# Patient Record
Sex: MALE | Race: ASIAN | NOT HISPANIC OR LATINO | Employment: OTHER | URBAN - METROPOLITAN AREA
[De-identification: names, ages, dates, MRNs, and addresses within clinical notes are randomized per-mention and may not be internally consistent; named-entity substitution may affect disease eponyms.]

---

## 2023-02-22 ENCOUNTER — OFFICE VISIT (OUTPATIENT)
Dept: FAMILY MEDICINE | Facility: CLINIC | Age: 73
End: 2023-02-22
Payer: MEDICAID

## 2023-02-22 VITALS
DIASTOLIC BLOOD PRESSURE: 73 MMHG | HEIGHT: 62 IN | WEIGHT: 137 LBS | HEART RATE: 69 BPM | SYSTOLIC BLOOD PRESSURE: 116 MMHG | TEMPERATURE: 99 F | OXYGEN SATURATION: 98 % | RESPIRATION RATE: 20 BRPM | BODY MASS INDEX: 25.21 KG/M2

## 2023-02-22 DIAGNOSIS — E11.9 TYPE 2 DIABETES MELLITUS WITHOUT COMPLICATION, WITHOUT LONG-TERM CURRENT USE OF INSULIN: Primary | ICD-10-CM

## 2023-02-22 DIAGNOSIS — R26.81 GAIT INSTABILITY: ICD-10-CM

## 2023-02-22 DIAGNOSIS — R26.2 DIFFICULTY WALKING: ICD-10-CM

## 2023-02-22 DIAGNOSIS — K59.00 CONSTIPATION, UNSPECIFIED CONSTIPATION TYPE: ICD-10-CM

## 2023-02-22 DIAGNOSIS — Z12.11 COLON CANCER SCREENING: ICD-10-CM

## 2023-02-22 DIAGNOSIS — I10 HYPERTENSION, UNSPECIFIED TYPE: ICD-10-CM

## 2023-02-22 DIAGNOSIS — I10 PRIMARY HYPERTENSION: ICD-10-CM

## 2023-02-22 DIAGNOSIS — Z00.00 WELLNESS EXAMINATION: ICD-10-CM

## 2023-02-22 DIAGNOSIS — R29.818 OTHER SYMPTOMS AND SIGNS INVOLVING THE NERVOUS SYSTEM: ICD-10-CM

## 2023-02-22 DIAGNOSIS — K59.09 OTHER CONSTIPATION: ICD-10-CM

## 2023-02-22 LAB
ALBUMIN SERPL-MCNC: 3.9 G/DL (ref 3.4–4.8)
ALBUMIN/GLOB SERPL: 1.1 RATIO (ref 1.1–2)
ALP SERPL-CCNC: 52 UNIT/L (ref 40–150)
ALT SERPL-CCNC: 28 UNIT/L (ref 0–55)
APPEARANCE UR: CLEAR
AST SERPL-CCNC: 22 UNIT/L (ref 5–34)
BACTERIA #/AREA URNS AUTO: ABNORMAL /HPF
BASOPHILS # BLD AUTO: 0.05 X10(3)/MCL (ref 0–0.2)
BASOPHILS NFR BLD AUTO: 0.7 %
BILIRUB UR QL STRIP.AUTO: NEGATIVE MG/DL
BILIRUBIN DIRECT+TOT PNL SERPL-MCNC: 0.8 MG/DL
BUN SERPL-MCNC: 16.8 MG/DL (ref 8.4–25.7)
CALCIUM SERPL-MCNC: 10.2 MG/DL (ref 8.8–10)
CHLORIDE SERPL-SCNC: 102 MMOL/L (ref 98–107)
CHOLEST SERPL-MCNC: 224 MG/DL
CHOLEST/HDLC SERPL: 4 {RATIO} (ref 0–5)
CO2 SERPL-SCNC: 28 MMOL/L (ref 23–31)
COLOR UR AUTO: YELLOW
CREAT SERPL-MCNC: 0.84 MG/DL (ref 0.73–1.18)
EOSINOPHIL # BLD AUTO: 0.5 X10(3)/MCL (ref 0–0.9)
EOSINOPHIL NFR BLD AUTO: 6.9 %
ERYTHROCYTE [DISTWIDTH] IN BLOOD BY AUTOMATED COUNT: 13.9 % (ref 11.5–17)
EST. AVERAGE GLUCOSE BLD GHB EST-MCNC: 269 MG/DL
GFR SERPLBLD CREATININE-BSD FMLA CKD-EPI: >60 MLS/MIN/1.73/M2
GLOBULIN SER-MCNC: 3.6 GM/DL (ref 2.4–3.5)
GLUCOSE SERPL-MCNC: 189 MG/DL (ref 82–115)
GLUCOSE UR QL STRIP.AUTO: NORMAL MG/DL
HAV IGM SERPL QL IA: NONREACTIVE
HBA1C MFR BLD: 11 %
HBV CORE IGM SERPL QL IA: NONREACTIVE
HBV SURFACE AG SERPL QL IA: NONREACTIVE
HCT VFR BLD AUTO: 40.1 % (ref 42–52)
HCV AB SERPL QL IA: NONREACTIVE
HDLC SERPL-MCNC: 59 MG/DL (ref 35–60)
HGB BLD-MCNC: 12.5 G/DL (ref 14–18)
HYALINE CASTS #/AREA URNS LPF: ABNORMAL /LPF
IMM GRANULOCYTES # BLD AUTO: 0.07 X10(3)/MCL (ref 0–0.04)
IMM GRANULOCYTES NFR BLD AUTO: 1 %
KETONES UR QL STRIP.AUTO: NEGATIVE MG/DL
LDLC SERPL CALC-MCNC: 138 MG/DL (ref 50–140)
LEUKOCYTE ESTERASE UR QL STRIP.AUTO: NEGATIVE UNIT/L
LYMPHOCYTES # BLD AUTO: 1.44 X10(3)/MCL (ref 0.6–4.6)
LYMPHOCYTES NFR BLD AUTO: 19.8 %
MCH RBC QN AUTO: 29.4 PG
MCHC RBC AUTO-ENTMCNC: 31.2 G/DL (ref 33–36)
MCV RBC AUTO: 94.4 FL (ref 80–94)
MONOCYTES # BLD AUTO: 0.55 X10(3)/MCL (ref 0.1–1.3)
MONOCYTES NFR BLD AUTO: 7.5 %
MUCOUS THREADS URNS QL MICRO: ABNORMAL /LPF
NEUTROPHILS # BLD AUTO: 4.68 X10(3)/MCL (ref 2.1–9.2)
NEUTROPHILS NFR BLD AUTO: 64.1 %
NITRITE UR QL STRIP.AUTO: NEGATIVE
NRBC BLD AUTO-RTO: 0 %
PH UR STRIP.AUTO: 5.5 [PH]
PLATELET # BLD AUTO: 345 X10(3)/MCL (ref 130–400)
PMV BLD AUTO: 10.3 FL (ref 7.4–10.4)
POTASSIUM SERPL-SCNC: 5 MMOL/L (ref 3.5–5.1)
PROT SERPL-MCNC: 7.5 GM/DL (ref 5.8–7.6)
PROT UR QL STRIP.AUTO: NEGATIVE MG/DL
RBC # BLD AUTO: 4.25 X10(6)/MCL (ref 4.7–6.1)
RBC #/AREA URNS AUTO: ABNORMAL /HPF
RBC UR QL AUTO: NEGATIVE UNIT/L
SODIUM SERPL-SCNC: 139 MMOL/L (ref 136–145)
SP GR UR STRIP.AUTO: 1.02
SQUAMOUS #/AREA URNS LPF: ABNORMAL /HPF
T4 FREE SERPL-MCNC: 1.04 NG/DL (ref 0.7–1.48)
TRIGL SERPL-MCNC: 137 MG/DL (ref 34–140)
TSH SERPL-ACNC: 1.26 UIU/ML (ref 0.35–4.94)
UROBILINOGEN UR STRIP-ACNC: NORMAL MG/DL
VLDLC SERPL CALC-MCNC: 27 MG/DL
WBC # SPEC AUTO: 7.3 X10(3)/MCL (ref 4.5–11.5)
WBC #/AREA URNS AUTO: ABNORMAL /HPF

## 2023-02-22 PROCEDURE — 84439 ASSAY OF FREE THYROXINE: CPT | Performed by: STUDENT IN AN ORGANIZED HEALTH CARE EDUCATION/TRAINING PROGRAM

## 2023-02-22 PROCEDURE — 80061 LIPID PANEL: CPT | Performed by: STUDENT IN AN ORGANIZED HEALTH CARE EDUCATION/TRAINING PROGRAM

## 2023-02-22 PROCEDURE — 3074F SYST BP LT 130 MM HG: CPT | Mod: CPTII,,, | Performed by: STUDENT IN AN ORGANIZED HEALTH CARE EDUCATION/TRAINING PROGRAM

## 2023-02-22 PROCEDURE — 80053 COMPREHEN METABOLIC PANEL: CPT | Performed by: STUDENT IN AN ORGANIZED HEALTH CARE EDUCATION/TRAINING PROGRAM

## 2023-02-22 PROCEDURE — 99204 PR OFFICE/OUTPT VISIT, NEW, LEVL IV, 45-59 MIN: ICD-10-PCS | Mod: S$PBB,,, | Performed by: STUDENT IN AN ORGANIZED HEALTH CARE EDUCATION/TRAINING PROGRAM

## 2023-02-22 PROCEDURE — 36415 COLL VENOUS BLD VENIPUNCTURE: CPT | Performed by: STUDENT IN AN ORGANIZED HEALTH CARE EDUCATION/TRAINING PROGRAM

## 2023-02-22 PROCEDURE — 3008F BODY MASS INDEX DOCD: CPT | Mod: CPTII,,, | Performed by: STUDENT IN AN ORGANIZED HEALTH CARE EDUCATION/TRAINING PROGRAM

## 2023-02-22 PROCEDURE — 1126F PR PAIN SEVERITY QUANTIFIED, NO PAIN PRESENT: ICD-10-PCS | Mod: CPTII,,, | Performed by: STUDENT IN AN ORGANIZED HEALTH CARE EDUCATION/TRAINING PROGRAM

## 2023-02-22 PROCEDURE — 83036 HEMOGLOBIN GLYCOSYLATED A1C: CPT | Performed by: STUDENT IN AN ORGANIZED HEALTH CARE EDUCATION/TRAINING PROGRAM

## 2023-02-22 PROCEDURE — 85025 COMPLETE CBC W/AUTO DIFF WBC: CPT | Performed by: STUDENT IN AN ORGANIZED HEALTH CARE EDUCATION/TRAINING PROGRAM

## 2023-02-22 PROCEDURE — 3074F PR MOST RECENT SYSTOLIC BLOOD PRESSURE < 130 MM HG: ICD-10-PCS | Mod: CPTII,,, | Performed by: STUDENT IN AN ORGANIZED HEALTH CARE EDUCATION/TRAINING PROGRAM

## 2023-02-22 PROCEDURE — 1126F AMNT PAIN NOTED NONE PRSNT: CPT | Mod: CPTII,,, | Performed by: STUDENT IN AN ORGANIZED HEALTH CARE EDUCATION/TRAINING PROGRAM

## 2023-02-22 PROCEDURE — 80074 ACUTE HEPATITIS PANEL: CPT | Performed by: STUDENT IN AN ORGANIZED HEALTH CARE EDUCATION/TRAINING PROGRAM

## 2023-02-22 PROCEDURE — 99204 OFFICE O/P NEW MOD 45 MIN: CPT | Mod: PBBFAC,PN | Performed by: STUDENT IN AN ORGANIZED HEALTH CARE EDUCATION/TRAINING PROGRAM

## 2023-02-22 PROCEDURE — 84443 ASSAY THYROID STIM HORMONE: CPT | Performed by: STUDENT IN AN ORGANIZED HEALTH CARE EDUCATION/TRAINING PROGRAM

## 2023-02-22 PROCEDURE — 3008F PR BODY MASS INDEX (BMI) DOCUMENTED: ICD-10-PCS | Mod: CPTII,,, | Performed by: STUDENT IN AN ORGANIZED HEALTH CARE EDUCATION/TRAINING PROGRAM

## 2023-02-22 PROCEDURE — 3078F PR MOST RECENT DIASTOLIC BLOOD PRESSURE < 80 MM HG: ICD-10-PCS | Mod: CPTII,,, | Performed by: STUDENT IN AN ORGANIZED HEALTH CARE EDUCATION/TRAINING PROGRAM

## 2023-02-22 PROCEDURE — 1101F PR PT FALLS ASSESS DOC 0-1 FALLS W/OUT INJ PAST YR: ICD-10-PCS | Mod: CPTII,,, | Performed by: STUDENT IN AN ORGANIZED HEALTH CARE EDUCATION/TRAINING PROGRAM

## 2023-02-22 PROCEDURE — 3078F DIAST BP <80 MM HG: CPT | Mod: CPTII,,, | Performed by: STUDENT IN AN ORGANIZED HEALTH CARE EDUCATION/TRAINING PROGRAM

## 2023-02-22 PROCEDURE — 81001 URINALYSIS AUTO W/SCOPE: CPT | Performed by: STUDENT IN AN ORGANIZED HEALTH CARE EDUCATION/TRAINING PROGRAM

## 2023-02-22 PROCEDURE — 3288F PR FALLS RISK ASSESSMENT DOCUMENTED: ICD-10-PCS | Mod: CPTII,,, | Performed by: STUDENT IN AN ORGANIZED HEALTH CARE EDUCATION/TRAINING PROGRAM

## 2023-02-22 PROCEDURE — 1101F PT FALLS ASSESS-DOCD LE1/YR: CPT | Mod: CPTII,,, | Performed by: STUDENT IN AN ORGANIZED HEALTH CARE EDUCATION/TRAINING PROGRAM

## 2023-02-22 PROCEDURE — 99204 OFFICE O/P NEW MOD 45 MIN: CPT | Mod: S$PBB,,, | Performed by: STUDENT IN AN ORGANIZED HEALTH CARE EDUCATION/TRAINING PROGRAM

## 2023-02-22 PROCEDURE — 3288F FALL RISK ASSESSMENT DOCD: CPT | Mod: CPTII,,, | Performed by: STUDENT IN AN ORGANIZED HEALTH CARE EDUCATION/TRAINING PROGRAM

## 2023-02-22 RX ORDER — METFORMIN HYDROCHLORIDE 500 MG/1
500 TABLET ORAL 2 TIMES DAILY
COMMUNITY
Start: 2023-01-20 | End: 2023-06-06 | Stop reason: SDUPTHER

## 2023-02-22 RX ORDER — POLYETHYLENE GLYCOL 3350 17 G/17G
17 POWDER, FOR SOLUTION ORAL DAILY
Qty: 289 G | Refills: 11 | Status: SHIPPED | OUTPATIENT
Start: 2023-02-22 | End: 2023-03-27

## 2023-02-22 NOTE — PROGRESS NOTES
Patient Name: William Mccauley   : 1950  MRN: [id number]     Subjective:   Patient ID: William Mccauley is a 72 y.o. male.    Chief Complaint:   Chief Complaint   Patient presents with    Establish Care        HPI: HPI  72-year-old Sinhala speaking male presents to clinic with  Son to establish care did not have prior PCP  Reports receiving his care at urgent cares or ED  Sinhala  used for duration of interview and physical      Diabetes  Reports that he is taking metformin 500 mg  2 times a day    Hypertension  Reports being on a blood pressure medication but chart records do not indicate that any blood pressure medicine was filled  Within the past year patient and caregiver asked to call the clinic with the name of the medication as patient reports that he last took medication last night      Difficulty walking  Son and patient both report an abrupt difficulty walking approximately 7 months ago  No inciting trauma  Son reports noting that his father's facial features do not seem to be symmetric all of the time and noted that there was an apparent   droop to 1 side of his mouth denies any issues with  Confusion or memory  State he had CT of back at outside ED. Negative per patient.       Constipation  States that he often has difficulty with constipation and sometimes will bleed a small amount onto the toilet paper after having a very difficult bowel movement      Med hx- as above  Surgical hx- denies  Family hx- mother  from cirrhosis of the liver.  States that she did not drink  Social hx- non smoker, 1 can of beer per week          ROS:  Review of Systems   Constitutional:  Negative for chills and fever.   HENT:  Negative for sore throat.    Respiratory:  Negative for shortness of breath and wheezing.    Cardiovascular:  Negative for chest pain, palpitations and leg swelling.   Gastrointestinal:  Negative for constipation, diarrhea and heartburn.   Genitourinary:  Negative for frequency and urgency.  "  Musculoskeletal:  Positive for joint pain. Negative for back pain and myalgias.   Skin:  Negative for itching and rash.   Neurological:  Negative for dizziness, focal weakness and headaches.   Psychiatric/Behavioral:  The patient is not nervous/anxious.     History:     Past Medical History:   Diagnosis Date    Diabetes mellitus, type 2       Past Surgical History:   Procedure Laterality Date    KIDNEY STONE SURGERY Left      History reviewed. No pertinent family history.   Social History     Tobacco Use    Smoking status: Never     Passive exposure: Never    Smokeless tobacco: Never   Substance and Sexual Activity    Alcohol use: Yes     Alcohol/week: 1.0 standard drink     Types: 1 Cans of beer per week     Comment: every once in awhile    Drug use: Never    Sexual activity: Yes     Partners: Female        Allergies: Review of patient's allergies indicates:  No Known Allergies  Objective:     Vitals:    02/22/23 1216   BP: 116/73   Pulse: 69   Resp: 20   Temp: 98.5 °F (36.9 °C)   SpO2: 98%   Weight: 62.1 kg (137 lb)   Height: 5' 2" (1.575 m)   PainSc: 0-No pain     Body mass index is 25.06 kg/m².     Physical Examination:   Physical Exam  Constitutional:       General: He is not in acute distress.  Eyes:      General: No scleral icterus.     Extraocular Movements: Extraocular movements intact.      Conjunctiva/sclera: Conjunctivae normal.      Pupils: Pupils are equal, round, and reactive to light.   Cardiovascular:      Rate and Rhythm: Normal rate and regular rhythm.      Heart sounds: No murmur heard.  Pulmonary:      Effort: Pulmonary effort is normal. No respiratory distress.      Breath sounds: No stridor. No wheezing or rhonchi.   Abdominal:      General: Bowel sounds are normal. There is no distension.      Palpations: Abdomen is soft.      Tenderness: There is no abdominal tenderness. There is no guarding.   Musculoskeletal:         General: No swelling. Normal range of motion.   Skin:     General: Skin " is warm and dry.      Coloration: Skin is not jaundiced.      Findings: No rash.   Neurological:      Mental Status: He is alert and oriented to person, place, and time.      Cranial Nerves: Cranial nerve deficit present.      Sensory: Sensory deficit present.      Gait: Gait abnormal.      Comments: Slight assymetry to smile on right  Left eye subjective lid droop per patient but cranial nerve III and VII intact in that region.   No dysdiadochokinesia negative Romberg  Shuffling gait   Psychiatric:         Thought Content: Thought content normal.       Assessment:     1. Type 2 diabetes mellitus without complication, without long-term current use of insulin    2. Hypertension, unspecified type    3. Wellness examination    4. Other symptoms and signs involving the nervous system    5. Difficulty walking    6. Colon cancer screening    7. Constipation, unspecified constipation type    8. Gait instability    9. Primary hypertension    10. Other constipation        Plan:     Problem List Items Addressed This Visit          Cardiac/Vascular    Primary hypertension    Overview     Family does not know name of BP med.  Chart records do not indicate BP med given  Advised family to call with name of BP med             Endocrine    Type 2 diabetes mellitus without complication - Primary    Overview     Filled metformin for patient.     A1c         Relevant Medications    metFORMIN (GLUCOPHAGE) 500 MG tablet    Other Relevant Orders    Comprehensive Metabolic Panel    Hemoglobin A1C       GI    Other constipation    Overview     Miralax 17 g daily             Other    Gait instability    Overview     CT head ordered as cranial nerve deficit noted  PT ordered  ED precautions. Patient has rollator but did not bring to appointment.           Other Visit Diagnoses       Hypertension, unspecified type        Relevant Orders    CBC Auto Differential    Comprehensive Metabolic Panel    Lipid Panel    T4, Free    TSH    Urinalysis     Hemoglobin A1C    Wellness examination        Relevant Orders    Hepatitis Panel, Acute    Other symptoms and signs involving the nervous system        Relevant Orders    CT Head Without Contrast    Ambulatory referral/consult to Physical/Occupational Therapy    Difficulty walking        Relevant Orders    CT Head Without Contrast    Ambulatory referral/consult to Physical/Occupational Therapy    Colon cancer screening        Relevant Orders    Cologuard Screening (Multitarget Stool DNA)    Constipation, unspecified constipation type        Relevant Medications    polyethylene glycol (GLYCOLAX) 17 gram/dose powder           Problem List Items Addressed This Visit          Cardiac/Vascular    Primary hypertension    Overview     Family does not know name of BP med.  Chart records do not indicate BP med given  Advised family to call with name of BP med             Endocrine    Type 2 diabetes mellitus without complication - Primary    Overview     Filled metformin for patient.     A1c         Relevant Medications    metFORMIN (GLUCOPHAGE) 500 MG tablet    Other Relevant Orders    Comprehensive Metabolic Panel    Hemoglobin A1C       GI    Other constipation    Overview     Miralax 17 g daily             Other    Gait instability    Overview     CT head ordered as cranial nerve deficit noted  PT ordered  ED precautions. Patient has rollator but did not bring to appointment.           Other Visit Diagnoses       Hypertension, unspecified type        Relevant Orders    CBC Auto Differential    Comprehensive Metabolic Panel    Lipid Panel    T4, Free    TSH    Urinalysis    Hemoglobin A1C    Wellness examination        Relevant Orders    Hepatitis Panel, Acute    Other symptoms and signs involving the nervous system        Relevant Orders    CT Head Without Contrast    Ambulatory referral/consult to Physical/Occupational Therapy    Difficulty walking        Relevant Orders    CT Head Without Contrast    Ambulatory  referral/consult to Physical/Occupational Therapy    Colon cancer screening        Relevant Orders    Cologuard Screening (Multitarget Stool DNA)    Constipation, unspecified constipation type        Relevant Medications    polyethylene glycol (GLYCOLAX) 17 gram/dose powder           Follow up in about 2 weeks (around 3/8/2023) for lab results.

## 2023-02-24 LAB — PATH REV: NORMAL

## 2023-03-07 ENCOUNTER — HOSPITAL ENCOUNTER (OUTPATIENT)
Dept: RADIOLOGY | Facility: HOSPITAL | Age: 73
Discharge: HOME | End: 2023-03-07
Attending: STUDENT IN AN ORGANIZED HEALTH CARE EDUCATION/TRAINING PROGRAM
Payer: MEDICAID

## 2023-03-07 DIAGNOSIS — R29.818 OTHER SYMPTOMS AND SIGNS INVOLVING THE NERVOUS SYSTEM: ICD-10-CM

## 2023-03-07 DIAGNOSIS — R26.2 DIFFICULTY WALKING: ICD-10-CM

## 2023-03-07 PROCEDURE — 70450 CT HEAD/BRAIN W/O DYE: CPT | Mod: TC

## 2023-03-10 DIAGNOSIS — I63.89 OTHER CEREBRAL INFARCTION: Primary | ICD-10-CM

## 2023-03-20 LAB — NONINV COLON CA DNA+OCC BLD SCRN STL QL: POSITIVE

## 2023-03-22 ENCOUNTER — HOSPITAL ENCOUNTER (OUTPATIENT)
Dept: RADIOLOGY | Facility: HOSPITAL | Age: 73
Discharge: HOME | End: 2023-03-22
Attending: STUDENT IN AN ORGANIZED HEALTH CARE EDUCATION/TRAINING PROGRAM
Payer: MEDICAID

## 2023-03-22 DIAGNOSIS — I63.89 OTHER CEREBRAL INFARCTION: ICD-10-CM

## 2023-03-22 PROCEDURE — 70551 MRI BRAIN STEM W/O DYE: CPT | Mod: TC

## 2023-03-27 ENCOUNTER — OFFICE VISIT (OUTPATIENT)
Dept: FAMILY MEDICINE | Facility: CLINIC | Age: 73
End: 2023-03-27
Payer: MEDICAID

## 2023-03-27 ENCOUNTER — HOSPITAL ENCOUNTER (OUTPATIENT)
Dept: RADIOLOGY | Facility: HOSPITAL | Age: 73
Discharge: HOME | End: 2023-03-27
Attending: STUDENT IN AN ORGANIZED HEALTH CARE EDUCATION/TRAINING PROGRAM
Payer: MEDICAID

## 2023-03-27 VITALS
HEIGHT: 62 IN | OXYGEN SATURATION: 97 % | DIASTOLIC BLOOD PRESSURE: 77 MMHG | HEART RATE: 71 BPM | TEMPERATURE: 98 F | BODY MASS INDEX: 22.82 KG/M2 | SYSTOLIC BLOOD PRESSURE: 133 MMHG | WEIGHT: 124 LBS | RESPIRATION RATE: 20 BRPM

## 2023-03-27 DIAGNOSIS — B37.9 YEAST INFECTION: ICD-10-CM

## 2023-03-27 DIAGNOSIS — K92.1 BLOOD IN STOOL: ICD-10-CM

## 2023-03-27 DIAGNOSIS — R60.9 EDEMA, UNSPECIFIED TYPE: ICD-10-CM

## 2023-03-27 DIAGNOSIS — R71.8 ELEVATED MCV: ICD-10-CM

## 2023-03-27 DIAGNOSIS — Z86.73 HISTORY OF CVA (CEREBROVASCULAR ACCIDENT): ICD-10-CM

## 2023-03-27 DIAGNOSIS — Z86.73 HISTORY OF STROKE: ICD-10-CM

## 2023-03-27 DIAGNOSIS — R06.02 SOB (SHORTNESS OF BREATH): ICD-10-CM

## 2023-03-27 DIAGNOSIS — E78.49 OTHER HYPERLIPIDEMIA: ICD-10-CM

## 2023-03-27 DIAGNOSIS — D64.9 ANEMIA, UNSPECIFIED TYPE: Primary | ICD-10-CM

## 2023-03-27 DIAGNOSIS — E11.65 UNCONTROLLED TYPE 2 DIABETES MELLITUS WITH HYPERGLYCEMIA: ICD-10-CM

## 2023-03-27 DIAGNOSIS — M79.89 LEG SWELLING: ICD-10-CM

## 2023-03-27 DIAGNOSIS — K62.5 BLOOD PER RECTUM: ICD-10-CM

## 2023-03-27 DIAGNOSIS — K59.00 CONSTIPATION, UNSPECIFIED CONSTIPATION TYPE: ICD-10-CM

## 2023-03-27 DIAGNOSIS — Z12.5 PROSTATE CANCER SCREENING: ICD-10-CM

## 2023-03-27 DIAGNOSIS — E78.5 HYPERLIPIDEMIA, UNSPECIFIED HYPERLIPIDEMIA TYPE: ICD-10-CM

## 2023-03-27 LAB
DEPRECATED CALCIDIOL+CALCIFEROL SERPL-MC: 28.1 NG/ML (ref 30–80)
FOLATE SERPL-MCNC: 18.7 NG/ML (ref 7–31.4)
IRON SATN MFR SERPL: 11 % (ref 20–50)
IRON SERPL-MCNC: 35 UG/DL (ref 65–175)
PSA SERPL-MCNC: 0.33 NG/ML
TIBC SERPL-MCNC: 293 UG/DL (ref 69–240)
TIBC SERPL-MCNC: 328 UG/DL (ref 250–450)
TRANSFERRIN SERPL-MCNC: 293 MG/DL (ref 163–344)
VIT B12 SERPL-MCNC: 732 PG/ML (ref 213–816)

## 2023-03-27 PROCEDURE — 83550 IRON BINDING TEST: CPT | Performed by: STUDENT IN AN ORGANIZED HEALTH CARE EDUCATION/TRAINING PROGRAM

## 2023-03-27 PROCEDURE — 3288F FALL RISK ASSESSMENT DOCD: CPT | Mod: CPTII,,, | Performed by: STUDENT IN AN ORGANIZED HEALTH CARE EDUCATION/TRAINING PROGRAM

## 2023-03-27 PROCEDURE — 3078F PR MOST RECENT DIASTOLIC BLOOD PRESSURE < 80 MM HG: ICD-10-PCS | Mod: CPTII,,, | Performed by: STUDENT IN AN ORGANIZED HEALTH CARE EDUCATION/TRAINING PROGRAM

## 2023-03-27 PROCEDURE — 3008F BODY MASS INDEX DOCD: CPT | Mod: CPTII,,, | Performed by: STUDENT IN AN ORGANIZED HEALTH CARE EDUCATION/TRAINING PROGRAM

## 2023-03-27 PROCEDURE — 82306 VITAMIN D 25 HYDROXY: CPT | Performed by: STUDENT IN AN ORGANIZED HEALTH CARE EDUCATION/TRAINING PROGRAM

## 2023-03-27 PROCEDURE — 1101F PT FALLS ASSESS-DOCD LE1/YR: CPT | Mod: CPTII,,, | Performed by: STUDENT IN AN ORGANIZED HEALTH CARE EDUCATION/TRAINING PROGRAM

## 2023-03-27 PROCEDURE — 3078F DIAST BP <80 MM HG: CPT | Mod: CPTII,,, | Performed by: STUDENT IN AN ORGANIZED HEALTH CARE EDUCATION/TRAINING PROGRAM

## 2023-03-27 PROCEDURE — 1126F PR PAIN SEVERITY QUANTIFIED, NO PAIN PRESENT: ICD-10-PCS | Mod: CPTII,,, | Performed by: STUDENT IN AN ORGANIZED HEALTH CARE EDUCATION/TRAINING PROGRAM

## 2023-03-27 PROCEDURE — 3075F SYST BP GE 130 - 139MM HG: CPT | Mod: CPTII,,, | Performed by: STUDENT IN AN ORGANIZED HEALTH CARE EDUCATION/TRAINING PROGRAM

## 2023-03-27 PROCEDURE — 84153 ASSAY OF PSA TOTAL: CPT | Performed by: STUDENT IN AN ORGANIZED HEALTH CARE EDUCATION/TRAINING PROGRAM

## 2023-03-27 PROCEDURE — 36415 COLL VENOUS BLD VENIPUNCTURE: CPT | Performed by: STUDENT IN AN ORGANIZED HEALTH CARE EDUCATION/TRAINING PROGRAM

## 2023-03-27 PROCEDURE — 3288F PR FALLS RISK ASSESSMENT DOCUMENTED: ICD-10-PCS | Mod: CPTII,,, | Performed by: STUDENT IN AN ORGANIZED HEALTH CARE EDUCATION/TRAINING PROGRAM

## 2023-03-27 PROCEDURE — 3075F PR MOST RECENT SYSTOLIC BLOOD PRESS GE 130-139MM HG: ICD-10-PCS | Mod: CPTII,,, | Performed by: STUDENT IN AN ORGANIZED HEALTH CARE EDUCATION/TRAINING PROGRAM

## 2023-03-27 PROCEDURE — 82746 ASSAY OF FOLIC ACID SERUM: CPT | Performed by: STUDENT IN AN ORGANIZED HEALTH CARE EDUCATION/TRAINING PROGRAM

## 2023-03-27 PROCEDURE — 99214 OFFICE O/P EST MOD 30 MIN: CPT | Mod: S$PBB,,, | Performed by: STUDENT IN AN ORGANIZED HEALTH CARE EDUCATION/TRAINING PROGRAM

## 2023-03-27 PROCEDURE — 1101F PR PT FALLS ASSESS DOC 0-1 FALLS W/OUT INJ PAST YR: ICD-10-PCS | Mod: CPTII,,, | Performed by: STUDENT IN AN ORGANIZED HEALTH CARE EDUCATION/TRAINING PROGRAM

## 2023-03-27 PROCEDURE — 99215 OFFICE O/P EST HI 40 MIN: CPT | Mod: PBBFAC,25,PN | Performed by: STUDENT IN AN ORGANIZED HEALTH CARE EDUCATION/TRAINING PROGRAM

## 2023-03-27 PROCEDURE — 71046 X-RAY EXAM CHEST 2 VIEWS: CPT | Mod: TC,PN

## 2023-03-27 PROCEDURE — 3008F PR BODY MASS INDEX (BMI) DOCUMENTED: ICD-10-PCS | Mod: CPTII,,, | Performed by: STUDENT IN AN ORGANIZED HEALTH CARE EDUCATION/TRAINING PROGRAM

## 2023-03-27 PROCEDURE — 1159F PR MEDICATION LIST DOCUMENTED IN MEDICAL RECORD: ICD-10-PCS | Mod: CPTII,,, | Performed by: STUDENT IN AN ORGANIZED HEALTH CARE EDUCATION/TRAINING PROGRAM

## 2023-03-27 PROCEDURE — 82607 VITAMIN B-12: CPT | Performed by: STUDENT IN AN ORGANIZED HEALTH CARE EDUCATION/TRAINING PROGRAM

## 2023-03-27 PROCEDURE — 1159F MED LIST DOCD IN RCRD: CPT | Mod: CPTII,,, | Performed by: STUDENT IN AN ORGANIZED HEALTH CARE EDUCATION/TRAINING PROGRAM

## 2023-03-27 PROCEDURE — 1126F AMNT PAIN NOTED NONE PRSNT: CPT | Mod: CPTII,,, | Performed by: STUDENT IN AN ORGANIZED HEALTH CARE EDUCATION/TRAINING PROGRAM

## 2023-03-27 PROCEDURE — 99214 PR OFFICE/OUTPT VISIT, EST, LEVL IV, 30-39 MIN: ICD-10-PCS | Mod: S$PBB,,, | Performed by: STUDENT IN AN ORGANIZED HEALTH CARE EDUCATION/TRAINING PROGRAM

## 2023-03-27 RX ORDER — ASPIRIN 81 MG/1
81 TABLET ORAL DAILY
Qty: 30 TABLET | Refills: 11 | Status: SHIPPED | OUTPATIENT
Start: 2023-03-27 | End: 2024-03-26

## 2023-03-27 RX ORDER — FLUCONAZOLE 150 MG/1
TABLET ORAL
Qty: 2 TABLET | Refills: 1 | Status: SHIPPED | OUTPATIENT
Start: 2023-03-27 | End: 2023-09-07

## 2023-03-27 RX ORDER — POLYETHYLENE GLYCOL 3350 17 G/17G
17 POWDER, FOR SOLUTION ORAL DAILY
Qty: 289 G | Refills: 11 | Status: SHIPPED | OUTPATIENT
Start: 2023-03-27 | End: 2023-09-07

## 2023-03-27 RX ORDER — ROSUVASTATIN CALCIUM 5 MG/1
5 TABLET, COATED ORAL DAILY
Qty: 90 TABLET | Refills: 3 | Status: SHIPPED | OUTPATIENT
Start: 2023-03-27 | End: 2023-04-27

## 2023-03-27 NOTE — PROGRESS NOTES
Patient Name: William Mccauley   : 1950  MRN: [id number]     Subjective:   Patient ID: William Mccauley is a 72 y.o. male.    Chief Complaint:   Chief Complaint   Patient presents with    Follow-up        HPI: HPI  72-year-old Tunisian speaking male presents to clinic with daughter for follow up.   Tunisian  used for duration of interview and physical      Diabetes  Reports that he is taking metformin 500 mg  2 times a day  A1c 11    History of CVA  At 1st visit patient and son both reported an abrupt difficulty with walking approximately 7 months ago  CT and subsequent MRI was completed which revealed bilateral  Found make strokes and pontine stroke  Patient has not attended physical therapy stating that he has been using nerves on his legs  Does have a walker but did not use it stating that he prefers to hold on to a family member when he is walking  Denies pain in his legs but states that he gets cramps in his legs at night    Previously son reportednoting noting that his father's facial features do not seem to be symmetric all of the time and noted that there was an apparent   droop to 1 side of his mouth denies any issues with  Confusion or memory    Constipation/blood per rectum  Reporting that constipation is relieved with MiraLax would like prescription  Notes that at times he has blood on the toilet  Paper    Anemia  H&H 12.5 and 40.1  MCV 94.4    States that at times he does feel slight shortness of breath but it quantify what he is doing when this occurs states mostly when he is taking deep breaths    Also reports that he is very tired and feels that is because he does not really walk very well      Med hx- as above  Surgical hx- denies  Family hx- mother  from cirrhosis of the liver.  States that she did not drink  Social hx- non smoker, 1 can of beer per week          ROS:  Review of Systems   Constitutional:  Positive for malaise/fatigue. Negative for chills and fever.   HENT:  Negative for  "sore throat.    Respiratory:  Positive for shortness of breath. Negative for wheezing.    Cardiovascular:  Positive for leg swelling. Negative for chest pain and palpitations.   Gastrointestinal:  Positive for blood in stool. Negative for constipation, diarrhea and heartburn.   Genitourinary:  Negative for frequency and urgency.   Musculoskeletal:  Positive for joint pain. Negative for back pain and myalgias.   Skin:  Negative for itching and rash.   Neurological:  Positive for weakness. Negative for dizziness, focal weakness and headaches.   Psychiatric/Behavioral:  The patient is not nervous/anxious.     History:     Past Medical History:   Diagnosis Date    Diabetes mellitus, type 2       Past Surgical History:   Procedure Laterality Date    KIDNEY STONE SURGERY Left      History reviewed. No pertinent family history.   Social History     Tobacco Use    Smoking status: Never     Passive exposure: Never    Smokeless tobacco: Never   Substance and Sexual Activity    Alcohol use: Yes     Alcohol/week: 1.0 standard drink     Types: 1 Cans of beer per week     Comment: every once in awhile    Drug use: Never    Sexual activity: Yes     Partners: Female        Allergies: Review of patient's allergies indicates:  No Known Allergies  Objective:     Vitals:    03/27/23 0835   BP: 133/77   Pulse: 71   Resp: 20   Temp: 98 °F (36.7 °C)   SpO2: 97%   Weight: 56.2 kg (124 lb)   Height: 5' 2" (1.575 m)   PainSc: 0-No pain     Body mass index is 22.68 kg/m².     Physical Examination:   Physical Exam  Constitutional:       General: He is not in acute distress.     Comments: Appears tired   Eyes:      General: No scleral icterus.     Extraocular Movements: Extraocular movements intact.      Conjunctiva/sclera: Conjunctivae normal.      Pupils: Pupils are equal, round, and reactive to light.   Cardiovascular:      Rate and Rhythm: Normal rate and regular rhythm.      Heart sounds: No murmur heard.  Pulmonary:      Effort: Pulmonary " effort is normal. No respiratory distress.      Breath sounds: No stridor. No wheezing or rhonchi.   Abdominal:      General: Bowel sounds are normal. There is no distension.      Palpations: Abdomen is soft.      Tenderness: There is no abdominal tenderness. There is no guarding.   Musculoskeletal:         General: Swelling present. Normal range of motion.      Comments: Swelling noted to bilateral ankles   Skin:     General: Skin is warm and dry.      Coloration: Skin is not jaundiced.      Findings: No rash.   Neurological:      Mental Status: He is alert and oriented to person, place, and time.      Cranial Nerves: Cranial nerve deficit present.      Sensory: Sensory deficit present.      Gait: Gait abnormal.      Comments: Slight assymetry to smile on right  Left eye subjective lid droop per patient but cranial nerve III and VII intact in that region.   No dysdiadochokinesia negative Romberg  Shuffling gait   Psychiatric:         Thought Content: Thought content normal.       Assessment:     1. Anemia, unspecified type    2. Elevated MCV    3. Prostate cancer screening    4. SOB (shortness of breath)    5. Blood in stool    6. History of CVA (cerebrovascular accident)    7. Uncontrolled type 2 diabetes mellitus with hyperglycemia    8. Yeast infection    9. Constipation, unspecified constipation type    10. Hyperlipidemia, unspecified hyperlipidemia type    11. History of stroke    12. Edema, unspecified type    13. Leg swelling    14. Blood per rectum    15. Other hyperlipidemia        Plan:     Problem List Items Addressed This Visit          Neuro    History of CVA (cerebrovascular accident)    Overview     Patient has been referred to physical therapy  Phone number given for patient to call  Encouraged walker use to avoid falls  Patient declines Plavix but is amenable to aspirin 81 mg  Starting Crestor 5 mg daily with intent to titrate  Discussed decreasing risk factors with patient which would mean to  control blood sugar, blood pressure, hyperlipidemia, antiplatelet therapy    Referral to neurology         Relevant Orders    Ambulatory referral/consult to Physical/Occupational Therapy       Pulmonary    SOB (shortness of breath)    Overview     Unclear etiology. CXR today          Relevant Orders    X-Ray Chest PA And Lateral (Completed)    Ambulatory referral/consult to Cardiology       Cardiac/Vascular    Other hyperlipidemia    Overview     Crestor 5 mg daily  Will seek to add lisinopril if blood pressure tolerates            Endocrine    Uncontrolled type 2 diabetes mellitus with hyperglycemia    Overview     A1c 11  Continue metformin  500 mg b.i.d. Jardiance 10 mg added with medication adverse effect discussion with family  Declines to try Ozempic at this time  Will refer to Diabetes Education         Relevant Medications    empagliflozin (JARDIANCE) 10 mg tablet    Other Relevant Orders    Ambulatory referral/consult to Diabetes Education       GI    Blood per rectum    Overview     Referring to Gastroenterology  May be etiology of anemia but will check B12, folate, iron as patient also has component of leg cramps            Other    Leg swelling    Overview     Referring to cardiology          Other Visit Diagnoses       Anemia, unspecified type    -  Primary    Relevant Orders    Vitamin B12    Folate    Vitamin D    Iron and TIBC    Elevated MCV        Relevant Orders    Vitamin B12    Folate    Prostate cancer screening        Relevant Orders    PSA, Screening    Blood in stool        Relevant Orders    Ambulatory referral/consult to Gastroenterology    Yeast infection        Relevant Medications    fluconazole (DIFLUCAN) 150 MG Tab    Constipation, unspecified constipation type        Relevant Medications    polyethylene glycol (GLYCOLAX) 17 gram/dose powder    Hyperlipidemia, unspecified hyperlipidemia type        Relevant Medications    rosuvastatin (CRESTOR) 5 MG tablet    History of stroke         Relevant Medications    aspirin (ECOTRIN) 81 MG EC tablet    Other Relevant Orders    Ambulatory referral/consult to Neurology    Edema, unspecified type        Relevant Orders    Ambulatory referral/consult to Cardiology           Problem List Items Addressed This Visit          Neuro    History of CVA (cerebrovascular accident)    Overview     Patient has been referred to physical therapy  Phone number given for patient to call  Encouraged walker use to avoid falls  Patient declines Plavix but is amenable to aspirin 81 mg  Starting Crestor 5 mg daily with intent to titrate  Discussed decreasing risk factors with patient which would mean to control blood sugar, blood pressure, hyperlipidemia, antiplatelet therapy    Referral to neurology         Relevant Orders    Ambulatory referral/consult to Physical/Occupational Therapy       Pulmonary    SOB (shortness of breath)    Overview     Unclear etiology. CXR today          Relevant Orders    X-Ray Chest PA And Lateral (Completed)    Ambulatory referral/consult to Cardiology       Cardiac/Vascular    Other hyperlipidemia    Overview     Crestor 5 mg daily  Will seek to add lisinopril if blood pressure tolerates            Endocrine    Uncontrolled type 2 diabetes mellitus with hyperglycemia    Overview     A1c 11  Continue metformin  500 mg b.i.d. Jardiance 10 mg added with medication adverse effect discussion with family  Declines to try Ozempic at this time  Will refer to Diabetes Education         Relevant Medications    empagliflozin (JARDIANCE) 10 mg tablet    Other Relevant Orders    Ambulatory referral/consult to Diabetes Education       GI    Blood per rectum    Overview     Referring to Gastroenterology  May be etiology of anemia but will check B12, folate, iron as patient also has component of leg cramps            Other    Leg swelling    Overview     Referring to cardiology          Other Visit Diagnoses       Anemia, unspecified type    -  Primary     Relevant Orders    Vitamin B12    Folate    Vitamin D    Iron and TIBC    Elevated MCV        Relevant Orders    Vitamin B12    Folate    Prostate cancer screening        Relevant Orders    PSA, Screening    Blood in stool        Relevant Orders    Ambulatory referral/consult to Gastroenterology    Yeast infection        Relevant Medications    fluconazole (DIFLUCAN) 150 MG Tab    Constipation, unspecified constipation type        Relevant Medications    polyethylene glycol (GLYCOLAX) 17 gram/dose powder    Hyperlipidemia, unspecified hyperlipidemia type        Relevant Medications    rosuvastatin (CRESTOR) 5 MG tablet    History of stroke        Relevant Medications    aspirin (ECOTRIN) 81 MG EC tablet    Other Relevant Orders    Ambulatory referral/consult to Neurology    Edema, unspecified type        Relevant Orders    Ambulatory referral/consult to Cardiology           Problem List Items Addressed This Visit          Neuro    History of CVA (cerebrovascular accident)    Overview     Patient has been referred to physical therapy  Phone number given for patient to call  Encouraged walker use to avoid falls  Patient declines Plavix but is amenable to aspirin 81 mg  Starting Crestor 5 mg daily with intent to titrate  Discussed decreasing risk factors with patient which would mean to control blood sugar, blood pressure, hyperlipidemia, antiplatelet therapy    Referral to neurology         Relevant Orders    Ambulatory referral/consult to Physical/Occupational Therapy       Pulmonary    SOB (shortness of breath)    Overview     Unclear etiology. CXR today          Relevant Orders    X-Ray Chest PA And Lateral (Completed)    Ambulatory referral/consult to Cardiology       Cardiac/Vascular    Other hyperlipidemia    Overview     Crestor 5 mg daily  Will seek to add lisinopril if blood pressure tolerates            Endocrine    Uncontrolled type 2 diabetes mellitus with hyperglycemia    Overview     A1c 11  Continue  metformin  500 mg b.i.d. Jardiance 10 mg added with medication adverse effect discussion with family  Declines to try Ozempic at this time  Will refer to Diabetes Education         Relevant Medications    empagliflozin (JARDIANCE) 10 mg tablet    Other Relevant Orders    Ambulatory referral/consult to Diabetes Education       GI    Blood per rectum    Overview     Referring to Gastroenterology  May be etiology of anemia but will check B12, folate, iron as patient also has component of leg cramps            Other    Leg swelling    Overview     Referring to cardiology          Other Visit Diagnoses       Anemia, unspecified type    -  Primary    Relevant Orders    Vitamin B12    Folate    Vitamin D    Iron and TIBC    Elevated MCV        Relevant Orders    Vitamin B12    Folate    Prostate cancer screening        Relevant Orders    PSA, Screening    Blood in stool        Relevant Orders    Ambulatory referral/consult to Gastroenterology    Yeast infection        Relevant Medications    fluconazole (DIFLUCAN) 150 MG Tab    Constipation, unspecified constipation type        Relevant Medications    polyethylene glycol (GLYCOLAX) 17 gram/dose powder    Hyperlipidemia, unspecified hyperlipidemia type        Relevant Medications    rosuvastatin (CRESTOR) 5 MG tablet    History of stroke        Relevant Medications    aspirin (ECOTRIN) 81 MG EC tablet    Other Relevant Orders    Ambulatory referral/consult to Neurology    Edema, unspecified type        Relevant Orders    Ambulatory referral/consult to Cardiology           Follow up in about 1 month (around 4/27/2023) for Diabetes, lab results.

## 2023-03-28 ENCOUNTER — TELEPHONE (OUTPATIENT)
Dept: DIABETES | Facility: CLINIC | Age: 73
End: 2023-03-28
Payer: MEDICAID

## 2023-04-27 ENCOUNTER — OFFICE VISIT (OUTPATIENT)
Dept: FAMILY MEDICINE | Facility: CLINIC | Age: 73
End: 2023-04-27
Payer: MEDICAID

## 2023-04-27 VITALS
SYSTOLIC BLOOD PRESSURE: 149 MMHG | OXYGEN SATURATION: 98 % | RESPIRATION RATE: 20 BRPM | HEART RATE: 65 BPM | DIASTOLIC BLOOD PRESSURE: 82 MMHG | BODY MASS INDEX: 25.21 KG/M2 | TEMPERATURE: 98 F | WEIGHT: 137 LBS | HEIGHT: 62 IN

## 2023-04-27 DIAGNOSIS — L21.9 SEBORRHEA: ICD-10-CM

## 2023-04-27 DIAGNOSIS — E78.5 HYPERLIPIDEMIA, UNSPECIFIED HYPERLIPIDEMIA TYPE: ICD-10-CM

## 2023-04-27 DIAGNOSIS — E11.9 TYPE 2 DIABETES MELLITUS WITHOUT COMPLICATION, WITHOUT LONG-TERM CURRENT USE OF INSULIN: ICD-10-CM

## 2023-04-27 DIAGNOSIS — E11.65 UNCONTROLLED TYPE 2 DIABETES MELLITUS WITH HYPERGLYCEMIA: Primary | ICD-10-CM

## 2023-04-27 DIAGNOSIS — I10 PRIMARY HYPERTENSION: ICD-10-CM

## 2023-04-27 DIAGNOSIS — L21.9 SEBORRHEIC DERMATITIS: ICD-10-CM

## 2023-04-27 DIAGNOSIS — K62.5 BLOOD PER RECTUM: ICD-10-CM

## 2023-04-27 DIAGNOSIS — I10 HYPERTENSION, UNSPECIFIED TYPE: ICD-10-CM

## 2023-04-27 DIAGNOSIS — D50.0 IRON DEFICIENCY ANEMIA DUE TO CHRONIC BLOOD LOSS: ICD-10-CM

## 2023-04-27 PROBLEM — D50.9 IRON DEFICIENCY ANEMIA: Status: ACTIVE | Noted: 2023-04-27

## 2023-04-27 LAB
CREAT UR-MCNC: 69.5 MG/DL (ref 63–166)
MICROALBUMIN UR-MCNC: <5 UG/ML
MICROALBUMIN/CREAT RATIO PNL UR: <7.2 MG/GM CR (ref 0–30)

## 2023-04-27 PROCEDURE — 1101F PR PT FALLS ASSESS DOC 0-1 FALLS W/OUT INJ PAST YR: ICD-10-PCS | Mod: CPTII,,, | Performed by: STUDENT IN AN ORGANIZED HEALTH CARE EDUCATION/TRAINING PROGRAM

## 2023-04-27 PROCEDURE — 3061F PR NEG MICROALBUMINURIA RESULT DOCUMENTED/REVIEW: ICD-10-PCS | Mod: CPTII,,, | Performed by: STUDENT IN AN ORGANIZED HEALTH CARE EDUCATION/TRAINING PROGRAM

## 2023-04-27 PROCEDURE — 82043 UR ALBUMIN QUANTITATIVE: CPT | Performed by: STUDENT IN AN ORGANIZED HEALTH CARE EDUCATION/TRAINING PROGRAM

## 2023-04-27 PROCEDURE — 1159F PR MEDICATION LIST DOCUMENTED IN MEDICAL RECORD: ICD-10-PCS | Mod: CPTII,,, | Performed by: STUDENT IN AN ORGANIZED HEALTH CARE EDUCATION/TRAINING PROGRAM

## 2023-04-27 PROCEDURE — 4010F PR ACE/ARB THEARPY RXD/TAKEN: ICD-10-PCS | Mod: CPTII,,, | Performed by: STUDENT IN AN ORGANIZED HEALTH CARE EDUCATION/TRAINING PROGRAM

## 2023-04-27 PROCEDURE — 3077F PR MOST RECENT SYSTOLIC BLOOD PRESSURE >= 140 MM HG: ICD-10-PCS | Mod: CPTII,,, | Performed by: STUDENT IN AN ORGANIZED HEALTH CARE EDUCATION/TRAINING PROGRAM

## 2023-04-27 PROCEDURE — 3061F NEG MICROALBUMINURIA REV: CPT | Mod: CPTII,,, | Performed by: STUDENT IN AN ORGANIZED HEALTH CARE EDUCATION/TRAINING PROGRAM

## 2023-04-27 PROCEDURE — 3066F NEPHROPATHY DOC TX: CPT | Mod: CPTII,,, | Performed by: STUDENT IN AN ORGANIZED HEALTH CARE EDUCATION/TRAINING PROGRAM

## 2023-04-27 PROCEDURE — 99214 PR OFFICE/OUTPT VISIT, EST, LEVL IV, 30-39 MIN: ICD-10-PCS | Mod: S$PBB,,, | Performed by: STUDENT IN AN ORGANIZED HEALTH CARE EDUCATION/TRAINING PROGRAM

## 2023-04-27 PROCEDURE — 3288F PR FALLS RISK ASSESSMENT DOCUMENTED: ICD-10-PCS | Mod: CPTII,,, | Performed by: STUDENT IN AN ORGANIZED HEALTH CARE EDUCATION/TRAINING PROGRAM

## 2023-04-27 PROCEDURE — 3288F FALL RISK ASSESSMENT DOCD: CPT | Mod: CPTII,,, | Performed by: STUDENT IN AN ORGANIZED HEALTH CARE EDUCATION/TRAINING PROGRAM

## 2023-04-27 PROCEDURE — 3008F BODY MASS INDEX DOCD: CPT | Mod: CPTII,,, | Performed by: STUDENT IN AN ORGANIZED HEALTH CARE EDUCATION/TRAINING PROGRAM

## 2023-04-27 PROCEDURE — 1159F MED LIST DOCD IN RCRD: CPT | Mod: CPTII,,, | Performed by: STUDENT IN AN ORGANIZED HEALTH CARE EDUCATION/TRAINING PROGRAM

## 2023-04-27 PROCEDURE — 3008F PR BODY MASS INDEX (BMI) DOCUMENTED: ICD-10-PCS | Mod: CPTII,,, | Performed by: STUDENT IN AN ORGANIZED HEALTH CARE EDUCATION/TRAINING PROGRAM

## 2023-04-27 PROCEDURE — 1125F PR PAIN SEVERITY QUANTIFIED, PAIN PRESENT: ICD-10-PCS | Mod: CPTII,,, | Performed by: STUDENT IN AN ORGANIZED HEALTH CARE EDUCATION/TRAINING PROGRAM

## 2023-04-27 PROCEDURE — 99214 OFFICE O/P EST MOD 30 MIN: CPT | Mod: S$PBB,,, | Performed by: STUDENT IN AN ORGANIZED HEALTH CARE EDUCATION/TRAINING PROGRAM

## 2023-04-27 PROCEDURE — 3066F PR DOCUMENTATION OF TREATMENT FOR NEPHROPATHY: ICD-10-PCS | Mod: CPTII,,, | Performed by: STUDENT IN AN ORGANIZED HEALTH CARE EDUCATION/TRAINING PROGRAM

## 2023-04-27 PROCEDURE — 1125F AMNT PAIN NOTED PAIN PRSNT: CPT | Mod: CPTII,,, | Performed by: STUDENT IN AN ORGANIZED HEALTH CARE EDUCATION/TRAINING PROGRAM

## 2023-04-27 PROCEDURE — 3079F PR MOST RECENT DIASTOLIC BLOOD PRESSURE 80-89 MM HG: ICD-10-PCS | Mod: CPTII,,, | Performed by: STUDENT IN AN ORGANIZED HEALTH CARE EDUCATION/TRAINING PROGRAM

## 2023-04-27 PROCEDURE — 3079F DIAST BP 80-89 MM HG: CPT | Mod: CPTII,,, | Performed by: STUDENT IN AN ORGANIZED HEALTH CARE EDUCATION/TRAINING PROGRAM

## 2023-04-27 PROCEDURE — 99214 OFFICE O/P EST MOD 30 MIN: CPT | Mod: PBBFAC,PN | Performed by: STUDENT IN AN ORGANIZED HEALTH CARE EDUCATION/TRAINING PROGRAM

## 2023-04-27 PROCEDURE — 3077F SYST BP >= 140 MM HG: CPT | Mod: CPTII,,, | Performed by: STUDENT IN AN ORGANIZED HEALTH CARE EDUCATION/TRAINING PROGRAM

## 2023-04-27 PROCEDURE — 4010F ACE/ARB THERAPY RXD/TAKEN: CPT | Mod: CPTII,,, | Performed by: STUDENT IN AN ORGANIZED HEALTH CARE EDUCATION/TRAINING PROGRAM

## 2023-04-27 PROCEDURE — 1101F PT FALLS ASSESS-DOCD LE1/YR: CPT | Mod: CPTII,,, | Performed by: STUDENT IN AN ORGANIZED HEALTH CARE EDUCATION/TRAINING PROGRAM

## 2023-04-27 RX ORDER — ROSUVASTATIN CALCIUM 10 MG/1
10 TABLET, COATED ORAL DAILY
Qty: 90 TABLET | Refills: 3 | Status: SHIPPED | OUTPATIENT
Start: 2023-04-27 | End: 2023-07-25

## 2023-04-27 RX ORDER — FERROUS SULFATE 325(65) MG
325 TABLET, DELAYED RELEASE (ENTERIC COATED) ORAL
Qty: 30 TABLET | Refills: 2 | Status: SHIPPED | OUTPATIENT
Start: 2023-04-28 | End: 2023-12-11 | Stop reason: SDUPTHER

## 2023-04-27 RX ORDER — LOSARTAN POTASSIUM 25 MG/1
25 TABLET ORAL DAILY
Qty: 90 TABLET | Refills: 3 | Status: SHIPPED | OUTPATIENT
Start: 2023-04-27 | End: 2023-07-25

## 2023-04-27 RX ORDER — HYDROCORTISONE 25 MG/G
CREAM TOPICAL 2 TIMES DAILY
Qty: 28 G | Refills: 5 | Status: SHIPPED | OUTPATIENT
Start: 2023-04-27 | End: 2023-11-01

## 2023-04-27 RX ORDER — KETOCONAZOLE 20 MG/ML
SHAMPOO, SUSPENSION TOPICAL
Qty: 120 ML | Refills: 11 | Status: SHIPPED | OUTPATIENT
Start: 2023-04-27 | End: 2023-11-01

## 2023-04-27 NOTE — PROGRESS NOTES
Patient Name: William Mccauley   : 1950  MRN: [id number]     Subjective:   Patient ID: William Mccauley is a 72 y.o. male.    Chief Complaint:   Chief Complaint   Patient presents with    Follow-up        HPI: HPI  72-year-old Portuguese speaking male presents to clinic with daughter for follow up of diabetes and lab results.   Portuguese  used for duration of interview and physical      Diabetes  Reports that he is taking metformin 500 mg  2 times a day  Started last visit on Jardiance 10 mg   Patient is doing well  A1c 11    History of CVA  At 1st visit patient and son both reported an abrupt difficulty with walking approximately 7 months ago  CT and subsequent MRI was completed which revealed bilateral  Found make strokes and pontine stroke  Does have a walker but did not use it stating that he prefers to hold on to a family member when he is walking  Denies pain in his legs but states that he gets cramps in his legs at night  Patient declined Plavix but agreeable to take 81 mg aspirin  Has had 1 physical therapy visit    Positive Cologuard test   Referral sent  To Gastroenterology    Anemia  H&H 12.5 and 40.1  MCV 94.4  Iron is also low  Also reports that he is very tired and feels that is because he does not really walk very well    Rash over face and scalp  States that it is been there several weeks and is very itchy   Lotion has not really helped    Med hx- as above  Surgical hx- denies  Family hx- mother  from cirrhosis of the liver.  States that she did not drink  Social hx- non smoker, 1 can of beer per week          ROS:  Review of Systems   Constitutional:  Positive for malaise/fatigue. Negative for chills and fever.   HENT:  Negative for sore throat.    Respiratory:  Negative for shortness of breath and wheezing.    Cardiovascular:  Positive for leg swelling. Negative for chest pain and palpitations.   Gastrointestinal:  Positive for blood in stool. Negative for constipation, diarrhea and  "heartburn.   Genitourinary:  Negative for frequency and urgency.   Musculoskeletal:  Positive for joint pain. Negative for back pain and myalgias.   Skin:  Negative for itching and rash.   Neurological:  Positive for weakness. Negative for dizziness, focal weakness and headaches.   Psychiatric/Behavioral:  The patient is not nervous/anxious.     History:     Past Medical History:   Diagnosis Date    Diabetes mellitus, type 2       Past Surgical History:   Procedure Laterality Date    KIDNEY STONE SURGERY Left      History reviewed. No pertinent family history.   Social History     Tobacco Use    Smoking status: Never     Passive exposure: Never    Smokeless tobacco: Never   Substance and Sexual Activity    Alcohol use: Yes     Alcohol/week: 1.0 standard drink     Types: 1 Cans of beer per week     Comment: every once in awhile    Drug use: Never    Sexual activity: Yes     Partners: Female        Allergies: Review of patient's allergies indicates:  No Known Allergies  Objective:     Vitals:    04/27/23 0827 04/27/23 0832   BP: (!) 158/95 (!) 149/82   Pulse: 65    Resp: 20    Temp: 98.2 °F (36.8 °C)    SpO2: 98%    Weight: 62.1 kg (137 lb)    Height: 5' 2" (1.575 m)    PainSc:   5    PainLoc: Leg      Body mass index is 25.06 kg/m².     Physical Examination:   Physical Exam  Constitutional:       General: He is not in acute distress.     Comments: Appears tired   Eyes:      General: No scleral icterus.     Extraocular Movements: Extraocular movements intact.      Conjunctiva/sclera: Conjunctivae normal.      Pupils: Pupils are equal, round, and reactive to light.   Cardiovascular:      Rate and Rhythm: Normal rate and regular rhythm.      Heart sounds: No murmur heard.  Pulmonary:      Effort: Pulmonary effort is normal. No respiratory distress.      Breath sounds: No stridor. No wheezing or rhonchi.   Abdominal:      General: Bowel sounds are normal. There is no distension.      Palpations: Abdomen is soft.      " Tenderness: There is no abdominal tenderness. There is no guarding.   Musculoskeletal:         General: Swelling present. Normal range of motion.      Comments: Swelling noted to bilateral ankles   Skin:     General: Skin is warm and dry.      Coloration: Skin is not jaundiced.      Findings: No rash.   Neurological:      Mental Status: He is alert and oriented to person, place, and time.      Cranial Nerves: Cranial nerve deficit present.      Sensory: Sensory deficit present.      Gait: Gait abnormal.      Comments: Slight assymetry to smile on right  Left eye subjective lid droop per patient but cranial nerve III and VII intact in that region.   No dysdiadochokinesia negative Romberg  Shuffling gait   Psychiatric:         Thought Content: Thought content normal.       Assessment:     1. Uncontrolled type 2 diabetes mellitus with hyperglycemia    2. Hyperlipidemia, unspecified hyperlipidemia type    3. Seborrhea    4. Hypertension, unspecified type    5. Primary hypertension    6. Type 2 diabetes mellitus without complication, without long-term current use of insulin    7. Blood per rectum    8. Iron deficiency anemia due to chronic blood loss    9. Seborrheic dermatitis        Plan:     Problem List Items Addressed This Visit          Derm    Seborrheic dermatitis    Overview     Prescribed hydrocortisone cream 2.5% b.i.d. to face  Also prescribed ketoconazole 2% shampoo twice a week with soakage time of 3-5 minutes may also apply to ears and nasal labial folds              Cardiac/Vascular    Primary hypertension    Overview     Blood pressure 149/82 starting losartan 25 mg daily              Oncology    Iron deficiency anemia    Overview     Ferrous sulfate 325 mg 3 times weekly              Endocrine    Uncontrolled type 2 diabetes mellitus with hyperglycemia - Primary    Overview     A1c 11  Continue metformin  500 mg b.i.d. Jardiance 10 mg added with medication adverse effect discussion with family  Declines  to try Ozempic at this time  Will refer to Diabetes Education           Relevant Medications    empagliflozin (JARDIANCE) 25 mg tablet    Other Relevant Orders    Microalbumin/Creatinine Ratio, Urine (Completed)    Diabetic Eye Screening Photo    Type 2 diabetes mellitus without complication    Overview     Increasing Jardiance to 25 mg daily continuing metformin 500 mg twice a day  Attempted eye fundus exam in clinic but patient's pupils too small will refer to ophthalmology clinic           Relevant Orders    Ambulatory referral/consult to Ophthalmology       GI    Blood per rectum    Overview     Referring to Gastroenterology  May be etiology of anemia             Other Visit Diagnoses       Hyperlipidemia, unspecified hyperlipidemia type        Relevant Medications    rosuvastatin (CRESTOR) 10 MG tablet    Seborrhea        Relevant Medications    hydrocortisone 2.5 % cream    ketoconazole (NIZORAL) 2 % shampoo    Hypertension, unspecified type        Relevant Medications    losartan (COZAAR) 25 MG tablet           Problem List Items Addressed This Visit          Derm    Seborrheic dermatitis    Overview     Prescribed hydrocortisone cream 2.5% b.i.d. to face  Also prescribed ketoconazole 2% shampoo twice a week with soakage time of 3-5 minutes may also apply to ears and nasal labial folds              Cardiac/Vascular    Primary hypertension    Overview     Blood pressure 149/82 starting losartan 25 mg daily              Oncology    Iron deficiency anemia    Overview     Ferrous sulfate 325 mg 3 times weekly              Endocrine    Uncontrolled type 2 diabetes mellitus with hyperglycemia - Primary    Overview     A1c 11  Continue metformin  500 mg b.i.d. Jardiance 10 mg added with medication adverse effect discussion with family  Declines to try Ozempic at this time  Will refer to Diabetes Education           Relevant Medications    empagliflozin (JARDIANCE) 25 mg tablet    Other Relevant Orders     Microalbumin/Creatinine Ratio, Urine (Completed)    Diabetic Eye Screening Photo    Type 2 diabetes mellitus without complication    Overview     Increasing Jardiance to 25 mg daily continuing metformin 500 mg twice a day  Attempted eye fundus exam in clinic but patient's pupils too small will refer to ophthalmology clinic           Relevant Orders    Ambulatory referral/consult to Ophthalmology       GI    Blood per rectum    Overview     Referring to Gastroenterology  May be etiology of anemia             Other Visit Diagnoses       Hyperlipidemia, unspecified hyperlipidemia type        Relevant Medications    rosuvastatin (CRESTOR) 10 MG tablet    Seborrhea        Relevant Medications    hydrocortisone 2.5 % cream    ketoconazole (NIZORAL) 2 % shampoo    Hypertension, unspecified type        Relevant Medications    losartan (COZAAR) 25 MG tablet           Problem List Items Addressed This Visit          Derm    Seborrheic dermatitis    Overview     Prescribed hydrocortisone cream 2.5% b.i.d. to face  Also prescribed ketoconazole 2% shampoo twice a week with soakage time of 3-5 minutes may also apply to ears and nasal labial folds              Cardiac/Vascular    Primary hypertension    Overview     Blood pressure 149/82 starting losartan 25 mg daily              Oncology    Iron deficiency anemia    Overview     Ferrous sulfate 325 mg 3 times weekly              Endocrine    Uncontrolled type 2 diabetes mellitus with hyperglycemia - Primary    Overview     A1c 11  Continue metformin  500 mg b.i.d. Jardiance 10 mg added with medication adverse effect discussion with family  Declines to try Ozempic at this time  Will refer to Diabetes Education           Relevant Medications    empagliflozin (JARDIANCE) 25 mg tablet    Other Relevant Orders    Microalbumin/Creatinine Ratio, Urine (Completed)    Diabetic Eye Screening Photo    Type 2 diabetes mellitus without complication    Overview     Increasing Jardiance to 25  mg daily continuing metformin 500 mg twice a day  Attempted eye fundus exam in clinic but patient's pupils too small will refer to ophthalmology clinic           Relevant Orders    Ambulatory referral/consult to Ophthalmology       GI    Blood per rectum    Overview     Referring to Gastroenterology  May be etiology of anemia             Other Visit Diagnoses       Hyperlipidemia, unspecified hyperlipidemia type        Relevant Medications    rosuvastatin (CRESTOR) 10 MG tablet    Seborrhea        Relevant Medications    hydrocortisone 2.5 % cream    ketoconazole (NIZORAL) 2 % shampoo    Hypertension, unspecified type        Relevant Medications    losartan (COZAAR) 25 MG tablet           Follow up in about 6 weeks (around 6/8/2023) for Diabetes with A1c, foot exam, anemia follow-up.

## 2023-05-16 ENCOUNTER — PATIENT MESSAGE (OUTPATIENT)
Dept: ADMINISTRATIVE | Facility: HOSPITAL | Age: 73
End: 2023-05-16
Payer: MEDICAID

## 2023-06-02 ENCOUNTER — TELEPHONE (OUTPATIENT)
Dept: FAMILY MEDICINE | Facility: CLINIC | Age: 73
End: 2023-06-02

## 2023-06-06 ENCOUNTER — TELEPHONE (OUTPATIENT)
Dept: FAMILY MEDICINE | Facility: CLINIC | Age: 73
End: 2023-06-06

## 2023-06-06 ENCOUNTER — OFFICE VISIT (OUTPATIENT)
Dept: FAMILY MEDICINE | Facility: CLINIC | Age: 73
End: 2023-06-06
Payer: MEDICAID

## 2023-06-06 VITALS
DIASTOLIC BLOOD PRESSURE: 72 MMHG | SYSTOLIC BLOOD PRESSURE: 110 MMHG | TEMPERATURE: 98 F | WEIGHT: 135 LBS | HEART RATE: 72 BPM | RESPIRATION RATE: 20 BRPM | BODY MASS INDEX: 24.69 KG/M2

## 2023-06-06 DIAGNOSIS — H02.402 PTOSIS OF LEFT EYELID: ICD-10-CM

## 2023-06-06 DIAGNOSIS — E11.65 UNCONTROLLED TYPE 2 DIABETES MELLITUS WITH HYPERGLYCEMIA: Primary | ICD-10-CM

## 2023-06-06 DIAGNOSIS — M79.605 PAIN OF LEFT LOWER EXTREMITY: ICD-10-CM

## 2023-06-06 DIAGNOSIS — M54.50 CHRONIC BILATERAL LOW BACK PAIN WITHOUT SCIATICA: ICD-10-CM

## 2023-06-06 DIAGNOSIS — M54.9 BACK PAIN, UNSPECIFIED BACK LOCATION, UNSPECIFIED BACK PAIN LATERALITY, UNSPECIFIED CHRONICITY: ICD-10-CM

## 2023-06-06 DIAGNOSIS — R29.898 LEG FATIGUE: ICD-10-CM

## 2023-06-06 DIAGNOSIS — G89.29 CHRONIC BILATERAL LOW BACK PAIN WITHOUT SCIATICA: ICD-10-CM

## 2023-06-06 DIAGNOSIS — Z86.73 HISTORY OF CVA (CEREBROVASCULAR ACCIDENT): ICD-10-CM

## 2023-06-06 LAB
ANION GAP SERPL CALC-SCNC: 9 MEQ/L
BUN SERPL-MCNC: 11.4 MG/DL (ref 8.4–25.7)
CALCIUM SERPL-MCNC: 9.4 MG/DL (ref 8.8–10)
CHLORIDE SERPL-SCNC: 103 MMOL/L (ref 98–107)
CO2 SERPL-SCNC: 27 MMOL/L (ref 23–31)
CREAT SERPL-MCNC: 0.8 MG/DL (ref 0.73–1.18)
CREAT/UREA NIT SERPL: 14
EST. AVERAGE GLUCOSE BLD GHB EST-MCNC: 191.5 MG/DL
GFR SERPLBLD CREATININE-BSD FMLA CKD-EPI: >60 MLS/MIN/1.73/M2
GLUCOSE SERPL-MCNC: 161 MG/DL (ref 82–115)
HBA1C MFR BLD: 8.3 %
POTASSIUM SERPL-SCNC: 4.2 MMOL/L (ref 3.5–5.1)
SODIUM SERPL-SCNC: 139 MMOL/L (ref 136–145)

## 2023-06-06 PROCEDURE — 4010F ACE/ARB THERAPY RXD/TAKEN: CPT | Mod: CPTII,,, | Performed by: STUDENT IN AN ORGANIZED HEALTH CARE EDUCATION/TRAINING PROGRAM

## 2023-06-06 PROCEDURE — 99214 OFFICE O/P EST MOD 30 MIN: CPT | Mod: S$PBB,,, | Performed by: STUDENT IN AN ORGANIZED HEALTH CARE EDUCATION/TRAINING PROGRAM

## 2023-06-06 PROCEDURE — 3288F PR FALLS RISK ASSESSMENT DOCUMENTED: ICD-10-PCS | Mod: CPTII,,, | Performed by: STUDENT IN AN ORGANIZED HEALTH CARE EDUCATION/TRAINING PROGRAM

## 2023-06-06 PROCEDURE — 1101F PR PT FALLS ASSESS DOC 0-1 FALLS W/OUT INJ PAST YR: ICD-10-PCS | Mod: CPTII,,, | Performed by: STUDENT IN AN ORGANIZED HEALTH CARE EDUCATION/TRAINING PROGRAM

## 2023-06-06 PROCEDURE — 83036 HEMOGLOBIN GLYCOSYLATED A1C: CPT | Performed by: STUDENT IN AN ORGANIZED HEALTH CARE EDUCATION/TRAINING PROGRAM

## 2023-06-06 PROCEDURE — 1125F PR PAIN SEVERITY QUANTIFIED, PAIN PRESENT: ICD-10-PCS | Mod: CPTII,,, | Performed by: STUDENT IN AN ORGANIZED HEALTH CARE EDUCATION/TRAINING PROGRAM

## 2023-06-06 PROCEDURE — 3074F SYST BP LT 130 MM HG: CPT | Mod: CPTII,,, | Performed by: STUDENT IN AN ORGANIZED HEALTH CARE EDUCATION/TRAINING PROGRAM

## 2023-06-06 PROCEDURE — 1101F PT FALLS ASSESS-DOCD LE1/YR: CPT | Mod: CPTII,,, | Performed by: STUDENT IN AN ORGANIZED HEALTH CARE EDUCATION/TRAINING PROGRAM

## 2023-06-06 PROCEDURE — 99213 OFFICE O/P EST LOW 20 MIN: CPT | Mod: PBBFAC,PN | Performed by: STUDENT IN AN ORGANIZED HEALTH CARE EDUCATION/TRAINING PROGRAM

## 2023-06-06 PROCEDURE — 3066F PR DOCUMENTATION OF TREATMENT FOR NEPHROPATHY: ICD-10-PCS | Mod: CPTII,,, | Performed by: STUDENT IN AN ORGANIZED HEALTH CARE EDUCATION/TRAINING PROGRAM

## 2023-06-06 PROCEDURE — 3288F FALL RISK ASSESSMENT DOCD: CPT | Mod: CPTII,,, | Performed by: STUDENT IN AN ORGANIZED HEALTH CARE EDUCATION/TRAINING PROGRAM

## 2023-06-06 PROCEDURE — 80048 BASIC METABOLIC PNL TOTAL CA: CPT | Performed by: STUDENT IN AN ORGANIZED HEALTH CARE EDUCATION/TRAINING PROGRAM

## 2023-06-06 PROCEDURE — 1125F AMNT PAIN NOTED PAIN PRSNT: CPT | Mod: CPTII,,, | Performed by: STUDENT IN AN ORGANIZED HEALTH CARE EDUCATION/TRAINING PROGRAM

## 2023-06-06 PROCEDURE — 3008F BODY MASS INDEX DOCD: CPT | Mod: CPTII,,, | Performed by: STUDENT IN AN ORGANIZED HEALTH CARE EDUCATION/TRAINING PROGRAM

## 2023-06-06 PROCEDURE — 36415 COLL VENOUS BLD VENIPUNCTURE: CPT | Performed by: STUDENT IN AN ORGANIZED HEALTH CARE EDUCATION/TRAINING PROGRAM

## 2023-06-06 PROCEDURE — 3008F PR BODY MASS INDEX (BMI) DOCUMENTED: ICD-10-PCS | Mod: CPTII,,, | Performed by: STUDENT IN AN ORGANIZED HEALTH CARE EDUCATION/TRAINING PROGRAM

## 2023-06-06 PROCEDURE — 3066F NEPHROPATHY DOC TX: CPT | Mod: CPTII,,, | Performed by: STUDENT IN AN ORGANIZED HEALTH CARE EDUCATION/TRAINING PROGRAM

## 2023-06-06 PROCEDURE — 99214 PR OFFICE/OUTPT VISIT, EST, LEVL IV, 30-39 MIN: ICD-10-PCS | Mod: S$PBB,,, | Performed by: STUDENT IN AN ORGANIZED HEALTH CARE EDUCATION/TRAINING PROGRAM

## 2023-06-06 PROCEDURE — 3078F DIAST BP <80 MM HG: CPT | Mod: CPTII,,, | Performed by: STUDENT IN AN ORGANIZED HEALTH CARE EDUCATION/TRAINING PROGRAM

## 2023-06-06 PROCEDURE — 3061F NEG MICROALBUMINURIA REV: CPT | Mod: CPTII,,, | Performed by: STUDENT IN AN ORGANIZED HEALTH CARE EDUCATION/TRAINING PROGRAM

## 2023-06-06 PROCEDURE — 3074F PR MOST RECENT SYSTOLIC BLOOD PRESSURE < 130 MM HG: ICD-10-PCS | Mod: CPTII,,, | Performed by: STUDENT IN AN ORGANIZED HEALTH CARE EDUCATION/TRAINING PROGRAM

## 2023-06-06 PROCEDURE — 3078F PR MOST RECENT DIASTOLIC BLOOD PRESSURE < 80 MM HG: ICD-10-PCS | Mod: CPTII,,, | Performed by: STUDENT IN AN ORGANIZED HEALTH CARE EDUCATION/TRAINING PROGRAM

## 2023-06-06 PROCEDURE — 4010F PR ACE/ARB THEARPY RXD/TAKEN: ICD-10-PCS | Mod: CPTII,,, | Performed by: STUDENT IN AN ORGANIZED HEALTH CARE EDUCATION/TRAINING PROGRAM

## 2023-06-06 PROCEDURE — 3061F PR NEG MICROALBUMINURIA RESULT DOCUMENTED/REVIEW: ICD-10-PCS | Mod: CPTII,,, | Performed by: STUDENT IN AN ORGANIZED HEALTH CARE EDUCATION/TRAINING PROGRAM

## 2023-06-06 RX ORDER — NAPROXEN 500 MG/1
500 TABLET ORAL 2 TIMES DAILY WITH MEALS
Qty: 60 TABLET | Refills: 11 | Status: SHIPPED | OUTPATIENT
Start: 2023-06-06 | End: 2023-07-25

## 2023-06-06 RX ORDER — DICLOFENAC SODIUM 10 MG/G
2 GEL TOPICAL DAILY
Qty: 450 G | Refills: 11 | Status: SHIPPED | OUTPATIENT
Start: 2023-06-06

## 2023-06-06 RX ORDER — METFORMIN HYDROCHLORIDE 500 MG/1
TABLET ORAL
Qty: 90 TABLET | Refills: 11 | Status: SHIPPED | OUTPATIENT
Start: 2023-06-06 | End: 2023-07-25

## 2023-06-06 NOTE — PROGRESS NOTES
"Patient Name: William Mccauley   : 1950  MRN: [id number]     Subjective:   Patient ID: William Mccauley is a 72 y.o. male.    Chief Complaint:   Chief Complaint   Patient presents with    Follow-up     6 wk F/u DB, Foot exam, A1c  and anemia.        HPI: HPI  72-year-old Luxembourgish speaking male presents to clinic with daughter for follow up of diabetes and lab results.   Luxembourgish  used for duration of interview and physical      Diabetes  Reports that he is taking metformin 500 mg  2 times a day  Started last visit on Jardiance 25   Patient is doing well  A1c 11  Needs A1c today     History of CVA  At 1st visit patient and son both reported an abrupt difficulty with walking approximately 7 months ago  CT and subsequent MRI was completed which revealed bilateral  Found make strokes and pontine stroke  Does have a walker but did not use it stating that he prefers to hold on to a family member when he is walking  Denies pain in his legs but states that he gets cramps in his legs at night  Patient declined Plavix but agreeable to take 81 mg aspirin  Has had several physical therapy visits    Left left tiredness  Patient reports feeling a tired" feeling in the back of his leg  Reports that it is not worse with physical therapy   Reports that it is not painful  Reports that it does not radiate    Back pain  Low left-sided back pain and across SI joints      Positive Cologuard test   Referral sent  To Gastroenterology    Anemia  H&H 12.5 and 40.1  MCV 94.4  Iron is also low  Also reports that he is very tired and feels that is because he does not really walk very well    Rash over face and scalp  Ketoconazole 2%  Med hx- as above  Surgical hx- denies  Family hx- mother  from cirrhosis of the liver.  States that she did not drink  Social hx- non smoker, 1 can of beer per week          ROS:  Review of Systems   Constitutional:  Positive for malaise/fatigue. Negative for chills and fever.   HENT:  Negative for sore " throat.    Respiratory:  Negative for shortness of breath and wheezing.    Cardiovascular:  Positive for leg swelling. Negative for chest pain and palpitations.   Gastrointestinal:  Positive for blood in stool. Negative for constipation, diarrhea and heartburn.   Genitourinary:  Negative for frequency and urgency.   Musculoskeletal:  Positive for joint pain. Negative for back pain and myalgias.   Skin:  Negative for itching and rash.   Neurological:  Positive for weakness. Negative for dizziness, focal weakness and headaches.   Psychiatric/Behavioral:  The patient is not nervous/anxious.     History:     Past Medical History:   Diagnosis Date    Diabetes mellitus, type 2       Past Surgical History:   Procedure Laterality Date    KIDNEY STONE SURGERY Left      History reviewed. No pertinent family history.   Social History     Tobacco Use    Smoking status: Never     Passive exposure: Never    Smokeless tobacco: Never   Substance and Sexual Activity    Alcohol use: Yes     Alcohol/week: 1.0 standard drink     Types: 1 Cans of beer per week     Comment: every once in awhile    Drug use: Never    Sexual activity: Yes     Partners: Female        Allergies: Review of patient's allergies indicates:  No Known Allergies  Objective:     Vitals:    06/06/23 0903   BP: 110/72   Pulse: 72   Resp: 20   Temp: 98.4 °F (36.9 °C)   Weight: 61.2 kg (135 lb)   PainSc:   4   PainLoc: Back     Body mass index is 24.69 kg/m².     Physical Examination:   Physical Exam  Constitutional:       General: He is not in acute distress.     Comments: Appears tired   Eyes:      General: No scleral icterus.     Extraocular Movements: Extraocular movements intact.      Conjunctiva/sclera: Conjunctivae normal.      Pupils: Pupils are equal, round, and reactive to light.   Cardiovascular:      Rate and Rhythm: Normal rate and regular rhythm.      Pulses:           Dorsalis pedis pulses are 2+ on the left side.      Heart sounds: No murmur  heard.  Pulmonary:      Effort: Pulmonary effort is normal. No respiratory distress.      Breath sounds: No stridor. No wheezing or rhonchi.   Abdominal:      General: Bowel sounds are normal. There is no distension.      Palpations: Abdomen is soft.      Tenderness: There is no abdominal tenderness. There is no guarding.   Musculoskeletal:         General: Swelling and tenderness present. Normal range of motion.      Right foot: Normal range of motion. No deformity, bunion, Charcot foot, foot drop or prominent metatarsal heads.      Left foot: Normal range of motion. No deformity, bunion, Charcot foot, foot drop or prominent metatarsal heads.      Comments: Swelling noted to bilateral ankles  Tenderness noted to SI joint bilaterally L>R   Feet:      Right foot:      Protective Sensation: 5 sites tested.  4 sites sensed.      Skin integrity: Skin integrity normal. No ulcer, blister, skin breakdown, erythema, warmth, callus, dry skin or fissure.      Toenail Condition: Right toenails are normal.      Left foot:      Protective Sensation: 5 sites tested.  5 sites sensed.      Skin integrity: Skin integrity normal. No ulcer, blister, skin breakdown, erythema, warmth, callus, dry skin or fissure.      Toenail Condition: Left toenails are normal.      Comments: Negative Homans sign  Prominent pulse elicited through patella on left side    Skin:     General: Skin is warm and dry.      Coloration: Skin is not jaundiced.      Findings: No rash.   Neurological:      Mental Status: He is alert and oriented to person, place, and time.      Cranial Nerves: Cranial nerve deficit present.      Sensory: Sensory deficit present.      Gait: Gait abnormal.      Comments: Slight assymetry to smile on right  Left eye subjective lid droop per patient but cranial nerve III and VII intact in that region.   No dysdiadochokinesia negative Romberg  Shuffling gait   Psychiatric:         Thought Content: Thought content normal.        Assessment:     1. Uncontrolled type 2 diabetes mellitus with hyperglycemia    2. Back pain, unspecified back location, unspecified back pain laterality, unspecified chronicity    3. Chronic bilateral low back pain without sciatica    4. Pain of left lower extremity    5. History of CVA (cerebrovascular accident)    6. Ptosis of left eyelid    7. Leg fatigue        Plan:     Problem List Items Addressed This Visit          Neuro    History of CVA (cerebrovascular accident)    Overview         Encouraged walker use to avoid falls  Patient declines Plavix but is amenable to aspirin 81 mg  Starting Crestor 5 mg daily and titrated to 10  with intent to titrate  Discussed decreasing risk factors with patient which would mean to control blood sugar, blood pressure, hyperlipidemia, antiplatelet therapy  Encouraged continued physical therapy     Referral to neurology            Ophtho    Ptosis of left eyelid    Overview     Referral to eye Clinic for this as well as diabetes         Relevant Orders    Ambulatory referral/consult to Ophthalmology       Endocrine    Uncontrolled type 2 diabetes mellitus with hyperglycemia - Primary    Overview     A1c 11  Continue metformin  500 mg b.i.d. Jardiance 10 mg added with medication adverse effect discussion with family  Declines to try Ozempic at this time  Will refer to Diabetes Education         Relevant Orders    Hemoglobin A1C    Basic Metabolic Panel    Ambulatory referral/consult to Ophthalmology       Orthopedic    Leg fatigue    Overview     Does have prominent pulses in bilateral dorsalis pedis with prominent pulse over left patella   Referring patient to vascular         Chronic bilateral low back pain without sciatica    Overview     Voltaren gel   Naproxen 500 b.i.d.         Relevant Medications    naproxen (NAPROSYN) 500 MG tablet     Other Visit Diagnoses       Back pain, unspecified back location, unspecified back pain laterality, unspecified chronicity         Relevant Orders    X-Ray Lumbar Spine AP And Lateral    Pain of left lower extremity        Relevant Orders    Ambulatory referral/consult to Vascular Surgery           Problem List Items Addressed This Visit          Neuro    History of CVA (cerebrovascular accident)    Overview         Encouraged walker use to avoid falls  Patient declines Plavix but is amenable to aspirin 81 mg  Starting Crestor 5 mg daily and titrated to 10  with intent to titrate  Discussed decreasing risk factors with patient which would mean to control blood sugar, blood pressure, hyperlipidemia, antiplatelet therapy  Encouraged continued physical therapy     Referral to neurology            Ophtho    Ptosis of left eyelid    Overview     Referral to eye Clinic for this as well as diabetes         Relevant Orders    Ambulatory referral/consult to Ophthalmology       Endocrine    Uncontrolled type 2 diabetes mellitus with hyperglycemia - Primary    Overview     A1c 11  Continue metformin  500 mg b.i.d. Jardiance 10 mg added with medication adverse effect discussion with family  Declines to try Ozempic at this time  Will refer to Diabetes Education         Relevant Orders    Hemoglobin A1C    Basic Metabolic Panel    Ambulatory referral/consult to Ophthalmology       Orthopedic    Leg fatigue    Overview     Does have prominent pulses in bilateral dorsalis pedis with prominent pulse over left patella   Referring patient to vascular         Chronic bilateral low back pain without sciatica    Overview     Voltaren gel   Naproxen 500 b.i.d.         Relevant Medications    naproxen (NAPROSYN) 500 MG tablet     Other Visit Diagnoses       Back pain, unspecified back location, unspecified back pain laterality, unspecified chronicity        Relevant Orders    X-Ray Lumbar Spine AP And Lateral    Pain of left lower extremity        Relevant Orders    Ambulatory referral/consult to Vascular Surgery           Problem List Items Addressed This Visit           Neuro    History of CVA (cerebrovascular accident)    Overview         Encouraged walker use to avoid falls  Patient declines Plavix but is amenable to aspirin 81 mg  Starting Crestor 5 mg daily and titrated to 10  with intent to titrate  Discussed decreasing risk factors with patient which would mean to control blood sugar, blood pressure, hyperlipidemia, antiplatelet therapy  Encouraged continued physical therapy     Referral to neurology            Ophtho    Ptosis of left eyelid    Overview     Referral to eye Clinic for this as well as diabetes         Relevant Orders    Ambulatory referral/consult to Ophthalmology       Endocrine    Uncontrolled type 2 diabetes mellitus with hyperglycemia - Primary    Overview     A1c 11  Continue metformin  500 mg b.i.d. Jardiance 10 mg added with medication adverse effect discussion with family  Declines to try Ozempic at this time  Will refer to Diabetes Education         Relevant Orders    Hemoglobin A1C    Basic Metabolic Panel    Ambulatory referral/consult to Ophthalmology       Orthopedic    Leg fatigue    Overview     Does have prominent pulses in bilateral dorsalis pedis with prominent pulse over left patella   Referring patient to vascular         Chronic bilateral low back pain without sciatica    Overview     Voltaren gel   Naproxen 500 b.i.d.         Relevant Medications    naproxen (NAPROSYN) 500 MG tablet     Other Visit Diagnoses       Back pain, unspecified back location, unspecified back pain laterality, unspecified chronicity        Relevant Orders    X-Ray Lumbar Spine AP And Lateral    Pain of left lower extremity        Relevant Orders    Ambulatory referral/consult to Vascular Surgery           Follow up in about 3 months (around 9/6/2023) for Diabetes.

## 2023-06-06 NOTE — TELEPHONE ENCOUNTER
Pt c  metFORMIN (GLUCOPHAGE) 500 MG tablet                 Summary: Take 2 tablets by mouth in the am and one in the evening, Normal   Start: 6/6/2023  Ord/Sold: 6/6/2023 (O)  Report  Long-term:   Pharmacy: [id number] - [place], [place] - [address]  Med Dose History       Patient Sig: Take 2 tablets by mouth in the am and one in the evening       Ordered on: 6/6/2023       Authorized by: JULIO NOVAK       Dispense: 90 tablet       Refills: 11 ordered       Admin Instructions: Take 2 tablets by mouth in the am and one in the evening       Prior Authorization: Request PA      Caregiver was contacted , information given above no question noted , Caregiver verbally  understand with no concerns call ended.

## 2023-06-06 NOTE — PROGRESS NOTES
Can you please call patient and let him and daughter that his A1c for diabetes is better at 8.3 (down from 11) but not under 8 where we need him to be. I sent a prescription for metformin to take 2 pills in the morning and 1 pill at night. This should lower it.

## 2023-07-25 ENCOUNTER — OFFICE VISIT (OUTPATIENT)
Dept: CARDIOLOGY | Facility: CLINIC | Age: 73
End: 2023-07-25
Payer: MEDICAID

## 2023-07-25 VITALS
OXYGEN SATURATION: 99 % | HEIGHT: 62 IN | HEART RATE: 69 BPM | RESPIRATION RATE: 20 BRPM | TEMPERATURE: 97 F | SYSTOLIC BLOOD PRESSURE: 130 MMHG | DIASTOLIC BLOOD PRESSURE: 81 MMHG | BODY MASS INDEX: 24.04 KG/M2 | WEIGHT: 130.63 LBS

## 2023-07-25 DIAGNOSIS — E78.5 HYPERLIPIDEMIA, UNSPECIFIED HYPERLIPIDEMIA TYPE: ICD-10-CM

## 2023-07-25 DIAGNOSIS — Z86.73 HISTORY OF CVA (CEREBROVASCULAR ACCIDENT): Primary | ICD-10-CM

## 2023-07-25 DIAGNOSIS — E11.65 UNCONTROLLED TYPE 2 DIABETES MELLITUS WITH HYPERGLYCEMIA: ICD-10-CM

## 2023-07-25 DIAGNOSIS — E78.49 OTHER HYPERLIPIDEMIA: ICD-10-CM

## 2023-07-25 DIAGNOSIS — R07.9 CHEST PAIN, UNSPECIFIED TYPE: ICD-10-CM

## 2023-07-25 DIAGNOSIS — R60.9 EDEMA, UNSPECIFIED TYPE: ICD-10-CM

## 2023-07-25 DIAGNOSIS — R06.02 SOB (SHORTNESS OF BREATH): ICD-10-CM

## 2023-07-25 DIAGNOSIS — I10 HYPERTENSION, UNSPECIFIED TYPE: ICD-10-CM

## 2023-07-25 PROCEDURE — 99214 OFFICE O/P EST MOD 30 MIN: CPT | Mod: PBBFAC | Performed by: INTERNAL MEDICINE

## 2023-07-25 PROCEDURE — 93005 ELECTROCARDIOGRAM TRACING: CPT

## 2023-07-25 RX ORDER — GABAPENTIN 300 MG/1
CAPSULE ORAL 2 TIMES DAILY
COMMUNITY
Start: 2023-06-14 | End: 2023-09-07

## 2023-07-25 RX ORDER — LOSARTAN POTASSIUM 25 MG/1
25 TABLET ORAL DAILY
Qty: 90 TABLET | Refills: 3 | Status: SHIPPED | OUTPATIENT
Start: 2023-07-25 | End: 2024-07-24

## 2023-07-25 RX ORDER — METFORMIN HYDROCHLORIDE 500 MG/1
TABLET ORAL
Qty: 90 TABLET | Refills: 11 | Status: SHIPPED | OUTPATIENT
Start: 2023-07-25

## 2023-07-25 RX ORDER — ATORVASTATIN CALCIUM 40 MG/1
40 TABLET, FILM COATED ORAL DAILY
Qty: 90 TABLET | Refills: 3 | Status: SHIPPED | OUTPATIENT
Start: 2023-07-25 | End: 2024-07-24

## 2023-07-25 NOTE — PROGRESS NOTES
"[facility] [place] [facility] Note    CHIEF COMPLAINT:   Chief Complaint   Patient presents with    initial visit daughter sts he has chest pain at times has w                           HPI:    Mr. William Mccauley is a 72 y.o. male with a history of DM2 (A1c of 8.3), HLD and CVA who presents as a new patient.    Mr. Mccauley reports worsening fatigue, dyspnea on exertion, chest pain with exertion and general weakness over the last year. He was previously very active and able to garden and now he is not able to move aorund much due to his symptoms. He reports that he had 3 strokes about 1 year ago and no etiology of the stroke was identified. He denies tobacco or alcohol use. He follows closely with primary care clinic.     He is currently on taking Naproxen, gabapentin 300 BID, metformin 500 qday and apsirin 81mg.    MRI 3/23/23  Impression:  1.  No acute intracranial findings identified.  2.  Bilateral thalami and pontine old lacunar infarcts, chronic microvascular ischemia and atrophy.    ROS:                                                                                                                                                                             12 point ROS negative apart from HPI    PE:  Blood pressure 130/81, pulse 69, temperature 97.3 °F (36.3 °C), temperature source Oral, resp. rate 20, height 5' 2" (1.575 m), weight 59.2 kg (130 lb 9.6 oz), SpO2 99 %.  CONSTITUTIONAL:  No acute distress.  Not ill appearing.  NECK:  No cervical adenopathy.  No carotid bruit.  CARDIOVASCULAR:  Normal rate.  Regular rhythm.  No murmur.  PULMONARY:  No respiratory distress.  No wheezing.  No crackles.  MUSCULOSKELETAL:  No deformity.  No edema.  SKIN:  No bruising.  No rash.  NEUROLOGICAL:  Oriented x 3.  No weakness.                                                                                                                                                                                                                          "                                                                                                                                                                                                                                                      Patient Active Problem List   Diagnosis    Type 2 diabetes mellitus without complication    Gait instability    Primary hypertension    Other constipation    Uncontrolled type 2 diabetes mellitus with hyperglycemia    History of CVA (cerebrovascular accident)    Leg swelling    Blood per rectum    Other hyperlipidemia    SOB (shortness of breath)    Iron deficiency anemia    Seborrheic dermatitis    Ptosis of left eyelid    Chronic bilateral low back pain without sciatica    Leg fatigue       Past Surgical History:   Procedure Laterality Date    KIDNEY STONE SURGERY Left        Social History     Socioeconomic History    Marital status:     Number of children: 5   Tobacco Use    Smoking status: Never     Passive exposure: Never    Smokeless tobacco: Never   Substance and Sexual Activity    Alcohol use: Yes     Alcohol/week: 1.0 standard drink     Types: 1 Cans of beer per week     Comment: every once in awhile    Drug use: Never    Sexual activity: Yes     Partners: Female          History reviewed. No pertinent family history.    Review of patient's allergies indicates:  No Known Allergies      Current Outpatient Medications:     aspirin (ECOTRIN) 81 MG EC tablet, Take 1 tablet (81 mg total) by mouth once daily., Disp: 30 tablet, Rfl: 11    gabapentin (NEURONTIN) 300 MG capsule, Take by mouth 2 (two) times daily., Disp: , Rfl:     losartan (COZAAR) 25 MG tablet, Take 1 tablet (25 mg total) by mouth once daily., Disp: 90 tablet, Rfl: 3    polyethylene glycol (GLYCOLAX) 17 gram/dose powder, Take 17 g by mouth once daily., Disp: 289 g, Rfl: 11    diclofenac sodium (VOLTAREN) 1 % Gel, Apply 2 g topically once daily. To back and leg, Disp: 450 g, Rfl: 11    empagliflozin  (JARDIANCE) 25 mg tablet, Take 1 tablet (25 mg total) by mouth once daily. (Patient not taking: Reported on 2023), Disp: 90 tablet, Rfl: 3    ferrous sulfate 325 (65 FE) MG EC tablet, Take 1 tablet (325 mg total) by mouth 3 (three) times a week. (Patient not taking: Reported on 2023), Disp: 30 tablet, Rfl: 2    fluconazole (DIFLUCAN) 150 MG Tab, Take one tablet today and then repeat in 3 days (Patient not taking: Reported on 2023), Disp: 2 tablet, Rfl: 1    hydrocortisone 2.5 % cream, Apply topically 2 (two) times daily., Disp: 28 g, Rfl: 5    ketoconazole (NIZORAL) 2 % shampoo, Apply topically twice a week., Disp: 120 mL, Rfl: 11    metFORMIN (GLUCOPHAGE) 500 MG tablet, Take 2 tablets by mouth in the am and one in the evening (Patient not taking: Reported on 2023), Disp: 90 tablet, Rfl: 11    naproxen (NAPROSYN) 500 MG tablet, Take 1 tablet (500 mg total) by mouth 2 (two) times daily with meals. (Patient not taking: Reported on 2023), Disp: 60 tablet, Rfl: 11    rosuvastatin (CRESTOR) 10 MG tablet, Take 1 tablet (10 mg total) by mouth once daily. (Patient not taking: Reported on 2023), Disp: 90 tablet, Rfl: 3     CARDIAC TESTING:  No results found for this or any previous visit.  No results found for this or any previous visit.  No results found for this or any previous visit.       ECst degree AV block with with NSR    ASSESSMENT/PLAN:    Mr. William Mccauley is a 72 y.o. male with a history of DM2 (A1c of 8.3), HLD and CVA who presents as a new patient.    // Dyspnea on exertion  // Chest pain with exertion  // Fatigue  // HTN  - TTE  - Nuclear stress test  - Restart losartan 25  - Would discuss discontinuation of gabapentin with primary care as this may be contributing to fatigue    // Cryptogenic stroke  - TTE with bubble study  - Carotid ultrasound  - 30 day event monitor  - Start Lipitor . Recheck lipids in 3 months to make sure at goal  - Continue aspirin    // Dm2  - Continue  metformin  - Restart Lexa Aguilar MD  [facility] - Cardiovascular  07/25/2023

## 2023-07-25 NOTE — PROGRESS NOTES
Cardiology Attending    I have discussed William Mccauley including the patient's symptoms, findings, and management plan with the cardiology fellow.  Please see the Cardiology Note for details.

## 2023-07-31 PROBLEM — K62.5 BLOOD PER RECTUM: Status: RESOLVED | Noted: 2023-03-27 | Resolved: 2023-07-31

## 2023-08-02 ENCOUNTER — HOSPITAL ENCOUNTER (OUTPATIENT)
Dept: CARDIOLOGY | Facility: HOSPITAL | Age: 73
Discharge: HOME | End: 2023-08-02
Attending: STUDENT IN AN ORGANIZED HEALTH CARE EDUCATION/TRAINING PROGRAM
Payer: MEDICAID

## 2023-08-02 DIAGNOSIS — Z86.73 HISTORY OF CVA (CEREBROVASCULAR ACCIDENT): ICD-10-CM

## 2023-08-02 PROCEDURE — 93229 REMOTE 30 DAY ECG TECH SUPP: CPT

## 2023-09-01 ENCOUNTER — PATIENT MESSAGE (OUTPATIENT)
Dept: ADMINISTRATIVE | Facility: HOSPITAL | Age: 73
End: 2023-09-01
Payer: MEDICAID

## 2023-09-07 ENCOUNTER — OFFICE VISIT (OUTPATIENT)
Dept: FAMILY MEDICINE | Facility: CLINIC | Age: 73
End: 2023-09-07
Payer: MEDICAID

## 2023-09-07 VITALS
TEMPERATURE: 98 F | DIASTOLIC BLOOD PRESSURE: 74 MMHG | BODY MASS INDEX: 23.4 KG/M2 | HEIGHT: 62 IN | HEART RATE: 68 BPM | OXYGEN SATURATION: 98 % | RESPIRATION RATE: 20 BRPM | WEIGHT: 127.13 LBS | SYSTOLIC BLOOD PRESSURE: 132 MMHG

## 2023-09-07 DIAGNOSIS — M54.16 LUMBAR RADICULOPATHY, CHRONIC: ICD-10-CM

## 2023-09-07 DIAGNOSIS — G89.29 CHRONIC LEFT-SIDED LOW BACK PAIN WITH RIGHT-SIDED SCIATICA: ICD-10-CM

## 2023-09-07 DIAGNOSIS — I10 PRIMARY HYPERTENSION: ICD-10-CM

## 2023-09-07 DIAGNOSIS — E78.5 HYPERLIPIDEMIA, UNSPECIFIED HYPERLIPIDEMIA TYPE: ICD-10-CM

## 2023-09-07 DIAGNOSIS — E78.49 OTHER HYPERLIPIDEMIA: ICD-10-CM

## 2023-09-07 DIAGNOSIS — Z86.73 HISTORY OF CVA (CEREBROVASCULAR ACCIDENT): ICD-10-CM

## 2023-09-07 DIAGNOSIS — D50.0 IRON DEFICIENCY ANEMIA DUE TO CHRONIC BLOOD LOSS: Primary | ICD-10-CM

## 2023-09-07 DIAGNOSIS — G57.01 SCIATIC NERVE DISEASE, RIGHT: ICD-10-CM

## 2023-09-07 DIAGNOSIS — E11.65 UNCONTROLLED TYPE 2 DIABETES MELLITUS WITH HYPERGLYCEMIA: ICD-10-CM

## 2023-09-07 DIAGNOSIS — M54.41 CHRONIC LEFT-SIDED LOW BACK PAIN WITH RIGHT-SIDED SCIATICA: ICD-10-CM

## 2023-09-07 PROBLEM — M54.50 CHRONIC BILATERAL LOW BACK PAIN WITHOUT SCIATICA: Status: RESOLVED | Noted: 2023-06-06 | Resolved: 2023-09-07

## 2023-09-07 LAB
BASOPHILS # BLD AUTO: 0.04 X10(3)/MCL
BASOPHILS NFR BLD AUTO: 0.6 %
CHOLEST SERPL-MCNC: 176 MG/DL
CHOLEST/HDLC SERPL: 3 {RATIO} (ref 0–5)
EOSINOPHIL # BLD AUTO: 1.19 X10(3)/MCL (ref 0–0.9)
EOSINOPHIL NFR BLD AUTO: 17.2 %
ERYTHROCYTE [DISTWIDTH] IN BLOOD BY AUTOMATED COUNT: 18.6 % (ref 11.5–17)
EST. AVERAGE GLUCOSE BLD GHB EST-MCNC: 145.6 MG/DL
HBA1C MFR BLD: 6.7 %
HCT VFR BLD AUTO: 40.9 % (ref 42–52)
HDLC SERPL-MCNC: 62 MG/DL (ref 35–60)
HGB BLD-MCNC: 12.9 G/DL (ref 14–18)
IMM GRANULOCYTES # BLD AUTO: 0.01 X10(3)/MCL (ref 0–0.04)
IMM GRANULOCYTES NFR BLD AUTO: 0.1 %
IRON SATN MFR SERPL: 16 % (ref 20–50)
IRON SERPL-MCNC: 59 UG/DL (ref 65–175)
LDLC SERPL CALC-MCNC: 101 MG/DL (ref 50–140)
LYMPHOCYTES # BLD AUTO: 2.04 X10(3)/MCL (ref 0.6–4.6)
LYMPHOCYTES NFR BLD AUTO: 29.6 %
MCH RBC QN AUTO: 26.3 PG (ref 27–31)
MCHC RBC AUTO-ENTMCNC: 31.5 G/DL (ref 33–36)
MCV RBC AUTO: 83.3 FL (ref 80–94)
MONOCYTES # BLD AUTO: 0.57 X10(3)/MCL (ref 0.1–1.3)
MONOCYTES NFR BLD AUTO: 8.3 %
NEUTROPHILS # BLD AUTO: 3.05 X10(3)/MCL (ref 2.1–9.2)
NEUTROPHILS NFR BLD AUTO: 44.2 %
NRBC BLD AUTO-RTO: 0 %
PLATELET # BLD AUTO: 320 X10(3)/MCL (ref 130–400)
PMV BLD AUTO: 10.3 FL (ref 7.4–10.4)
RBC # BLD AUTO: 4.91 X10(6)/MCL (ref 4.7–6.1)
TIBC SERPL-MCNC: 301 UG/DL (ref 69–240)
TIBC SERPL-MCNC: 360 UG/DL (ref 250–450)
TRANSFERRIN SERPL-MCNC: 314 MG/DL (ref 163–344)
TRIGL SERPL-MCNC: 65 MG/DL (ref 34–140)
VLDLC SERPL CALC-MCNC: 13 MG/DL
WBC # SPEC AUTO: 6.9 X10(3)/MCL (ref 4.5–11.5)

## 2023-09-07 PROCEDURE — 4010F PR ACE/ARB THEARPY RXD/TAKEN: ICD-10-PCS | Mod: CPTII,,, | Performed by: STUDENT IN AN ORGANIZED HEALTH CARE EDUCATION/TRAINING PROGRAM

## 2023-09-07 PROCEDURE — 3078F DIAST BP <80 MM HG: CPT | Mod: CPTII,,, | Performed by: STUDENT IN AN ORGANIZED HEALTH CARE EDUCATION/TRAINING PROGRAM

## 2023-09-07 PROCEDURE — 3008F PR BODY MASS INDEX (BMI) DOCUMENTED: ICD-10-PCS | Mod: CPTII,,, | Performed by: STUDENT IN AN ORGANIZED HEALTH CARE EDUCATION/TRAINING PROGRAM

## 2023-09-07 PROCEDURE — 99214 OFFICE O/P EST MOD 30 MIN: CPT | Mod: PBBFAC,PN | Performed by: STUDENT IN AN ORGANIZED HEALTH CARE EDUCATION/TRAINING PROGRAM

## 2023-09-07 PROCEDURE — 3066F NEPHROPATHY DOC TX: CPT | Mod: CPTII,,, | Performed by: STUDENT IN AN ORGANIZED HEALTH CARE EDUCATION/TRAINING PROGRAM

## 2023-09-07 PROCEDURE — 3288F PR FALLS RISK ASSESSMENT DOCUMENTED: ICD-10-PCS | Mod: CPTII,,, | Performed by: STUDENT IN AN ORGANIZED HEALTH CARE EDUCATION/TRAINING PROGRAM

## 2023-09-07 PROCEDURE — 3008F BODY MASS INDEX DOCD: CPT | Mod: CPTII,,, | Performed by: STUDENT IN AN ORGANIZED HEALTH CARE EDUCATION/TRAINING PROGRAM

## 2023-09-07 PROCEDURE — 83036 HEMOGLOBIN GLYCOSYLATED A1C: CPT | Performed by: STUDENT IN AN ORGANIZED HEALTH CARE EDUCATION/TRAINING PROGRAM

## 2023-09-07 PROCEDURE — 3066F PR DOCUMENTATION OF TREATMENT FOR NEPHROPATHY: ICD-10-PCS | Mod: CPTII,,, | Performed by: STUDENT IN AN ORGANIZED HEALTH CARE EDUCATION/TRAINING PROGRAM

## 2023-09-07 PROCEDURE — 3052F PR MOST RECENT HEMOGLOBIN A1C LEVEL 8.0 - < 9.0%: ICD-10-PCS | Mod: CPTII,,, | Performed by: STUDENT IN AN ORGANIZED HEALTH CARE EDUCATION/TRAINING PROGRAM

## 2023-09-07 PROCEDURE — 3075F PR MOST RECENT SYSTOLIC BLOOD PRESS GE 130-139MM HG: ICD-10-PCS | Mod: CPTII,,, | Performed by: STUDENT IN AN ORGANIZED HEALTH CARE EDUCATION/TRAINING PROGRAM

## 2023-09-07 PROCEDURE — 1159F MED LIST DOCD IN RCRD: CPT | Mod: CPTII,,, | Performed by: STUDENT IN AN ORGANIZED HEALTH CARE EDUCATION/TRAINING PROGRAM

## 2023-09-07 PROCEDURE — 80061 LIPID PANEL: CPT | Performed by: STUDENT IN AN ORGANIZED HEALTH CARE EDUCATION/TRAINING PROGRAM

## 2023-09-07 PROCEDURE — 3288F FALL RISK ASSESSMENT DOCD: CPT | Mod: CPTII,,, | Performed by: STUDENT IN AN ORGANIZED HEALTH CARE EDUCATION/TRAINING PROGRAM

## 2023-09-07 PROCEDURE — 1101F PR PT FALLS ASSESS DOC 0-1 FALLS W/OUT INJ PAST YR: ICD-10-PCS | Mod: CPTII,,, | Performed by: STUDENT IN AN ORGANIZED HEALTH CARE EDUCATION/TRAINING PROGRAM

## 2023-09-07 PROCEDURE — 36415 COLL VENOUS BLD VENIPUNCTURE: CPT | Performed by: STUDENT IN AN ORGANIZED HEALTH CARE EDUCATION/TRAINING PROGRAM

## 2023-09-07 PROCEDURE — 1125F AMNT PAIN NOTED PAIN PRSNT: CPT | Mod: CPTII,,, | Performed by: STUDENT IN AN ORGANIZED HEALTH CARE EDUCATION/TRAINING PROGRAM

## 2023-09-07 PROCEDURE — 99214 OFFICE O/P EST MOD 30 MIN: CPT | Mod: S$PBB,,, | Performed by: STUDENT IN AN ORGANIZED HEALTH CARE EDUCATION/TRAINING PROGRAM

## 2023-09-07 PROCEDURE — 1125F PR PAIN SEVERITY QUANTIFIED, PAIN PRESENT: ICD-10-PCS | Mod: CPTII,,, | Performed by: STUDENT IN AN ORGANIZED HEALTH CARE EDUCATION/TRAINING PROGRAM

## 2023-09-07 PROCEDURE — 99214 PR OFFICE/OUTPT VISIT, EST, LEVL IV, 30-39 MIN: ICD-10-PCS | Mod: S$PBB,,, | Performed by: STUDENT IN AN ORGANIZED HEALTH CARE EDUCATION/TRAINING PROGRAM

## 2023-09-07 PROCEDURE — 83540 ASSAY OF IRON: CPT | Performed by: STUDENT IN AN ORGANIZED HEALTH CARE EDUCATION/TRAINING PROGRAM

## 2023-09-07 PROCEDURE — 1159F PR MEDICATION LIST DOCUMENTED IN MEDICAL RECORD: ICD-10-PCS | Mod: CPTII,,, | Performed by: STUDENT IN AN ORGANIZED HEALTH CARE EDUCATION/TRAINING PROGRAM

## 2023-09-07 PROCEDURE — 1101F PT FALLS ASSESS-DOCD LE1/YR: CPT | Mod: CPTII,,, | Performed by: STUDENT IN AN ORGANIZED HEALTH CARE EDUCATION/TRAINING PROGRAM

## 2023-09-07 PROCEDURE — 3078F PR MOST RECENT DIASTOLIC BLOOD PRESSURE < 80 MM HG: ICD-10-PCS | Mod: CPTII,,, | Performed by: STUDENT IN AN ORGANIZED HEALTH CARE EDUCATION/TRAINING PROGRAM

## 2023-09-07 PROCEDURE — 3075F SYST BP GE 130 - 139MM HG: CPT | Mod: CPTII,,, | Performed by: STUDENT IN AN ORGANIZED HEALTH CARE EDUCATION/TRAINING PROGRAM

## 2023-09-07 PROCEDURE — 3061F NEG MICROALBUMINURIA REV: CPT | Mod: CPTII,,, | Performed by: STUDENT IN AN ORGANIZED HEALTH CARE EDUCATION/TRAINING PROGRAM

## 2023-09-07 PROCEDURE — 85025 COMPLETE CBC W/AUTO DIFF WBC: CPT | Performed by: STUDENT IN AN ORGANIZED HEALTH CARE EDUCATION/TRAINING PROGRAM

## 2023-09-07 PROCEDURE — 4010F ACE/ARB THERAPY RXD/TAKEN: CPT | Mod: CPTII,,, | Performed by: STUDENT IN AN ORGANIZED HEALTH CARE EDUCATION/TRAINING PROGRAM

## 2023-09-07 PROCEDURE — 3052F HG A1C>EQUAL 8.0%<EQUAL 9.0%: CPT | Mod: CPTII,,, | Performed by: STUDENT IN AN ORGANIZED HEALTH CARE EDUCATION/TRAINING PROGRAM

## 2023-09-07 PROCEDURE — 3061F PR NEG MICROALBUMINURIA RESULT DOCUMENTED/REVIEW: ICD-10-PCS | Mod: CPTII,,, | Performed by: STUDENT IN AN ORGANIZED HEALTH CARE EDUCATION/TRAINING PROGRAM

## 2023-09-07 RX ORDER — GABAPENTIN 300 MG/1
300 CAPSULE ORAL 3 TIMES DAILY
Qty: 90 CAPSULE | Refills: 11 | Status: SHIPPED | OUTPATIENT
Start: 2023-09-07 | End: 2024-09-06

## 2023-09-07 NOTE — ASSESSMENT & PLAN NOTE
Repeating iron and CBC today   Patient does need to be scoped however is awaiting cardiac clearance

## 2023-09-07 NOTE — ASSESSMENT & PLAN NOTE
Continue aspirin 81 mg  Continuing atorvastatin 40 mg daily  Patient is undergoing workup with Cardiology (echocardiogram, carotid ultrasound, Holter monitor)

## 2023-09-07 NOTE — ASSESSMENT & PLAN NOTE
Improving   Repeating A1c today patient is currently taking metformin 1000 mg q.a.m. and 500 mg q.a.m.   Also taking Jardiance 25 mg daily   Declines Ozempic

## 2023-09-07 NOTE — PROGRESS NOTES
"Patient Name: William Mccauley     : 1950    MRN: [id number]     Subjective:     Patient ID: William Mccauley is a 72 y.o. male.    Chief Complaint:   Chief Complaint   Patient presents with    Follow-up     DB        HPI: HPI  72 year-old [place] Islander speaking male presents to clinic with daughter for follow up of diabetes [place] Islander  used for duration of interview and physical      Diabetes  Reports that he is taking metformin 1000 mg qam and 500 q pm  Started last visit on Jardiance 25   Needs A1c today     History of CVA  CT and subsequent MRI was completed which revealed bilateral  Found make strokes and pontine stroke  Patient declined Plavix but agreeable to take 81 mg aspirin  Is undergoing workup by cardiology     Left left tiredness/left leg sciatic nerve pain   Patient reports feeling a tired" feeling in the back of his leg  Now unclear if complete radiation. Daughter is reporting that patient went to see vascular surgeon who feels primary issue is back related and had given patient gabapentin 300 mg b.i.d.  Patient reports that this medication did help somewhat and would like a refill  Patient did complete physical therapy  Back pain  Low left-sided back pain and across SI joints      Positive Cologuard test   Referral sent  To Gastroenterology  Patient is awaiting cardiac clearance before C scope    Anemia  H&H 12.5 and 40.1  MCV 94.4    Rash over face and scalp  Ketoconazole 2%  Med hx- as above  Surgical hx- denies  Family hx- mother  from cirrhosis of the liver.  States that she did not drink  Social hx- non smoker, 1 can of beer per week        ROS:      ROS     12 point review of systems conducted, negative except as stated in the history of present illness. See HPI for details.    History:     Past Medical History:   Diagnosis Date    Diabetes mellitus, type 2         Past Surgical History:   Procedure Laterality Date    KIDNEY STONE SURGERY Left        History reviewed. No pertinent family " "history.     Social History     Tobacco Use    Smoking status: Never     Passive exposure: Never    Smokeless tobacco: Never   Substance and Sexual Activity    Alcohol use: Yes     Alcohol/week: 1.0 standard drink of alcohol     Types: 1 Cans of beer per week     Comment: every once in awhile    Drug use: Never    Sexual activity: Yes     Partners: Female       Current Outpatient Medications   Medication Instructions    aspirin (ECOTRIN) 81 mg, Oral, Daily    atorvastatin (LIPITOR) 40 mg, Oral, Daily    diclofenac sodium (VOLTAREN) 2 g, Topical (Top), Daily, To back and leg    empagliflozin (JARDIANCE) 25 mg, Oral, Daily    ferrous sulfate 325 mg, Oral, Three times weekly    gabapentin (NEURONTIN) 300 mg, Oral, 3 times daily    hydrocortisone 2.5 % cream Topical (Top), 2 times daily    ketoconazole (NIZORAL) 2 % shampoo Topical (Top), Twice weekly    losartan (COZAAR) 25 mg, Oral, Daily    metFORMIN (GLUCOPHAGE) 500 MG tablet Take 2 tablets by mouth in the am and one in the evening        Review of patient's allergies indicates:  No Known Allergies    Objective:     Visit Vitals  /74 (BP Location: Right arm, Patient Position: Sitting, BP Method: Medium (Automatic))   Pulse 68   Temp 97.7 °F (36.5 °C) (Oral)   Resp 20   Ht 5' 2" (1.575 m)   Wt 57.7 kg (127 lb 1.6 oz)   SpO2 98%   BMI 23.25 kg/m²       Physical Examination:     Physical Exam  Constitutional:       General: He is not in acute distress.  Eyes:      General: No scleral icterus.     Extraocular Movements: Extraocular movements intact.      Conjunctiva/sclera: Conjunctivae normal.      Pupils: Pupils are equal, round, and reactive to light.   Cardiovascular:      Rate and Rhythm: Normal rate and regular rhythm.      Heart sounds: No murmur heard.  Pulmonary:      Effort: Pulmonary effort is normal. No respiratory distress.      Breath sounds: No stridor. No wheezing or rhonchi.   Musculoskeletal:         General: Tenderness present. No swelling. " Normal range of motion.   Skin:     General: Skin is warm and dry.      Coloration: Skin is not jaundiced.      Findings: No rash.   Neurological:      Mental Status: He is alert and oriented to person, place, and time. Mental status is at baseline.      Gait: Gait abnormal.      Comments: Ambulating with a walker   Psychiatric:         Thought Content: Thought content normal.         Lab Results:     Chemistry:  Lab Results   Component Value Date     06/06/2023    K 4.2 06/06/2023    CHLORIDE 103 06/06/2023    BUN 11.4 06/06/2023    CREATININE 0.80 06/06/2023    EGFRNORACEVR >60 06/06/2023    GLUCOSE 161 (H) 06/06/2023    CALCIUM 9.4 06/06/2023    ALKPHOS 52 02/22/2023    LABPROT 7.5 02/22/2023    ALBUMIN 3.9 02/22/2023    AST 22 02/22/2023    ALT 28 02/22/2023    DMHTVHDZ13DO 28.1 (L) 03/27/2023    TSH 1.259 02/22/2023    RRDMCN3ZSUK 1.04 02/22/2023    PSA 0.33 03/27/2023        Lab Results   Component Value Date    HGBA1C 8.3 (H) 06/06/2023        Hematology:  Lab Results   Component Value Date    WBC 7.3 02/22/2023    HGB 12.5 (L) 02/22/2023    HCT 40.1 (L) 02/22/2023     02/22/2023       Lipid Panel:  Lab Results   Component Value Date    CHOL 224 (H) 02/22/2023    HDL 59 02/22/2023    .00 02/22/2023    TRIG 137 02/22/2023    TOTALCHOLEST 4 02/22/2023        Urine:  Lab Results   Component Value Date    COLORUA Yellow 02/22/2023    APPEARANCEUA Clear 02/22/2023    SGUA 1.021 02/22/2023    PHUA 5.5 02/22/2023    PROTEINUA Negative 02/22/2023    GLUCOSEUA Normal 02/22/2023    KETONESUA Negative 02/22/2023    BLOODUA Negative 02/22/2023    NITRITESUA Negative 02/22/2023    LEUKOCYTESUR Negative 02/22/2023    RBCUA 0-5 02/22/2023    WBCUA 0-5 02/22/2023    BACTERIA None Seen 02/22/2023    SQEPUA None Seen 02/22/2023    HYALINECASTS None Seen 02/22/2023    CREATRANDUR 69.5 04/27/2023        Assessment:          ICD-10-CM ICD-9-CM   1. Iron deficiency anemia due to chronic blood loss  D50.0 280.0    2. Uncontrolled type 2 diabetes mellitus with hyperglycemia  E11.65 250.02   3. Hyperlipidemia, unspecified hyperlipidemia type  E78.5 272.4   4. Sciatic nerve disease, right  G57.01 355.0   5. Lumbar radiculopathy, chronic  M54.16 724.4   6. History of CVA (cerebrovascular accident)  Z86.73 V12.54   7. Other hyperlipidemia  E78.49 272.4   8. Primary hypertension  I10 401.9   9. Chronic left-sided low back pain with right-sided sciatica  M54.41 724.2    G89.29 724.3     338.29        Plan:     1. Iron deficiency anemia due to chronic blood loss  Assessment & Plan:  Repeating iron and CBC today   Patient does need to be scoped however is awaiting cardiac clearance    Orders:  -     Iron and TIBC; Future; Expected date: 09/07/2023  -     CBC Auto Differential; Future; Expected date: 09/07/2023    2. Uncontrolled type 2 diabetes mellitus with hyperglycemia  Assessment & Plan:  Improving   Repeating A1c today patient is currently taking metformin 1000 mg q.a.m. and 500 mg q.a.m.   Also taking Jardiance 25 mg daily   Declines Ozempic    Orders:  -     Hemoglobin A1C; Future; Expected date: 09/07/2023  -     empagliflozin (JARDIANCE) 25 mg tablet; Take 1 tablet (25 mg total) by mouth once daily.  Dispense: 90 tablet; Refill: 3    3. Hyperlipidemia, unspecified hyperlipidemia type  -     Lipid Panel; Future; Expected date: 09/07/2023    4. Sciatic nerve disease, right  -     gabapentin (NEURONTIN) 300 MG capsule; Take 1 capsule (300 mg total) by mouth 3 (three) times daily.  Dispense: 90 capsule; Refill: 11    5. Lumbar radiculopathy, chronic  -     MRI Lumbar Spine Without Contrast; Future; Expected date: 09/07/2023    6. History of CVA (cerebrovascular accident)  Overview:      Encouraged walker use to avoid falls  Patient declines Plavix    Assessment & Plan:  Continue aspirin 81 mg  Continuing atorvastatin 40 mg daily  Patient is undergoing workup with Cardiology (echocardiogram, carotid ultrasound, Holter  monitor)      7. Other hyperlipidemia  Assessment & Plan:  Stable continue atorvastatin 40 mg daily      8. Primary hypertension  Assessment & Plan:  Blood pressure 132/74   Stable continue losartan 25 mg daily      9. Chronic left-sided low back pain with right-sided sciatica  Assessment & Plan:  Patient has completed physical therapy   Increasing gabapentin to 300 mg t.i.d.  Ordering MRI           Follow up in about 3 months (around 12/7/2023) for Diabetes, BP check.    Future Appointments   Date Time Provider Department Center   9/18/2023  8:00 AM PROVIDERS, [facility] [place]    11/1/2023  1:00 PM Stacie Newman, [facility] NEURO [place]    11/28/2023  2:00 PM Be Rahman MD [facility] CARD [place]    12/7/2023  8:00 AM Shannon Alonso MD [facility]        Shannon Alonso MD

## 2023-09-08 ENCOUNTER — PATIENT MESSAGE (OUTPATIENT)
Dept: FAMILY MEDICINE | Facility: CLINIC | Age: 73
End: 2023-09-08
Payer: MEDICAID

## 2023-09-08 DIAGNOSIS — D50.0 IRON DEFICIENCY ANEMIA DUE TO CHRONIC BLOOD LOSS: Primary | ICD-10-CM

## 2023-09-08 RX ORDER — IRON,CARBONYL/ASCORBIC ACID 100-250 MG
1 TABLET ORAL
Qty: 30 TABLET | Refills: 1 | Status: SHIPPED | OUTPATIENT
Start: 2023-09-08 | End: 2023-12-11

## 2023-09-18 ENCOUNTER — OFFICE VISIT (OUTPATIENT)
Dept: OPHTHALMOLOGY | Facility: CLINIC | Age: 73
End: 2023-09-18
Payer: MEDICAID

## 2023-09-18 VITALS — HEIGHT: 62 IN | WEIGHT: 127 LBS | BODY MASS INDEX: 23.37 KG/M2

## 2023-09-18 DIAGNOSIS — H40.023 AT HIGH RISK FOR OPEN ANGLE GLAUCOMA OF BOTH EYES: Primary | ICD-10-CM

## 2023-09-18 DIAGNOSIS — E11.65 UNCONTROLLED TYPE 2 DIABETES MELLITUS WITH HYPERGLYCEMIA: ICD-10-CM

## 2023-09-18 DIAGNOSIS — H25.13 AGE-RELATED NUCLEAR CATARACT OF BOTH EYES: ICD-10-CM

## 2023-09-18 DIAGNOSIS — H02.402 PTOSIS OF LEFT EYELID: ICD-10-CM

## 2023-09-18 PROCEDURE — 92133 CPTRZD OPH DX IMG PST SGM ON: CPT | Mod: PBBFAC,PO | Performed by: OPHTHALMOLOGY

## 2023-09-18 PROCEDURE — 92136 OPHTHALMIC BIOMETRY: CPT | Mod: PBBFAC,PO | Performed by: OPHTHALMOLOGY

## 2023-09-18 PROCEDURE — 99213 OFFICE O/P EST LOW 20 MIN: CPT | Mod: PBBFAC,PO | Performed by: STUDENT IN AN ORGANIZED HEALTH CARE EDUCATION/TRAINING PROGRAM

## 2023-09-18 PROCEDURE — 92134 CPTRZ OPH DX IMG PST SGM RTA: CPT | Mod: PBBFAC,PO | Performed by: OPHTHALMOLOGY

## 2023-09-18 NOTE — PROGRESS NOTES
HPI     Diabetic Eye Exam     Additional comments: New DM eval. Last A1c 9/7/2023- 6.7           Ptosis     Additional comments: New eval.          Last edited by Mai Vieyra RN on 9/18/2023  8:45 AM.        OCT RNFL 9/18/23  OD: 79, yellow I, green STN - poor scan  OS: 92, yellow I, green TN, white S - poor scan    OCT MAC 9/18/23  OD: 271, normal foveal contour, no SRF/IRF, IS/OS intact, drusen  OS: 235, normal foveal contour, superior IRF, IS/OS loss near fovea, drusen        Assessment /Plan     For exam results, see Encounter Report.    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  - Patient with visually significant cataract Left Eye and desires surgical removal  - Thoroughly discussed cataract surgery today, risks/benefits/alternatives discussed and informed consent obtained, signed, and placed in the chart  - MRX done today; BCVA: 20/80 // CF  - IOP today: WNL OU  - Pt interested in cataract surgery and qualifies for surgery  - Trauma: no  - Guttae: none  - Phaco/iridodonesis: none  - Trypan blue: no  - Flomax use: no  - Dilation: good  - Okay to continue ASA and blood thinners  - Cooperative with exam: Pt able to lie flat for 30 minutes, will plan to do under general due to language barrier  - Will need medical clearance  - IOL calcs done 9/1823: discrepancy with K values OS. Will use OD Ks for OS eye  - Lens to be used: MX60E +21.50 to aim for final refraction of -0.17 diopters postop  - Will call patient to schedule once cleared by cards     2. DM with Moderate NPDR OS  3. DM w/o manifestations OD  - last A1c 6.7 9/7/23  - carotid duplex ordered, but not done  - maintain good BP/BS control    4. Drusen, OU  - OCT mac without foveal involvement, no CNVM  - monitor    5. Pterygium, OS  - monitor    6. Inc C/D  ratio  - repeat OCT nerve after cataract surgery  - IOP 22 // 13

## 2023-10-11 ENCOUNTER — HOSPITAL ENCOUNTER (OUTPATIENT)
Dept: RADIOLOGY | Facility: HOSPITAL | Age: 73
Discharge: HOME | End: 2023-10-11
Attending: STUDENT IN AN ORGANIZED HEALTH CARE EDUCATION/TRAINING PROGRAM
Payer: MEDICAID

## 2023-10-11 DIAGNOSIS — M54.16 LUMBAR RADICULOPATHY, CHRONIC: ICD-10-CM

## 2023-10-11 PROCEDURE — 72148 MRI LUMBAR SPINE W/O DYE: CPT | Mod: TC

## 2023-10-31 NOTE — PROGRESS NOTES
[facility] Initial Office Visit Note    Subjective:      Patient ID: William Mccauley is a 73 y.o. male.    Chief Complaint: Stroke (Patient is here today with his daughter. She reports that stroke occurred approx. 2 years ago. Daughter states delayed speech, difficulty with balance and weakness in lower extremity. She also states he has had imaging done that showed L-spine stenosis. Has had PT in the past/)    HPI  This is a 72 YO [place] R handed male with a PMX of HTN, DM  was referred by his PCP for stroke history. CT and MRI brain performed. Carotid U/S, Echo were also ordered and still pending, Pt no-showed for all three appts. PCP visit note indicates that Pt's son reports that Pt had difficulty walking 7 mths prior to being seen in 2/22/2023. Son also noted that patient's suffered w/a facial droop. CT head was ordered and referral to PT/OT was sent.    Today, Pt presents to office with his daughter. Daughter states she participated in PT/OT at [facility] on [address]. Admits to improvement to ambulation and R upper ext weakness. Ambulates w/Rolator walker. Denies falling accidents. Lives with his daughter who provides assistance. Is able to bathe himself, dresses himself, feeds self. May need some assistance with tying shoes. Appetite good, enjoys sweets. Sleeps well at night. Cardiac monitor currently in place, but state not sure when it need to be discontinued. Denies chest pain or SOB w/ambulation. Denies smoking or alcohol intake Denies any further stroke like symptoms. Followed by ophthalmology for L cataract, should be scheduled for surgery soon    Stroke Work-Up    - MRI brain w/out Mihai 3/10/2023: bilat thalamic and pontine old lacunar infarcts, chronic microvascular ischemia and atrophy  - NCT 2/22/2023: chronic age-related changes. Multiple punctate areas of lacunar infarct seen at bilat basal ganglia    Intervention:   IV tPA: no   Interventional Thrombectomy: no    Risk Factors:    Cardiovascular risk factors: Yes hypertension, DM  Tobacco use:   Alcohol use:   Recreational drug use: No    Results for orders placed or performed in visit on 09/07/23   Lipid Panel   Result Value Ref Range    Cholesterol Total 176 <=200 mg/dL    HDL Cholesterol 62 (H) 35 - 60 mg/dL    Triglyceride 65 34 - 140 mg/dL    Cholesterol/HDL Ratio 3 0 - 5    Very Low Density Lipoprotein 13     LDL Cholesterol 101.00 50.00 - 140.00 mg/dL   Iron and TIBC   Result Value Ref Range    Iron Binding Capacity Unsaturated 301 (H) 69 - 240 ug/dL    Iron Level 59 (L) 65 - 175 ug/dL    Transferrin 314 163 - 344 mg/dL    Iron Binding Capacity Total 360 250 - 450 ug/dL    Iron Saturation 16 (L) 20 - 50 %   Hemoglobin A1C   Result Value Ref Range    Hemoglobin A1c 6.7 <=7.0 %    Estimated Average Glucose 145.6 mg/dL   CBC with Differential   Result Value Ref Range    WBC 6.90 4.50 - 11.50 x10(3)/mcL    RBC 4.91 4.70 - 6.10 x10(6)/mcL    Hgb 12.9 (L) 14.0 - 18.0 g/dL    Hct 40.9 (L) 42.0 - 52.0 %    MCV 83.3 80.0 - 94.0 fL    MCH 26.3 (L) 27.0 - 31.0 pg    MCHC 31.5 (L) 33.0 - 36.0 g/dL    RDW 18.6 (H) 11.5 - 17.0 %    Platelet 320 130 - 400 x10(3)/mcL    MPV 10.3 7.4 - 10.4 fL    Neut % 44.2 %    Lymph % 29.6 %    Mono % 8.3 %    Eos % 17.2 %    Basophil % 0.6 %    Lymph # 2.04 0.6 - 4.6 x10(3)/mcL    Neut # 3.05 2.1 - 9.2 x10(3)/mcL    Mono # 0.57 0.1 - 1.3 x10(3)/mcL    Eos # 1.19 (H) 0 - 0.9 x10(3)/mcL    Baso # 0.04 <=0.2 x10(3)/mcL    IG# 0.01 0 - 0.04 x10(3)/mcL    IG% 0.1 %    NRBC% 0.0 %       Review of Systems   As per HPI. All other systems negative   Objective:      Physical Exam  Constitutional:       Appearance: Normal appearance. He is normal weight.   HENT:      Head: Normocephalic.      Right Ear: External ear normal.      Left Ear: External ear normal.      Nose: Nose normal.      Mouth/Throat:      Mouth: Mucous membranes are moist.   Eyes:      Pupils: Pupils are equal, round, and reactive to light.    Cardiovascular:      Rate and Rhythm: Normal rate and regular rhythm.      Pulses: Normal pulses.   Pulmonary:      Effort: Pulmonary effort is normal.   Abdominal:      General: Abdomen is flat.   Musculoskeletal:         General: Normal range of motion.      Cervical back: Normal range of motion.   Skin:     General: Skin is warm and dry.   Neurological:      General: No focal deficit present.      Mental Status: He is alert.   Psychiatric:         Mood and Affect: Mood normal.         Comprehensive Neurological Exam:  Mental Status: Alert Oriented to Self, Date, and Place. Naming, repetition, reading, and writing wnl. Comprehension wnl. No dysarthria.   CN II - XII: JUVENCIO, No APD, cataract to L eye, difficulty assessing VA as Pt turned head to L when assessing L side, VFFC, pterygium OS, lid lag bilaterally, EOMI without nystagmus, LT/Temp symmetric in CN V1-3 distribution, Hearing grossly intact, Face Symmetric, Tongue and Uvula midline, Trapezius symmetric bilateral.   Motor: tone and bulk wnl throughout, no abnormal involuntary or voluntary movements, 5/5 to confrontation, Fine finger movements wnl b/l (slow), No pronator drift.   Sensory: LT, Proprioception, Vibration, PP, Temp symmetric. No sensory simultagnosia.   Reflexes: 2+ throughout, plantar reflexes downward bilateral.   Cerebellar: FNF wnl b/l  Romberg: unable to assess  Gait: slow, careful, utilizing Rolator walker, slightly stooped posture    Assessment:       1. History of CVA (cerebrovascular accident)  - CTA Head; Future  - CTA Neck; Future  - Creatinine, serum; Future  - Creatinine, serum; Future    2. Gait instability    3. Language barrier to communication    4. Ptosis of left eyelid      Plan:       [] c/w ASA 81mg daily  [] c/w statin  [] d/c naproxen as Pt on ASA; may try Tylenol  [] carotid U/S via cardiology  [] Echo w/bubble study via cardiology  [] order CTA head/neck  [] there is no surgical contraindication from a neurology  standpoint    Stroke Education Provided - including DASH diet, Blood Pressure Control < 130/90, HgA1c < 7.0%, LDL < 100, sleep apnea, and physical activity of at least 150 min per week     I have explained the treatment plan, diagnosis, and prognosis to patient. All questions are answered to the best of my knowledge.     Face to face time 60 minutes, including documentation, chart review, counseling, education, review of test results, relevant medical records, and coordination of care.

## 2023-11-01 ENCOUNTER — OFFICE VISIT (OUTPATIENT)
Dept: NEUROLOGY | Facility: CLINIC | Age: 73
End: 2023-11-01
Payer: MEDICAID

## 2023-11-01 VITALS
BODY MASS INDEX: 24.11 KG/M2 | WEIGHT: 131 LBS | HEIGHT: 62 IN | HEART RATE: 102 BPM | SYSTOLIC BLOOD PRESSURE: 131 MMHG | OXYGEN SATURATION: 98 % | DIASTOLIC BLOOD PRESSURE: 88 MMHG

## 2023-11-01 DIAGNOSIS — R26.81 GAIT INSTABILITY: ICD-10-CM

## 2023-11-01 DIAGNOSIS — Z78.9 LANGUAGE BARRIER TO COMMUNICATION: ICD-10-CM

## 2023-11-01 DIAGNOSIS — H02.402 PTOSIS OF LEFT EYELID: ICD-10-CM

## 2023-11-01 DIAGNOSIS — Z86.73 HISTORY OF CVA (CEREBROVASCULAR ACCIDENT): Primary | ICD-10-CM

## 2023-11-01 PROCEDURE — 3066F NEPHROPATHY DOC TX: CPT | Mod: CPTII,,, | Performed by: NURSE PRACTITIONER

## 2023-11-01 PROCEDURE — 3044F PR MOST RECENT HEMOGLOBIN A1C LEVEL <7.0%: ICD-10-PCS | Mod: CPTII,,, | Performed by: NURSE PRACTITIONER

## 2023-11-01 PROCEDURE — 99205 PR OFFICE/OUTPT VISIT, NEW, LEVL V, 60-74 MIN: ICD-10-PCS | Mod: S$PBB,,, | Performed by: NURSE PRACTITIONER

## 2023-11-01 PROCEDURE — 99205 OFFICE O/P NEW HI 60 MIN: CPT | Mod: S$PBB,,, | Performed by: NURSE PRACTITIONER

## 2023-11-01 PROCEDURE — 1101F PR PT FALLS ASSESS DOC 0-1 FALLS W/OUT INJ PAST YR: ICD-10-PCS | Mod: CPTII,,, | Performed by: NURSE PRACTITIONER

## 2023-11-01 PROCEDURE — 4010F PR ACE/ARB THEARPY RXD/TAKEN: ICD-10-PCS | Mod: CPTII,,, | Performed by: NURSE PRACTITIONER

## 2023-11-01 PROCEDURE — 1159F PR MEDICATION LIST DOCUMENTED IN MEDICAL RECORD: ICD-10-PCS | Mod: CPTII,,, | Performed by: NURSE PRACTITIONER

## 2023-11-01 PROCEDURE — 3044F HG A1C LEVEL LT 7.0%: CPT | Mod: CPTII,,, | Performed by: NURSE PRACTITIONER

## 2023-11-01 PROCEDURE — 3079F PR MOST RECENT DIASTOLIC BLOOD PRESSURE 80-89 MM HG: ICD-10-PCS | Mod: CPTII,,, | Performed by: NURSE PRACTITIONER

## 2023-11-01 PROCEDURE — 4010F ACE/ARB THERAPY RXD/TAKEN: CPT | Mod: CPTII,,, | Performed by: NURSE PRACTITIONER

## 2023-11-01 PROCEDURE — 1101F PT FALLS ASSESS-DOCD LE1/YR: CPT | Mod: CPTII,,, | Performed by: NURSE PRACTITIONER

## 2023-11-01 PROCEDURE — 3066F PR DOCUMENTATION OF TREATMENT FOR NEPHROPATHY: ICD-10-PCS | Mod: CPTII,,, | Performed by: NURSE PRACTITIONER

## 2023-11-01 PROCEDURE — 99214 OFFICE O/P EST MOD 30 MIN: CPT | Mod: PBBFAC | Performed by: NURSE PRACTITIONER

## 2023-11-01 PROCEDURE — 3061F NEG MICROALBUMINURIA REV: CPT | Mod: CPTII,,, | Performed by: NURSE PRACTITIONER

## 2023-11-01 PROCEDURE — 3288F FALL RISK ASSESSMENT DOCD: CPT | Mod: CPTII,,, | Performed by: NURSE PRACTITIONER

## 2023-11-01 PROCEDURE — 3061F PR NEG MICROALBUMINURIA RESULT DOCUMENTED/REVIEW: ICD-10-PCS | Mod: CPTII,,, | Performed by: NURSE PRACTITIONER

## 2023-11-01 PROCEDURE — 3008F PR BODY MASS INDEX (BMI) DOCUMENTED: ICD-10-PCS | Mod: CPTII,,, | Performed by: NURSE PRACTITIONER

## 2023-11-01 PROCEDURE — 3079F DIAST BP 80-89 MM HG: CPT | Mod: CPTII,,, | Performed by: NURSE PRACTITIONER

## 2023-11-01 PROCEDURE — 1159F MED LIST DOCD IN RCRD: CPT | Mod: CPTII,,, | Performed by: NURSE PRACTITIONER

## 2023-11-01 PROCEDURE — 3008F BODY MASS INDEX DOCD: CPT | Mod: CPTII,,, | Performed by: NURSE PRACTITIONER

## 2023-11-01 PROCEDURE — 1160F PR REVIEW ALL MEDS BY PRESCRIBER/CLIN PHARMACIST DOCUMENTED: ICD-10-PCS | Mod: CPTII,,, | Performed by: NURSE PRACTITIONER

## 2023-11-01 PROCEDURE — 3075F SYST BP GE 130 - 139MM HG: CPT | Mod: CPTII,,, | Performed by: NURSE PRACTITIONER

## 2023-11-01 PROCEDURE — 1160F RVW MEDS BY RX/DR IN RCRD: CPT | Mod: CPTII,,, | Performed by: NURSE PRACTITIONER

## 2023-11-01 PROCEDURE — 3075F PR MOST RECENT SYSTOLIC BLOOD PRESS GE 130-139MM HG: ICD-10-PCS | Mod: CPTII,,, | Performed by: NURSE PRACTITIONER

## 2023-11-01 PROCEDURE — 3288F PR FALLS RISK ASSESSMENT DOCUMENTED: ICD-10-PCS | Mod: CPTII,,, | Performed by: NURSE PRACTITIONER

## 2023-11-01 RX ORDER — NAPROXEN 500 MG/1
500 TABLET ORAL 2 TIMES DAILY
COMMUNITY
Start: 2023-10-10 | End: 2023-11-01

## 2023-11-15 ENCOUNTER — PATIENT MESSAGE (OUTPATIENT)
Dept: NEUROLOGY | Facility: CLINIC | Age: 73
End: 2023-11-15
Payer: MEDICAID

## 2023-11-15 ENCOUNTER — TELEPHONE (OUTPATIENT)
Dept: NEUROLOGY | Facility: CLINIC | Age: 73
End: 2023-11-15
Payer: MEDICAID

## 2023-11-15 ENCOUNTER — HOSPITAL ENCOUNTER (OUTPATIENT)
Dept: RADIOLOGY | Facility: HOSPITAL | Age: 73
Discharge: HOME | End: 2023-11-15
Attending: NURSE PRACTITIONER
Payer: MEDICAID

## 2023-11-15 DIAGNOSIS — Z86.73 HISTORY OF CVA (CEREBROVASCULAR ACCIDENT): ICD-10-CM

## 2023-11-15 DIAGNOSIS — Z86.73 HISTORY OF CVA (CEREBROVASCULAR ACCIDENT): Primary | ICD-10-CM

## 2023-11-15 DIAGNOSIS — I65.01 ASYMPTOMATIC STENOSIS OF RIGHT VERTEBRAL ARTERY: Primary | ICD-10-CM

## 2023-11-15 DIAGNOSIS — I65.01 STENOSIS OF RIGHT VERTEBRAL ARTERY: ICD-10-CM

## 2023-11-15 PROCEDURE — 70496 CT ANGIOGRAPHY HEAD: CPT | Mod: TC

## 2023-11-15 PROCEDURE — 25500020 PHARM REV CODE 255: Performed by: NURSE PRACTITIONER

## 2023-11-15 RX ADMIN — IOHEXOL 100 ML: 350 INJECTION, SOLUTION INTRAVENOUS at 12:11

## 2023-11-15 NOTE — TELEPHONE ENCOUNTER
Spoke w/pt's dtr, Mehnaz, and informed her of results CTA head/neck results. Will refer to vascular surgeon for evaluation and possible intervention. Pt's dtr verbalized understanding.

## 2023-11-16 ENCOUNTER — PATIENT MESSAGE (OUTPATIENT)
Dept: NEUROLOGY | Facility: CLINIC | Age: 73
End: 2023-11-16
Payer: MEDICAID

## 2023-11-28 ENCOUNTER — OFFICE VISIT (OUTPATIENT)
Dept: CARDIOLOGY | Facility: CLINIC | Age: 73
End: 2023-11-28
Payer: MEDICAID

## 2023-11-28 VITALS
RESPIRATION RATE: 20 BRPM | WEIGHT: 130.06 LBS | TEMPERATURE: 99 F | BODY MASS INDEX: 23.93 KG/M2 | HEART RATE: 90 BPM | DIASTOLIC BLOOD PRESSURE: 82 MMHG | OXYGEN SATURATION: 99 % | HEIGHT: 62 IN | SYSTOLIC BLOOD PRESSURE: 128 MMHG

## 2023-11-28 DIAGNOSIS — E78.5 HYPERLIPIDEMIA, UNSPECIFIED HYPERLIPIDEMIA TYPE: ICD-10-CM

## 2023-11-28 DIAGNOSIS — I10 HYPERTENSION, UNSPECIFIED TYPE: ICD-10-CM

## 2023-11-28 DIAGNOSIS — R07.9 CHEST PAIN, UNSPECIFIED TYPE: ICD-10-CM

## 2023-11-28 DIAGNOSIS — Z86.73 HISTORY OF CVA (CEREBROVASCULAR ACCIDENT): Primary | ICD-10-CM

## 2023-11-28 PROCEDURE — 99213 OFFICE O/P EST LOW 20 MIN: CPT | Mod: PBBFAC | Performed by: INTERNAL MEDICINE

## 2023-11-28 RX ORDER — NAPROXEN 500 MG/1
500 TABLET ORAL 2 TIMES DAILY
COMMUNITY
Start: 2023-11-13 | End: 2023-12-07

## 2023-11-28 NOTE — PROGRESS NOTES
11/28/2023 3:13 PM    Subjective:     CHIEF COMPLAINT:   Chief Complaint   Patient presents with    f/u sts has chest pain off/on has sob                            HPI:    Mr. William Mccauley is a 73 y.o. male with HTN, T2DM, history of CVA, generalized weakness and atypical chest pain who presents for follow up.     The patient reports completing his event monitor but it is not in the computer system when we looked it up. He has not completed his TTE or nuclear stress.     The patient reports epigastric and substernal chest discomfort that occurs at rest and with exertion. It spontaneously resolves.  It is not associated with other symptoms.  It does not radiate.  It is not worsened after eating.  It is not alleviated by leaning forward or changing positions.  It is not a tearing sensation.  It is not associated after emesis.  His pain is overall constant baseline with intermittent bouts of worsened symptoms but generally mild in nature.    He reports dyspnea on exertion but denies orthopnea, PND, abdominal distention, early satiety, or lower extremity edema.  He denies presyncope or syncope.  He is reportedly had multiple CVAs of unknown etiology.  He has no tobacco abuse history.      Past Medical History    Patient Active Problem List   Diagnosis    Type 2 diabetes mellitus without complication    Gait instability    Primary hypertension    Other constipation    Uncontrolled type 2 diabetes mellitus with hyperglycemia    History of CVA (cerebrovascular accident)    Leg swelling    Other hyperlipidemia    SOB (shortness of breath)    Iron deficiency anemia    Seborrheic dermatitis    Ptosis of left eyelid    Leg fatigue    Chronic left-sided low back pain with right-sided sciatica    Language barrier to communication       Surgical History    Past Surgical History:   Procedure Laterality Date    KIDNEY STONE SURGERY Left        Social History     Socioeconomic History    Marital status:     Number of children:  5   Tobacco Use    Smoking status: Never     Passive exposure: Never    Smokeless tobacco: Never   Substance and Sexual Activity    Alcohol use: Never     Alcohol/week: 1.0 standard drink of alcohol     Types: 1 Cans of beer per week    Drug use: Never    Sexual activity: Yes     Partners: Female       History reviewed. No pertinent family history.  Review of patient's allergies indicates:  No Known Allergies    Current Medications    Current Outpatient Medications   Medication Instructions    aspirin (ECOTRIN) 81 mg, Oral, Daily    atorvastatin (LIPITOR) 40 mg, Oral, Daily    diclofenac sodium (VOLTAREN) 2 g, Topical (Top), Daily, To back and leg    empagliflozin (JARDIANCE) 25 mg, Oral, Daily    ferrous sulfate 325 mg, Oral, Three times weekly    gabapentin (NEURONTIN) 300 mg, Oral, 3 times daily    iron-vitamin C 100-250 mg, ICAR-C, (ICAR-C) 100-250 mg Tab 1 tablet, Oral, Three times weekly    losartan (COZAAR) 25 mg, Oral, Daily    metFORMIN (GLUCOPHAGE) 500 MG tablet Take 2 tablets by mouth in the am and one in the evening    naproxen (NAPROSYN) 500 mg, Oral, 2 times daily         ROS:  He denies headaches, changes in vision, nausea, vomiting, fever, chills, palpitations, abdominal pain, change in urinary or bowel habits.  He reports chronic substernal chest pain with intermittent bouts of exertion and dyspnea on exertion.    Objective:     BP Readings from Last 3 Encounters:   11/28/23 128/82   11/01/23 131/88   09/07/23 132/74        Pulse Readings from Last 3 Encounters:   11/28/23 90   11/01/23 102   09/07/23 68        Temp Readings from Last 3 Encounters:   11/28/23 98.6 °F (37 °C) (Oral)   09/07/23 97.7 °F (36.5 °C) (Oral)   07/25/23 97.3 °F (36.3 °C) (Oral)       Wt Readings from Last 3 Encounters:   11/28/23 59 kg (130 lb 1.1 oz)   11/01/23 59.4 kg (131 lb)   09/18/23 57.6 kg (127 lb)         PE:  Blood pressure 128/82, pulse 90, temperature 98.6 °F (37 °C), temperature source Oral, resp. rate 20,  "height 5' 2" (1.575 m), weight 59 kg (130 lb 1.1 oz), SpO2 99 %.   Physical Exam  Vitals reviewed.   Constitutional:       General: He is not in acute distress.     Appearance: Normal appearance. He is normal weight. He is not ill-appearing.   HENT:      Head: Normocephalic and atraumatic.      Right Ear: External ear normal.      Left Ear: External ear normal.      Nose: Nose normal.      Mouth/Throat:      Mouth: Mucous membranes are moist.   Eyes:      Conjunctiva/sclera: Conjunctivae normal.   Cardiovascular:      Rate and Rhythm: Normal rate and regular rhythm.      Pulses: Normal pulses.      Heart sounds: Murmur heard.   Pulmonary:      Effort: Pulmonary effort is normal. No respiratory distress.      Breath sounds: Normal breath sounds. No stridor. No wheezing, rhonchi or rales.   Chest:      Chest wall: No tenderness.   Abdominal:      General: Abdomen is flat. Bowel sounds are normal. There is no distension.      Palpations: Abdomen is soft.   Musculoskeletal:      Cervical back: Neck supple.      Right lower leg: No edema.      Left lower leg: No edema.   Skin:     General: Skin is warm and dry.      Capillary Refill: Capillary refill takes less than 2 seconds.   Neurological:      Mental Status: He is alert and oriented to person, place, and time.   Psychiatric:         Mood and Affect: Mood normal.         Behavior: Behavior normal.                                                                                                                                                                                                                                                                                                                                                                                     CARDIAC TESTING:  Echocardiogram  No results found for this or any previous visit.      Stress Test  No results found for this or any previous visit.     Coronary Angiogram  No results found for this or any " "previous visit.     Holter Monitor  No cardiac monitor results found for the past 12 months    Last BMP BMP  Lab Results   Component Value Date     06/06/2023    K 4.2 06/06/2023    CO2 27 06/06/2023    BUN 11.4 06/06/2023    CREATININE 0.72 (L) 11/01/2023    CALCIUM 9.4 06/06/2023    EGFRNORACEVR >60 11/01/2023      Last CBC     Lab Results   Component Value Date    WBC 6.90 09/07/2023    HGB 12.9 (L) 09/07/2023    HCT 40.9 (L) 09/07/2023    MCV 83.3 09/07/2023     09/07/2023           BNP  No results found for: "BNP"  Last lipids    Lab Results   Component Value Date    CHOL 134 11/01/2023    CHOL 176 09/07/2023    CHOL 224 (H) 02/22/2023    HDL 54 11/01/2023    HDL 62 (H) 09/07/2023    HDL 59 02/22/2023    LDL 63.00 11/01/2023    .00 09/07/2023    .00 02/22/2023    TRIG 86 11/01/2023    TRIG 65 09/07/2023    TRIG 137 02/22/2023    TOTALCHOLEST 2 11/01/2023    TOTALCHOLEST 3 09/07/2023    TOTALCHOLEST 4 02/22/2023      LFT     Lab Results   Component Value Date    ALT 28 02/22/2023    AST 22 02/22/2023       Assessment:     Mr. William Mccauley is a 73 y.o. male with HTN, T2DM, history of CVA, generalized weakness and atypical chest pain who presents for follow up.     Plan:     HTN  -Stop Naproxen this can worsen hypertension.   -Continue Losartan 25 mg daily.     History of CVA  -Antiplatelet therapy.   -Lipitor 40 mg daily.   -48 hour Holter. If normal will proceed with 30 day event monitor.     Dyspnea with Exertion and Murmur  -A TTE was ordered at the last office visit but not completed. Complete TTE to assess LV function, valves, and diastolic function.     Atypical Chest Pain  -Has risk factors of age, HTN, and T2DM but no tobacco abuse history. Constant with intermittent bouts of worsening and spontaneously resolves. Ordered nuclear stress in the past but he did not complete it. He is unable to physically perform an exercise stress test. Complete nuclear stress test. His blood pressure " is controlled. His cholesterol is controlled.     Puma Thomas MD  Cardiology Fellow    Future Appointments   Date Time Provider Department Center   12/7/2023  8:20 AM Shannon Alonso MD [facility]   5/1/2024  9:00 AM Stacie Newman, ANP [facility] NEURO [facility]

## 2023-12-07 ENCOUNTER — OFFICE VISIT (OUTPATIENT)
Dept: FAMILY MEDICINE | Facility: CLINIC | Age: 73
End: 2023-12-07
Payer: MEDICAID

## 2023-12-07 VITALS
SYSTOLIC BLOOD PRESSURE: 134 MMHG | TEMPERATURE: 98 F | OXYGEN SATURATION: 100 % | HEART RATE: 60 BPM | BODY MASS INDEX: 24.29 KG/M2 | WEIGHT: 132 LBS | HEIGHT: 62 IN | DIASTOLIC BLOOD PRESSURE: 75 MMHG

## 2023-12-07 DIAGNOSIS — Z23 ENCOUNTER FOR IMMUNIZATION: Primary | ICD-10-CM

## 2023-12-07 DIAGNOSIS — R06.02 SHORTNESS OF BREATH: ICD-10-CM

## 2023-12-07 DIAGNOSIS — I10 PRIMARY HYPERTENSION: ICD-10-CM

## 2023-12-07 DIAGNOSIS — D50.0 IRON DEFICIENCY ANEMIA DUE TO CHRONIC BLOOD LOSS: ICD-10-CM

## 2023-12-07 DIAGNOSIS — M48.062 LUMBAR STENOSIS WITH NEUROGENIC CLAUDICATION: ICD-10-CM

## 2023-12-07 DIAGNOSIS — R53.82 CHRONIC FATIGUE: ICD-10-CM

## 2023-12-07 DIAGNOSIS — M48.062 SPINAL STENOSIS OF LUMBAR REGION WITH NEUROGENIC CLAUDICATION: ICD-10-CM

## 2023-12-07 DIAGNOSIS — E11.9 TYPE 2 DIABETES MELLITUS WITHOUT COMPLICATION, WITHOUT LONG-TERM CURRENT USE OF INSULIN: ICD-10-CM

## 2023-12-07 DIAGNOSIS — Z86.73 HISTORY OF CVA (CEREBROVASCULAR ACCIDENT): ICD-10-CM

## 2023-12-07 LAB
ALBUMIN SERPL-MCNC: 3.5 G/DL (ref 3.4–4.8)
ALBUMIN/GLOB SERPL: 0.9 RATIO (ref 1.1–2)
ALP SERPL-CCNC: 49 UNIT/L (ref 40–150)
ALT SERPL-CCNC: 19 UNIT/L (ref 0–55)
AST SERPL-CCNC: 22 UNIT/L (ref 5–34)
BASOPHILS # BLD AUTO: 0.04 X10(3)/MCL
BASOPHILS NFR BLD AUTO: 0.5 %
BILIRUB SERPL-MCNC: 0.3 MG/DL
BUN SERPL-MCNC: 17.2 MG/DL (ref 8.4–25.7)
CALCIUM SERPL-MCNC: 9.3 MG/DL (ref 8.8–10)
CHLORIDE SERPL-SCNC: 105 MMOL/L (ref 98–107)
CO2 SERPL-SCNC: 27 MMOL/L (ref 23–31)
CREAT SERPL-MCNC: 0.76 MG/DL (ref 0.73–1.18)
EOSINOPHIL # BLD AUTO: 1 X10(3)/MCL (ref 0–0.9)
EOSINOPHIL NFR BLD AUTO: 13.4 %
ERYTHROCYTE [DISTWIDTH] IN BLOOD BY AUTOMATED COUNT: 16.1 % (ref 11.5–17)
EST. AVERAGE GLUCOSE BLD GHB EST-MCNC: 145.6 MG/DL
GFR SERPLBLD CREATININE-BSD FMLA CKD-EPI: >60 MLS/MIN/1.73/M2
GLOBULIN SER-MCNC: 4.1 GM/DL (ref 2.4–3.5)
GLUCOSE SERPL-MCNC: 130 MG/DL (ref 82–115)
HBA1C MFR BLD: 6.7 %
HCT VFR BLD AUTO: 35.9 % (ref 42–52)
HGB BLD-MCNC: 11.3 G/DL (ref 14–18)
IMM GRANULOCYTES # BLD AUTO: 0.01 X10(3)/MCL (ref 0–0.04)
IMM GRANULOCYTES NFR BLD AUTO: 0.1 %
IRON SATN MFR SERPL: 11 % (ref 20–50)
IRON SERPL-MCNC: 29 UG/DL (ref 65–175)
LYMPHOCYTES # BLD AUTO: 1.48 X10(3)/MCL (ref 0.6–4.6)
LYMPHOCYTES NFR BLD AUTO: 19.8 %
MCH RBC QN AUTO: 27.6 PG (ref 27–31)
MCHC RBC AUTO-ENTMCNC: 31.5 G/DL (ref 33–36)
MCV RBC AUTO: 87.6 FL (ref 80–94)
MONOCYTES # BLD AUTO: 0.38 X10(3)/MCL (ref 0.1–1.3)
MONOCYTES NFR BLD AUTO: 5.1 %
NEUTROPHILS # BLD AUTO: 4.58 X10(3)/MCL (ref 2.1–9.2)
NEUTROPHILS NFR BLD AUTO: 61.1 %
NRBC BLD AUTO-RTO: 0 %
PLATELET # BLD AUTO: 264 X10(3)/MCL (ref 130–400)
PMV BLD AUTO: 10.2 FL (ref 7.4–10.4)
POTASSIUM SERPL-SCNC: 4.5 MMOL/L (ref 3.5–5.1)
PROT SERPL-MCNC: 7.6 GM/DL (ref 5.8–7.6)
RBC # BLD AUTO: 4.1 X10(6)/MCL (ref 4.7–6.1)
SODIUM SERPL-SCNC: 140 MMOL/L (ref 136–145)
TIBC SERPL-MCNC: 238 UG/DL (ref 69–240)
TIBC SERPL-MCNC: 267 UG/DL (ref 250–450)
TRANSFERRIN SERPL-MCNC: 261 MG/DL (ref 163–344)
WBC # SPEC AUTO: 7.49 X10(3)/MCL (ref 4.5–11.5)

## 2023-12-07 PROCEDURE — 83036 HEMOGLOBIN GLYCOSYLATED A1C: CPT | Performed by: STUDENT IN AN ORGANIZED HEALTH CARE EDUCATION/TRAINING PROGRAM

## 2023-12-07 PROCEDURE — 83540 ASSAY OF IRON: CPT | Performed by: STUDENT IN AN ORGANIZED HEALTH CARE EDUCATION/TRAINING PROGRAM

## 2023-12-07 PROCEDURE — 36415 COLL VENOUS BLD VENIPUNCTURE: CPT | Performed by: STUDENT IN AN ORGANIZED HEALTH CARE EDUCATION/TRAINING PROGRAM

## 2023-12-07 PROCEDURE — 85025 COMPLETE CBC W/AUTO DIFF WBC: CPT | Performed by: STUDENT IN AN ORGANIZED HEALTH CARE EDUCATION/TRAINING PROGRAM

## 2023-12-07 PROCEDURE — 80053 COMPREHEN METABOLIC PANEL: CPT | Performed by: STUDENT IN AN ORGANIZED HEALTH CARE EDUCATION/TRAINING PROGRAM

## 2023-12-07 NOTE — ASSESSMENT & PLAN NOTE
Discussed potential treatment options   Patient and daughter amenable to referral to Neurosurgery in [place]

## 2023-12-07 NOTE — ASSESSMENT & PLAN NOTE
A1c today  Continuing metformin 1000 mg in the morning 500 at night continuing Jardiance 25 mg daily

## 2023-12-07 NOTE — PROGRESS NOTES
"Patient Name: William Mccauley     : 1950    MRN: [id number]     Subjective:     Patient ID: William Mccauley is a 73 y.o. male.    Chief Complaint:   Chief Complaint   Patient presents with    Follow-up     Patient here today for follow up. Reports fatigue and some sob. /75, he would also like the flu shot.         HPI: HPI  72 year-old [place] speaking male presents to clinic with daughter for follow up of diabetes and MRI results  Reports some fatigue and SOB. Has been seen by cardiology  Has also been noted to be anemic but waiting on cardiology clearance for c scope for rectal bleeding  Patient declines  services today  Diabetes  Reports that he is taking metformin 1000 mg qam and 500 q pm  Started last visit on Jardiance 25   Needs A1c today     History of CVA  CT and subsequent MRI was completed which revealed bilateral  Found make strokes and pontine stroke  Patient declined Plavix but agreeable to take 81 mg aspirin  Discovered to have 60% stenosis in Carotid artery on right side.     Left left tiredness/left leg sciatic nerve pain   Patient reports feeling a tired" feeling in the back of his leg  gabapentin 300 mg b.i.d.  Patient reports that this medication did help somewhat and would like a refill  Patient did complete physical therapy  MRI revealed disc bulges in severe spinal stenosis   Patient is amenable to      Positive Cologuard test   Referral sent  To Gastroenterology  Patient is awaiting cardiac clearance before C scope    Anemia  H&H 12.5 and 40.1  MCV 94.4    Rash over face and scalp  Ketoconazole 2%  Med hx- as above  Surgical hx- denies  Family hx- mother  from cirrhosis of the liver.  States that she did not drink  Social hx- non smoker, 1 can of beer per week    ROS:      ROS     12 point review of systems conducted, negative except as stated in the history of present illness. See HPI for details.    History:     Past Medical History:   Diagnosis Date    Diabetes " "mellitus, type 2     HTN (hypertension)     Ptosis     Sciatica         Past Surgical History:   Procedure Laterality Date    KIDNEY STONE SURGERY Left        History reviewed. No pertinent family history.     Social History     Tobacco Use    Smoking status: Never     Passive exposure: Never    Smokeless tobacco: Never   Substance and Sexual Activity    Alcohol use: Never     Alcohol/week: 1.0 standard drink of alcohol     Types: 1 Cans of beer per week    Drug use: Never    Sexual activity: Yes     Partners: Female       Current Outpatient Medications   Medication Instructions    aspirin (ECOTRIN) 81 mg, Oral, Daily    atorvastatin (LIPITOR) 40 mg, Oral, Daily    diclofenac sodium (VOLTAREN) 2 g, Topical (Top), Daily, To back and leg    empagliflozin (JARDIANCE) 25 mg, Oral, Daily    ferrous sulfate 325 mg, Oral, Three times weekly    gabapentin (NEURONTIN) 300 mg, Oral, 3 times daily    iron-vitamin C 100-250 mg, ICAR-C, (ICAR-C) 100-250 mg Tab 1 tablet, Oral, Three times weekly    losartan (COZAAR) 25 mg, Oral, Daily    metFORMIN (GLUCOPHAGE) 500 MG tablet Take 2 tablets by mouth in the am and one in the evening        Review of patient's allergies indicates:  No Known Allergies    Objective:     Visit Vitals  /75 (BP Location: Left arm, Patient Position: Sitting)   Pulse 60   Temp 97.9 °F (36.6 °C) (Oral)   Ht 5' 2" (1.575 m)   Wt 59.9 kg (132 lb)   SpO2 100%   BMI 24.14 kg/m²       Physical Examination:     Physical Exam  Constitutional:       General: He is not in acute distress.     Appearance: Normal appearance. He is not ill-appearing or diaphoretic.   HENT:      Nose: No congestion.   Eyes:      Conjunctiva/sclera: Conjunctivae normal.      Pupils: Pupils are equal, round, and reactive to light.   Cardiovascular:      Rate and Rhythm: Normal rate and regular rhythm.      Heart sounds: No murmur heard.  Pulmonary:      Effort: Pulmonary effort is normal. No respiratory distress.     "  Breath sounds: Normal breath sounds. No wheezing.   Musculoskeletal:         General: No swelling, tenderness, deformity or signs of injury.      Cervical back: Normal range of motion.      Comments: Ambulating with walker   Skin:     General: Skin is warm and dry.      Coloration: Skin is not jaundiced.   Neurological:      General: No focal deficit present.      Mental Status: He is alert and oriented to person, place, and time. Mental status is at baseline.      Gait: Gait normal.       Lab Results:     Chemistry:  Lab Results   Component Value Date     06/06/2023    K 4.2 06/06/2023    CHLORIDE 103 06/06/2023    BUN 11.4 06/06/2023    CREATININE 0.72 (L) 11/01/2023    EGFRNORACEVR >60 11/01/2023    GLUCOSE 161 (H) 06/06/2023    CALCIUM 9.4 06/06/2023    ALKPHOS 52 02/22/2023    LABPROT 7.5 02/22/2023    ALBUMIN 3.9 02/22/2023    AST 22 02/22/2023    ALT 28 02/22/2023    DWVSJUQE10VU 28.1 (L) 03/27/2023    TSH 1.259 02/22/2023    RXQQTY0CVVH 1.04 02/22/2023    PSA 0.33 03/27/2023        Lab Results   Component Value Date    HGBA1C 6.7 09/07/2023        Hematology:  Lab Results   Component Value Date    WBC 6.90 09/07/2023    HGB 12.9 (L) 09/07/2023    HCT 40.9 (L) 09/07/2023     09/07/2023       Lipid Panel:  Lab Results   Component Value Date    CHOL 134 11/01/2023    HDL 54 11/01/2023    LDL 63.00 11/01/2023    TRIG 86 11/01/2023    TOTALCHOLEST 2 11/01/2023        Urine:  Lab Results   Component Value Date    COLORUA Yellow 02/22/2023    APPEARANCEUA Clear 02/22/2023    SGUA 1.021 02/22/2023    PHUA 5.5 02/22/2023    PROTEINUA Negative 02/22/2023    GLUCOSEUA Normal 02/22/2023    KETONESUA Negative 02/22/2023    BLOODUA Negative 02/22/2023    NITRITESUA Negative 02/22/2023    LEUKOCYTESUR Negative 02/22/2023    RBCUA 0-5 02/22/2023    WBCUA 0-5 02/22/2023    BACTERIA None Seen 02/22/2023    SQEPUA None Seen 02/22/2023    HYALINECASTS None Seen 02/22/2023    CREATRANDUR 69.5 04/27/2023         Assessment:          ICD-10-CM ICD-9-CM   1. Encounter for immunization  Z23 V03.89   2. History of CVA (cerebrovascular accident)  Z86.73 V12.54   3. Type 2 diabetes mellitus without complication, without long-term current use of insulin  E11.9 250.00   4. Iron deficiency anemia due to chronic blood loss  D50.0 280.0   5. Lumbar stenosis with neurogenic claudication  M48.062 724.03   6. Chronic fatigue  R53.82 780.79   7. Shortness of breath  R06.02 786.05   8. Spinal stenosis of lumbar region with neurogenic claudication  M48.062 724.03   9. Primary hypertension  I10 401.9        Plan:     1. Encounter for immunization  -     Influenza - High Dose (65+) (PF) (IM)  -     influenza 65up-adj (QUADRIVALENT ADJUVANTED PF) vaccine 0.5 mL    2. History of CVA (cerebrovascular accident)  Overview:      Encouraged walker use to avoid falls  Patient declines Plavix    Assessment & Plan:  Has been referred to Neurology right carotid 60-70% stenosis deferring treatment to Cardiology    Orders:  -     Comprehensive Metabolic Panel; Future; Expected date: 12/07/2023  -     Comprehensive Metabolic Panel    3. Type 2 diabetes mellitus without complication, without long-term current use of insulin  Overview:  Increasing Jardiance to 25 mg daily continuing metformin 500 mg twice a day  Attempted eye fundus exam in clinic but patient's pupils too small will refer to ophthalmology clinic    Assessment & Plan:  A1c today  Continuing metformin 1000 mg in the morning 500 at night continuing Jardiance 25 mg daily    Orders:  -     Hemoglobin A1C; Future; Expected date: 12/07/2023  -     Hemoglobin A1C    4. Iron deficiency anemia due to chronic blood loss  -     CBC Auto Differential; Future; Expected date: 12/07/2023  -     Iron and TIBC; Future; Expected date: 12/07/2023  -     CBC Auto Differential  -     Iron and TIBC    5. Lumbar stenosis with neurogenic claudication  -     Ambulatory referral/consult to Neurosurgery; Future;  Expected date: 12/14/2023    6. Chronic fatigue  Assessment & Plan:  CBC iron B12   Referring patient for pulmonary function      7. Shortness of breath  -     Complete PFT w/ bronchodilator; Future    8. Spinal stenosis of lumbar region with neurogenic claudication  Assessment & Plan:  Discussed potential treatment options   Patient and daughter amenable to referral to Neurosurgery in [place]      9. Primary hypertension  Assessment & Plan:  Blood pressure controlled 134/75 continuing losartan 25 mg daily           Follow up in about 4 months (around 4/7/2024) for Diabetes, Hypertension, BP check.    Future Appointments   Date Time Provider Department Center   12/27/2023 10:00 AM [facility] ECHO 01 [facility] ECHO [place]    1/9/2024  9:30 AM CV [facility] CARDIAC MONITOR 01 [facility] CARDIA [place]    2/29/2024  3:00 PM Be Rahman MD [facility] CARD [place]    5/1/2024  9:00 AM Stacie Newman, ANP [facility] NEURO [place]    5/7/2024  9:40 AM Shannon Alonso MD [facility]        Shannon Alonso MD

## 2023-12-11 ENCOUNTER — PATIENT MESSAGE (OUTPATIENT)
Dept: FAMILY MEDICINE | Facility: CLINIC | Age: 73
End: 2023-12-11
Payer: MEDICAID

## 2023-12-11 RX ORDER — FERROUS SULFATE 325(65) MG
325 TABLET, DELAYED RELEASE (ENTERIC COATED) ORAL
Qty: 30 TABLET | Refills: 2 | Status: SHIPPED | OUTPATIENT
Start: 2023-12-11

## 2023-12-15 ENCOUNTER — TELEPHONE (OUTPATIENT)
Dept: OPHTHALMOLOGY | Facility: CLINIC | Age: 73
End: 2023-12-15
Payer: MEDICAID

## 2023-12-15 NOTE — TELEPHONE ENCOUNTER
12/15/2023 11:14 AM   Patient would like to reschedule cataract surgery to 01/30/2023. Can you please put in case request?     Sent message to  to schedule post op day 1 and week 1.     12/27/2023 8:59 AM   Followed up with Dr. Galeana and he stated that he put her on the surgery board.

## 2024-01-23 ENCOUNTER — HOSPITAL ENCOUNTER (OUTPATIENT)
Dept: CARDIOLOGY | Facility: HOSPITAL | Age: 74
Discharge: HOME | End: 2024-01-23
Attending: STUDENT IN AN ORGANIZED HEALTH CARE EDUCATION/TRAINING PROGRAM
Payer: MEDICAID

## 2024-01-23 DIAGNOSIS — Z86.73 HISTORY OF CVA (CEREBROVASCULAR ACCIDENT): ICD-10-CM

## 2024-01-23 PROCEDURE — 93225 XTRNL ECG REC<48 HRS REC: CPT

## 2024-01-26 RX ORDER — CYCLOPENTOLAT/TROPIC/PHENYLEPH 1%-1%-2.5%
1 DROPS (EA) OPHTHALMIC (EYE)
Status: CANCELLED | OUTPATIENT
Start: 2024-01-26

## 2024-01-26 RX ORDER — PROPARACAINE HYDROCHLORIDE 5 MG/ML
1 SOLUTION/ DROPS OPHTHALMIC
Status: CANCELLED | OUTPATIENT
Start: 2024-01-26

## 2024-01-26 RX ORDER — SODIUM CHLORIDE 0.9 % (FLUSH) 0.9 %
10 SYRINGE (ML) INJECTION
Status: SHIPPED | OUTPATIENT
Start: 2024-01-26

## 2024-01-26 NOTE — H&P
Pre-Operative History & Physical  Ophthalmology      SUBJECTIVE:     History of Present Illness:  Patient is a 73 y.o. male presents with cataract OS    MEDICATIONS: (Not in a hospital admission)      ALLERGIES: Review of patient's allergies indicates:  No Known Allergies    PAST MEDICAL HISTORY:   Past Medical History:   Diagnosis Date    Diabetes mellitus, type 2     HTN (hypertension)     Ptosis     Sciatica      PAST SURGICAL HISTORY:   Past Surgical History:   Procedure Laterality Date    KIDNEY STONE SURGERY Left      PAST FAMILY HISTORY: History reviewed. No pertinent family history.  SOCIAL HISTORY:   Social History     Tobacco Use    Smoking status: Never     Passive exposure: Never    Smokeless tobacco: Never   Substance Use Topics    Alcohol use: Never     Alcohol/week: 1.0 standard drink of alcohol     Types: 1 Cans of beer per week    Drug use: Never        MENTAL STATUS: Alert    REVIEW OF SYSTEMS: Negative    OBJECTIVE:     Vital Signs (Most Recent)       Physical Exam:  General: NAD  HEENT: No change in ophthalmic exam from prior consult/clinic note.   Lungs: Adequate respirations  Heart: + pulses  Abdomen: Soft    ASSESSMENT/PLAN:     Patient is a 73 y.o. male with cataract OS    Planned Procedure: CEIOL OS    -Risks/benefits/alternatives of the procedure were discussed extensively with the patient and/or family, including (but not limited to): infection, bleeding, pain, corneal edema, persistent corneal defect, cataract formation (if phakic), elevated intraocular pressure, hypotony, retinal breaks, retinal detachment, macular edema, loss of vision, and loss of the eye. The patient/family voiced good understanding. The informed consent was signed, witnessed, and placed in chart. All patient and family questions were answered.     -Anesthesia: MAC  -Allergies reviewed: Review of patient's allergies indicates:  No Known Allergies

## 2024-02-05 LAB
OHS CV EVENT MONITOR DAY: 0
OHS CV HOLTER LENGTH DECIMAL HOURS: 48
OHS CV HOLTER LENGTH HOURS: 48
OHS CV HOLTER LENGTH MINUTES: 0
OHS CV HOLTER SINUS AVERAGE HR: 85
OHS CV HOLTER SINUS MAX HR: 114
OHS CV HOLTER SINUS MIN HR: 60

## 2024-03-07 DIAGNOSIS — R07.9 CHEST PAIN, UNSPECIFIED TYPE: Primary | ICD-10-CM

## 2024-03-18 ENCOUNTER — OFFICE VISIT (OUTPATIENT)
Dept: OPHTHALMOLOGY | Facility: CLINIC | Age: 74
End: 2024-03-18
Payer: MEDICAID

## 2024-03-18 VITALS — BODY MASS INDEX: 24.3 KG/M2 | HEIGHT: 62 IN | WEIGHT: 132.06 LBS

## 2024-03-18 DIAGNOSIS — H25.12 AGE-RELATED NUCLEAR CATARACT OF LEFT EYE: Primary | ICD-10-CM

## 2024-03-18 DIAGNOSIS — H25.812 COMBINED FORMS OF AGE-RELATED CATARACT OF LEFT EYE: ICD-10-CM

## 2024-03-18 PROCEDURE — 99213 OFFICE O/P EST LOW 20 MIN: CPT | Mod: PBBFAC,PN

## 2024-03-18 RX ORDER — SODIUM CHLORIDE 0.9 % (FLUSH) 0.9 %
10 SYRINGE (ML) INJECTION
Status: SHIPPED | OUTPATIENT
Start: 2024-03-18

## 2024-03-18 RX ORDER — CYCLOP/TROP/PROPA/PHEN/KET/WAT 1-1-0.1%
1 DROPS (EA) OPHTHALMIC (EYE)
Status: CANCELLED | OUTPATIENT
Start: 2024-03-18

## 2024-03-18 NOTE — PROGRESS NOTES
HPI    IOP check  Last edited by Guerda Myers MA on 3/18/2024 12:15 PM.        OCT RNFL 9/18/23  OD: 79, yellow I, green STN - poor scan  OS: 92, yellow I, green TN, white S - poor scan    OCT MAC 9/18/23  OD: 271, normal foveal contour, no SRF/IRF, IS/OS intact, drusen  OS: 235, normal foveal contour, superior IRF, IS/OS loss near fovea, drusen        Assessment /Plan     For exam results, see Encounter Report.    Age-related nuclear cataract of left eye  -     Place in Outpatient; Standing  -     Case Request Operating Room: PHACOEMULSIFICATION, CATARACT    Combined forms of age-related cataract of left eye    Other orders  -     sodium chloride 0.9% flush 10 mL  -     tropicamide-cyclopentolate-PHENYLephrine-ketorolac-proparacaine ophthalmic solution 1 drop        1) Age related combined cataract, OS>OD Eye  - Patient with visually significant cataract Left Eye and desires surgical removal  - Thoroughly discussed cataract surgery today, risks/benefits/alternatives discussed and informed consent obtained, signed, and placed in the chart  - MRX done 03/2024; BCVA: 20/60 // CF @ 5 ft   - IOP 03/2024: WNL OU  - Pt interested in cataract surgery and qualifies for surgery -- was scheduled for surgery in January and it was canceled by cardiology  - has TTE schedule 03/20 and cardiology ppt 03/25. Will plan for surgery in late April. Patient and family ok with April 30th  - Trauma: no  - Guttae: none  - Phaco/iridodonesis: none  - Trypan blue: no  - Flomax use: no  - Dilation: good  - Okay to continue ASA and blood thinners  - Cooperative with exam: Pt able to lie flat for 30 minutes, will plan to do under general due to language barrier  - IOL calcs done 9/1823: discrepancy with K values OS. Will use OD Ks for OS eye  - Lens to be used: MX60E +21.50 to aim for final refraction of -0.17 diopters postop  - Will schedule for April 30th with Dr. Graff  - message sent to cardiologist requesting clearance    2. DM with  Moderate NPDR OS  3. DM w/o manifestations OD  - last A1c 6.7 9/7/23  - carotid duplex ordered, but not done -- CTA head and neck 11/2023 showed stenosis in vertebral arteries right > left   - maintain good BP/BS control    4. Drusen, OU  - OCT mac without foveal involvement, no CNVM  - monitor    5. Pterygium, OS  - monitor    6. Inc C/D ratio  - repeat OCT nerve after cataract surgery  - IOP 18//12 03/2024         Plan for CEIOL OS April 30th  RTC POD1  Greater than 30 days from last appt 03/2024, so will need consent day of surgery    HPI    IOP check  Last edited by Guerda Myers MA on 3/18/2024 12:15 PM.            Assessment /Plan     For exam results, see Encounter Report.    Age-related nuclear cataract of left eye  -     Place in Outpatient; Standing  -     Case Request Operating Room: PHACOEMULSIFICATION, CATARACT    Combined forms of age-related cataract of left eye    Other orders  -     sodium chloride 0.9% flush 10 mL  -     tropicamide-cyclopentolate-PHENYLephrine-ketorolac-proparacaine ophthalmic solution 1 drop

## 2024-03-18 NOTE — H&P
Pre-operative History and Physical  Ophthalmology Service    CC: Decreased vision OS>OD    HPI:   Patient is a 73 y.o. male presents with an eye problem visually significant cataracts left eye requiring surgery as noted in the most recent ophthalmology progress note.    Past Medical History:   Diagnosis Date    Diabetes mellitus, type 2     HTN (hypertension)     Ptosis     Sciatica        Past Surgical History:   Procedure Laterality Date    KIDNEY STONE SURGERY Left        History reviewed. No pertinent family history.    Social History     Socioeconomic History    Marital status:     Number of children: 5   Tobacco Use    Smoking status: Never     Passive exposure: Never    Smokeless tobacco: Never   Substance and Sexual Activity    Alcohol use: Never     Alcohol/week: 1.0 standard drink of alcohol     Types: 1 Cans of beer per week    Drug use: Never    Sexual activity: Yes     Partners: Female       Current Outpatient Medications   Medication Sig Dispense Refill    aspirin (ECOTRIN) 81 MG EC tablet Take 1 tablet (81 mg total) by mouth once daily. 30 tablet 11    atorvastatin (LIPITOR) 40 MG tablet Take 1 tablet (40 mg total) by mouth once daily. 90 tablet 3    diclofenac sodium (VOLTAREN) 1 % Gel Apply 2 g topically once daily. To back and leg (Patient not taking: Reported on 12/7/2023) 450 g 11    empagliflozin (JARDIANCE) 25 mg tablet Take 1 tablet (25 mg total) by mouth once daily. 90 tablet 3    ferrous sulfate 325 (65 FE) MG EC tablet Take 1 tablet (325 mg total) by mouth 3 (three) times a week. 30 tablet 2    gabapentin (NEURONTIN) 300 MG capsule Take 1 capsule (300 mg total) by mouth 3 (three) times daily. 90 capsule 11    losartan (COZAAR) 25 MG tablet Take 1 tablet (25 mg total) by mouth once daily. 90 tablet 3    metFORMIN (GLUCOPHAGE) 500 MG tablet Take 2 tablets by mouth in the am and one in the evening 90 tablet 11     Current Facility-Administered Medications   Medication Dose Route  Frequency Provider Last Rate Last Admin    influenza 65up-adj (QUADRIVALENT ADJUVANTED PF) vaccine 0.5 mL  0.5 mL Intramuscular vaccine x 1 dose Shannon Alonso MD        sodium chloride 0.9% flush 10 mL  10 mL Intravenous PRN Krystina Galeana MD           Review of patient's allergies indicates:  No Known Allergies      Review of systems:  Gen: denies fevers, chills, nausea, vomitting, weight changes  HEENT: Denies discharge or coughing/sneezing. +blurry vision left > right eye  Resp: Denies SOB  CV: Denies CP or palpitations  Abd: Denies tenderness, diarrhea, constipation, nausea  Ext:  Denies pain or edema    Physical Exam  BP: Vital signs stable  General: No apparent distres  HEENT: Normocephalic, atraumatic, see past ophtho note for eye exam OS  Lungs: Adequate respirations, no respiratory distress  Heart: Pulses intact  Abdomen: Soft, no distention  Rectal/pelvic: Deferred    Assessment  Patient is a 73 y.o. male with eye problem cataracts left eye  - consent not signed (outside of 30 day window), will need updated consent day of surgery  - Will proceed with CEIOL OS  - Plan for General anesthesia given language barrier  - see status of aspirin and other blood thinners in progress note from today

## 2024-03-20 ENCOUNTER — HOSPITAL ENCOUNTER (OUTPATIENT)
Dept: RADIOLOGY | Facility: HOSPITAL | Age: 74
Discharge: HOME | End: 2024-03-20
Attending: NURSE PRACTITIONER
Payer: MEDICAID

## 2024-03-20 ENCOUNTER — HOSPITAL ENCOUNTER (OUTPATIENT)
Dept: PULMONOLOGY | Facility: HOSPITAL | Age: 74
Discharge: HOME | End: 2024-03-20
Attending: STUDENT IN AN ORGANIZED HEALTH CARE EDUCATION/TRAINING PROGRAM
Payer: MEDICAID

## 2024-03-20 ENCOUNTER — HOSPITAL ENCOUNTER (OUTPATIENT)
Dept: CARDIOLOGY | Facility: HOSPITAL | Age: 74
Discharge: HOME | End: 2024-03-20
Attending: STUDENT IN AN ORGANIZED HEALTH CARE EDUCATION/TRAINING PROGRAM
Payer: MEDICAID

## 2024-03-20 VITALS
HEIGHT: 62 IN | WEIGHT: 132.06 LBS | DIASTOLIC BLOOD PRESSURE: 76 MMHG | SYSTOLIC BLOOD PRESSURE: 135 MMHG | BODY MASS INDEX: 24.3 KG/M2

## 2024-03-20 DIAGNOSIS — Z86.73 HISTORY OF CVA (CEREBROVASCULAR ACCIDENT): ICD-10-CM

## 2024-03-20 DIAGNOSIS — R06.02 SOB (SHORTNESS OF BREATH): ICD-10-CM

## 2024-03-20 DIAGNOSIS — R06.02 SHORTNESS OF BREATH: ICD-10-CM

## 2024-03-20 LAB
CREAT SERPL-MCNC: 0.93 MG/DL (ref 0.73–1.18)
GFR SERPLBLD CREATININE-BSD FMLA CKD-EPI: >60 MLS/MIN/1.73/M2

## 2024-03-20 PROCEDURE — 82565 ASSAY OF CREATININE: CPT | Performed by: NURSE PRACTITIONER

## 2024-03-20 PROCEDURE — 70496 CT ANGIOGRAPHY HEAD: CPT | Mod: TC

## 2024-03-20 PROCEDURE — 25500020 PHARM REV CODE 255

## 2024-03-20 PROCEDURE — 93306 TTE W/DOPPLER COMPLETE: CPT

## 2024-03-20 RX ADMIN — IOHEXOL 100 ML: 350 INJECTION, SOLUTION INTRAVENOUS at 11:03

## 2024-03-21 LAB
AV INDEX (PROSTH): 0.63
AV MEAN GRADIENT: 4 MMHG
AV PEAK GRADIENT: 7 MMHG
AV VELOCITY RATIO: 0.63
BSA FOR ECHO PROCEDURE: 1.62 M2
CV ECHO LV RWT: 0.63 CM
DOP CALC AO PEAK VEL: 1.35 M/S
DOP CALC AO VTI: 28.3 CM
DOP CALC LVOT PEAK VEL: 0.85 M/S
DOP CALC MV VTI: 20.1 CM
DOP CALCLVOT PEAK VEL VTI: 17.8 CM
E WAVE DECELERATION TIME: 263.87 MSEC
E/A RATIO: 0.81
E/E' RATIO: 14.89 M/S
ECHO LV POSTERIOR WALL: 1.18 CM (ref 0.6–1.1)
FRACTIONAL SHORTENING: 34 % (ref 28–44)
HR MV ECHO: 64 BPM
INTERVENTRICULAR SEPTUM: 1.13 CM (ref 0.6–1.1)
LEFT ATRIUM SIZE: 3.65 CM
LEFT INTERNAL DIMENSION IN SYSTOLE: 2.46 CM (ref 2.1–4)
LEFT VENTRICLE DIASTOLIC VOLUME INDEX: 37.19 ML/M2
LEFT VENTRICLE DIASTOLIC VOLUME: 59.5 ML
LEFT VENTRICLE MASS INDEX: 88 G/M2
LEFT VENTRICLE SYSTOLIC VOLUME INDEX: 13.4 ML/M2
LEFT VENTRICLE SYSTOLIC VOLUME: 21.38 ML
LEFT VENTRICULAR INTERNAL DIMENSION IN DIASTOLE: 3.74 CM (ref 3.5–6)
LEFT VENTRICULAR MASS: 141.32 G
LV LATERAL E/E' RATIO: 13.4 M/S
LV SEPTAL E/E' RATIO: 16.75 M/S
LVOT MG: 1.62 MMHG
LVOT MV: 0.6 CM/S
MV MEAN GRADIENT: 1 MMHG
MV PEAK A VEL: 0.83 M/S
MV PEAK E VEL: 0.67 M/S
MV PEAK GRADIENT: 3 MMHG
MV STENOSIS PRESSURE HALF TIME: 76.52 MS
MV VALVE AREA P 1/2 METHOD: 2.88 CM2
OHS LV EJECTION FRACTION SIMPSONS BIPLANE MOD: 65 %
PISA TR MAX VEL: 2 M/S
RA PRESSURE ESTIMATED: 3 MMHG
RIGHT VENTRICULAR END-DIASTOLIC DIMENSION: 3.29 CM
RV TB RVSP: 5 MMHG
TDI LATERAL: 0.05 M/S
TDI SEPTAL: 0.04 M/S
TDI: 0.05 M/S
TR MAX PG: 16 MMHG
TV REST PULMONARY ARTERY PRESSURE: 19 MMHG
Z-SCORE OF LEFT VENTRICULAR DIMENSION IN END DIASTOLE: -1.97
Z-SCORE OF LEFT VENTRICULAR DIMENSION IN END SYSTOLE: -1.09

## 2024-03-22 ENCOUNTER — TELEPHONE (OUTPATIENT)
Dept: NEUROLOGY | Facility: CLINIC | Age: 74
End: 2024-03-22
Payer: MEDICAID

## 2024-03-22 NOTE — TELEPHONE ENCOUNTER
Called and spoke w/Pt's daughter (language barrier w/Pt) and discussed CTA head/neck results. Does not require intervention. States Pt is doing well.

## 2024-03-25 ENCOUNTER — HOSPITAL ENCOUNTER (OUTPATIENT)
Dept: RADIOLOGY | Facility: HOSPITAL | Age: 74
Discharge: HOME | End: 2024-03-25
Attending: STUDENT IN AN ORGANIZED HEALTH CARE EDUCATION/TRAINING PROGRAM
Payer: MEDICAID

## 2024-03-25 ENCOUNTER — HOSPITAL ENCOUNTER (OUTPATIENT)
Dept: CARDIOLOGY | Facility: HOSPITAL | Age: 74
Discharge: HOME | End: 2024-03-25
Attending: STUDENT IN AN ORGANIZED HEALTH CARE EDUCATION/TRAINING PROGRAM
Payer: MEDICAID

## 2024-03-25 VITALS
HEIGHT: 62 IN | SYSTOLIC BLOOD PRESSURE: 136 MMHG | RESPIRATION RATE: 20 BRPM | WEIGHT: 132.06 LBS | DIASTOLIC BLOOD PRESSURE: 86 MMHG | HEART RATE: 69 BPM | BODY MASS INDEX: 24.3 KG/M2

## 2024-03-25 DIAGNOSIS — R07.9 CHEST PAIN, UNSPECIFIED TYPE: ICD-10-CM

## 2024-03-25 LAB
CV STRESS BASE HR: 68 BPM
DIASTOLIC BLOOD PRESSURE: 86 MMHG
OHS CV CPX 85 PERCENT MAX PREDICTED HEART RATE MALE: 124
OHS CV CPX ESTIMATED METS: 1
OHS CV CPX MAX PREDICTED HEART RATE: 146
OHS CV CPX PATIENT IS FEMALE: 0
OHS CV CPX PATIENT IS MALE: 1
OHS CV CPX PEAK DIASTOLIC BLOOD PRESSURE: 86 MMHG
OHS CV CPX PEAK HEAR RATE: 109 BPM
OHS CV CPX PEAK RATE PRESSURE PRODUCT: NORMAL
OHS CV CPX PEAK SYSTOLIC BLOOD PRESSURE: 136 MMHG
OHS CV CPX PERCENT MAX PREDICTED HEART RATE ACHIEVED: 75
OHS CV CPX RATE PRESSURE PRODUCT PRESENTING: 9248
OHS QRS DURATION: 74 MS
OHS QTC CALCULATION: 420 MS
STRESS ECHO POST EXERCISE DUR MIN: 0 MINUTES
STRESS ECHO POST EXERCISE DUR SEC: 47 SECONDS
SUMMED DIFFERENCE: 1
SUMMED REST SCORE: 0
SUMMED STRESS SCORE: 1
SYSTOLIC BLOOD PRESSURE: 136 MMHG

## 2024-03-25 PROCEDURE — 93017 CV STRESS TEST TRACING ONLY: CPT

## 2024-03-25 PROCEDURE — A9502 TC99M TETROFOSMIN: HCPCS | Performed by: STUDENT IN AN ORGANIZED HEALTH CARE EDUCATION/TRAINING PROGRAM

## 2024-03-25 PROCEDURE — 63600175 PHARM REV CODE 636 W HCPCS: Performed by: STUDENT IN AN ORGANIZED HEALTH CARE EDUCATION/TRAINING PROGRAM

## 2024-03-25 PROCEDURE — 78452 HT MUSCLE IMAGE SPECT MULT: CPT

## 2024-03-25 RX ORDER — REGADENOSON 0.08 MG/ML
0.4 INJECTION, SOLUTION INTRAVENOUS
Status: COMPLETED | OUTPATIENT
Start: 2024-03-25 | End: 2024-03-25

## 2024-03-25 RX ADMIN — TETROFOSMIN 30.9 MILLICURIE: 1.38 INJECTION, POWDER, LYOPHILIZED, FOR SOLUTION INTRAVENOUS at 09:03

## 2024-03-25 RX ADMIN — REGADENOSON 0.4 MG: 0.08 INJECTION, SOLUTION INTRAVENOUS at 08:03

## 2024-03-25 RX ADMIN — TETROFOSMIN 10.1 MILLICURIE: 1.38 INJECTION, POWDER, LYOPHILIZED, FOR SOLUTION INTRAVENOUS at 08:03

## 2024-04-18 ENCOUNTER — TELEPHONE (OUTPATIENT)
Dept: OPHTHALMOLOGY | Facility: CLINIC | Age: 74
End: 2024-04-18
Payer: MEDICAID

## 2024-04-18 NOTE — TELEPHONE ENCOUNTER
Cataract surgery cancelled due to pt no showing cadiology appt.     Vera and Manjit from [facility] sent a message stating that anesthesia must see this patient prior to him having cataract surgery.     I looked in the patient's appointment to see if he had a cardiology appt coming up and saow that he no showed a cardiology appt on 04/04/2024.     Lm for patient asking him to call us back and that his surgery on 04/30/2024 would be cancelled.

## 2024-05-01 ENCOUNTER — OFFICE VISIT (OUTPATIENT)
Dept: NEUROLOGY | Facility: CLINIC | Age: 74
End: 2024-05-01
Payer: MEDICAID

## 2024-05-01 VITALS
SYSTOLIC BLOOD PRESSURE: 131 MMHG | BODY MASS INDEX: 25.36 KG/M2 | OXYGEN SATURATION: 98 % | HEART RATE: 67 BPM | HEIGHT: 62 IN | WEIGHT: 137.81 LBS | DIASTOLIC BLOOD PRESSURE: 82 MMHG

## 2024-05-01 DIAGNOSIS — Z78.9 LANGUAGE BARRIER TO COMMUNICATION: ICD-10-CM

## 2024-05-01 DIAGNOSIS — Z86.73 HISTORY OF STROKE: ICD-10-CM

## 2024-05-01 DIAGNOSIS — Z86.73 HISTORY OF CVA (CEREBROVASCULAR ACCIDENT): Primary | ICD-10-CM

## 2024-05-01 PROCEDURE — 99214 OFFICE O/P EST MOD 30 MIN: CPT | Mod: S$PBB,,, | Performed by: NURSE PRACTITIONER

## 2024-05-01 PROCEDURE — 3079F DIAST BP 80-89 MM HG: CPT | Mod: CPTII,,, | Performed by: NURSE PRACTITIONER

## 2024-05-01 PROCEDURE — G2211 COMPLEX E/M VISIT ADD ON: HCPCS | Mod: S$PBB,,, | Performed by: NURSE PRACTITIONER

## 2024-05-01 PROCEDURE — 3075F SYST BP GE 130 - 139MM HG: CPT | Mod: CPTII,,, | Performed by: NURSE PRACTITIONER

## 2024-05-01 PROCEDURE — 1160F RVW MEDS BY RX/DR IN RCRD: CPT | Mod: CPTII,,, | Performed by: NURSE PRACTITIONER

## 2024-05-01 PROCEDURE — 3288F FALL RISK ASSESSMENT DOCD: CPT | Mod: CPTII,,, | Performed by: NURSE PRACTITIONER

## 2024-05-01 PROCEDURE — 1101F PT FALLS ASSESS-DOCD LE1/YR: CPT | Mod: CPTII,,, | Performed by: NURSE PRACTITIONER

## 2024-05-01 PROCEDURE — 1159F MED LIST DOCD IN RCRD: CPT | Mod: CPTII,,, | Performed by: NURSE PRACTITIONER

## 2024-05-01 PROCEDURE — 99214 OFFICE O/P EST MOD 30 MIN: CPT | Mod: PBBFAC | Performed by: NURSE PRACTITIONER

## 2024-05-01 RX ORDER — IRON,CARBONYL/ASCORBIC ACID 100-250 MG
TABLET ORAL
COMMUNITY
Start: 2023-12-14

## 2024-05-01 RX ORDER — ASPIRIN 81 MG/1
81 TABLET ORAL DAILY
Qty: 30 TABLET | Refills: 11 | Status: SHIPPED | OUTPATIENT
Start: 2024-05-01 | End: 2025-05-01

## 2024-05-01 NOTE — PROGRESS NOTES
[facility] Neurology Follow Up Office Visit Note    Initial Visit Date: 11/1/2023  Current Visit Date: 5/1/2024    Subjective:      Patient ID: William Mccauley is a 73 y.o. male.    Chief Complaint: Stroke (Patient accompanied by daughter, who reports patient is stable. Denies any complaints today.)    HPI  This is a 74 YO Bulgarian R handed male with a PMX of HTN, DM  was referred by his PCP for stroke history. CT and MRI brain performed. Carotid U/S, Echo were also ordered and still pending, Pt no-showed for all three appts. PCP visit note indicates that Pt's son reports that Pt had difficulty walking 7 mths prior to being seen in 2/22/2023. Son also noted that patient's suffered w/a facial droop. CT head was ordered and referral to PT/OT was sent. During last visit, CTA head/neck and echo w/bubble were ordered. Naproxen was Dc'd as Pt taking ASA 81mg daily. Pt was cleared to have cataract surgery.    Today, Pt presents to office with his daughter. Has completed PT at [facility] on [address]. Denies falls. Suffers with chronic lower back pain. Admits to improvement to ambulation and R upper ext weakness. Ambulates w/Rolator walker. Continues to lives with his daughter who provides assistance. Is able to bathe himself, dresses himself, feeds self. May need some assistance with tying shoes. Appetite good, enjoys sweets. Sleeps well at night. Missed his cardiology appt 4/4/2024. Cataract surgery cancelled. Denies chest pain or SOB w/ambulation. Denies smoking or alcohol intake Denies any further stroke like symptoms.     Stroke Work-Up    - CTA head/neck 11/1/2023: calcified plaque seen in both vertebral arteries at the base of the skull w/60-70% stenosis of R vertebral artery at the base of the skull and 40% stenosis seen in the L vertebral artery distally. MCAs are patent  - MRI brain w/out Mihai 3/10/2023: bilat thalamic and pontine old lacunar infarcts, chronic microvascular ischemia and atrophy  - NCHCT 2/22/2023: chronic  age-related changes. Multiple punctate areas of lacunar infarct seen at bilat basal ganglia  - Echo w/bubble study 7/25/2023: L EF 65%; negative bubble  - nuclear stress test 3/7/2024: abnormal myocardial perfusion scan    Intervention:   IV tPA: no   Interventional Thrombectomy: no    Risk Factors:   Cardiovascular risk factors: Yes hypertension, DM  Tobacco use:   Alcohol use:   Recreational drug use: No    Results for orders placed or performed in visit on 03/25/24   EKG 12-lead   Result Value Ref Range    QRS Duration 74 ms    OHS QTC Calculation 420 ms       Review of Systems   As per HPI. All other systems negative     Objective:      Physical Exam  Constitutional:       Appearance: Normal appearance. He is normal weight.   HENT:      Head: Normocephalic.      Right Ear: External ear normal.      Left Ear: External ear normal.      Nose: Nose normal.      Mouth/Throat:      Mouth: Mucous membranes are moist.   Eyes:      Pupils: Pupils are equal, round, and reactive to light.   Cardiovascular:      Rate and Rhythm: Normal rate and regular rhythm.      Pulses: Normal pulses.   Pulmonary:      Effort: Pulmonary effort is normal.   Abdominal:      General: Abdomen is flat.   Musculoskeletal:         General: Normal range of motion.      Cervical back: Normal range of motion.   Skin:     General: Skin is warm and dry.   Neurological:      General: No focal deficit present.      Mental Status: He is alert.   Psychiatric:         Mood and Affect: Mood normal.         Comprehensive Neurological Exam:  Mental Status: Alert Oriented to Self, Date, and Place. Naming, repetition, reading, and writing wnl. Comprehension wnl. No dysarthria.   CN II - XII: JUVENCIO, No APD, cataract to L eye, difficulty assessing VA as Pt turned head to L when assessing L side, VFFC, pterygium OS, lid lag bilaterally, EOMI without nystagmus, LT/Temp symmetric in CN V1-3 distribution, Hearing grossly intact, Face Symmetric, Tongue and Uvula  midline, Trapezius symmetric bilateral.   Motor: tone and bulk wnl throughout, no abnormal involuntary or voluntary movements, 5/5 to confrontation, Fine finger movements wnl b/l (slow), No pronator drift.   Sensory: LT, Proprioception, Vibration, PP, Temp symmetric. No sensory simultagnosia.   Reflexes: 2+ throughout, plantar reflexes downward bilateral.   Cerebellar: FNF wnl b/l  Romberg: unable to assess  Gait: short stride, narrow gait, slow, careful, utilizing Rolator walker, slightly stooped posture    Assessment:       1. History of CVA (cerebrovascular accident)    2. Language barrier to communication      Plan:       [] c/w ASA 81mg daily  [] c/w statin  [] carotid U/S via cardiology  [] there is no surgical contraindication from a neurology standpoint  [] needs to call cardiology and set up appt    RTC 9 mths    Stroke Education Provided - including DASH diet, Blood Pressure Control < 130/90, HgA1c < 7.0%, LDL < 100, sleep apnea, and physical activity of at least 150 min per week     I have explained the treatment plan, diagnosis, and prognosis to patient. All questions are answered to the best of my knowledge.     Face to face time 30 minutes, including documentation, chart review, counseling, education, review of test results, relevant medical records, and coordination of care.     Stacie Newman, NP-C  General Neurology

## 2024-05-07 ENCOUNTER — OFFICE VISIT (OUTPATIENT)
Dept: FAMILY MEDICINE | Facility: CLINIC | Age: 74
End: 2024-05-07
Payer: MEDICAID

## 2024-05-07 VITALS
HEIGHT: 62 IN | DIASTOLIC BLOOD PRESSURE: 85 MMHG | HEART RATE: 79 BPM | OXYGEN SATURATION: 98 % | BODY MASS INDEX: 24.66 KG/M2 | WEIGHT: 134 LBS | SYSTOLIC BLOOD PRESSURE: 128 MMHG

## 2024-05-07 DIAGNOSIS — Z78.9 LANGUAGE BARRIER TO COMMUNICATION: ICD-10-CM

## 2024-05-07 DIAGNOSIS — E11.65 UNCONTROLLED TYPE 2 DIABETES MELLITUS WITH HYPERGLYCEMIA: ICD-10-CM

## 2024-05-07 DIAGNOSIS — D50.0 IRON DEFICIENCY ANEMIA DUE TO CHRONIC BLOOD LOSS: ICD-10-CM

## 2024-05-07 DIAGNOSIS — M48.062 SPINAL STENOSIS OF LUMBAR REGION WITH NEUROGENIC CLAUDICATION: ICD-10-CM

## 2024-05-07 DIAGNOSIS — B37.9 INFECTION DUE TO YEAST: ICD-10-CM

## 2024-05-07 DIAGNOSIS — G89.29 CHRONIC LEFT-SIDED LOW BACK PAIN WITH RIGHT-SIDED SCIATICA: ICD-10-CM

## 2024-05-07 DIAGNOSIS — M54.41 CHRONIC LEFT-SIDED LOW BACK PAIN WITH RIGHT-SIDED SCIATICA: ICD-10-CM

## 2024-05-07 DIAGNOSIS — E11.42 TYPE 2 DIABETES MELLITUS WITH DIABETIC POLYNEUROPATHY, WITHOUT LONG-TERM CURRENT USE OF INSULIN: ICD-10-CM

## 2024-05-07 DIAGNOSIS — R29.898 LEG FATIGUE: ICD-10-CM

## 2024-05-07 DIAGNOSIS — I10 PRIMARY HYPERTENSION: ICD-10-CM

## 2024-05-07 DIAGNOSIS — B37.89 CANDIDA RASH OF GROIN: ICD-10-CM

## 2024-05-07 DIAGNOSIS — G57.01 SCIATIC NERVE DISEASE, RIGHT: ICD-10-CM

## 2024-05-07 DIAGNOSIS — L21.9 SEBORRHEIC DERMATITIS: ICD-10-CM

## 2024-05-07 DIAGNOSIS — Z00.00 WELLNESS EXAMINATION: Primary | ICD-10-CM

## 2024-05-07 LAB
ALBUMIN SERPL-MCNC: 4.5 G/DL (ref 3.4–4.8)
ALBUMIN/GLOB SERPL: 1.2 RATIO (ref 1.1–2)
ALP SERPL-CCNC: 55 UNIT/L (ref 40–150)
ALT SERPL-CCNC: 27 UNIT/L (ref 0–55)
AST SERPL-CCNC: 29 UNIT/L (ref 5–34)
BASOPHILS # BLD AUTO: 0.05 X10(3)/MCL
BASOPHILS NFR BLD AUTO: 0.8 %
BILIRUB SERPL-MCNC: 0.9 MG/DL
BUN SERPL-MCNC: 13.1 MG/DL (ref 8.4–25.7)
CALCIUM SERPL-MCNC: 10 MG/DL (ref 8.8–10)
CHLORIDE SERPL-SCNC: 102 MMOL/L (ref 98–107)
CHOLEST SERPL-MCNC: 130 MG/DL
CHOLEST/HDLC SERPL: 2 {RATIO} (ref 0–5)
CO2 SERPL-SCNC: 27 MMOL/L (ref 23–31)
CREAT SERPL-MCNC: 0.91 MG/DL (ref 0.73–1.18)
CREAT UR-MCNC: 71.1 MG/DL (ref 63–166)
DEPRECATED CALCIDIOL+CALCIFEROL SERPL-MC: 32.1 NG/ML (ref 30–80)
EOSINOPHIL # BLD AUTO: 1.73 X10(3)/MCL (ref 0–0.9)
EOSINOPHIL NFR BLD AUTO: 28.5 %
ERYTHROCYTE [DISTWIDTH] IN BLOOD BY AUTOMATED COUNT: 14.8 % (ref 11.5–17)
EST. AVERAGE GLUCOSE BLD GHB EST-MCNC: 165.7 MG/DL
GFR SERPLBLD CREATININE-BSD FMLA CKD-EPI: >60 ML/MIN/1.73/M2
GLOBULIN SER-MCNC: 3.9 GM/DL (ref 2.4–3.5)
GLUCOSE SERPL-MCNC: 116 MG/DL (ref 82–115)
HBA1C MFR BLD: 7.4 %
HCT VFR BLD AUTO: 43 % (ref 42–52)
HDLC SERPL-MCNC: 63 MG/DL (ref 35–60)
HGB BLD-MCNC: 13.9 G/DL (ref 14–18)
IMM GRANULOCYTES # BLD AUTO: 0.01 X10(3)/MCL (ref 0–0.04)
IMM GRANULOCYTES NFR BLD AUTO: 0.2 %
IRON SATN MFR SERPL: 36 % (ref 20–50)
IRON SERPL-MCNC: 117 UG/DL (ref 65–175)
LDLC SERPL CALC-MCNC: 56 MG/DL (ref 50–140)
LYMPHOCYTES # BLD AUTO: 1.97 X10(3)/MCL (ref 0.6–4.6)
LYMPHOCYTES NFR BLD AUTO: 32.5 %
MCH RBC QN AUTO: 28.5 PG (ref 27–31)
MCHC RBC AUTO-ENTMCNC: 32.3 G/DL (ref 33–36)
MCV RBC AUTO: 88.1 FL (ref 80–94)
MICROALBUMIN UR-MCNC: 12.7 UG/ML
MICROALBUMIN/CREAT RATIO PNL UR: 17.9 MG/GM CR (ref 0–30)
MONOCYTES # BLD AUTO: 0.44 X10(3)/MCL (ref 0.1–1.3)
MONOCYTES NFR BLD AUTO: 7.3 %
NEUTROPHILS # BLD AUTO: 1.86 X10(3)/MCL (ref 2.1–9.2)
NEUTROPHILS NFR BLD AUTO: 30.7 %
NRBC BLD AUTO-RTO: 0 %
PLATELET # BLD AUTO: 244 X10(3)/MCL (ref 130–400)
PMV BLD AUTO: 10.1 FL (ref 7.4–10.4)
POTASSIUM SERPL-SCNC: 4.9 MMOL/L (ref 3.5–5.1)
PROT SERPL-MCNC: 8.4 GM/DL (ref 5.8–7.6)
PSA SERPL-MCNC: 0.26 NG/ML
RBC # BLD AUTO: 4.88 X10(6)/MCL (ref 4.7–6.1)
SODIUM SERPL-SCNC: 137 MMOL/L (ref 136–145)
T4 FREE SERPL-MCNC: 0.95 NG/DL (ref 0.7–1.48)
TIBC SERPL-MCNC: 208 UG/DL (ref 69–240)
TIBC SERPL-MCNC: 325 UG/DL (ref 250–450)
TRANSFERRIN SERPL-MCNC: 299 MG/DL (ref 163–344)
TRIGL SERPL-MCNC: 55 MG/DL (ref 34–140)
TSH SERPL-ACNC: 0.87 UIU/ML (ref 0.35–4.94)
VLDLC SERPL CALC-MCNC: 11 MG/DL
WBC # SPEC AUTO: 6.06 X10(3)/MCL (ref 4.5–11.5)

## 2024-05-07 PROCEDURE — 84439 ASSAY OF FREE THYROXINE: CPT | Performed by: STUDENT IN AN ORGANIZED HEALTH CARE EDUCATION/TRAINING PROGRAM

## 2024-05-07 PROCEDURE — 85025 COMPLETE CBC W/AUTO DIFF WBC: CPT | Performed by: STUDENT IN AN ORGANIZED HEALTH CARE EDUCATION/TRAINING PROGRAM

## 2024-05-07 PROCEDURE — 1125F AMNT PAIN NOTED PAIN PRSNT: CPT | Mod: CPTII,,, | Performed by: STUDENT IN AN ORGANIZED HEALTH CARE EDUCATION/TRAINING PROGRAM

## 2024-05-07 PROCEDURE — G2211 COMPLEX E/M VISIT ADD ON: HCPCS | Mod: S$PBB,,, | Performed by: STUDENT IN AN ORGANIZED HEALTH CARE EDUCATION/TRAINING PROGRAM

## 2024-05-07 PROCEDURE — 80053 COMPREHEN METABOLIC PANEL: CPT | Performed by: STUDENT IN AN ORGANIZED HEALTH CARE EDUCATION/TRAINING PROGRAM

## 2024-05-07 PROCEDURE — 3079F DIAST BP 80-89 MM HG: CPT | Mod: CPTII,,, | Performed by: STUDENT IN AN ORGANIZED HEALTH CARE EDUCATION/TRAINING PROGRAM

## 2024-05-07 PROCEDURE — 99213 OFFICE O/P EST LOW 20 MIN: CPT | Mod: PBBFAC,PN | Performed by: STUDENT IN AN ORGANIZED HEALTH CARE EDUCATION/TRAINING PROGRAM

## 2024-05-07 PROCEDURE — 84153 ASSAY OF PSA TOTAL: CPT | Performed by: STUDENT IN AN ORGANIZED HEALTH CARE EDUCATION/TRAINING PROGRAM

## 2024-05-07 PROCEDURE — 1159F MED LIST DOCD IN RCRD: CPT | Mod: CPTII,,, | Performed by: STUDENT IN AN ORGANIZED HEALTH CARE EDUCATION/TRAINING PROGRAM

## 2024-05-07 PROCEDURE — 82306 VITAMIN D 25 HYDROXY: CPT | Performed by: STUDENT IN AN ORGANIZED HEALTH CARE EDUCATION/TRAINING PROGRAM

## 2024-05-07 PROCEDURE — 84443 ASSAY THYROID STIM HORMONE: CPT | Performed by: STUDENT IN AN ORGANIZED HEALTH CARE EDUCATION/TRAINING PROGRAM

## 2024-05-07 PROCEDURE — 36415 COLL VENOUS BLD VENIPUNCTURE: CPT | Performed by: STUDENT IN AN ORGANIZED HEALTH CARE EDUCATION/TRAINING PROGRAM

## 2024-05-07 PROCEDURE — 82043 UR ALBUMIN QUANTITATIVE: CPT | Performed by: STUDENT IN AN ORGANIZED HEALTH CARE EDUCATION/TRAINING PROGRAM

## 2024-05-07 PROCEDURE — 3074F SYST BP LT 130 MM HG: CPT | Mod: CPTII,,, | Performed by: STUDENT IN AN ORGANIZED HEALTH CARE EDUCATION/TRAINING PROGRAM

## 2024-05-07 PROCEDURE — 83540 ASSAY OF IRON: CPT | Performed by: STUDENT IN AN ORGANIZED HEALTH CARE EDUCATION/TRAINING PROGRAM

## 2024-05-07 PROCEDURE — 99214 OFFICE O/P EST MOD 30 MIN: CPT | Mod: S$PBB,,, | Performed by: STUDENT IN AN ORGANIZED HEALTH CARE EDUCATION/TRAINING PROGRAM

## 2024-05-07 PROCEDURE — 80061 LIPID PANEL: CPT | Performed by: STUDENT IN AN ORGANIZED HEALTH CARE EDUCATION/TRAINING PROGRAM

## 2024-05-07 PROCEDURE — 83036 HEMOGLOBIN GLYCOSYLATED A1C: CPT | Performed by: STUDENT IN AN ORGANIZED HEALTH CARE EDUCATION/TRAINING PROGRAM

## 2024-05-07 PROCEDURE — 3288F FALL RISK ASSESSMENT DOCD: CPT | Mod: CPTII,,, | Performed by: STUDENT IN AN ORGANIZED HEALTH CARE EDUCATION/TRAINING PROGRAM

## 2024-05-07 PROCEDURE — 1101F PT FALLS ASSESS-DOCD LE1/YR: CPT | Mod: CPTII,,, | Performed by: STUDENT IN AN ORGANIZED HEALTH CARE EDUCATION/TRAINING PROGRAM

## 2024-05-07 PROCEDURE — 3066F NEPHROPATHY DOC TX: CPT | Mod: CPTII,,, | Performed by: STUDENT IN AN ORGANIZED HEALTH CARE EDUCATION/TRAINING PROGRAM

## 2024-05-07 PROCEDURE — 3061F NEG MICROALBUMINURIA REV: CPT | Mod: CPTII,,, | Performed by: STUDENT IN AN ORGANIZED HEALTH CARE EDUCATION/TRAINING PROGRAM

## 2024-05-07 PROCEDURE — 3051F HG A1C>EQUAL 7.0%<8.0%: CPT | Mod: CPTII,,, | Performed by: STUDENT IN AN ORGANIZED HEALTH CARE EDUCATION/TRAINING PROGRAM

## 2024-05-07 RX ORDER — METFORMIN HYDROCHLORIDE 500 MG/1
500 TABLET ORAL 2 TIMES DAILY WITH MEALS
Qty: 180 TABLET | Refills: 3 | Status: SHIPPED | OUTPATIENT
Start: 2024-05-07 | End: 2024-05-08

## 2024-05-07 RX ORDER — KETOCONAZOLE 20 MG/ML
SHAMPOO, SUSPENSION TOPICAL
Qty: 120 ML | Refills: 11 | Status: SHIPPED | OUTPATIENT
Start: 2024-05-09

## 2024-05-07 RX ORDER — NYSTATIN 100000 U/G
CREAM TOPICAL 2 TIMES DAILY
Qty: 30 G | Refills: 11 | Status: SHIPPED | OUTPATIENT
Start: 2024-05-07

## 2024-05-07 RX ORDER — HYDROCORTISONE 25 MG/G
CREAM TOPICAL 2 TIMES DAILY
Qty: 28 G | Refills: 11 | Status: SHIPPED | OUTPATIENT
Start: 2024-05-07

## 2024-05-07 NOTE — PROGRESS NOTES
"Patient Name: William Mccauley     : 1950    MRN: [id number]     Subjective:     Patient ID: William Mccauley is a 73 y.o. male.    Chief Complaint:   Chief Complaint   Patient presents with    Follow-up     Complains of pain in his legs. Rates it a 7. Happens almost every morning and he is having problems walking. /85        HPI: HPI  73 year-old Lithuanian speaking male presents to clinic with daughter for follow up of diabetes and MRI results; declines  application  Interval history  Patient was scheduled to have cardiac surgery in colonoscopy however patient's daughter reports that they miss the cardiology appointment and the cataract surgery had to be canceled  States she has not called yet to make a new appointment   Phone number has been given to patient's daughter    Diabetes  Reports that he is taking metformin 1000 mg qam and 500 q pm  Started last visit on Jardiance 25   A1c 2024 7.4     CVA with residual deficits  CT and subsequent MRI was completed which revealed bilateral  Found make strokes and pontine stroke  Patient declined Plavix but agreeable to take 81 mg aspirin  Discovered to have 60% stenosis in Carotid artery on right side.     Left left tiredness/left leg sciatic nerve pain   Patient reports feeling a tired" feeling in the back of his leg  gabapentin 300 mg b.i.d.  Patient reports that this medication did help somewhat and would like a refill  Patient did complete physical therapy  MRI revealed disc bulges in severe spinal stenosis   Patient was denied for referral at neurology [place]    Patient is complaining of rash on his scalp that did respond to ketoconazole shampoo also now complaining of rash bilateral groin, states he has been sweating from working in the garden    Positive Cologuard test   Referral sent  To Gastroenterology  Patient is awaiting cardiac clearance before C scope    Anemia  H&H 12.5 and 40.1  MCV 94.4  Patient has previously had iron deficiency anemia as " "stated above patient has a colonoscopy but awaiting Cardiology clearance patient missed appointment    Rash over face and scalp  Ketoconazole 2%  Med hx- as above  Surgical hx- denies  Family hx- mother  from cirrhosis of the liver.  States that she did not drink  Social hx- non smoker, 1 can of beer per week    ROS:      ROS     12 point review of systems conducted, negative except as stated in the history of present illness. See HPI for details.    History:     Past Surgical History:   Procedure Laterality Date    KIDNEY STONE SURGERY Left        No family history on file.     Social History     Tobacco Use    Smoking status: Never     Passive exposure: Never    Smokeless tobacco: Never   Substance and Sexual Activity    Alcohol use: Never     Alcohol/week: 1.0 standard drink of alcohol     Types: 1 Cans of beer per week    Drug use: Never    Sexual activity: Yes     Partners: Female       Current Outpatient Medications   Medication Instructions    aspirin (ECOTRIN) 81 mg, Oral, Daily    atorvastatin (LIPITOR) 40 mg, Oral, Daily    diclofenac sodium (VOLTAREN) 2 g, Topical (Top), Daily, To back and leg    empagliflozin (JARDIANCE) 25 mg, Oral, Daily    ferrous sulfate 325 mg, Oral, Three times weekly    gabapentin (NEURONTIN) 600 mg, Oral, 3 times daily    hydrocortisone 2.5 % cream Topical (Top), 2 times daily    ICAR-C 100-250 mg Tab Oral    [START ON 2024] ketoconazole (NIZORAL) 2 % shampoo Topical (Top), Twice weekly    losartan (COZAAR) 25 mg, Oral, Daily    metFORMIN (GLUMETZA) 1,000 mg, Oral, 2 times daily with meals    nystatin (MYCOSTATIN) cream Topical (Top), 2 times daily        Review of patient's allergies indicates:  No Known Allergies    Objective:     Visit Vitals  /85 (BP Location: Left arm, Patient Position: Sitting)   Pulse 79   Ht 5' 2" (1.575 m)   Wt 60.8 kg (134 lb)   SpO2 98%   BMI 24.51 kg/m²       Physical Examination:     Physical Exam  Constitutional:       General: He " is not in acute distress.     Appearance: Normal appearance. He is not ill-appearing or diaphoretic.   HENT:      Nose: No congestion.   Eyes:      Conjunctiva/sclera: Conjunctivae normal.      Pupils: Pupils are equal, round, and reactive to light.   Cardiovascular:      Rate and Rhythm: Normal rate and regular rhythm.      Heart sounds: No murmur heard.  Pulmonary:      Effort: Pulmonary effort is normal. No respiratory distress.      Breath sounds: Normal breath sounds. No wheezing.   Musculoskeletal:         General: No swelling, tenderness, deformity or signs of injury. Normal range of motion.      Cervical back: Normal range of motion.   Skin:     General: Skin is warm and dry.      Coloration: Skin is not jaundiced.      Findings: Rash present.      Comments: Rash over scalp and face consistent with seborrheic dermatitis declines to show rash in area of groin description is consistent with candidal yeast infection   Neurological:      Mental Status: He is alert and oriented to person, place, and time. Mental status is at baseline.      Gait: Gait abnormal.      Comments: Patient is ambulating with a walker             Lab Results:     Chemistry:  Lab Results   Component Value Date     05/07/2024    K 4.9 05/07/2024    CHLORIDE 102 05/07/2024    BUN 13.1 05/07/2024    CREATININE 0.91 05/07/2024    EGFRNORACEVR >60 05/07/2024    GLUCOSE 116 (H) 05/07/2024    CALCIUM 10.0 05/07/2024    ALKPHOS 55 05/07/2024    LABPROT 8.4 (H) 05/07/2024    ALBUMIN 4.5 05/07/2024    AST 29 05/07/2024    ALT 27 05/07/2024    GPCTRZBU25GR 32.1 05/07/2024    TSH 0.873 05/07/2024    NVOAMG6PRAI 0.95 05/07/2024    PSA 0.26 05/07/2024        Lab Results   Component Value Date    HGBA1C 7.4 (H) 05/07/2024        Hematology:  Lab Results   Component Value Date    WBC 6.06 05/07/2024    HGB 13.9 (L) 05/07/2024    HCT 43.0 05/07/2024     05/07/2024       Lipid Panel:  Lab Results   Component Value Date    CHOL 130 05/07/2024     HDL 63 (H) 05/07/2024    LDL 56.00 05/07/2024    TRIG 55 05/07/2024    TOTALCHOLEST 2 05/07/2024        Urine:  Lab Results   Component Value Date    COLORUA Yellow 02/22/2023    APPEARANCEUA Clear 02/22/2023    SGUA 1.021 02/22/2023    PHUA 5.5 02/22/2023    PROTEINUA Negative 02/22/2023    GLUCOSEUA Normal 02/22/2023    KETONESUA Negative 02/22/2023    BLOODUA Negative 02/22/2023    NITRITESUA Negative 02/22/2023    LEUKOCYTESUR Negative 02/22/2023    RBCUA 0-5 02/22/2023    WBCUA 0-5 02/22/2023    BACTERIA None Seen 02/22/2023    SQEPUA None Seen 02/22/2023    HYALINECASTS None Seen 02/22/2023    CREATRANDUR 71.1 05/07/2024        Assessment:          ICD-10-CM ICD-9-CM   1. Wellness examination  Z00.00 V70.0   2. Iron deficiency anemia due to chronic blood loss  D50.0 280.0   3. Uncontrolled type 2 diabetes mellitus with hyperglycemia  E11.65 250.02   4. Seborrheic dermatitis  L21.9 690.10   5. Candida rash of groin  B37.89 112.89   6. Language barrier to communication  Z78.9 V49.89   7. Sciatic nerve disease, right  G57.01 355.0   8. Chronic left-sided low back pain with right-sided sciatica  M54.41 724.2    G89.29 724.3     338.29   9. Leg fatigue  R29.898 729.89   10. Type 2 diabetes mellitus with diabetic polyneuropathy, without long-term current use of insulin  E11.42 250.60     357.2   11. Infection due to yeast  B37.9 112.9        Plan:     1. Wellness examination  -     Discontinue: metFORMIN (GLUCOPHAGE) 500 MG tablet; Take 1 tablet (500 mg total) by mouth 2 (two) times daily with meals.  Dispense: 180 tablet; Refill: 3  -     CBC Auto Differential; Future; Expected date: 05/07/2024  -     Comprehensive Metabolic Panel; Future; Expected date: 05/07/2024  -     Lipid Panel; Future; Expected date: 05/07/2024  -     TSH; Future; Expected date: 05/07/2024  -     Hemoglobin A1C; Future; Expected date: 05/07/2024  -     PSA, Screening; Future; Expected date: 05/07/2024  -     T4, Free; Future; Expected  date: 05/07/2024  -     Vitamin D; Future; Expected date: 05/07/2024    2. Iron deficiency anemia due to chronic blood loss    3. Uncontrolled type 2 diabetes mellitus with hyperglycemia  -     Microalbumin/Creatinine Ratio, Urine  -     Iron and TIBC; Future; Expected date: 05/07/2024  -     metFORMIN (GLUMETZA) 1000 MG (MOD) 24hr tablet; Take 1 tablet (1,000 mg total) by mouth 2 (two) times daily with meals.  Dispense: 180 tablet; Refill: 3    4. Seborrheic dermatitis  Assessment & Plan:  Prescribed hydrocortisone cream 2.5% b.i.d. to face  Also prescribed ketoconazole 2% shampoo twice a week with soakage time of 3-5 minutes may also apply to ears and nasal labial folds  Advised daughter that patient has been given a 1 year supply of this medication and that she can request the refills monthly from the pharmacy    Orders:  -     ketoconazole (NIZORAL) 2 % shampoo; Apply topically twice a week.  Dispense: 120 mL; Refill: 11  -     hydrocortisone 2.5 % cream; Apply topically 2 (two) times daily.  Dispense: 28 g; Refill: 11    5. Candida rash of groin  -     nystatin (MYCOSTATIN) cream; Apply topically 2 (two) times daily.  Dispense: 30 g; Refill: 11    6. Language barrier to communication  Assessment & Plan:  Gave patient's daughter information on how to reschedule cardiology appointment stated that this is the barrier needed before patient can have cataract surgery as well as colonoscopy to find the source of bleeding in his colon      7. Sciatic nerve disease, right  -     gabapentin (NEURONTIN) 300 MG capsule; Take 2 capsules (600 mg total) by mouth 3 (three) times daily.  Dispense: 180 capsule; Refill: 11    8. Chronic left-sided low back pain with right-sided sciatica  Assessment & Plan:  Patient has been referred to Neurology [place] but referral was denied has seen Neurology for stroke related issues  Discussed options with patient and daughter to send to [facility] that they feel they can not get  there at this time   Also offered to either start Lyrica or increase gabapentin  Discussed side effects of both with patient and daughter  Shared decision-making that they would like to increase Neurontin to 600 mg 3 times a day      9. Leg fatigue  Overview:  Does have prominent pulses in bilateral dorsalis pedis with prominent pulse over left patella       Assessment & Plan:  Patient has been to vascular surgery who do not find a blockage that can be cleared they felt the issue was his sciatica as well as residual deficits from the stroke      10. Type 2 diabetes mellitus with diabetic polyneuropathy, without long-term current use of insulin  Assessment & Plan:  A1c is 7.4 continuing Jardiance 25 mg daily but increasing metformin to 1000 b.i.d.      11. Infection due to yeast  Assessment & Plan:  Nystatin cream b.i.d. to areas of groin           Follow up in about 4 months (around 9/7/2024) for Diabetes with A1c and foot exam.    Future Appointments   Date Time Provider Department Center   10/7/2024  8:20 AM Shannon Alonso MD [facility]   2/5/2025  9:00 AM Stacie Newman, ANP [facility] NEURO [place]        Shannon Alonso MD

## 2024-05-08 PROBLEM — B37.9 INFECTION DUE TO YEAST: Status: ACTIVE | Noted: 2024-05-08

## 2024-05-08 PROBLEM — E11.65 UNCONTROLLED TYPE 2 DIABETES MELLITUS WITH HYPERGLYCEMIA: Status: RESOLVED | Noted: 2023-03-27 | Resolved: 2024-05-08

## 2024-05-08 PROBLEM — E11.42 TYPE 2 DIABETES MELLITUS WITH DIABETIC POLYNEUROPATHY, WITHOUT LONG-TERM CURRENT USE OF INSULIN: Status: ACTIVE | Noted: 2024-05-08

## 2024-05-08 RX ORDER — METFORMIN HYDROCHLORIDE 1000 MG/1
1000 TABLET, FILM COATED, EXTENDED RELEASE ORAL 2 TIMES DAILY WITH MEALS
Qty: 180 TABLET | Refills: 3 | Status: SHIPPED | OUTPATIENT
Start: 2024-05-08 | End: 2025-05-08

## 2024-05-08 RX ORDER — GABAPENTIN 300 MG/1
600 CAPSULE ORAL 3 TIMES DAILY
Qty: 180 CAPSULE | Refills: 11 | Status: SHIPPED | OUTPATIENT
Start: 2024-05-08 | End: 2025-05-08

## 2024-05-08 NOTE — ASSESSMENT & PLAN NOTE
Gave patient's daughter information on how to reschedule cardiology appointment stated that this is the barrier needed before patient can have cataract surgery as well as colonoscopy to find the source of bleeding in his colon

## 2024-05-08 NOTE — ASSESSMENT & PLAN NOTE
Patient has been referred to Neurology [place] but referral was denied has seen Neurology for stroke related issues  Discussed options with patient and daughter to send to [place]  States that they feel they can not get there at this time   Also offered to either start Lyrica or increase gabapentin  Discussed side effects of both with patient and daughter  Shared decision-making that they would like to increase Neurontin to 600 mg 3 times a day

## 2024-05-08 NOTE — ASSESSMENT & PLAN NOTE
Prescribed hydrocortisone cream 2.5% b.i.d. to face  Also prescribed ketoconazole 2% shampoo twice a week with soakage time of 3-5 minutes may also apply to ears and nasal labial folds  Advised daughter that patient has been given a 1 year supply of this medication and that she can request the refills monthly from the pharmacy

## 2024-05-08 NOTE — ASSESSMENT & PLAN NOTE
Patient has been to vascular surgery who do not find a blockage that can be cleared they felt the issue was his sciatica as well as residual deficits from the stroke

## 2024-05-09 NOTE — ASSESSMENT & PLAN NOTE
Patient is currently controlled with blood pressure on Jardiance   And losartan 25 mg daily  Blood pressure in clinic 128/85 continue current medications

## 2024-05-10 ENCOUNTER — PATIENT MESSAGE (OUTPATIENT)
Dept: FAMILY MEDICINE | Facility: CLINIC | Age: 74
End: 2024-05-10
Payer: MEDICAID

## 2024-07-15 ENCOUNTER — TELEPHONE (OUTPATIENT)
Dept: FAMILY MEDICINE | Facility: CLINIC | Age: 74
End: 2024-07-15
Payer: MEDICAID

## 2024-07-15 NOTE — TELEPHONE ENCOUNTER
----- Message from Stacy Daniels sent at 7/15/2024  9:28 AM CDT -----  Regarding: Refill  Type:  RX Refill Request    Who Called: Patients Daughter     Refill or New Rx:Refill     RX Name and Strength:aspirin (ECOTRIN) 81 MG EC tablet and metFORMIN (GLUMETZA) 1000 MG (MOD) 24hr tablet    Preferred Pharmacy with phone number:[id number] [place] [address] AT [facility] & [address]   Phone: [phone]        Ordering Provider:Shannon Alonso     Would the patient rather a call back or a response via [facility]? Call back     Best Call Back Number: [phone]

## 2024-07-15 NOTE — TELEPHONE ENCOUNTER
Called phone number on file to let them know that there are refills on file for the patients medication. Said hello and someone said hello but that was it after that. Will send protal message.

## 2024-09-23 ENCOUNTER — HOSPITAL ENCOUNTER (INPATIENT)
Facility: HOSPITAL | Age: 74
LOS: 4 days | Discharge: HOME | DRG: 352 | End: 2024-09-27
Attending: EMERGENCY MEDICINE | Admitting: SURGERY
Payer: MEDICAID

## 2024-09-23 DIAGNOSIS — R10.31 RIGHT LOWER QUADRANT ABDOMINAL PAIN: ICD-10-CM

## 2024-09-23 DIAGNOSIS — K40.30 IRREDUCIBLE RIGHT INGUINAL HERNIA: ICD-10-CM

## 2024-09-23 DIAGNOSIS — K46.0 INCARCERATED HERNIA: Primary | ICD-10-CM

## 2024-09-23 DIAGNOSIS — Z01.818 PREOP EXAMINATION: ICD-10-CM

## 2024-09-23 DIAGNOSIS — K56.609 SMALL BOWEL OBSTRUCTION: ICD-10-CM

## 2024-09-23 DIAGNOSIS — K56.609 SBO (SMALL BOWEL OBSTRUCTION): ICD-10-CM

## 2024-09-23 DIAGNOSIS — K40.90 RIGHT INGUINAL HERNIA: ICD-10-CM

## 2024-09-23 PROBLEM — I10 HYPERTENSION: Status: ACTIVE | Noted: 2024-09-23

## 2024-09-23 PROBLEM — E11.9 DIABETES MELLITUS: Status: ACTIVE | Noted: 2024-09-23

## 2024-09-23 LAB
ANION GAP SERPL CALC-SCNC: 10 MEQ/L
BACTERIA #/AREA URNS AUTO: ABNORMAL /HPF
BASOPHILS # BLD AUTO: 0.04 X10(3)/MCL
BASOPHILS # BLD AUTO: 0.05 X10(3)/MCL
BASOPHILS NFR BLD AUTO: 0.3 %
BASOPHILS NFR BLD AUTO: 0.4 %
BILIRUB UR QL STRIP.AUTO: NEGATIVE
BUN SERPL-MCNC: 9.2 MG/DL (ref 8.4–25.7)
CALCIUM SERPL-MCNC: 10.4 MG/DL (ref 8.8–10)
CHLORIDE SERPL-SCNC: 101 MMOL/L (ref 98–107)
CLARITY UR: CLEAR
CO2 SERPL-SCNC: 26 MMOL/L (ref 23–31)
COLOR UR AUTO: COLORLESS
CREAT SERPL-MCNC: 0.84 MG/DL (ref 0.73–1.18)
CREAT/UREA NIT SERPL: 11
EOSINOPHIL # BLD AUTO: 0.81 X10(3)/MCL (ref 0–0.9)
EOSINOPHIL # BLD AUTO: 1.28 X10(3)/MCL (ref 0–0.9)
EOSINOPHIL NFR BLD AUTO: 11.4 %
EOSINOPHIL NFR BLD AUTO: 6.8 %
ERYTHROCYTE [DISTWIDTH] IN BLOOD BY AUTOMATED COUNT: 16.9 % (ref 11.5–17)
ERYTHROCYTE [DISTWIDTH] IN BLOOD BY AUTOMATED COUNT: 17.2 % (ref 11.5–17)
GFR SERPLBLD CREATININE-BSD FMLA CKD-EPI: >60 ML/MIN/1.73/M2
GLUCOSE SERPL-MCNC: 180 MG/DL (ref 82–115)
GLUCOSE UR QL STRIP: NORMAL
HCT VFR BLD AUTO: 42.3 % (ref 42–52)
HCT VFR BLD AUTO: 43.8 % (ref 42–52)
HGB BLD-MCNC: 13.4 G/DL (ref 14–18)
HGB BLD-MCNC: 14 G/DL (ref 14–18)
HGB UR QL STRIP: NEGATIVE
HOLD SPECIMEN: NORMAL
HYALINE CASTS #/AREA URNS LPF: ABNORMAL /LPF
IMM GRANULOCYTES # BLD AUTO: 0.04 X10(3)/MCL (ref 0–0.04)
IMM GRANULOCYTES # BLD AUTO: 0.04 X10(3)/MCL (ref 0–0.04)
IMM GRANULOCYTES NFR BLD AUTO: 0.3 %
IMM GRANULOCYTES NFR BLD AUTO: 0.4 %
KETONES UR QL STRIP: NEGATIVE
LACTATE SERPL-SCNC: 1.5 MMOL/L (ref 0.5–2.2)
LACTATE SERPL-SCNC: 2.1 MMOL/L (ref 0.5–2.2)
LACTATE SERPL-SCNC: 2.5 MMOL/L (ref 0.5–2.2)
LEUKOCYTE ESTERASE UR QL STRIP: NEGATIVE
LYMPHOCYTES # BLD AUTO: 1.47 X10(3)/MCL (ref 0.6–4.6)
LYMPHOCYTES # BLD AUTO: 1.55 X10(3)/MCL (ref 0.6–4.6)
LYMPHOCYTES NFR BLD AUTO: 12.4 %
LYMPHOCYTES NFR BLD AUTO: 13.8 %
MCH RBC QN AUTO: 25.8 PG (ref 27–31)
MCH RBC QN AUTO: 26.1 PG (ref 27–31)
MCHC RBC AUTO-ENTMCNC: 31.7 G/DL (ref 33–36)
MCHC RBC AUTO-ENTMCNC: 32 G/DL (ref 33–36)
MCV RBC AUTO: 81.5 FL (ref 80–94)
MCV RBC AUTO: 81.6 FL (ref 80–94)
MONOCYTES # BLD AUTO: 0.72 X10(3)/MCL (ref 0.1–1.3)
MONOCYTES # BLD AUTO: 0.78 X10(3)/MCL (ref 0.1–1.3)
MONOCYTES NFR BLD AUTO: 6.4 %
MONOCYTES NFR BLD AUTO: 6.6 %
NEUTROPHILS # BLD AUTO: 7.57 X10(3)/MCL (ref 2.1–9.2)
NEUTROPHILS # BLD AUTO: 8.75 X10(3)/MCL (ref 2.1–9.2)
NEUTROPHILS NFR BLD AUTO: 67.6 %
NEUTROPHILS NFR BLD AUTO: 73.6 %
NITRITE UR QL STRIP: NEGATIVE
NRBC BLD AUTO-RTO: 0 %
NRBC BLD AUTO-RTO: 0 %
PH UR STRIP: 6.5 [PH]
PLATELET # BLD AUTO: 258 X10(3)/MCL (ref 130–400)
PLATELET # BLD AUTO: 293 X10(3)/MCL (ref 130–400)
PMV BLD AUTO: 10.3 FL (ref 7.4–10.4)
PMV BLD AUTO: 9.8 FL (ref 7.4–10.4)
POCT GLUCOSE: 134 MG/DL (ref 70–110)
POCT GLUCOSE: 147 MG/DL (ref 70–110)
POCT GLUCOSE: 147 MG/DL (ref 70–110)
POTASSIUM SERPL-SCNC: 4.3 MMOL/L (ref 3.5–5.1)
PROT UR QL STRIP: NEGATIVE
RBC # BLD AUTO: 5.19 X10(6)/MCL (ref 4.7–6.1)
RBC # BLD AUTO: 5.37 X10(6)/MCL (ref 4.7–6.1)
RBC #/AREA URNS AUTO: ABNORMAL /HPF
SODIUM SERPL-SCNC: 137 MMOL/L (ref 136–145)
SP GR UR STRIP.AUTO: 1.01 (ref 1–1.03)
SQUAMOUS #/AREA URNS LPF: ABNORMAL /HPF
UROBILINOGEN UR STRIP-ACNC: NORMAL
WBC # BLD AUTO: 11.21 X10(3)/MCL (ref 4.5–11.5)
WBC # BLD AUTO: 11.89 X10(3)/MCL (ref 4.5–11.5)
WBC #/AREA URNS AUTO: ABNORMAL /HPF

## 2024-09-23 PROCEDURE — 81001 URINALYSIS AUTO W/SCOPE: CPT | Performed by: EMERGENCY MEDICINE

## 2024-09-23 PROCEDURE — 96374 THER/PROPH/DIAG INJ IV PUSH: CPT

## 2024-09-23 PROCEDURE — 63600175 PHARM REV CODE 636 W HCPCS

## 2024-09-23 PROCEDURE — 96361 HYDRATE IV INFUSION ADD-ON: CPT

## 2024-09-23 PROCEDURE — 96376 TX/PRO/DX INJ SAME DRUG ADON: CPT

## 2024-09-23 PROCEDURE — 63600175 PHARM REV CODE 636 W HCPCS: Performed by: EMERGENCY MEDICINE

## 2024-09-23 PROCEDURE — 85025 COMPLETE CBC W/AUTO DIFF WBC: CPT

## 2024-09-23 PROCEDURE — 96375 TX/PRO/DX INJ NEW DRUG ADDON: CPT

## 2024-09-23 PROCEDURE — 99285 EMERGENCY DEPT VISIT HI MDM: CPT | Mod: 25

## 2024-09-23 PROCEDURE — 80048 BASIC METABOLIC PNL TOTAL CA: CPT | Performed by: EMERGENCY MEDICINE

## 2024-09-23 PROCEDURE — 21400001 HC TELEMETRY ROOM

## 2024-09-23 PROCEDURE — 36415 COLL VENOUS BLD VENIPUNCTURE: CPT

## 2024-09-23 PROCEDURE — 25500020 PHARM REV CODE 255: Performed by: EMERGENCY MEDICINE

## 2024-09-23 PROCEDURE — 85025 COMPLETE CBC W/AUTO DIFF WBC: CPT | Performed by: EMERGENCY MEDICINE

## 2024-09-23 PROCEDURE — 25000003 PHARM REV CODE 250: Performed by: EMERGENCY MEDICINE

## 2024-09-23 PROCEDURE — 63600175 PHARM REV CODE 636 W HCPCS: Performed by: NURSE PRACTITIONER

## 2024-09-23 PROCEDURE — 83605 ASSAY OF LACTIC ACID: CPT

## 2024-09-23 RX ORDER — ONDANSETRON HYDROCHLORIDE 2 MG/ML
4 INJECTION, SOLUTION INTRAVENOUS EVERY 6 HOURS PRN
Status: DISCONTINUED | OUTPATIENT
Start: 2024-09-23 | End: 2024-09-27 | Stop reason: HOSPADM

## 2024-09-23 RX ORDER — KETOROLAC TROMETHAMINE 30 MG/ML
15 INJECTION, SOLUTION INTRAMUSCULAR; INTRAVENOUS EVERY 6 HOURS
Status: DISCONTINUED | OUTPATIENT
Start: 2024-09-24 | End: 2024-09-26

## 2024-09-23 RX ORDER — IBUPROFEN 200 MG
24 TABLET ORAL
Status: DISCONTINUED | OUTPATIENT
Start: 2024-09-23 | End: 2024-09-27 | Stop reason: HOSPADM

## 2024-09-23 RX ORDER — ACETAMINOPHEN 325 MG/1
650 TABLET ORAL EVERY 6 HOURS PRN
Status: DISCONTINUED | OUTPATIENT
Start: 2024-09-23 | End: 2024-09-27 | Stop reason: HOSPADM

## 2024-09-23 RX ORDER — OXYCODONE HYDROCHLORIDE 5 MG/1
10 TABLET ORAL EVERY 4 HOURS PRN
Status: DISCONTINUED | OUTPATIENT
Start: 2024-09-23 | End: 2024-09-26

## 2024-09-23 RX ORDER — HYDRALAZINE HYDROCHLORIDE 20 MG/ML
10 INJECTION INTRAMUSCULAR; INTRAVENOUS EVERY 6 HOURS PRN
Status: DISCONTINUED | OUTPATIENT
Start: 2024-09-23 | End: 2024-09-27 | Stop reason: HOSPADM

## 2024-09-23 RX ORDER — OXYCODONE HYDROCHLORIDE 5 MG/1
5 TABLET ORAL EVERY 4 HOURS PRN
Status: DISCONTINUED | OUTPATIENT
Start: 2024-09-23 | End: 2024-09-27 | Stop reason: HOSPADM

## 2024-09-23 RX ORDER — INSULIN ASPART 100 [IU]/ML
0-10 INJECTION, SOLUTION INTRAVENOUS; SUBCUTANEOUS
Status: DISCONTINUED | OUTPATIENT
Start: 2024-09-23 | End: 2024-09-27 | Stop reason: HOSPADM

## 2024-09-23 RX ORDER — SODIUM CHLORIDE, SODIUM LACTATE, POTASSIUM CHLORIDE, CALCIUM CHLORIDE 600; 310; 30; 20 MG/100ML; MG/100ML; MG/100ML; MG/100ML
INJECTION, SOLUTION INTRAVENOUS CONTINUOUS
Status: DISCONTINUED | OUTPATIENT
Start: 2024-09-23 | End: 2024-09-26

## 2024-09-23 RX ORDER — IBUPROFEN 200 MG
16 TABLET ORAL
Status: DISCONTINUED | OUTPATIENT
Start: 2024-09-23 | End: 2024-09-27 | Stop reason: HOSPADM

## 2024-09-23 RX ORDER — ONDANSETRON 4 MG/1
4 TABLET, ORALLY DISINTEGRATING ORAL EVERY 6 HOURS PRN
Status: DISCONTINUED | OUTPATIENT
Start: 2024-09-23 | End: 2024-09-23

## 2024-09-23 RX ORDER — LABETALOL HCL 20 MG/4 ML
10 SYRINGE (ML) INTRAVENOUS EVERY 6 HOURS PRN
Status: DISCONTINUED | OUTPATIENT
Start: 2024-09-23 | End: 2024-09-27 | Stop reason: HOSPADM

## 2024-09-23 RX ORDER — ONDANSETRON 4 MG/1
4 TABLET, ORALLY DISINTEGRATING ORAL
Status: COMPLETED | OUTPATIENT
Start: 2024-09-23 | End: 2024-09-23

## 2024-09-23 RX ORDER — MORPHINE SULFATE 2 MG/ML
2 INJECTION, SOLUTION INTRAMUSCULAR; INTRAVENOUS
Status: COMPLETED | OUTPATIENT
Start: 2024-09-23 | End: 2024-09-23

## 2024-09-23 RX ORDER — ENOXAPARIN SODIUM 100 MG/ML
40 INJECTION SUBCUTANEOUS EVERY 24 HOURS
Status: DISCONTINUED | OUTPATIENT
Start: 2024-09-23 | End: 2024-09-27 | Stop reason: HOSPADM

## 2024-09-23 RX ORDER — MORPHINE SULFATE 2 MG/ML
4 INJECTION, SOLUTION INTRAMUSCULAR; INTRAVENOUS
Status: COMPLETED | OUTPATIENT
Start: 2024-09-23 | End: 2024-09-23

## 2024-09-23 RX ORDER — GLUCAGON 1 MG
1 KIT INJECTION
Status: DISCONTINUED | OUTPATIENT
Start: 2024-09-23 | End: 2024-09-27 | Stop reason: HOSPADM

## 2024-09-23 RX ORDER — MORPHINE SULFATE 2 MG/ML
2 INJECTION, SOLUTION INTRAMUSCULAR; INTRAVENOUS ONCE
Status: COMPLETED | OUTPATIENT
Start: 2024-09-23 | End: 2024-09-23

## 2024-09-23 RX ORDER — LABETALOL HCL 20 MG/4 ML
10 SYRINGE (ML) INTRAVENOUS EVERY 6 HOURS PRN
Status: DISCONTINUED | OUTPATIENT
Start: 2024-09-23 | End: 2024-09-23

## 2024-09-23 RX ORDER — ONDANSETRON HYDROCHLORIDE 2 MG/ML
4 INJECTION, SOLUTION INTRAVENOUS ONCE
Status: COMPLETED | OUTPATIENT
Start: 2024-09-23 | End: 2024-09-23

## 2024-09-23 RX ADMIN — SODIUM CHLORIDE 1000 ML: 9 INJECTION, SOLUTION INTRAVENOUS at 10:09

## 2024-09-23 RX ADMIN — SODIUM CHLORIDE, POTASSIUM CHLORIDE, SODIUM LACTATE AND CALCIUM CHLORIDE: 600; 310; 30; 20 INJECTION, SOLUTION INTRAVENOUS at 04:09

## 2024-09-23 RX ADMIN — MORPHINE SULFATE 4 MG: 2 INJECTION, SOLUTION INTRAMUSCULAR; INTRAVENOUS at 01:09

## 2024-09-23 RX ADMIN — MORPHINE SULFATE 2 MG: 2 INJECTION, SOLUTION INTRAMUSCULAR; INTRAVENOUS at 09:09

## 2024-09-23 RX ADMIN — IOHEXOL 100 ML: 350 INJECTION, SOLUTION INTRAVENOUS at 11:09

## 2024-09-23 RX ADMIN — SODIUM CHLORIDE, POTASSIUM CHLORIDE, SODIUM LACTATE AND CALCIUM CHLORIDE 1000 ML: 600; 310; 30; 20 INJECTION, SOLUTION INTRAVENOUS at 03:09

## 2024-09-23 RX ADMIN — ONDANSETRON 4 MG: 2 INJECTION INTRAMUSCULAR; INTRAVENOUS at 01:09

## 2024-09-23 RX ADMIN — ENOXAPARIN SODIUM 40 MG: 40 INJECTION SUBCUTANEOUS at 05:09

## 2024-09-23 RX ADMIN — MORPHINE SULFATE 2 MG: 2 INJECTION, SOLUTION INTRAMUSCULAR; INTRAVENOUS at 10:09

## 2024-09-23 RX ADMIN — ONDANSETRON 4 MG: 2 INJECTION INTRAMUSCULAR; INTRAVENOUS at 09:09

## 2024-09-23 RX ADMIN — ONDANSETRON 4 MG: 4 TABLET, ORALLY DISINTEGRATING ORAL at 10:09

## 2024-09-23 NOTE — ED PROVIDER NOTES
ED PROVIDER NOTE  9/23/2024    CHIEF COMPLAINT:   Chief Complaint   Patient presents with    Groin Pain     Right testicular pain radiating to right lower abdomen that started yesterday. Pt born with one testicle.        HISTORY OF PRESENT ILLNESS:   William Mccauley is a 74 y.o. male who presents with chief complaint Right groin pain.  Onset was yesterday whenever he began having pain in his right groin that has been constant associated with a hernia.  Associated symptoms of nausea and vomiting.  Reports he has had issues with this hernia while he was in [place] but they did not do any surgery.    The history is provided by the patient and a relative. The history is limited by a language barrier. A  was used.         REVIEW OF SYSTEMS: as noted in the HPI.  NURSING NOTES REVIEWED      PAST MEDICAL/SURGICAL HISTORY:   Past Medical History:   Diagnosis Date    Stroke     Type 2 diabetes mellitus without complications     History reviewed. No pertinent surgical history.    FAMILY HISTORY: No family history on file.    SOCIAL HISTORY:   Social History     Tobacco Use    Smoking status: Never    Smokeless tobacco: Never       ALLERGIES: Review of patient's allergies indicates:  No Known Allergies    PHYSICAL EXAM:  Initial Vitals [09/23/24 0925]   BP Pulse Resp Temp SpO2   (!) 153/89 76 18 98.6 °F (37 °C) 99 %      MAP       --         Physical Exam    Nursing note and vitals reviewed.  Constitutional: He appears well-developed and well-nourished. No distress.   HENT:   Head: Normocephalic and atraumatic.   Nose: Nose normal.   Mouth/Throat: Oropharynx is clear and moist and mucous membranes are normal.   Eyes: Conjunctivae and EOM are normal. Pupils are equal, round, and reactive to light.   Neck: Neck supple. No tracheal deviation present.   Cardiovascular:  Normal rate, regular rhythm, normal heart sounds, intact distal pulses and normal pulses.           Pulmonary/Chest: Effort normal and breath sounds  normal. No respiratory distress.   Abdominal: Abdomen is soft. There is no abdominal tenderness. A hernia is present. Hernia confirmed positive in the right inguinal area.   Firm right inguinal hernia seems to only partially reduce, no overlying skin changes There is no rebound and no guarding.   Genitourinary: Uncircumcised.    Genitourinary Comments: No tenderness to palpation of the right testicle.     Musculoskeletal:         General: Normal range of motion.      Cervical back: Neck supple.     Neurological: He is alert and oriented to person, place, and time. GCS eye subscore is 4. GCS verbal subscore is 5. GCS motor subscore is 6.   CN II-XII intact. Moves all extremities. No gross sensory or motor deficits.   Skin: Skin is warm, dry and intact.   Psychiatric: He has a normal mood and affect. His speech is normal and behavior is normal. Judgment and thought content normal. Cognition and memory are normal.         RESULTS:  Labs Reviewed   URINALYSIS, REFLEX TO URINE CULTURE - Abnormal       Result Value    Color, UA Colorless (*)     Appearance, UA Clear      Specific Gravity, UA 1.006      pH, UA 6.5      Protein, UA Negative      Glucose, UA Normal      Ketones, UA Negative      Blood, UA Negative      Bilirubin, UA Negative      Urobilinogen, UA Normal      Nitrites, UA Negative      Leukocyte Esterase, UA Negative      RBC, UA 0-5      WBC, UA 0-5      Bacteria, UA None Seen      Squamous Epithelial Cells, UA Trace (*)     Hyaline Casts, UA None Seen     BASIC METABOLIC PANEL - Abnormal    Sodium 137      Potassium 4.3      Chloride 101      CO2 26      Glucose 180 (*)     Blood Urea Nitrogen 9.2      Creatinine 0.84      BUN/Creatinine Ratio 11      Calcium 10.4 (*)     Anion Gap 10.0      eGFR >60     CBC WITH DIFFERENTIAL - Abnormal    WBC 11.89 (*)     RBC 5.37      Hgb 14.0      Hct 43.8      MCV 81.6      MCH 26.1 (*)     MCHC 32.0 (*)     RDW 16.9      Platelet 293      MPV 10.3      Neut % 73.6       Lymph % 12.4      Mono % 6.6      Eos % 6.8      Basophil % 0.3      Lymph # 1.47      Neut # 8.75      Mono # 0.78      Eos # 0.81      Baso # 0.04      IG# 0.04      IG% 0.3      NRBC% 0.0     POCT GLUCOSE - Abnormal    POCT Glucose 147 (*)    LACTIC ACID, PLASMA - Normal    Lactic Acid Level 2.1     CBC W/ AUTO DIFFERENTIAL    Narrative:     The following orders were created for panel order CBC auto differential.  Procedure                               Abnormality         Status                     ---------                               -----------         ------                     CBC with Differential[[id number]]       Abnormal            Final result                 Please view results for these tests on the individual orders.   EXTRA TUBES    Narrative:     The following orders were created for panel order EXTRA TUBES.  Procedure                               Abnormality         Status                     ---------                               -----------         ------                     Light Blue Top Hold[[id number]]                             Final result               Gold Top Hold[[id number]]                                   Final result               Pink Top Hold[[id number]]                                   Final result                 Please view results for these tests on the individual orders.   LIGHT BLUE TOP HOLD    Extra Tube Hold for add-ons.     GOLD TOP HOLD    Extra Tube Hold for add-ons.     PINK TOP HOLD    Extra Tube Hold for add-ons.     POCT GLUCOSE MONITORING CONTINUOUS     Imaging Results               CT Abdomen Pelvis With IV Contrast NO Oral Contrast (Final result)  Result time 09/23/24 12:14:05      Final result by Kale Solis MD (09/23/24 12:14:05)                   Impression:      Incarcerated inguinal hernia on the right side causing secondary small-bowel obstruction.    This report was flagged in Epic as abnormal.      Electronically signed by: Paras  [facility]  Date:    09/23/2024  Time:    12:14               Narrative:    EXAMINATION:  CT ABDOMEN PELVIS WITH IV CONTRAST    CLINICAL HISTORY:  Bowel obstruction suspected;    TECHNIQUE:  Low dose axial images, sagittal and coronal reformations were obtained from the lung bases to the pubic symphysis following the IV administration of contrast. Automatic exposure control (AEC) is utilized to reduce patient radiation exposure.    COMPARISON:  None.    FINDINGS:  The lung bases are clear.    There is a hiatal hernia.    The liver appears normal.  No liver mass or lesion is seen.  Portal and hepatic veins appear normal.    The gallbladder appears normal.  No obvious gallstones are seen.  No biliary dilatation is seen.  No pericholecystic fluid is seen.    The pancreas appears normal.  No pancreatic mass or lesion is seen.    The spleen shows no acute abnormality.    The adrenal glands appear normal.  No adrenal nodule is seen.    The kidneys appear normal.  No hydronephrosis is seen.  No hydroureter is seen.  No nephrolithiasis is seen.  No obvious ureteral stones are seen.    Urinary bladder appears grossly unremarkable.    There are dilated loops of small bowel seen consistent with a small-bowel obstruction.  The point of obstruction is a inguinal hernia in the right side.  There is some fluid seen in the scrotal sac on the right side and area of calcification seen in the scrotal sac on the right side.    There are some scattered areas of calcifications seen in the intraperitoneal cavity.    No free air is seen.  No free fluid is seen.                                      PROCEDURES:  Critical Care    Date/Time: 9/24/2024 5:35 PM    Performed by: Enmanuel Carroll DO  Authorized by: Vernon Scott Jr., MD  Direct patient critical care time: 15 minutes  Ordering / reviewing critical care time: 10 minutes  Documentation critical care time: 5 minutes  Consult with family critical care time: 2 minutes  Total  critical care time (exclusive of procedural time) : 32 minutes  Critical care time was exclusive of separately billable procedures and treating other patients.  Critical care was necessary to treat or prevent imminent or life-threatening deterioration of the following conditions: sepsis.  Critical care was time spent personally by me on the following activities: development of treatment plan with patient or surrogate, interpretation of cardiac output measurements, evaluation of patient's response to treatment, examination of patient, obtaining history from patient or surrogate, ordering and performing treatments and interventions, ordering and review of laboratory studies, re-evaluation of patient's condition and ordering and review of radiographic studies.          ECG:       ED COURSE AND MEDICAL DECISION MAKING:  Medications   iohexoL (OMNIPAQUE 350) 350 mg iodine/mL injection (has no administration in time range)   enoxaparin injection 40 mg (40 mg Subcutaneous Given 9/24/24 1709)   acetaminophen tablet 650 mg (has no administration in time range)   oxyCODONE immediate release tablet 5 mg (has no administration in time range)   oxyCODONE immediate release tablet 10 mg (has no administration in time range)   lactated ringers infusion ( Intravenous Anesthesia Volume Adjustment 9/24/24 1623)   glucose chewable tablet 16 g (has no administration in time range)   glucose chewable tablet 24 g (has no administration in time range)   glucagon (human recombinant) injection 1 mg (has no administration in time range)   insulin aspart U-100 injection 0-10 Units (has no administration in time range)   hydrALAZINE injection 10 mg (has no administration in time range)   dextrose 10% bolus 125 mL 125 mL (has no administration in time range)   dextrose 10% bolus 250 mL 250 mL (has no administration in time range)   labetalol 20 mg/4 mL (5 mg/mL) IV syring (has no administration in time range)   ketorolac injection 15 mg (15 mg  Intravenous Given 9/24/24 1709)   ondansetron injection 4 mg (4 mg Intravenous Given 9/23/24 2109)   lactated ringers infusion (has no administration in time range)   sodium chloride 0.9% flush 10 mL (has no administration in time range)   mupirocin 2 % ointment (has no administration in time range)   sodium chloride 0.9% bolus 1,000 mL 1,000 mL (0 mLs Intravenous Stopped 9/23/24 1117)   morphine injection 2 mg (2 mg Intravenous Given 9/23/24 1016)   ondansetron disintegrating tablet 4 mg (4 mg Oral Given 9/23/24 1015)   iohexoL (OMNIPAQUE 350) injection 100 mL (100 mLs Intravenous Given 9/23/24 1148)   morphine injection 4 mg (4 mg Intravenous Given 9/23/24 1317)   ondansetron injection 4 mg (4 mg Intravenous Given 9/23/24 1317)   lactated ringers bolus 1,000 mL (0 mLs Intravenous Stopped 9/23/24 1613)   morphine injection 2 mg (2 mg Intravenous Given 9/23/24 2147)   midazolam (PF) (VERSED) 1 mg/mL injection 2 mg (2 mg Intravenous Given 9/24/24 1415)   heparin (porcine) injection 5,000 Units (5,000 Units Subcutaneous Given 9/24/24 1435)   ceFAZolin injection 2 g (2 g Intravenous Given 9/24/24 1455)     ED Course as of 09/24/24 1736   Mon Sep 23, 2024   1026 WBC(!): 11.89 [IB]   1026 Hemoglobin: 14.0 [IB]   1026 Platelet Count: 293 [IB]   1058 Creatinine: 0.84 [IB]   1120 NITRITE UA: Negative [IB]   1120 Leukocyte Esterase, UA: Negative [IB]   1120 RBC, UA: 0-5 [IB]   1120 WBC, UA: 0-5 [IB]   1120 Bacteria, UA: None Seen [IB]      ED Course User Index  [IB] Enmanuel Carroll DO        Medical Decision Making  74-year-old male who presents with right inguinal hernia pain.  Differential diagnosis includes incarcerated hernia, strangulated hernia, bowel obstruction.  The hernia was able to be partially reduced at the bedside by myself with just a little knuckle of the hernia persisting.  CBC shows mild leukocytosis of 11.89.  BMP grossly unremarkable.  CT abdomen and pelvis shows incarcerated inguinal hernia on the right  causing secondary small-bowel obstruction.  General surgery consulted and will admit for herniorrhaphy.  I have spoken with the patient and/or caregivers. I have explained the patient's condition, diagnoses and treatment plan based on the information available to me at this time. I have answered the patient's and/or caregiver's questions and addressed any concerns. The patient and/or caregivers have as good an understanding of the patient's diagnosis, condition and treatment plan as can be expected at this point. The patient has been stabilized within the capability of the emergency department. The patient will be transported for further care and management or will be moved to an observation or inpatient service. I have communicated with the staff or medical practitioner taking over this patient's care.    Amount and/or Complexity of Data Reviewed  Labs: ordered. Decision-making details documented in ED Course.  Radiology: ordered.    Risk  Prescription drug management.  Decision regarding hospitalization.  Emergency major surgery.        CLINICAL IMPRESSION:  1. Incarcerated hernia    2. SBO (small bowel obstruction)    3. Right inguinal hernia    4. Right lower quadrant abdominal pain    5. Preop examination        DISPOSITION:   ED Disposition Condition    Admit Stable             \     Enmanuel Carroll DO  09/24/24 1739

## 2024-09-23 NOTE — PLAN OF CARE
09/23/24 1522   Discharge Assessment   Assessment Type Discharge Planning Assessment   Confirmed/corrected address, phone number and insurance Yes   Confirmed Demographics Correct on Facesheet   Source of Information family   Reason For Admission Incarcerated hernia   People in Home child(kavita), adult;spouse;grandchild(kavita)   Facility Arrived From: Home   Do you expect to return to your current living situation? Yes   Do you have help at home or someone to help you manage your care at home? Yes   Who are your caregiver(s) and their phone number(s)? Mehnaz Mccauley (Daughter)  [phone]; Spouse; LILIAM   Prior to hospitilization cognitive status: Alert/Oriented   Current cognitive status: Alert/Oriented   Walking or Climbing Stairs Difficulty yes   Walking or Climbing Stairs ambulation difficulty, requires equipment   Mobility Management Cane, Rollator   Dressing/Bathing Difficulty no   Home Layout Able to live on 1st floor   Equipment Currently Used at Home cane, straight;rollator   Readmission within 30 days? No   Patient currently being followed by outpatient case management? No   Do you currently have service(s) that help you manage your care at home? No   Do you take prescription medications? No   Do you have prescription coverage? Yes   Coverage Healthy Blue M/D   Do you have any problems affording any of your prescribed medications? No   Is the patient taking medications as prescribed? yes   Who is going to help you get home at discharge? Dghtr   How do you get to doctors appointments? family or friend will provide   Are you on dialysis? No   Discharge Plan A Home with family   DME Needed Upon Discharge  none   Discharge Plan discussed with: Adult children   Transition of Care Barriers None   Physical Activity   On average, how many days per week do you engage in moderate to strenuous exercise (like a brisk walk)? 0 days   On average, how many minutes do you engage in exercise at this level? 0 min   Financial  Resource Strain   How hard is it for you to pay for the very basics like food, housing, medical care, and heating? Not hard   Housing Stability   In the last 12 months, was there a time when you were not able to pay the mortgage or rent on time? N   At any time in the past 12 months, were you homeless or living in a shelter (including now)? N   Transportation Needs   Has the lack of transportation kept you from medical appointments, meetings, work or from getting things needed for daily living? No   Food Insecurity   Within the past 12 months, you worried that your food would run out before you got the money to buy more. Never true   Within the past 12 months, the food you bought just didn't last and you didn't have money to get more. Never true   Social Isolation   How often do you feel lonely or isolated from those around you?  Never   Utilities   In the past 12 months has the electric, gas, oil, or water company threatened to shut off services in your home? No   Health Literacy   How often do you need to have someone help you when you read instructions, pamphlets, or other written material from your doctor or pharmacy? Often   OTHER   Name(s) of People in Home Mehnaz Mccauley (Daughter)  [phone]; LILIAM, Grandchildren; Spouse     Pt  with 4 living children; Immigrated to [place] with spouse 10 yrs ago; Resides with ruthannMehnaz & her family; Speaks only Albanian; CM to follow for d/c planning needs.

## 2024-09-23 NOTE — H&P
"[facility]  General Surgery - [facility] [place] Team  H&P Note  Admit Date: 9/23/2024  #0    Patient Name: William Mccauley  YOB: 1950  Date: 09/23/2024 1:14 PM  Date of Admission: 9/23/2024    PRESENTING HISTORY   Reason for Consult: Incarcerated right inguinal hernia    History of Present Illness:  Mr. Mccauley is a 74-year-old male with a history of diabetes, hypertension, degenerative disc disease, and right inguinal hernia presenting with incarcerated inguinal hernia.  He is accompanied by his daughter, who states pain started at 4:00 a.m. yesterday.  He has had associated nausea, vomiting, and groin pain, prompting his emergency room today.  Denies fevers or chills at home.     On arrival, pt was afebrile with stable vital signs. WBC is 11.89. LA is pending. CT A/P shows incarcerated inguinal hernia on R side with small bowel transition point at site of hernia. He denies surgical history.    Review of Systems:  12 point ROS negative except as stated in HPI    PAST HISTORY:   Past medical history:  Past Medical History:   Diagnosis Date    Stroke     Type 2 diabetes mellitus without complications        Past surgical history:  History reviewed. No pertinent surgical history.    MEDICATIONS & ALLERGIES:   Home meds: ASA, metformin, anti-HTN meds    Allergies: NKDA    OBJECTIVE:   Vital Signs:  VITAL SIGNS: 24 HR MIN & MAX LAST   Temp  Min: 98.6 °F (37 °C)  Max: 98.6 °F (37 °C)  98.6 °F (37 °C)   BP  Min: 140/82  Max: 153/89  (!) 140/82    Pulse  Min: 68  Max: 78  78    Resp  Min: 18  Max: 20  20    SpO2  Min: 98 %  Max: 100 %  100 %      HT: 5' 0.24" (153 cm)  WT: 62.6 kg (138 lb)  BMI: 26.7     Physical Exam:  General: Well developed, well nourished, no acute distress  HEENT: NCAT, PERRL  CV: RR  Resp: NWOB on RA  GI:  S, ND, mildly TTP in LLQ   : R groin fullness, TTP. Hydrocele and thickened cord structures palpated. External ring palpable.   MSK: Moves all extremities spontaneously and " purposefully  Skin/wounds:  No rashes, ulcers, erythema  Neuro:  CNII-XII grossly intact, A&Ox3    Labs:  Recent Labs     09/23/24  0952   WBC 11.89*   HGB 14.0   HCT 43.8         K 4.3      CO2 26   BUN 9.2   CREATININE 0.84   CALCIUM 10.4*       Imaging:  CT Abdomen Pelvis With IV Contrast NO Oral Contrast   Final Result   Abnormal      Incarcerated inguinal hernia on the right side causing secondary small-bowel obstruction.      This report was flagged in Epic as abnormal.         Electronically signed by: Paras Solis   Date:    09/23/2024   Time:    12:14         I have reviewed all pertinent imaging results/findings within the past 24 hours.    Micro/Path:  None    ASSESSMENT & PLAN:    74-year-old male with hx of HTN, DM, and DDD presenting with right inguinal hernia with obstructive symptoms.     - Pt placed in trendelenburg and ice applied. Some fullness in R groin but no palpable bowel in scrotum. External ring palpated.  - Lactic acid ordered  - Will admit pt for observation given obstructive symptoms and perform serial abdominal exams   - Will tentatively plan on repairing hernia on Wednesday, 9/25  - NPO, will advance to [facility] if lactic normal   - Strict Is&Os   - Hydralazine, labetalol prn for BP   - MD SSI for hx of DM     DVT ppx: lovenox     Dispo: admit to inpatient    Lexi Rangel MD   [employer] General Surgery, PGY-3

## 2024-09-23 NOTE — CONSULTS
"[facility]  General Surgery - [facility] Team  H&P Note  Admit Date: 9/23/2024  #0    Patient Name: William Mccauley  YOB: 1950  Date: 09/23/2024 1:14 PM  Date of Admission: 9/23/2024    PRESENTING HISTORY   Reason for Consult: Incarcerated right inguinal hernia    History of Present Illness:  Mr. Mccauley is a 74-year-old male with a history of diabetes, hypertension, degenerative disc disease, and right inguinal hernia presenting with incarcerated inguinal hernia.  He is accompanied by his daughter, who states pain started at 4:00 a.m. yesterday.  He has had associated nausea, vomiting, and groin pain, prompting his emergency room today.  Denies fevers or chills at home.     On arrival, pt was afebrile with stable vital signs. WBC is 11.89. LA is pending. CT A/P shows incarcerated inguinal hernia on R side with small bowel transition point at site of hernia. He denies surgical history.    Review of Systems:  12 point ROS negative except as stated in HPI    PAST HISTORY:   Past medical history:  Past Medical History:   Diagnosis Date    Stroke     Type 2 diabetes mellitus without complications        Past surgical history:  History reviewed. No pertinent surgical history.    MEDICATIONS & ALLERGIES:   Home meds: ASA, metformin, anti-HTN meds    Allergies: NKDA    OBJECTIVE:   Vital Signs:  VITAL SIGNS: 24 HR MIN & MAX LAST   Temp  Min: 98.6 °F (37 °C)  Max: 98.6 °F (37 °C)  98.6 °F (37 °C)   BP  Min: 140/82  Max: 153/89  (!) 140/82    Pulse  Min: 68  Max: 78  78    Resp  Min: 18  Max: 20  20    SpO2  Min: 98 %  Max: 100 %  100 %      HT: 5' 0.24" (153 cm)  WT: 62.6 kg (138 lb)  BMI: 26.7     Physical Exam:  General: Well developed, well nourished, no acute distress  HEENT: NCAT, PERRL  CV: RR  Resp: NWOB on RA  GI:  S, ND, mildly TTP in LLQ   : R groin fullness, TTP. Hydrocele and thickened cord structures palpated. External ring palpable.   MSK: Moves all extremities spontaneously and " purposefully  Skin/wounds:  No rashes, ulcers, erythema  Neuro:  CNII-XII grossly intact, A&Ox3    Labs:  Recent Labs     09/23/24  0952   WBC 11.89*   HGB 14.0   HCT 43.8         K 4.3      CO2 26   BUN 9.2   CREATININE 0.84   CALCIUM 10.4*       Imaging:  CT Abdomen Pelvis With IV Contrast NO Oral Contrast   Final Result   Abnormal      Incarcerated inguinal hernia on the right side causing secondary small-bowel obstruction.      This report was flagged in Epic as abnormal.         Electronically signed by: Paras Solis   Date:    09/23/2024   Time:    12:14         I have reviewed all pertinent imaging results/findings within the past 24 hours.    Micro/Path:  None    ASSESSMENT & PLAN:    74-year-old male with hx of HTN, DM, and DDD presenting with right inguinal hernia with obstructive symptoms.     - Pt placed in trendelenburg and ice applied. Some fullness in R groin but no palpable bowel in scrotum. External ring palpated.  - Lactic acid ordered  - Will admit pt for observation given obstructive symptoms and perform serial abdominal exams   - Will tentatively plan on repairing hernia on Wednesday, 9/25  - NPO, will advance to [facility] if lactic normal   - Strict Is&Os   - Hydralazine, labetalol prn for BP   - MD SSI for hx of DM     DVT ppx: lovenox     Dispo: admit to inpatient    Lexi Rangel MD   [employer] General Surgery, PGY-3

## 2024-09-24 ENCOUNTER — ANESTHESIA EVENT (OUTPATIENT)
Dept: SURGERY | Facility: HOSPITAL | Age: 74
End: 2024-09-24
Payer: MEDICAID

## 2024-09-24 ENCOUNTER — ANESTHESIA (OUTPATIENT)
Dept: SURGERY | Facility: HOSPITAL | Age: 74
End: 2024-09-24
Payer: MEDICAID

## 2024-09-24 LAB
ALBUMIN SERPL-MCNC: 2.9 G/DL (ref 3.4–4.8)
ALBUMIN/GLOB SERPL: 0.9 RATIO (ref 1.1–2)
ALP SERPL-CCNC: 46 UNIT/L (ref 40–150)
ALT SERPL-CCNC: 12 UNIT/L (ref 0–55)
ANION GAP SERPL CALC-SCNC: 6 MEQ/L
AST SERPL-CCNC: 16 UNIT/L (ref 5–34)
BASOPHILS # BLD AUTO: 0.05 X10(3)/MCL
BASOPHILS NFR BLD AUTO: 0.5 %
BILIRUB SERPL-MCNC: 0.6 MG/DL
BUN SERPL-MCNC: 11.1 MG/DL (ref 8.4–25.7)
CALCIUM SERPL-MCNC: 8.8 MG/DL (ref 8.8–10)
CHLORIDE SERPL-SCNC: 104 MMOL/L (ref 98–107)
CO2 SERPL-SCNC: 27 MMOL/L (ref 23–31)
CREAT SERPL-MCNC: 0.71 MG/DL (ref 0.73–1.18)
CREAT/UREA NIT SERPL: 16
EOSINOPHIL # BLD AUTO: 1.1 X10(3)/MCL (ref 0–0.9)
EOSINOPHIL NFR BLD AUTO: 9.9 %
ERYTHROCYTE [DISTWIDTH] IN BLOOD BY AUTOMATED COUNT: 17.3 % (ref 11.5–17)
GFR SERPLBLD CREATININE-BSD FMLA CKD-EPI: >60 ML/MIN/1.73/M2
GLOBULIN SER-MCNC: 3.3 GM/DL (ref 2.4–3.5)
GLUCOSE SERPL-MCNC: 130 MG/DL (ref 82–115)
HCT VFR BLD AUTO: 36.8 % (ref 42–52)
HGB BLD-MCNC: 11.7 G/DL (ref 14–18)
IMM GRANULOCYTES # BLD AUTO: 0.03 X10(3)/MCL (ref 0–0.04)
IMM GRANULOCYTES NFR BLD AUTO: 0.3 %
LACTATE SERPL-SCNC: 1.1 MMOL/L (ref 0.5–2.2)
LACTATE SERPL-SCNC: 2 MMOL/L (ref 0.5–2.2)
LYMPHOCYTES # BLD AUTO: 1.87 X10(3)/MCL (ref 0.6–4.6)
LYMPHOCYTES NFR BLD AUTO: 16.9 %
MAGNESIUM SERPL-MCNC: 1.7 MG/DL (ref 1.6–2.6)
MCH RBC QN AUTO: 25.9 PG (ref 27–31)
MCHC RBC AUTO-ENTMCNC: 31.8 G/DL (ref 33–36)
MCV RBC AUTO: 81.6 FL (ref 80–94)
MONOCYTES # BLD AUTO: 0.78 X10(3)/MCL (ref 0.1–1.3)
MONOCYTES NFR BLD AUTO: 7 %
NEUTROPHILS # BLD AUTO: 7.24 X10(3)/MCL (ref 2.1–9.2)
NEUTROPHILS NFR BLD AUTO: 65.4 %
NRBC BLD AUTO-RTO: 0 %
OHS QRS DURATION: 78 MS
OHS QTC CALCULATION: 401 MS
PHOSPHATE SERPL-MCNC: 3.7 MG/DL (ref 2.3–4.7)
PLATELET # BLD AUTO: 241 X10(3)/MCL (ref 130–400)
PMV BLD AUTO: 10.4 FL (ref 7.4–10.4)
POCT GLUCOSE: 119 MG/DL (ref 70–110)
POCT GLUCOSE: 120 MG/DL (ref 70–110)
POCT GLUCOSE: 120 MG/DL (ref 70–110)
POCT GLUCOSE: 124 MG/DL (ref 70–110)
POCT GLUCOSE: 172 MG/DL (ref 70–110)
POTASSIUM SERPL-SCNC: 3.8 MMOL/L (ref 3.5–5.1)
PROT SERPL-MCNC: 6.2 GM/DL (ref 5.8–7.6)
RBC # BLD AUTO: 4.51 X10(6)/MCL (ref 4.7–6.1)
SODIUM SERPL-SCNC: 137 MMOL/L (ref 136–145)
WBC # BLD AUTO: 11.07 X10(3)/MCL (ref 4.5–11.5)

## 2024-09-24 PROCEDURE — 83605 ASSAY OF LACTIC ACID: CPT | Performed by: STUDENT IN AN ORGANIZED HEALTH CARE EDUCATION/TRAINING PROGRAM

## 2024-09-24 PROCEDURE — 25000003 PHARM REV CODE 250: Performed by: NURSE ANESTHETIST, CERTIFIED REGISTERED

## 2024-09-24 PROCEDURE — 25000003 PHARM REV CODE 250: Performed by: STUDENT IN AN ORGANIZED HEALTH CARE EDUCATION/TRAINING PROGRAM

## 2024-09-24 PROCEDURE — 36000709 HC OR TIME LEV III EA ADD 15 MIN: Performed by: STUDENT IN AN ORGANIZED HEALTH CARE EDUCATION/TRAINING PROGRAM

## 2024-09-24 PROCEDURE — 37000009 HC ANESTHESIA EA ADD 15 MINS: Performed by: STUDENT IN AN ORGANIZED HEALTH CARE EDUCATION/TRAINING PROGRAM

## 2024-09-24 PROCEDURE — 84100 ASSAY OF PHOSPHORUS: CPT

## 2024-09-24 PROCEDURE — 51798 US URINE CAPACITY MEASURE: CPT

## 2024-09-24 PROCEDURE — 36415 COLL VENOUS BLD VENIPUNCTURE: CPT | Performed by: STUDENT IN AN ORGANIZED HEALTH CARE EDUCATION/TRAINING PROGRAM

## 2024-09-24 PROCEDURE — 83605 ASSAY OF LACTIC ACID: CPT

## 2024-09-24 PROCEDURE — 63600175 PHARM REV CODE 636 W HCPCS: Performed by: NURSE ANESTHETIST, CERTIFIED REGISTERED

## 2024-09-24 PROCEDURE — 63600175 PHARM REV CODE 636 W HCPCS

## 2024-09-24 PROCEDURE — 21400001 HC TELEMETRY ROOM

## 2024-09-24 PROCEDURE — C1729 CATH, DRAINAGE: HCPCS | Performed by: STUDENT IN AN ORGANIZED HEALTH CARE EDUCATION/TRAINING PROGRAM

## 2024-09-24 PROCEDURE — C1781 MESH (IMPLANTABLE): HCPCS | Performed by: STUDENT IN AN ORGANIZED HEALTH CARE EDUCATION/TRAINING PROGRAM

## 2024-09-24 PROCEDURE — 83735 ASSAY OF MAGNESIUM: CPT

## 2024-09-24 PROCEDURE — 80053 COMPREHEN METABOLIC PANEL: CPT

## 2024-09-24 PROCEDURE — 85025 COMPLETE CBC W/AUTO DIFF WBC: CPT

## 2024-09-24 PROCEDURE — 88302 TISSUE EXAM BY PATHOLOGIST: CPT | Mod: TC | Performed by: STUDENT IN AN ORGANIZED HEALTH CARE EDUCATION/TRAINING PROGRAM

## 2024-09-24 PROCEDURE — 36415 COLL VENOUS BLD VENIPUNCTURE: CPT

## 2024-09-24 PROCEDURE — 37000008 HC ANESTHESIA 1ST 15 MINUTES: Performed by: STUDENT IN AN ORGANIZED HEALTH CARE EDUCATION/TRAINING PROGRAM

## 2024-09-24 PROCEDURE — 25000003 PHARM REV CODE 250: Performed by: SURGERY

## 2024-09-24 PROCEDURE — 71000033 HC RECOVERY, INTIAL HOUR: Performed by: STUDENT IN AN ORGANIZED HEALTH CARE EDUCATION/TRAINING PROGRAM

## 2024-09-24 PROCEDURE — 0YU54JZ SUPPLEMENT RIGHT INGUINAL REGION WITH SYNTHETIC SUBSTITUTE, PERCUTANEOUS ENDOSCOPIC APPROACH: ICD-10-PCS | Performed by: STUDENT IN AN ORGANIZED HEALTH CARE EDUCATION/TRAINING PROGRAM

## 2024-09-24 PROCEDURE — 63600175 PHARM REV CODE 636 W HCPCS: Performed by: STUDENT IN AN ORGANIZED HEALTH CARE EDUCATION/TRAINING PROGRAM

## 2024-09-24 PROCEDURE — 36000708 HC OR TIME LEV III 1ST 15 MIN: Performed by: STUDENT IN AN ORGANIZED HEALTH CARE EDUCATION/TRAINING PROGRAM

## 2024-09-24 PROCEDURE — 51701 INSERT BLADDER CATHETER: CPT

## 2024-09-24 PROCEDURE — 93005 ELECTROCARDIOGRAM TRACING: CPT

## 2024-09-24 DEVICE — BARD MESH, 3" X 6" (7.6 CM X 15 CM)
Type: IMPLANTABLE DEVICE | Site: GROIN | Status: FUNCTIONAL
Brand: BARD

## 2024-09-24 RX ORDER — SUCCINYLCHOLINE CHLORIDE 20 MG/ML
INJECTION INTRAMUSCULAR; INTRAVENOUS
Status: DISCONTINUED | OUTPATIENT
Start: 2024-09-24 | End: 2024-09-24

## 2024-09-24 RX ORDER — HEPARIN SODIUM 5000 [USP'U]/ML
5000 INJECTION, SOLUTION INTRAVENOUS; SUBCUTANEOUS
Status: COMPLETED | OUTPATIENT
Start: 2024-09-24 | End: 2024-09-24

## 2024-09-24 RX ORDER — SODIUM CHLORIDE 0.9 % (FLUSH) 0.9 %
10 SYRINGE (ML) INJECTION
Status: DISCONTINUED | OUTPATIENT
Start: 2024-09-24 | End: 2024-09-27 | Stop reason: HOSPADM

## 2024-09-24 RX ORDER — OXYCODONE HYDROCHLORIDE 5 MG/1
5 TABLET ORAL
Status: DISCONTINUED | OUTPATIENT
Start: 2024-09-24 | End: 2024-09-24

## 2024-09-24 RX ORDER — ONDANSETRON HYDROCHLORIDE 2 MG/ML
INJECTION, SOLUTION INTRAVENOUS
Status: DISCONTINUED | OUTPATIENT
Start: 2024-09-24 | End: 2024-09-24

## 2024-09-24 RX ORDER — MORPHINE SULFATE 2 MG/ML
2 INJECTION, SOLUTION INTRAMUSCULAR; INTRAVENOUS EVERY 5 MIN PRN
Status: DISCONTINUED | OUTPATIENT
Start: 2024-09-24 | End: 2024-09-24

## 2024-09-24 RX ORDER — CEFAZOLIN SODIUM 1 G/3ML
2 INJECTION, POWDER, FOR SOLUTION INTRAMUSCULAR; INTRAVENOUS
Status: COMPLETED | OUTPATIENT
Start: 2024-09-24 | End: 2024-09-24

## 2024-09-24 RX ORDER — MIDAZOLAM HYDROCHLORIDE 2 MG/2ML
2 INJECTION, SOLUTION INTRAMUSCULAR; INTRAVENOUS ONCE AS NEEDED
Status: COMPLETED | OUTPATIENT
Start: 2024-09-24 | End: 2024-09-24

## 2024-09-24 RX ORDER — FENTANYL CITRATE 50 UG/ML
INJECTION, SOLUTION INTRAMUSCULAR; INTRAVENOUS
Status: DISCONTINUED | OUTPATIENT
Start: 2024-09-24 | End: 2024-09-24

## 2024-09-24 RX ORDER — EPHEDRINE SULFATE 50 MG/ML
INJECTION, SOLUTION INTRAVENOUS
Status: DISCONTINUED | OUTPATIENT
Start: 2024-09-24 | End: 2024-09-24

## 2024-09-24 RX ORDER — LABETALOL HYDROCHLORIDE 5 MG/ML
INJECTION, SOLUTION INTRAVENOUS
Status: DISCONTINUED | OUTPATIENT
Start: 2024-09-24 | End: 2024-09-24

## 2024-09-24 RX ORDER — SODIUM CHLORIDE, SODIUM LACTATE, POTASSIUM CHLORIDE, CALCIUM CHLORIDE 600; 310; 30; 20 MG/100ML; MG/100ML; MG/100ML; MG/100ML
INJECTION, SOLUTION INTRAVENOUS CONTINUOUS
Status: DISCONTINUED | OUTPATIENT
Start: 2024-09-24 | End: 2024-09-27 | Stop reason: HOSPADM

## 2024-09-24 RX ORDER — DEXAMETHASONE SODIUM PHOSPHATE 4 MG/ML
INJECTION, SOLUTION INTRA-ARTICULAR; INTRALESIONAL; INTRAMUSCULAR; INTRAVENOUS; SOFT TISSUE
Status: DISCONTINUED | OUTPATIENT
Start: 2024-09-24 | End: 2024-09-24

## 2024-09-24 RX ORDER — MUPIROCIN 20 MG/G
OINTMENT TOPICAL 2 TIMES DAILY
Status: DISCONTINUED | OUTPATIENT
Start: 2024-09-24 | End: 2024-09-27 | Stop reason: HOSPADM

## 2024-09-24 RX ORDER — MEPERIDINE HYDROCHLORIDE 25 MG/ML
12.5 INJECTION INTRAMUSCULAR; INTRAVENOUS; SUBCUTANEOUS EVERY 10 MIN PRN
Status: DISCONTINUED | OUTPATIENT
Start: 2024-09-24 | End: 2024-09-24

## 2024-09-24 RX ORDER — LIDOCAINE HYDROCHLORIDE 20 MG/ML
INJECTION INTRAVENOUS
Status: DISCONTINUED | OUTPATIENT
Start: 2024-09-24 | End: 2024-09-24

## 2024-09-24 RX ORDER — PROPOFOL 10 MG/ML
INJECTION, EMULSION INTRAVENOUS
Status: DISCONTINUED | OUTPATIENT
Start: 2024-09-24 | End: 2024-09-24

## 2024-09-24 RX ORDER — LIDOCAINE HYDROCHLORIDE 10 MG/ML
INJECTION, SOLUTION EPIDURAL; INFILTRATION; INTRACAUDAL; PERINEURAL
Status: DISCONTINUED | OUTPATIENT
Start: 2024-09-24 | End: 2024-09-24 | Stop reason: HOSPADM

## 2024-09-24 RX ORDER — ONDANSETRON HYDROCHLORIDE 2 MG/ML
4 INJECTION, SOLUTION INTRAVENOUS DAILY PRN
Status: DISCONTINUED | OUTPATIENT
Start: 2024-09-24 | End: 2024-09-24

## 2024-09-24 RX ORDER — GLUCAGON 1 MG
1 KIT INJECTION
Status: DISCONTINUED | OUTPATIENT
Start: 2024-09-24 | End: 2024-09-24

## 2024-09-24 RX ORDER — MIDAZOLAM HYDROCHLORIDE 2 MG/2ML
INJECTION, SOLUTION INTRAMUSCULAR; INTRAVENOUS
Status: COMPLETED
Start: 2024-09-24 | End: 2024-09-24

## 2024-09-24 RX ORDER — ROCURONIUM BROMIDE 10 MG/ML
INJECTION, SOLUTION INTRAVENOUS
Status: DISCONTINUED | OUTPATIENT
Start: 2024-09-24 | End: 2024-09-24

## 2024-09-24 RX ADMIN — KETOROLAC TROMETHAMINE 15 MG: 30 INJECTION, SOLUTION INTRAMUSCULAR; INTRAVENOUS at 06:09

## 2024-09-24 RX ADMIN — SODIUM CHLORIDE, POTASSIUM CHLORIDE, SODIUM LACTATE AND CALCIUM CHLORIDE: 600; 310; 30; 20 INJECTION, SOLUTION INTRAVENOUS at 02:09

## 2024-09-24 RX ADMIN — KETOROLAC TROMETHAMINE 15 MG: 30 INJECTION, SOLUTION INTRAMUSCULAR; INTRAVENOUS at 12:09

## 2024-09-24 RX ADMIN — SUCCINYLCHOLINE CHLORIDE 100 MG: 20 INJECTION, SOLUTION INTRAMUSCULAR; INTRAVENOUS; PARENTERAL at 02:09

## 2024-09-24 RX ADMIN — HEPARIN SODIUM 5000 UNITS: 5000 INJECTION, SOLUTION INTRAVENOUS; SUBCUTANEOUS at 02:09

## 2024-09-24 RX ADMIN — MIDAZOLAM HYDROCHLORIDE 2 MG: 2 INJECTION, SOLUTION INTRAMUSCULAR; INTRAVENOUS at 02:09

## 2024-09-24 RX ADMIN — ENOXAPARIN SODIUM 40 MG: 40 INJECTION SUBCUTANEOUS at 05:09

## 2024-09-24 RX ADMIN — LABETALOL HYDROCHLORIDE 5 MG: 5 INJECTION INTRAVENOUS at 04:09

## 2024-09-24 RX ADMIN — LIDOCAINE HYDROCHLORIDE 60 MG: 20 INJECTION INTRAVENOUS at 02:09

## 2024-09-24 RX ADMIN — PROPOFOL 120 MG: 10 INJECTION, EMULSION INTRAVENOUS at 02:09

## 2024-09-24 RX ADMIN — SODIUM CHLORIDE, POTASSIUM CHLORIDE, SODIUM LACTATE AND CALCIUM CHLORIDE: 600; 310; 30; 20 INJECTION, SOLUTION INTRAVENOUS at 01:09

## 2024-09-24 RX ADMIN — SODIUM CHLORIDE, POTASSIUM CHLORIDE, SODIUM LACTATE AND CALCIUM CHLORIDE: 600; 310; 30; 20 INJECTION, SOLUTION INTRAVENOUS at 03:09

## 2024-09-24 RX ADMIN — SODIUM CHLORIDE, POTASSIUM CHLORIDE, SODIUM LACTATE AND CALCIUM CHLORIDE: 600; 310; 30; 20 INJECTION, SOLUTION INTRAVENOUS at 11:09

## 2024-09-24 RX ADMIN — ROCURONIUM BROMIDE 40 MG: 10 INJECTION INTRAVENOUS at 02:09

## 2024-09-24 RX ADMIN — KETOROLAC TROMETHAMINE 15 MG: 30 INJECTION, SOLUTION INTRAMUSCULAR; INTRAVENOUS at 05:09

## 2024-09-24 RX ADMIN — ONDANSETRON 4 MG: 2 INJECTION INTRAMUSCULAR; INTRAVENOUS at 04:09

## 2024-09-24 RX ADMIN — DEXAMETHASONE SODIUM PHOSPHATE 8 MG: 4 INJECTION, SOLUTION INTRA-ARTICULAR; INTRALESIONAL; INTRAMUSCULAR; INTRAVENOUS; SOFT TISSUE at 02:09

## 2024-09-24 RX ADMIN — MUPIROCIN: 20 OINTMENT TOPICAL at 09:09

## 2024-09-24 RX ADMIN — FENTANYL CITRATE 50 MCG: 50 INJECTION INTRAMUSCULAR; INTRAVENOUS at 03:09

## 2024-09-24 RX ADMIN — CEFAZOLIN 2 G: 330 INJECTION, POWDER, FOR SOLUTION INTRAMUSCULAR; INTRAVENOUS at 02:09

## 2024-09-24 RX ADMIN — MIDAZOLAM HYDROCHLORIDE 2 MG: 1 INJECTION, SOLUTION INTRAMUSCULAR; INTRAVENOUS at 02:09

## 2024-09-24 RX ADMIN — KETOROLAC TROMETHAMINE 15 MG: 30 INJECTION, SOLUTION INTRAMUSCULAR; INTRAVENOUS at 11:09

## 2024-09-24 RX ADMIN — EPHEDRINE SULFATE 20 MG: 50 INJECTION INTRAVENOUS at 03:09

## 2024-09-24 RX ADMIN — SUGAMMADEX 200 MG: 100 INJECTION, SOLUTION INTRAVENOUS at 04:09

## 2024-09-24 NOTE — PROGRESS NOTES
Inpatient Nutrition Assessment    Admit Date: 9/23/2024   Total duration of encounter: 1 day   Patient Age: 74 y.o.    Nutrition Recommendation/Prescription     While pt NPO; NGT suction--consider use PPN: Clinimix 4.25/5 @ 75 ml/hr with lipids 20% 250 ml daily to provide 1112 calories, 75 gm protein.   When appropriate--ADAT to 1800 diabetic diet  Order ONS--boost glucose control tid; Boost Glucose Control (provides 190 kcal, 16 g protein per serving)   MVI/fe  Biweekly wt  Will monitor nutrition status     Communication of Recommendations: reviewed with nurse    Nutrition Assessment     Malnutrition Assessment/Nutrition-Focused Physical Exam    Malnutrition Context: acute illness or injury (09/24/24 1409)  Malnutrition Level: other (see comments) (unable to fully complete malnutrition assessment at this time) (09/24/24 1409)  Energy Intake (Malnutrition): other (see comments) (unable to obtain diet hx) (09/24/24 1409)  Weight Loss (Malnutrition): other (see comments) (unable to obtain wt hx) (09/24/24 1409)     Orbital Region (Subcutaneous Fat Loss): well nourished  Upper Arm Region (Subcutaneous Fat Loss): well nourished        [place] Region (Muscle Loss): well nourished  Clavicle Bone Region (Muscle Loss): well nourished            A minimum of two characteristics is recommended for diagnosis of either severe or non-severe malnutrition.    Chart Review    Reason Seen: continuous nutrition monitoring    Malnutrition Screening Tool Results   Have you recently lost weight without trying?: No  Have you been eating poorly because of a decreased appetite?: No   MST Score: 0   Diagnosis:  Incarcerated R hernia; obstruction     Relevant Medical History: DM, HTN, degenerative joint dz, R inguinal hernia     Scheduled Medications:  enoxparin, 40 mg, Daily  ketorolac, 15 mg, Q6H    Continuous Infusions:  lactated ringers, Last Rate: 100 mL/hr at 09/24/24 1122    PRN Medications:  acetaminophen, 650 mg, Q6H PRN  dextrose  "10%, 12.5 g, PRN  dextrose 10%, 25 g, PRN  glucagon (human recombinant), 1 mg, PRN  glucose, 16 g, PRN  glucose, 24 g, PRN  hydrALAZINE, 10 mg, Q6H PRN  insulin aspart U-100, 0-10 Units, QID (AC + HS) PRN  labetalol, 10 mg, Q6H PRN  ondansetron, 4 mg, Q6H PRN  oxyCODONE, 10 mg, Q4H PRN  oxyCODONE, 5 mg, Q4H PRN    Calorie Containing IV Medications: no significant kcals from medications at this time    Recent Labs   Lab 09/23/24  0952 09/23/24 2040 09/24/24  0446     --  137   K 4.3  --  3.8   CALCIUM 10.4*  --  8.8   PHOS  --   --  3.7   MG  --   --  1.70     --  104   CO2 26  --  27   BUN 9.2  --  11.1   CREATININE 0.84  --  0.71*   EGFRNORACEVR >60  --  >60   GLUCOSE 180*  --  130*   BILITOT  --   --  0.6   ALKPHOS  --   --  46   ALT  --   --  12   AST  --   --  16   ALBUMIN  --   --  2.9*   WBC 11.89* 11.21 11.07   HGB 14.0 13.4* 11.7*   HCT 43.8 42.3 36.8*     Nutrition Orders:  Diet NPO      Appetite/Oral Intake: NPO/NPO  Factors Affecting Nutritional Intake: nausea, NPO, and vomiting  Social Needs Impacting Access to Food: none identified  Food/Nondenominational/Cultural Preferences: unable to obtain  Food Allergies: unable to obtain  Last Bowel Movement: 09/21/24  Wound(s):  none    Comments    (9/24) Used [place]  service during assessment; pt not communicating with ; family not available during rounds. Unable to obtain diet/wt hx at this time. Per EMR--pt admitted with N/V past 1-2 days; + hernia; to go to OR today for hernia repair. Currently NGT suction. BMI 26.7--overwt. No fat/muscle wasting noted during PA. PPN recs provided if unable to advance diet. Will monitor diet progression/tolerance.     Anthropometrics    Height: 5' 0.24" (153 cm), Height Method: Stated  Last Weight: 62.6 kg (138 lb) (09/23/24 0925), Weight Method: Standard Scale  BMI (Calculated): 26.7  BMI Classification: overweight (BMI 25-29.9)        Ideal Body Weight (IBW), Male: 107.44 lb     % Ideal Body " Weight, Male (lb): 128.44 %                 Usual Body Weight (UBW), kg:  (pt not able to provide UBW)        Usual Weight Provided By: EMR weight history    Wt Readings from Last 5 Encounters:   09/23/24 62.6 kg (138 lb)     Weight Change(s) Since Admission: no wt hx   Wt Readings from Last 1 Encounters:   09/23/24 0925 62.6 kg (138 lb)   Admit Weight: 62.6 kg (138 lb) (09/23/24 0925), Weight Method: Standard Scale    Estimated Needs    Weight Used For Calorie Calculations: 62.6 kg (138 lb 0.1 oz)  Energy Calorie Requirements (kcal): 1878 kcal/d; 30 walter/kg  Energy Need Method: Kcal/kg  Weight Used For Protein Calculations: 62.6 kg (138 lb 0.1 oz)  Protein Requirements: 75 gm protein/d; 1.2 gm/kg  Fluid Requirements (mL): 1878 ml/d; 1ml/walter  CHO Requirement: 211 gm CHO/d     Enteral Nutrition     Patient not receiving enteral nutrition at this time.    Parenteral Nutrition     Patient not receiving parenteral nutrition support at this time.    Evaluation of Received Nutrient Intake    Calories: not meeting estimated needs  Protein: not meeting estimated needs    Patient Education     Not applicable.    Nutrition Diagnosis     PES: Inadequate oral intake related to acute illness as evidenced by NPO/NGT suction . (new)         Nutrition Interventions     Intervention(s): modified composition of meals/snacks, modified composition of parenteral nutrition, modified rate of parenteral nutrition, commercial beverage, multivitamin/mineral supplement therapy, and collaboration with other providers    Goal: Meet greater than 80% of nutritional needs by follow-up. (new)  Goal: Maintain weight throughout hospitalization. (new)    Nutrition Goals & Monitoring     Dietitian will monitor: food and beverage intake, enteral nutrition intake, weight, and glucose/endocrine profile  Discharge planning: too early to determine; pending clinical course  Nutrition Risk/Follow-Up: moderate (follow-up in 3-5 days)   Please consult if  re-assessment needed sooner.

## 2024-09-24 NOTE — ANESTHESIA PREPROCEDURE EVALUATION
09/24/2024  William Mccauley is a 74 y.o., male with PMHx of HTN, DM, CVA presents for Rt IHR secondary to incarcerated hernia.    NO BETA BLOCKER USE    Active Ambulatory Problems     Diagnosis Date Noted    No Active Ambulatory Problems     Resolved Ambulatory Problems     Diagnosis Date Noted    No Resolved Ambulatory Problems     Past Medical History:   Diagnosis Date    Stroke     Type 2 diabetes mellitus without complications        Pre-op Assessment    I have reviewed the NPO Status.      Review of Systems  Anesthesia Hx:  No problems with previous Anesthesia                Social:  Non-Smoker       Cardiovascular:     Hypertension, well controlled                                        Pulmonary:  Pulmonary Normal                       Renal/:  Renal/ Normal                 Hepatic/GI:  Hepatic/GI Normal                 Neurological:   CVA, no residual symptoms                                    Endocrine:  Diabetes, well controlled, type 2             Vitals:    09/24/24 0700 09/24/24 0902 09/24/24 1112 09/24/24 1415   BP:  139/73 (!) 157/79 (!) 171/98   BP Location:    Left arm   Patient Position:    Lying   Pulse:  65 64 91   Resp:  18 18 18   Temp:  36.6 °C (97.9 °F) 36.7 °C (98 °F) 35.8 °C (96.4 °F)   TempSrc:  Oral Oral    SpO2: 97% 99% 98% 97%   Weight:       Height:             Physical Exam  General: Alert, Cooperative and Well nourished    Airway:  Mallampati: II   Mouth Opening: Normal  TM Distance: Normal  Tongue: Normal  Neck ROM: Normal ROM    Dental:  Periodontal disease    Chest/Lungs:  Clear to auscultation, Normal Respiratory Rate    Heart:  Rate: Normal  Rhythm: Regular Rhythm  Sounds: Normal      Lab Results   Component Value Date    WBC 11.07 09/24/2024    HGB 11.7 (L) 09/24/2024    HCT 36.8 (L) 09/24/2024    MCV 81.6 09/24/2024     09/24/2024       CMP  Sodium   Date Value Ref  Range Status   09/24/2024 137 136 - 145 mmol/L Final     Potassium   Date Value Ref Range Status   09/24/2024 3.8 3.5 - 5.1 mmol/L Final     Chloride   Date Value Ref Range Status   09/24/2024 104 98 - 107 mmol/L Final     CO2   Date Value Ref Range Status   09/24/2024 27 23 - 31 mmol/L Final     Blood Urea Nitrogen   Date Value Ref Range Status   09/24/2024 11.1 8.4 - 25.7 mg/dL Final     Creatinine   Date Value Ref Range Status   09/24/2024 0.71 (L) 0.73 - 1.18 mg/dL Final     Calcium   Date Value Ref Range Status   09/24/2024 8.8 8.8 - 10.0 mg/dL Final     Albumin   Date Value Ref Range Status   09/24/2024 2.9 (L) 3.4 - 4.8 g/dL Final     Bilirubin Total   Date Value Ref Range Status   09/24/2024 0.6 <=1.5 mg/dL Final     ALP   Date Value Ref Range Status   09/24/2024 46 40 - 150 unit/L Final     AST   Date Value Ref Range Status   09/24/2024 16 5 - 34 unit/L Final     ALT   Date Value Ref Range Status   09/24/2024 12 0 - 55 unit/L Final     eGFR   Date Value Ref Range Status   09/24/2024 >60 mL/min/1.73/m2 Final      Latest Reference Range & Units 09/24/24 14:19   POCT Glucose 70 - 110 mg/dL 120 (H)   (H): Data is abnormally high        Anesthesia Plan  Type of Anesthesia, risks & benefits discussed:    Anesthesia Type: Gen ETT  Intra-op Monitoring Plan: Standard ASA Monitors  Post Op Pain Control Plan: IV/PO Opioids PRN  Induction:  IV and rapid sequence  Airway Plan: Direct  Informed Consent: Informed consent signed with the Patient and all parties understand the risks and agree with anesthesia plan.  All questions answered.   ASA Score: 3  Day of Surgery Review of History & Physical: H&P Update referred to the surgeon/provider.    Ready For Surgery From Anesthesia Perspective.     .

## 2024-09-24 NOTE — OP NOTE
[facility]  Operative Note      Date of Procedure: 9/24/2024     Procedure: Procedure(s) (LRB):  REPAIR, HERNIA, INGUINAL (Right)   Diagnostic laparoscopy    Surgeons and Role:     * Adalberto Rock MD - Primary     * Cayla Minor MD - Resident - Assisting     * Lexi Rangel MD - Resident - Assisting    Pre-Operative Diagnosis: Incarcerated right inguinal hernia    Post-Operative Diagnosis: Post-Op Diagnosis Codes:     * Right inguinal hernia [K40.90]    Anesthesia: General    Operative Findings (including complications, if any):   Incarcerated right inguinal hernia containing viable small bowel  R hydrocele    Description of Technical Procedures:   After informed consent was obtained, the patient was taken to the operating room and placed on the table in the supine position.  General anesthesia was induced. The abdomen and groin were prepped with chloraprep and draped in the standard sterile fashion. A time out was called, verifying the patient's name, date of birth, procedure to be performed, laterality, and allergies. All were in agreement.     The left inguinal ligament was identified from the pubic tubercle to the ASIS. Two fingerbreadths above the pubic tubercle, a transverse incision was made. An incision was made with a #15 blade scalpel, approximately 5 cm in length. Dissection was then carried down with Bovie electrocautery through Omid's fascia maintaining hemostasis. Incarcerated small bowel could be seen exiting the external oblique. Once the external oblique was identified, external oblique was incised in the length of its fibers using Metzenbaum scissors. The external oblique was grasped with hemostats on both sides. The cord and structures were freed from hernia sac. Hernia sac was incised with Metzenbaum scissors  and dissected away from cord structures. Small bowel was examined and found to be erythematous but viable. This small bowel was reduced back  into the abdomen through the hernia defect. Hernia sac was then incised and removed.     A 12-mm port was placed through the hernia defect and the abdomen was insufflated to 15 mmHg. The abdomen was inspected using laparoscope. The patient had erythematous small bowel, but small bowel appeared viable. The camera and port were removed. The hernia sac was ligated at its base.    The decision was then made to perform inguinal hernia repair with mesh. Polypropylene mesh was introduced onto the field and was sutured to the pubic tubercle medially along the ilioinguinal ligament inferiorly and along the conjoint tendon superiorly making a slit for the cord and cord structures. This was done with 2-0 PDS at the tubercle and 3-0 PDS suture for other sites of adherence. The external oblique was then closed over the roof with a running 3-0 Vicryl suture, taking care not to strangulate the cord and to recreate the external ring. The Omid's fascia was approximated with interrupted 3-0 Vicryl sutures. 3-0 vicryl was used to reapproximate tissues using deep dermal sutures. 4-0 Monocryl was used to close skin in a running subcuticular fashion. Dermabond was applied.     The patient tolerated the procedure well with no intraoperative complications. All lap and needle counts were correct at the conclusion of the case. The patient was awakened from anesthesia and taken to PACU in stable condition.    Estimated Blood Loss (EBL): * No values recorded between 9/24/2024  2:59 PM and 9/24/2024  4:27 PM *           Implants:   Implant Name Type Inv. Item Serial No.  Lot No. LRB No. Used Action   MESH KNITTED ST 3 X 6 - [id number]  MESH KNITTED ST 3 X 6  [employer] [id number] Right 1 Implanted       Specimens:   Specimen (24h ago, onward)       Start     Ordered    09/24/24 1529  Specimen to Pathology  RELEASE UPON ORDERING        References:    Click here for ordering Quick Tip   Question:  Release to patient  Answer:  Immediate     09/24/24 1529                            Condition: Good    Disposition: PACU - hemodynamically stable.    Attestation: I was present and scrubbed for the entire procedure.    Lexi Rangel MD   [facility], PGY-3

## 2024-09-24 NOTE — ANESTHESIA PROCEDURE NOTES
Intubation    Date/Time: 9/24/2024 2:49 PM    Performed by: Vandana Braxton CRNA  Authorized by: Vandana Braxton CRNA    Intubation:     Induction:  Rapid sequence induction    Intubated:  Postinduction    Mask Ventilation:  Not attempted    Attempts:  1    Attempted By:  CRNA, student and other (see comments) (MARTHA Charles)    Method of Intubation:  Direct    Blade:  Newman 2    Laryngeal View Grade: Grade I - full view of cords      Difficult Airway Encountered?: No      Complications:  None    Airway Device:  Oral endotracheal tube    Airway Device Size:  7.5    Style/Cuff Inflation:  Cuffed (inflated to minimal occlusive pressure)    Inflation Amount (mL):  6    Tube secured:  23    Secured at:  The lips    Placement Verified By:  Capnometry    Complicating Factors:  None    Findings Post-Intubation:  BS equal bilateral and atraumatic/condition of teeth unchanged

## 2024-09-24 NOTE — TRANSFER OF CARE
"Anesthesia Transfer of Care Note    Patient: William Mccauley    Procedure(s) Performed: Procedure(s) (LRB):  REPAIR, HERNIA, INGUINAL (Right)    Patient location: PACU    Anesthesia Type: general    Transport from OR: Transported from OR on room air with adequate spontaneous ventilation    Post pain: adequate analgesia    Post assessment: no apparent anesthetic complications and tolerated procedure well    Post vital signs: stable    Level of consciousness: sedated    Nausea/Vomiting: no nausea/vomiting    Complications: none    Transfer of care protocol was followed      Last vitals: Visit Vitals  BP (!) 171/98 (BP Location: Left arm, Patient Position: Lying)   Pulse 91   Temp 35.8 °C (96.4 °F)   Resp 18   Ht 5' 0.24" (1.53 m)   Wt 62.6 kg (138 lb)   SpO2 97%   BMI 26.74 kg/m²     "

## 2024-09-24 NOTE — PLAN OF CARE
Plan of care    Patient re-examined. Abdomen is soft, nondistended, TTP in RLQ. Fullness in right inguinal region.     Repeat CT scan with incarcerated small bowel similar to previous. LA 1.5 from 2.5. Repeat CBC stable.     Will book for right inguinal hernia repair in am. Patient is consented.     NGT for gastric decompression to low intermittent wall suction.    Lexi Rangel MD   [facility], PGY-3

## 2024-09-24 NOTE — PROGRESS NOTES
[facility] - [facility] Team  Daily Progress Note    Patient ID: [id number]  Patient Name: William Mccauley  YOB: 1950    Subjective:  AF, -160, HR WNL  Nausea and vomiting last night requiring NGT placement. No episodes since  Continues to endorse right groin pain, fullness, and tenderness.   No bowel movement or passed gas since 2 days prior to admission.    Objective:    Vitals:  Vitals:    09/24/24 0337   BP: (!) 160/84   Pulse: 64   Resp: 16   Temp:         Intake/Output:  IV/NG/GT: 1.287.4  Urine/Drains: 550    Physical Exam:  Gen: NAD  Neuro: awake, alert, answering questions appropriately  CV: RRR  Resp: non-labored breathing, BALJEET  Abd: soft, ND, NT  : R groin fullness, TTP. Hydrocele and thickened cord structures palpated.  Ext: moves all 4 spontaneously and purposefully  Skin: warm, well perfused    Labs:  Renal:  Recent Labs     09/23/24  0952 09/24/24 0446   BUN 9.2 11.1   CREATININE 0.84 0.71*       FENGI:  Recent Labs     09/23/24 0952 09/24/24 0446    137   K 4.3 3.8    104   CO2 26 27   CALCIUM 10.4* 8.8   MG  --  1.70   PHOS  --  3.7   ALBUMIN  --  2.9*   BILITOT  --  0.6   AST  --  16   ALKPHOS  --  46   ALT  --  12     Heme:  Recent Labs     09/23/24  0952 09/23/24 2040 09/24/24 0446   HGB 14.0 13.4* 11.7*   HCT 43.8 42.3 36.8*    258 241     ID:  Recent Labs     09/23/24  0952 09/23/24 2040 09/24/24 0446   WBC 11.89* 11.21 11.07     CBG:  Recent Labs     09/23/24 0952 09/24/24 0446   GLUCOSE 180* 130*      Lactic Acid  2.1 -> 2.5 -> 1.5 -> 1.1    Imaging:  CT Abdomen Pelvis with Contrast 9/23/24 12:08  Incarcerated inguinal hernia on the right side causing secondary small-bowel obstruction.     CT Abdomen Pelvis without Contrast 9/23/24 20:57  Right inguinal hernia with obstruction is again noted. The bladder is decompressed in the interval with the bowel in the pelvis appearing slightly more dilated with increasing surrounding inflammatory change.  Further evaluation limited secondary to lack of IV contrast.   Findings of hernia with obstruction slightly worsened in the interval.     Micro/Path/Other:  None    Assessment/Plan:  74 year old male with hx of diabetes, HTN, and degenerative disk disease presenting with right inguinal hernia with obstructive symptoms.    - Pt admitted for observation given obstructive symptoms  - Lactic acid down to 1.1 from 2.1 at admission  - NGT for gastric decompression to low intermittent wall suction  - Scheduled for right inguinal hernia repair today   - NPO  - MMPC  - Lovenox for DVT prophylaxis  - Hydralazine and labetalol PRN for HTN  - Zofran PRN for nausea    Cayla Addison MS-3  [facility] Gold Team  09/24/2024     Patient seen and examined alongside medical student. Note reviewed and edited as appropriate. Agree with plan as written above.     Gabriella Kuo MD  [facility], PGY-1  09/24/2024

## 2024-09-24 NOTE — MEDICAL/APP STUDENT
"[facility] - [facility] Team  Daily Progress Note    Patient ID: [id number]  Patient Name: William Mccauley  YOB: 1950    Subjective:  NAEON. VSS. Endorsed nausea and vomiting last night. Continues to endorse right groin pain, fullness, and tenderness. Still has not had a bowel movement or passed gas since 2 days prior to admission.    Objective:    Vitals:  Vitals:    09/24/24 0337   BP: (!) 160/84   Pulse: 64   Resp: 16   Temp:         Intake/Output:  IV/NG/GT: 1.287.4  Urine/Drains: 550    Physical Exam:  Gen: NAD  Neuro: awake, alert, answering questions appropriately  CV: RRR  Resp: non-labored breathing, BALJEET  Abd: soft, ND, NT  : R groin fullness, TTP. Hydrocele and thickened cord structures palpated.  Ext: moves all 4 spontaneously and purposefully  Skin: warm, well perfused    Labs:  Renal:  Recent Labs     09/23/24  0952 09/24/24 0446   BUN 9.2 11.1   CREATININE 0.84 0.71*     No results for input(s): "LACTIC" in the last 72 hours.  FENGI:  Recent Labs     09/23/24  0952 09/24/24 0446    137   K 4.3 3.8    104   CO2 26 27   CALCIUM 10.4* 8.8   MG  --  1.70   PHOS  --  3.7   ALBUMIN  --  2.9*   BILITOT  --  0.6   AST  --  16   ALKPHOS  --  46   ALT  --  12     Heme:  Recent Labs     09/23/24  0952 09/23/24 2040 09/24/24 0446   HGB 14.0 13.4* 11.7*   HCT 43.8 42.3 36.8*    258 241     ID:  Recent Labs     09/23/24  0952 09/23/24 2040 09/24/24 0446   WBC 11.89* 11.21 11.07     CBG:  Recent Labs     09/23/24 0952 09/24/24  0446   GLUCOSE 180* 130*      Cardiovascular:  No results for input(s): "TROPONINI", "CKTOTAL", "CKMB", "BNP" in the last 168 hours.  I have reviewed all pertinent lab results within the past 24 hours.    Imaging:  CT Abdomen Pelvis with Contrast 9/23/24 12:08  Incarcerated inguinal hernia on the right side causing secondary small-bowel obstruction.     CT Abdomen Pelvis without Contrast 9/23/24 20:57  Right inguinal hernia with obstruction is again noted. " The bladder is decompressed in the interval with the bowel in the pelvis appearing slightly more dilated with increasing surrounding inflammatory change. Further evaluation limited secondary to lack of IV contrast.   Findings of hernia with obstruction slightly worsened in the interval.     Micro/Path/Other:  None    Assessment/Plan:  74 year old male with hx of diabetes, HTN, and degenerative disk disease presenting with right inguinal hernia with obstructive symptoms.    - Pt admitted for observation given obstructive symptoms  - Lactic acid down to 1.5 from 2.5  - Repeat CT scan with incarcerated small bowel similar to previous  - NGT for gastric decompression to low intermittent wall suction  - Scheduled for right inguinal hernia repair today 11:56    Cayla Addison MS-3  [facility] Gold Team  09/24/2024

## 2024-09-25 LAB
ALBUMIN SERPL-MCNC: 2.7 G/DL (ref 3.4–4.8)
ALBUMIN/GLOB SERPL: 0.9 RATIO (ref 1.1–2)
ALP SERPL-CCNC: 42 UNIT/L (ref 40–150)
ALT SERPL-CCNC: 11 UNIT/L (ref 0–55)
ANION GAP SERPL CALC-SCNC: 8 MEQ/L
AST SERPL-CCNC: 16 UNIT/L (ref 5–34)
BASOPHILS # BLD AUTO: 0.01 X10(3)/MCL
BASOPHILS NFR BLD AUTO: 0.1 %
BILIRUB SERPL-MCNC: 0.6 MG/DL
BUN SERPL-MCNC: 16.8 MG/DL (ref 8.4–25.7)
CALCIUM SERPL-MCNC: 8.3 MG/DL (ref 8.8–10)
CHLORIDE SERPL-SCNC: 105 MMOL/L (ref 98–107)
CO2 SERPL-SCNC: 25 MMOL/L (ref 23–31)
CREAT SERPL-MCNC: 0.7 MG/DL (ref 0.73–1.18)
CREAT/UREA NIT SERPL: 24
EOSINOPHIL # BLD AUTO: 0 X10(3)/MCL (ref 0–0.9)
EOSINOPHIL NFR BLD AUTO: 0 %
ERYTHROCYTE [DISTWIDTH] IN BLOOD BY AUTOMATED COUNT: 17.2 % (ref 11.5–17)
GFR SERPLBLD CREATININE-BSD FMLA CKD-EPI: >60 ML/MIN/1.73/M2
GLOBULIN SER-MCNC: 3 GM/DL (ref 2.4–3.5)
GLUCOSE SERPL-MCNC: 196 MG/DL (ref 82–115)
HCT VFR BLD AUTO: 32.2 % (ref 42–52)
HGB BLD-MCNC: 10.4 G/DL (ref 14–18)
IMM GRANULOCYTES # BLD AUTO: 0.04 X10(3)/MCL (ref 0–0.04)
IMM GRANULOCYTES NFR BLD AUTO: 0.4 %
LYMPHOCYTES # BLD AUTO: 0.63 X10(3)/MCL (ref 0.6–4.6)
LYMPHOCYTES NFR BLD AUTO: 6.7 %
MAGNESIUM SERPL-MCNC: 1.5 MG/DL (ref 1.6–2.6)
MCH RBC QN AUTO: 26.1 PG (ref 27–31)
MCHC RBC AUTO-ENTMCNC: 32.3 G/DL (ref 33–36)
MCV RBC AUTO: 80.9 FL (ref 80–94)
MONOCYTES # BLD AUTO: 0.42 X10(3)/MCL (ref 0.1–1.3)
MONOCYTES NFR BLD AUTO: 4.5 %
NEUTROPHILS # BLD AUTO: 8.33 X10(3)/MCL (ref 2.1–9.2)
NEUTROPHILS NFR BLD AUTO: 88.3 %
NRBC BLD AUTO-RTO: 0 %
PHOSPHATE SERPL-MCNC: 3.6 MG/DL (ref 2.3–4.7)
PLATELET # BLD AUTO: 221 X10(3)/MCL (ref 130–400)
PMV BLD AUTO: 10.8 FL (ref 7.4–10.4)
POCT GLUCOSE: 129 MG/DL (ref 70–110)
POCT GLUCOSE: 154 MG/DL (ref 70–110)
POCT GLUCOSE: 168 MG/DL (ref 70–110)
POCT GLUCOSE: 172 MG/DL (ref 70–110)
POTASSIUM SERPL-SCNC: 3.8 MMOL/L (ref 3.5–5.1)
PROT SERPL-MCNC: 5.7 GM/DL (ref 5.8–7.6)
RBC # BLD AUTO: 3.98 X10(6)/MCL (ref 4.7–6.1)
SODIUM SERPL-SCNC: 138 MMOL/L (ref 136–145)
WBC # BLD AUTO: 9.43 X10(3)/MCL (ref 4.5–11.5)

## 2024-09-25 PROCEDURE — 63600175 PHARM REV CODE 636 W HCPCS

## 2024-09-25 PROCEDURE — 80053 COMPREHEN METABOLIC PANEL: CPT

## 2024-09-25 PROCEDURE — 94761 N-INVAS EAR/PLS OXIMETRY MLT: CPT

## 2024-09-25 PROCEDURE — 36415 COLL VENOUS BLD VENIPUNCTURE: CPT

## 2024-09-25 PROCEDURE — 84100 ASSAY OF PHOSPHORUS: CPT

## 2024-09-25 PROCEDURE — 25000003 PHARM REV CODE 250

## 2024-09-25 PROCEDURE — 83735 ASSAY OF MAGNESIUM: CPT

## 2024-09-25 PROCEDURE — 25000003 PHARM REV CODE 250: Performed by: SURGERY

## 2024-09-25 PROCEDURE — 85025 COMPLETE CBC W/AUTO DIFF WBC: CPT

## 2024-09-25 PROCEDURE — 21400001 HC TELEMETRY ROOM

## 2024-09-25 PROCEDURE — 97162 PT EVAL MOD COMPLEX 30 MIN: CPT

## 2024-09-25 RX ADMIN — SODIUM CHLORIDE, POTASSIUM CHLORIDE, SODIUM LACTATE AND CALCIUM CHLORIDE: 600; 310; 30; 20 INJECTION, SOLUTION INTRAVENOUS at 02:09

## 2024-09-25 RX ADMIN — INSULIN ASPART 2 UNITS: 100 INJECTION, SOLUTION INTRAVENOUS; SUBCUTANEOUS at 12:09

## 2024-09-25 RX ADMIN — KETOROLAC TROMETHAMINE 15 MG: 30 INJECTION, SOLUTION INTRAMUSCULAR; INTRAVENOUS at 12:09

## 2024-09-25 RX ADMIN — KETOROLAC TROMETHAMINE 15 MG: 30 INJECTION, SOLUTION INTRAMUSCULAR; INTRAVENOUS at 06:09

## 2024-09-25 RX ADMIN — OXYCODONE HYDROCHLORIDE 10 MG: 5 TABLET ORAL at 09:09

## 2024-09-25 RX ADMIN — MUPIROCIN: 20 OINTMENT TOPICAL at 08:09

## 2024-09-25 RX ADMIN — INSULIN ASPART 1 UNITS: 100 INJECTION, SOLUTION INTRAVENOUS; SUBCUTANEOUS at 09:09

## 2024-09-25 RX ADMIN — ENOXAPARIN SODIUM 40 MG: 40 INJECTION SUBCUTANEOUS at 04:09

## 2024-09-25 RX ADMIN — MUPIROCIN: 20 OINTMENT TOPICAL at 09:09

## 2024-09-25 NOTE — PLAN OF CARE
Problem: Adult Inpatient Plan of Care  Goal: Plan of Care Review  Outcome: Progressing     Problem: Adult Inpatient Plan of Care  Goal: Patient-Specific Goal (Individualized)  Outcome: Progressing     Problem: Adult Inpatient Plan of Care  Goal: Absence of Hospital-Acquired Illness or Injury  Outcome: Progressing     Problem: Adult Inpatient Plan of Care  Goal: Optimal Comfort and Wellbeing  Outcome: Progressing     Problem: Adult Inpatient Plan of Care  Goal: Readiness for Transition of Care  Outcome: Progressing     Problem: Diabetes Comorbidity  Goal: Blood Glucose Level Within Targeted Range  Outcome: Progressing     Problem: Wound  Goal: Optimal Coping  Outcome: Progressing     Problem: Wound  Goal: Optimal Functional Ability  Outcome: Progressing

## 2024-09-25 NOTE — MEDICAL/APP STUDENT
[facility] - [facility] Team  Daily Progress Note    Patient ID: [id number]  Patient Name: William Mccauley  YOB: 1950    Subjective:  AF, -170, HR WNL  NAEON. Pt says he feels a lot better, pain is well controlled. No nausea, vomiting, fevers or chills.    Objective:    Vitals:  Vitals:    09/25/24 0336   BP: 131/71   Pulse: 79   Resp:    Temp:         Intake/Output:  IV: 1,500  Urine: 1,100    Physical Exam:  Gen: NAD  Neuro: awake, alert, answering questions appropriately  CV: RRR  Resp: non-labored breathing, BAJLEET  Abd: soft, ND, NT  : R groin incision site is clean and intact, skin glue in place, no erythema or drainage  Ext: moves all 4 spontaneously and purposefully  Skin: warm, well perfused    Labs:  Renal:  Recent Labs     09/23/24 0952 09/24/24 0446 09/25/24 0357   BUN 9.2 11.1 16.8   CREATININE 0.84 0.71* 0.70*     FENGI:  Recent Labs     09/23/24 0952 09/24/24 0446 09/25/24 0357    137 138   K 4.3 3.8 3.8    104 105   CO2 26 27 25   CALCIUM 10.4* 8.8 8.3*   MG  --  1.70 1.50*   PHOS  --  3.7 3.6   ALBUMIN  --  2.9* 2.7*   BILITOT  --  0.6 0.6   AST  --  16 16   ALKPHOS  --  46 42   ALT  --  12 11     Heme:  Recent Labs     09/23/24 0952 09/23/24 2040 09/24/24 0446 09/25/24 0357   HGB 14.0 13.4* 11.7* 10.4*   HCT 43.8 42.3 36.8* 32.2*    258 241 221     ID:  Recent Labs     09/23/24 0952 09/23/24 2040 09/24/24 0446 09/25/24 0357   WBC 11.89* 11.21 11.07 9.43     CBG:  Recent Labs     09/23/24 0952 09/24/24 0446 09/25/24 0357   GLUCOSE 180* 130* 196*      Lactic Acid  2.1 -> 2.5 -> 1.5 -> 1.1 -> 2.0      Micro/Path/Other:  Hernia Sac, Right Inguinal pending    Assessment/Plan:  74 year old male with hx of diabetes, HTN, and degenerative disk disease presenting with right inguinal hernia with obstructive symptoms s/p open inguinal hernia mesh repair 9/24/24.    - Pt observed overnight  - Lactic acid up to 2.0 from 1.1 prior to surgery  - Clear liquid  diet  - MMPC  - Lovenox for DVT prophylaxis  - Hydralazine and labetalol PRN for HTN  - Zofran PRN for nausea    Cayla Addison MS-3  [employer] [facility]  09/25/2024

## 2024-09-25 NOTE — PROGRESS NOTES
[facility] - [facility] Team  Daily Progress Note    Patient ID: [id number]  Patient Name: William Mccauley  YOB: 1950    Subjective:  AF, -170, HR WNL  NAEON.   Pt says he feels a lot better, pain is well controlled.   Tolerating CLD without nausea, vomiting  Denies fevers or chills.  Passing flatus, no BM since admission    Objective:    Vitals:  Vitals:    09/25/24 0336   BP: 131/71   Pulse: 79   Resp:    Temp:         Intake/Output:  IV: 1,500  Urine: 1,100    Physical Exam:  Gen: NAD  Neuro: awake, alert, answering questions appropriately  CV: RRR  Resp: non-labored breathing, BALJEET  Abd: soft, ND, NT  : R groin incision site is clean and intact, skin glue in place, no erythema or drainage  Ext: moves all 4 spontaneously and purposefully  Skin: warm, well perfused    Labs:  Renal:  Recent Labs     09/23/24 0952 09/24/24 0446 09/25/24 0357   BUN 9.2 11.1 16.8   CREATININE 0.84 0.71* 0.70*     FENGI:  Recent Labs     09/23/24 0952 09/24/24 0446 09/25/24  0357    137 138   K 4.3 3.8 3.8    104 105   CO2 26 27 25   CALCIUM 10.4* 8.8 8.3*   MG  --  1.70 1.50*   PHOS  --  3.7 3.6   ALBUMIN  --  2.9* 2.7*   BILITOT  --  0.6 0.6   AST  --  16 16   ALKPHOS  --  46 42   ALT  --  12 11     Heme:  Recent Labs     09/23/24 0952 09/23/24 2040 09/24/24 0446 09/25/24  0357   HGB 14.0 13.4* 11.7* 10.4*   HCT 43.8 42.3 36.8* 32.2*    258 241 221     ID:  Recent Labs     09/23/24 0952 09/23/24 2040 09/24/24 0446 09/25/24  0357   WBC 11.89* 11.21 11.07 9.43     CBG:  Recent Labs     09/23/24 0952 09/24/24 0446 09/25/24  0357   GLUCOSE 180* 130* 196*      Lactic Acid  2.1 -> 2.5 -> 1.5 -> 1.1 -> 2.0      Micro/Path/Other:  Hernia Sac, Right Inguinal - pending    Assessment/Plan:  74 year old male with hx of diabetes, HTN, and degenerative disk disease presenting with right inguinal hernia with obstructive symptoms s/p open inguinal hernia mesh repair 9/24/24.    - Tolerating CLD,  advance to regular  - Voiding trial  - MMPC  - Lovenox for DVT prophylaxis  - Hydralazine and labetalol PRN for HTN  - Zofran PRN for nausea    Cayla Addison MS-3  [employer] [facility]    Patient seen and examined alongside medical student. Note reviewed and edited as appropriate. Agree with plan as written above.     Gabriella Kuo MD  [facility], PGY-1  09/25/2024 09/25/2024

## 2024-09-25 NOTE — PT/OT/SLP EVAL
Physical Therapy Evaluation    Patient Name:  William Mccauley   MRN:  [id number]    Recommendations:     Therapy Intensity Recommendations at Discharge: Moderate Intensity Therapy  Discharge Equipment Recommendations: walker, rolling   Equipment to be obtained for discharge: rolling walker.  Barriers to discharge: fall risk, severity of deficits, level of skilled assistance required, and decreased endurance    Assessment:     William Mccauley is a 74 y.o. male admitted with a medical diagnosis of Incarcerated right inguinal hernia.  He presents with the following impairments/functional limitations:  weakness, impaired endurance, impaired functional mobility, gait instability, impaired balance.    Rehab Prognosis: Good.    Patient would benefit from continued skilled acute PT services to: address above listed impairments/functional limitations; receive patient/caregiver education; reduce fall risk; and maximize independency/safety with functional mobility.    PT used translation system, code was 944618    Recent Surgery: Procedure(s) (LRB):  REPAIR, HERNIA, INGUINAL (Right)  LAPAROSCOPY, DIAGNOSTIC (N/A) 1 Day Post-Op    Plan:     During this hospitalization, patient to be seen 3 x/week to address the identified impairments/functional limitations via gait training, therapeutic activities, therapeutic exercises and progress toward the established goals.    Plan of Care Expires:  10/25/24    Subjective     Communicated with patient's nurse Flakita prior to session.    Patient agreeable to participate in evaluation.     Chief Complaint: none  Patient/Family Comments/goals: to get stronger  Pain/Comfort:  Pain Rating 1: 0/10  Pain Rating Post-Intervention 1: 0/10    Patients cultural, spiritual, Orthodoxy conflicts given the current situation: no    Social History  Living Environment: Patient lives with daughter in a single level home, with no steps, with walk-in shower.  Functional Level: Prior to admission patient ambulated without  assistive device and was independent in ADL's.  Equipment Used at Home: rollator  Equipment owned (not currently used): none.  Assistance Upon Discharge: family.    Objective:     Patient found supine in bed and with HOB elevated with peripheral IV, SCD  upon PT entry to room.    General Precautions: Standard, fall   Orthopedic Precautions:    Braces: N/A  Respiratory Status: room air      Exams:  Orientation: Patient is oriented to person, place, time, situation  Commands: Patient follows commands consistently    RLE ROM: WFL  RLE Strength: WFL  LLE ROM: WFL  LLE Strength: WFL    Functional Mobility:    Bed Mobility:  Supine to Sit: minimum assistance  Sit to Supine: moderate assistance  Repositioning to head-of-bed: minimum assistance    Transfers:  Sit to Stand: minimum assistance with rollator  Stand to Sit: minimum assistance with rollator    Gait:  Patient ambulated 40ft with rollator and minimum assistance.  Patient demonstrates :       unsteady gait       decreased amado       decreased bilateral step length       shuffle gait.    Other Mobility:  N/A    Balance:  Sit  Static: NORMAL: No deviations seen in posture held statically  Dynamic: NORMAL: No deviations seen in posture held dynamically  Stand  Static: NORMAL: No deviations seen in posture held statically  Dynamic: POOR+: Needs MIN (minimal ) assist during gait    Additional Treatment Session  N/A    Patient left supine in bed and with HOB elevated with all lines intact, call button in reach, tray table at bedside, and patient's nurse notified.    Education     Patient educated on the importance of early mobility to prevent functional decline during hospital stay.  Patient educated on and assisted with functional mobility as noted above.  Patient educated on PT Plan of Care and role of PT in acute care.  Patient was instructed to utilize staff assistance for mobility/transfers.  White board updated regarding patient's safest level of mobility with  staff assistance    Goals     Multidisciplinary Problems       Physical Therapy Goals          Problem: Physical Therapy    Goal Priority Disciplines Outcome Goal Variances Interventions   Physical Therapy Goal     PT, PT/OT Progressing     Description: Goals to be met by: 10/25/2024     Patient will increase functional independence with mobility by performin. Supine to sit with Modified [place]  2. Sit to supine with Modified [place]  3. Sit to stand transfer with Modified [place]  4. Gait  x 130 feet with Stand-by Assistance using Rolling Walker.                        History:     Past Medical History:   Diagnosis Date    Stroke     Type 2 diabetes mellitus without complications      Past Surgical History:   Procedure Laterality Date    DIAGNOSTIC LAPAROSCOPY N/A 2024    Procedure: LAPAROSCOPY, DIAGNOSTIC;  Surgeon: Adalberto Rock MD;  Location: [facility];  Service: General;  Laterality: N/A;    REPAIR, HERNIA, INGUINAL Right 2024    Procedure: REPAIR, HERNIA, INGUINAL;  Surgeon: Adalberto Rock MD;  Location: [facility];  Service: General;  Laterality: Right;  Incarcerated Hernia     Time Tracking:     PT Received On: 24  PT Start Time: 1416     PT Stop Time: 1436  PT Total Time (min): 20 min     Billable Minutes: Evaluation , moderate complexity    2024

## 2024-09-25 NOTE — PLAN OF CARE
Problem: Physical Therapy  Goal: Physical Therapy Goal  Description: Goals to be met by: 10/25/2024     Patient will increase functional independence with mobility by performin. Supine to sit with Modified [place]  2. Sit to supine with Modified [place]  3. Sit to stand transfer with Modified [place]  4. Gait  x 130 feet with Stand-by Assistance using Rolling Walker.     Outcome: Progressing

## 2024-09-26 LAB
ALBUMIN SERPL-MCNC: 2.5 G/DL (ref 3.4–4.8)
ALBUMIN/GLOB SERPL: 0.9 RATIO (ref 1.1–2)
ALP SERPL-CCNC: 38 UNIT/L (ref 40–150)
ALT SERPL-CCNC: 11 UNIT/L (ref 0–55)
ANION GAP SERPL CALC-SCNC: 3 MEQ/L
AST SERPL-CCNC: 17 UNIT/L (ref 5–34)
BASOPHILS # BLD AUTO: 0.04 X10(3)/MCL
BASOPHILS NFR BLD AUTO: 0.4 %
BILIRUB SERPL-MCNC: 0.6 MG/DL
BUN SERPL-MCNC: 19.7 MG/DL (ref 8.4–25.7)
CALCIUM SERPL-MCNC: 8 MG/DL (ref 8.8–10)
CHLORIDE SERPL-SCNC: 106 MMOL/L (ref 98–107)
CO2 SERPL-SCNC: 28 MMOL/L (ref 23–31)
CREAT SERPL-MCNC: 0.76 MG/DL (ref 0.73–1.18)
CREAT/UREA NIT SERPL: 26
EOSINOPHIL # BLD AUTO: 1.23 X10(3)/MCL (ref 0–0.9)
EOSINOPHIL NFR BLD AUTO: 12.9 %
ERYTHROCYTE [DISTWIDTH] IN BLOOD BY AUTOMATED COUNT: 17.6 % (ref 11.5–17)
ESTROGEN SERPL-MCNC: NORMAL PG/ML
GFR SERPLBLD CREATININE-BSD FMLA CKD-EPI: >60 ML/MIN/1.73/M2
GLOBULIN SER-MCNC: 2.7 GM/DL (ref 2.4–3.5)
GLUCOSE SERPL-MCNC: 131 MG/DL (ref 82–115)
HCT VFR BLD AUTO: 26.8 % (ref 42–52)
HGB BLD-MCNC: 8.5 G/DL (ref 14–18)
IMM GRANULOCYTES # BLD AUTO: 0.03 X10(3)/MCL (ref 0–0.04)
IMM GRANULOCYTES NFR BLD AUTO: 0.3 %
INSULIN SERPL-ACNC: NORMAL U[IU]/ML
LAB AP CLINICAL INFORMATION: NORMAL
LAB AP GROSS DESCRIPTION: NORMAL
LAB AP REPORT FOOTNOTES: NORMAL
LYMPHOCYTES # BLD AUTO: 2.32 X10(3)/MCL (ref 0.6–4.6)
LYMPHOCYTES NFR BLD AUTO: 24.3 %
MAGNESIUM SERPL-MCNC: 1.6 MG/DL (ref 1.6–2.6)
MCH RBC QN AUTO: 26.1 PG (ref 27–31)
MCHC RBC AUTO-ENTMCNC: 31.7 G/DL (ref 33–36)
MCV RBC AUTO: 82.2 FL (ref 80–94)
MONOCYTES # BLD AUTO: 0.69 X10(3)/MCL (ref 0.1–1.3)
MONOCYTES NFR BLD AUTO: 7.2 %
NEUTROPHILS # BLD AUTO: 5.24 X10(3)/MCL (ref 2.1–9.2)
NEUTROPHILS NFR BLD AUTO: 54.9 %
NRBC BLD AUTO-RTO: 0 %
PHOSPHATE SERPL-MCNC: 2.6 MG/DL (ref 2.3–4.7)
PLATELET # BLD AUTO: 180 X10(3)/MCL (ref 130–400)
PMV BLD AUTO: 10.2 FL (ref 7.4–10.4)
POCT GLUCOSE: 133 MG/DL (ref 70–110)
POCT GLUCOSE: 141 MG/DL (ref 70–110)
POCT GLUCOSE: 163 MG/DL (ref 70–110)
POTASSIUM SERPL-SCNC: 3.6 MMOL/L (ref 3.5–5.1)
PROT SERPL-MCNC: 5.2 GM/DL (ref 5.8–7.6)
RBC # BLD AUTO: 3.26 X10(6)/MCL (ref 4.7–6.1)
SODIUM SERPL-SCNC: 137 MMOL/L (ref 136–145)
T3RU NFR SERPL: NORMAL %
WBC # BLD AUTO: 9.55 X10(3)/MCL (ref 4.5–11.5)

## 2024-09-26 PROCEDURE — 94761 N-INVAS EAR/PLS OXIMETRY MLT: CPT

## 2024-09-26 PROCEDURE — 97116 GAIT TRAINING THERAPY: CPT

## 2024-09-26 PROCEDURE — 97535 SELF CARE MNGMENT TRAINING: CPT

## 2024-09-26 PROCEDURE — 25000003 PHARM REV CODE 250

## 2024-09-26 PROCEDURE — 80053 COMPREHEN METABOLIC PANEL: CPT

## 2024-09-26 PROCEDURE — 21400001 HC TELEMETRY ROOM

## 2024-09-26 PROCEDURE — 97167 OT EVAL HIGH COMPLEX 60 MIN: CPT

## 2024-09-26 PROCEDURE — 83735 ASSAY OF MAGNESIUM: CPT

## 2024-09-26 PROCEDURE — 85025 COMPLETE CBC W/AUTO DIFF WBC: CPT

## 2024-09-26 PROCEDURE — 63600175 PHARM REV CODE 636 W HCPCS

## 2024-09-26 PROCEDURE — 36415 COLL VENOUS BLD VENIPUNCTURE: CPT

## 2024-09-26 PROCEDURE — A4216 STERILE WATER/SALINE, 10 ML: HCPCS | Performed by: ANESTHESIOLOGY

## 2024-09-26 PROCEDURE — 25000003 PHARM REV CODE 250: Performed by: ANESTHESIOLOGY

## 2024-09-26 PROCEDURE — 84100 ASSAY OF PHOSPHORUS: CPT

## 2024-09-26 RX ADMIN — Medication 10 ML: at 05:09

## 2024-09-26 RX ADMIN — ACETAMINOPHEN 650 MG: 325 TABLET ORAL at 03:09

## 2024-09-26 RX ADMIN — KETOROLAC TROMETHAMINE 15 MG: 30 INJECTION, SOLUTION INTRAMUSCULAR; INTRAVENOUS at 05:09

## 2024-09-26 RX ADMIN — MUPIROCIN: 20 OINTMENT TOPICAL at 09:09

## 2024-09-26 RX ADMIN — OXYCODONE HYDROCHLORIDE 5 MG: 5 TABLET ORAL at 12:09

## 2024-09-26 RX ADMIN — MUPIROCIN: 20 OINTMENT TOPICAL at 08:09

## 2024-09-26 RX ADMIN — ENOXAPARIN SODIUM 40 MG: 40 INJECTION SUBCUTANEOUS at 05:09

## 2024-09-26 RX ADMIN — KETOROLAC TROMETHAMINE 15 MG: 30 INJECTION, SOLUTION INTRAMUSCULAR; INTRAVENOUS at 12:09

## 2024-09-26 RX ADMIN — INSULIN ASPART 2 UNITS: 100 INJECTION, SOLUTION INTRAVENOUS; SUBCUTANEOUS at 05:09

## 2024-09-26 RX ADMIN — OXYCODONE HYDROCHLORIDE 5 MG: 5 TABLET ORAL at 07:09

## 2024-09-26 NOTE — PROGRESS NOTES
Discussed recommendation for SNF placement with family who declined. As fly agreeable to  services, [facility] Fax. Order for RW requested.

## 2024-09-26 NOTE — MEDICAL/APP STUDENT
"[facility] - [facility] Team  Daily Progress Note    Patient ID: [id number]  Patient Name: William Mccauley  YOB: 1950    Subjective:  AF, -140, HR WNL  NAEON.   Pt feels well.   Tolerating regular diet without nausea, vomiting  Denies fevers or chills  Pt had a bowel movement last night (9/25)    Objective:    Vitals:  Vitals:    09/26/24 0332   BP: (!) 144/74   Pulse: 68   Resp: 18   Temp: 98.5 °F (36.9 °C)        Intake/Output:  IV/PO: 1,365  Urine: 600    Physical Exam:  Gen: NAD  Neuro: awake, alert, answering questions appropriately  CV: RRR  Resp: non-labored breathing, BALJEET  Abd: soft, ND, NT  : R groin incision site is clean and intact, skin glue in place, no erythema or drainage   Ext: moves all 4 spontaneously and purposefully  Skin: warm, well perfused    Labs:  Renal:  Recent Labs     09/23/24 0952 09/24/24 0446 09/25/24 0357 09/26/24  0335   BUN 9.2 11.1 16.8 19.7   CREATININE 0.84 0.71* 0.70* 0.76     No results for input(s): "LACTIC" in the last 72 hours.  FENGI:  Recent Labs     09/23/24 0952 09/24/24 0446 09/25/24 0357 09/26/24  0335    137 138 137   K 4.3 3.8 3.8 3.6    104 105 106   CO2 26 27 25 28   CALCIUM 10.4* 8.8 8.3* 8.0*   MG  --  1.70 1.50* 1.60   PHOS  --  3.7 3.6 2.6   ALBUMIN  --  2.9* 2.7* 2.5*   BILITOT  --  0.6 0.6 0.6   AST  --  16 16 17   ALKPHOS  --  46 42 38*   ALT  --  12 11 11     Heme:  Recent Labs     09/23/24 2040 09/24/24 0446 09/25/24 0357 09/26/24  0334   HGB 13.4* 11.7* 10.4* 8.5*   HCT 42.3 36.8* 32.2* 26.8*    241 221 180     ID:  Recent Labs     09/23/24  2040 09/24/24  0446 09/25/24  0357 09/26/24  0334   WBC 11.21 11.07 9.43 9.55     CBG:  Recent Labs     09/23/24  0952 09/24/24  0446 09/25/24  0357 09/26/24  0335   GLUCOSE 180* 130* 196* 131*      Lactic Acid  2.1 -> 2.5 -> 1.5 -> 1.1 -> 2.0     Micro/Path/Other:  Hernia Sac, Right Inguinal - in process    Assessment/Plan:  74 year old male with hx of diabetes, HTN, and " degenerative disk disease presenting with right inguinal hernia with obstructive symptoms s/p open inguinal hernia mesh repair 9/24/24.     - Tolerating regular diet  - Can discharge home today given pt had bowel movement and is tolerating regular diet  - MMPC  - Lovenox for DVT prophylaxis  - Hydralazine and labetalol PRN for HTN  - Zofran PRN for nausea     Cayla Addison MS-3  [employer] [facility]

## 2024-09-26 NOTE — PLAN OF CARE
Problem: Wound  Goal: Optimal Coping  9/26/2024 0059 by Homa Elaine RN  Outcome: Progressing  9/26/2024 0058 by Homa Elaine RN  Outcome: Progressing  Goal: Optimal Functional Ability  9/26/2024 0059 by Homa Elaine RN  Outcome: Progressing  9/26/2024 0058 by Homa Elaine RN  Outcome: Progressing  Goal: Absence of Infection Signs and Symptoms  9/26/2024 0059 by Homa Elaine RN  Outcome: Progressing  9/26/2024 0058 by Homa Elaine RN  Outcome: Progressing  Goal: Improved Oral Intake  9/26/2024 0059 by Homa Elaine RN  Outcome: Progressing  9/26/2024 0058 by Homa Elaine RN  Outcome: Progressing  Goal: Optimal Pain Control and Function  9/26/2024 0059 by Homa Elaine RN  Outcome: Progressing  9/26/2024 0058 by Homa Elaine RN  Outcome: Progressing  Goal: Skin Health and Integrity  9/26/2024 0059 by Homa Elaine RN  Outcome: Progressing  9/26/2024 0058 by Homa Elaine RN  Outcome: Progressing  Goal: Optimal Wound Healing  9/26/2024 0059 by Homa Elaine RN  Outcome: Progressing  9/26/2024 0058 by Homa Elaine RN  Outcome: Progressing     Problem: Fall Injury Risk  Goal: Absence of Fall and Fall-Related Injury  9/26/2024 0059 by Homa Elaine RN  Outcome: Progressing  9/26/2024 0058 by Homa Elaine RN  Outcome: Progressing     Problem: Infection  Goal: Absence of Infection Signs and Symptoms  Outcome: Progressing     Problem: Pain Acute  Goal: Optimal Pain Control and Function  Outcome: Progressing     Problem: Comorbidity Management  Goal: Blood Pressure in Desired Range  Outcome: Progressing

## 2024-09-26 NOTE — PT/OT/SLP EVAL
Occupational Therapy   Evaluation, Treatment, and Discharge Note    Name: William Mccauley  MRN: [id number]  Admitting Diagnosis: Incarcerated right inguinal hernia  Recent Surgery: Procedure(s) (LRB):  REPAIR, HERNIA, INGUINAL (Right)  LAPAROSCOPY, DIAGNOSTIC (N/A) 2 Days Post-Op    Recommendations:     Discharge Recommendations: Low Intensity Therapy  Discharge Equipment Recommendations: walker, rolling, bedside commode, shower chair  Barriers to discharge:  None    Assessment:     William Mccauley is a 74 y.o. male with a medical diagnosis of Incarcerated right inguinal hernia. Pt's daughter in room provided  services per pt request, and participated in extensive education for DME recommendations/home modifications d/t anticipated discharge home today with family and home health.  At this time, patient has nearly returned to baseline and is appropriate for this discharge disposition.  Pt does not require further acute OT services.     Plan:     Pt has been evaluated, DME recommendations reviewed extensively with pt and his daughter who is primary caregiver, and OT will now DC services d/t anticipated DC home today.    Subjective     Chief Complaint: none at this time, abdominal pain 2/10 and well managed per patient report  Patient/Family Comments/goals: DC home with family and home health    Occupational Profile:  Living Environment: SSH, no steps to enter, with his daughter, his wife, and their family, walk-in shower  Previous level of function: SBA/SPV for ADLs and mobility, using rollator  Roles and Routines: pt ambulates in home and performs light yardwork in his garden; his wife prepares meals for him, family provides transportation and assists with IAdLs  Equipment Used at home: rollator  Assistance upon Discharge: family and home health    Pain/Comfort:  Pain Rating 1: 2/10  Location - Orientation 1: lower  Location 1: abdomen  Pain Addressed 1: Reposition, Distraction  Pain Rating Post-Intervention 1:  2/10    Patients cultural, spiritual, Scientologist conflicts given the current situation: no    Objective:     Communicated with: nurse Boggs prior to session.  Patient found supine with peripheral IV, telemetry upon OT entry to room.    General Precautions: Standard, fall  Orthopedic Precautions: N/A  Braces: N/A  Respiratory Status: Room air     Occupational Performance:    Bed Mobility:    Patient completed Supine to Sit with minimum assistance and training for bed mobility, log rolling technique to reduce stress at abdomen during transitional movements    Functional Mobility/Transfers:  Patient completed Sit <> Stand Transfer with stand by assistance  with  rollator   Functional Mobility: CGA/SBA to ambulate in room and in haley 130 ft using his rollator, shuffling gait with BLE externally rotated, which daughter stated is his baseline    Activities of Daily Living:  Feeding:  independence .  Grooming: supervision standing at sink to brush teeth and wash face  Upper Body Dressing: stand by assistance for setup seated EOB  Lower Body Dressing: minimum assistance to don pullup seated EOB d/t mild abdominal pain with lifting each LE to be threaded  Toileting: stand by assistance .    Cognitive/Visual Perceptual:  Cognitive/Psychosocial Skills:     -       Oriented to: Person, Place, and Situation   -       Follows Commands/attention:Follows multistep  commands and with translation provided by his daughter; pt able to follow single -step visual and some basic verbal commands from OT  -       Safety awareness/insight to disability: intact   -       Mood/Affect/Coping skills/emotional control: Cooperative and Pleasant    Physical Exam:  BUE ArOM WNL, strength 4+/5 bilaterally, FM coordination intact during ADLs    Treatment & Education:  Pt. And daughter educated on bed mobility with abdominal precautions, safety during transfers with rollator, home modifications and DME recommendations (BSC or toilet rails, shower chair  with siderails, and bed rail), POC, orientation to environment, use of call bell for assist with transfers OOB or for any other needs due to fall risk.  Pt and daughter verbalized understanding.    Patient left up in chair with all lines intact, call button in reach, nurse notified, and daughter present    GOALS:   Multidisciplinary Problems       Occupational Therapy Goals       Not on file                    History:     Past Medical History:   Diagnosis Date    Stroke     Type 2 diabetes mellitus without complications          Past Surgical History:   Procedure Laterality Date    DIAGNOSTIC LAPAROSCOPY N/A 9/24/2024    Procedure: LAPAROSCOPY, DIAGNOSTIC;  Surgeon: Adalberto Rock MD;  Location: [facility];  Service: General;  Laterality: N/A;    REPAIR, HERNIA, INGUINAL Right 9/24/2024    Procedure: REPAIR, HERNIA, INGUINAL;  Surgeon: Adalberto Rock MD;  Location: [facility];  Service: General;  Laterality: Right;  Incarcerated Hernia       Time Tracking:     OT Date of Treatment: 09/26/24  OT Start Time: 0950  OT Stop Time: 1047  OT Total Time (min): 57 min    Billable Minutes:Evaluation 45, high  Self Care/Home Management 12    9/26/2024

## 2024-09-26 NOTE — PROGRESS NOTES
"[facility] General Surgery - [facility] Team  Daily Progress Note    Patient ID: [id number]  Patient Name: William Mccauley  YOB: 1950    Subjective:  AFVSS. NAEON.  Pt feels well.   Tolerating regular diet without nausea, vomiting  Denies fevers or chills  Pt had a bowel movement last night (9/25)  Ambulating with RW    Objective:    Vitals:  Vitals:    09/26/24 0720   BP: 139/74   Pulse: 60   Resp: 18   Temp: 98.3 °F (36.8 °C)        Intake/Output:  IV/PO: 1,365  Urine: 600    Physical Exam:  Gen: NAD  Neuro: awake, alert, answering questions appropriately  CV: RRR  Resp: non-labored breathing, BALJEET  Abd: soft, ND, NT  : R groin incision site is clean and intact, skin glue in place, no erythema or drainage. Appropriately TTP.  Ext: moves all 4 spontaneously and purposefully  Skin: warm, well perfused    Labs:  Renal:  Recent Labs     09/23/24 0952 09/24/24 0446 09/25/24 0357 09/26/24  0335   BUN 9.2 11.1 16.8 19.7   CREATININE 0.84 0.71* 0.70* 0.76     No results for input(s): "LACTIC" in the last 72 hours.  FENGI:  Recent Labs     09/23/24 0952 09/24/24 0446 09/25/24 0357 09/26/24  0335    137 138 137   K 4.3 3.8 3.8 3.6    104 105 106   CO2 26 27 25 28   CALCIUM 10.4* 8.8 8.3* 8.0*   MG  --  1.70 1.50* 1.60   PHOS  --  3.7 3.6 2.6   ALBUMIN  --  2.9* 2.7* 2.5*   BILITOT  --  0.6 0.6 0.6   AST  --  16 16 17   ALKPHOS  --  46 42 38*   ALT  --  12 11 11     Heme:  Recent Labs     09/23/24 2040 09/24/24 0446 09/25/24 0357 09/26/24  0334   HGB 13.4* 11.7* 10.4* 8.5*   HCT 42.3 36.8* 32.2* 26.8*    241 221 180     ID:  Recent Labs     09/23/24  2040 09/24/24  0446 09/25/24  0357 09/26/24  0334   WBC 11.21 11.07 9.43 9.55     CBG:  Recent Labs     09/23/24  0952 09/24/24  0446 09/25/24  0357 09/26/24  0335   GLUCOSE 180* 130* 196* 131*      Lactic Acid  2.1 -> 2.5 -> 1.5 -> 1.1 -> 2.0     Micro/Path/Other:  Hernia Sac, Right Inguinal - in process    Assessment/Plan:  74 year old male with hx of " diabetes, HTN, and degenerative disk disease presenting with right inguinal hernia with obstructive symptoms s/p open right inguinal hernia repair with mesh 9/24/24.     - Tolerating regular diet  - Pt had bowel movement  - MMPC  - Hydralazine and labetalol PRN for HTN  - Zofran PRN for nausea    DVT ppx: lovenox    Dispo: will likely DC later today     Cayla Cyn MS-3  [employer] [facility]     Attestation   I have seen and examined the patient with the medical student above. The above note was edited as necessary. I agree with above assessment and plan.       Lexi Rangel MD  [employer] [facility], PGY-3

## 2024-09-26 NOTE — PT/OT/SLP PROGRESS
Physical Therapy Treatment    Patient Name:  William Mccauley   MRN:  [id number]    Recommendations     Therapy Intensity Recommendations at Discharge: Low Intensity Therapy  Discharge Equipment Recommendations: walker, rolling   Barriers to discharge: fall risk    Assessment     William Mccauley is a 74 y.o. male admitted with a medical diagnosis of  Incarcerated right inguinal hernia.    He presents with the following impairments/functional limitations:  weakness, impaired endurance, impaired functional mobility, gait instability, impaired balance.    Rehab Prognosis: Good.    Patient would benefit from continued skilled acute PT services to: address above listed impairments/functional limitations; receive patient/caregiver education; reduce fall risk; and maximize independency/safety with functional mobility.    Recent Surgery: Procedure(s) (LRB):  REPAIR, HERNIA, INGUINAL (Right)  LAPAROSCOPY, DIAGNOSTIC (N/A) 2 Days Post-Op    Plan     During this hospitalization, patient to be seen 3 x/week to address the identified impairments/functional limitations via gait training, therapeutic activities, therapeutic exercises and progress toward the established goals.    Plan of Care Expires:  10/25/24    Subjective     Communicated with patient's nurse  prior to session.    Patient agreeable to participate in treatment session.    Chief Complaint: pain  Patient/Family Comments/goals: to go home  Pain/Comfort:  Pain Rating 1: 2/10  Location 1: abdomen  Pain Addressed 1: Nurse notified, Pre-medicate for activity  Pain Rating Post-Intervention 1: 2/10    Objective     Patient found sitting in wheelchair with peripheral IV  upon PT entry to room.    General Precautions: Standard, fall   Orthopedic Precautions:    Braces:    Respiratory Status: room air    Functional Mobility:    Bed Mobility:  Seated in WC at start of session and left in WC at end of session    Transfers:  Sit to Stand: stand by assistance with rollator    Gait:  Patient  ambulated 390ft with rollator and contact guard assistance and fading to SBA .  Patient demonstrates :       decreased amado       decreased bilateral step length       Decreased heel strike on L .    Other Mobility:  N/A    Patient left sitting in wheelchair with all lines intact, call button in reach, tray table at bedside, and patient's nurse notified.    Education     Patient educated on and assisted with functional mobility as noted above.  Patient educated on PT Plan of Care.  Patient was instructed to utilize staff assistance for mobility/transfers.  White board updated regarding patient's safest level of mobility with staff assistance    Goals     Multidisciplinary Problems       Physical Therapy Goals          Problem: Physical Therapy    Goal Priority Disciplines Outcome Goal Variances Interventions   Physical Therapy Goal     PT, PT/OT Progressing     Description: Goals to be met by: 10/25/2024     Patient will increase functional independence with mobility by performin. Supine to sit with Modified [place]  2. Sit to supine with Modified [place]  3. Sit to stand transfer with Modified [place]  4. Gait  x 500 feet with Stand-by Assistance using Rolling Walker.                        Time Tracking     PT Received On: 24  PT Start Time: 1119     PT Stop Time: 1135  PT Total Time (min): 16 min     Billable Minutes: Gait Training 16 mins    2024

## 2024-09-26 NOTE — ANESTHESIA POSTPROCEDURE EVALUATION
Anesthesia Post Evaluation    Patient: William Mccauley    Procedure(s) Performed: Procedure(s) (LRB):  REPAIR, HERNIA, INGUINAL (Right)  LAPAROSCOPY, DIAGNOSTIC (N/A)    Final Anesthesia Type: general      Patient location during evaluation: med/surg floor  Post-procedure vital signs: reviewed and stable  Airway patency: patent      Anesthetic complications: no      Cardiovascular status: hemodynamically stable  Respiratory status: spontaneous ventilation  Follow-up not needed.              Vitals Value Taken Time   /68 09/25/24 1919   Temp 37.1 °C (98.7 °F) 09/25/24 1919   Pulse 74 09/25/24 1919   Resp 18 09/25/24 1919   SpO2 97 % 09/25/24 2005         Event Time   Out of Recovery 09/24/2024 16:55:00         Pain/Kati Score: Pain Rating Prior to Med Admin: 3 (9/25/2024  6:53 PM)  Pain Rating Post Med Admin: 0 (9/25/2024  7:23 PM)  Kati Score: 6 (9/24/2024  4:27 PM)

## 2024-09-27 VITALS
HEART RATE: 112 BPM | HEIGHT: 60 IN | BODY MASS INDEX: 27.09 KG/M2 | TEMPERATURE: 99 F | RESPIRATION RATE: 18 BRPM | DIASTOLIC BLOOD PRESSURE: 49 MMHG | SYSTOLIC BLOOD PRESSURE: 81 MMHG | WEIGHT: 138 LBS | OXYGEN SATURATION: 95 %

## 2024-09-27 PROBLEM — K40.30 INCARCERATED RIGHT INGUINAL HERNIA: Status: RESOLVED | Noted: 2024-09-23 | Resolved: 2024-09-27

## 2024-09-27 PROBLEM — K56.609 SMALL BOWEL OBSTRUCTION: Status: RESOLVED | Noted: 2024-09-23 | Resolved: 2024-09-27

## 2024-09-27 LAB
ABS NEUT CALC (OHS): 7.52 X10(3)/MCL (ref 2.1–9.2)
ALBUMIN SERPL-MCNC: 2.7 G/DL (ref 3.4–4.8)
ALBUMIN/GLOB SERPL: 0.9 RATIO (ref 1.1–2)
ALP SERPL-CCNC: 41 UNIT/L (ref 40–150)
ALT SERPL-CCNC: 16 UNIT/L (ref 0–55)
ANION GAP SERPL CALC-SCNC: 6 MEQ/L
ANISOCYTOSIS BLD QL SMEAR: ABNORMAL
AST SERPL-CCNC: 21 UNIT/L (ref 5–34)
BASOPHILS NFR BLD MANUAL: 0.11 X10(3)/MCL (ref 0–0.2)
BASOPHILS NFR BLD MANUAL: 1 % (ref 0–2)
BILIRUB SERPL-MCNC: 0.6 MG/DL
BUN SERPL-MCNC: 16.6 MG/DL (ref 8.4–25.7)
CALCIUM SERPL-MCNC: 8.3 MG/DL (ref 8.8–10)
CHLORIDE SERPL-SCNC: 104 MMOL/L (ref 98–107)
CO2 SERPL-SCNC: 27 MMOL/L (ref 23–31)
CREAT SERPL-MCNC: 0.64 MG/DL (ref 0.73–1.18)
CREAT/UREA NIT SERPL: 26
EOSINOPHIL NFR BLD MANUAL: 2.85 X10(3)/MCL (ref 0–0.9)
EOSINOPHIL NFR BLD MANUAL: 25 % (ref 0–8)
ERYTHROCYTE [DISTWIDTH] IN BLOOD BY AUTOMATED COUNT: 17.8 % (ref 11.5–17)
GFR SERPLBLD CREATININE-BSD FMLA CKD-EPI: >60 ML/MIN/1.73/M2
GLOBULIN SER-MCNC: 3.1 GM/DL (ref 2.4–3.5)
GLUCOSE SERPL-MCNC: 138 MG/DL (ref 82–115)
HCT VFR BLD AUTO: 28.6 % (ref 42–52)
HGB BLD-MCNC: 9 G/DL (ref 14–18)
LYMPHOCYTES NFR BLD MANUAL: 0.8 X10(3)/MCL
LYMPHOCYTES NFR BLD MANUAL: 7 % (ref 13–40)
MAGNESIUM SERPL-MCNC: 1.6 MG/DL (ref 1.6–2.6)
MCH RBC QN AUTO: 25.9 PG (ref 27–31)
MCHC RBC AUTO-ENTMCNC: 31.5 G/DL (ref 33–36)
MCV RBC AUTO: 82.2 FL (ref 80–94)
MONOCYTES NFR BLD MANUAL: 0.11 X10(3)/MCL (ref 0.1–1.3)
MONOCYTES NFR BLD MANUAL: 1 % (ref 2–11)
NEUTROPHILS NFR BLD MANUAL: 66 % (ref 47–80)
NRBC BLD AUTO-RTO: 0 %
PHOSPHATE SERPL-MCNC: 2.7 MG/DL (ref 2.3–4.7)
PLATELET # BLD AUTO: 189 X10(3)/MCL (ref 130–400)
PLATELET # BLD EST: ADEQUATE 10*3/UL
PMV BLD AUTO: 10.8 FL (ref 7.4–10.4)
POCT GLUCOSE: 129 MG/DL (ref 70–110)
POCT GLUCOSE: 132 MG/DL (ref 70–110)
POCT GLUCOSE: 187 MG/DL (ref 70–110)
POTASSIUM SERPL-SCNC: 3.9 MMOL/L (ref 3.5–5.1)
PROT SERPL-MCNC: 5.8 GM/DL (ref 5.8–7.6)
RBC # BLD AUTO: 3.48 X10(6)/MCL (ref 4.7–6.1)
RBC MORPH BLD: ABNORMAL
SODIUM SERPL-SCNC: 137 MMOL/L (ref 136–145)
WBC # BLD AUTO: 11.39 X10(3)/MCL (ref 4.5–11.5)

## 2024-09-27 PROCEDURE — 97116 GAIT TRAINING THERAPY: CPT

## 2024-09-27 PROCEDURE — 94761 N-INVAS EAR/PLS OXIMETRY MLT: CPT

## 2024-09-27 PROCEDURE — 80053 COMPREHEN METABOLIC PANEL: CPT

## 2024-09-27 PROCEDURE — 84100 ASSAY OF PHOSPHORUS: CPT

## 2024-09-27 PROCEDURE — 83735 ASSAY OF MAGNESIUM: CPT

## 2024-09-27 PROCEDURE — 85027 COMPLETE CBC AUTOMATED: CPT

## 2024-09-27 PROCEDURE — 63600175 PHARM REV CODE 636 W HCPCS

## 2024-09-27 PROCEDURE — 36415 COLL VENOUS BLD VENIPUNCTURE: CPT

## 2024-09-27 RX ORDER — OXYCODONE AND ACETAMINOPHEN 5; 325 MG/1; MG/1
1 TABLET ORAL EVERY 4 HOURS PRN
Qty: 5 TABLET | Refills: 0 | Status: SHIPPED | OUTPATIENT
Start: 2024-09-27

## 2024-09-27 RX ADMIN — MUPIROCIN: 20 OINTMENT TOPICAL at 08:09

## 2024-09-27 RX ADMIN — HYDRALAZINE HYDROCHLORIDE 10 MG: 20 INJECTION INTRAMUSCULAR; INTRAVENOUS at 04:09

## 2024-09-27 NOTE — PLAN OF CARE
Problem: Adult Inpatient Plan of Care  Goal: Plan of Care Review  Outcome: Progressing  Goal: Patient-Specific Goal (Individualized)  Outcome: Progressing  Goal: Absence of Hospital-Acquired Illness or Injury  Outcome: Progressing  Goal: Optimal Comfort and Wellbeing  Outcome: Progressing  Goal: Readiness for Transition of Care  Outcome: Progressing     Problem: Diabetes Comorbidity  Goal: Blood Glucose Level Within Targeted Range  Outcome: Progressing     Problem: Wound  Goal: Optimal Coping  Outcome: Progressing  Goal: Optimal Functional Ability  Outcome: Progressing  Goal: Absence of Infection Signs and Symptoms  Outcome: Progressing  Goal: Improved Oral Intake  Outcome: Progressing  Goal: Optimal Pain Control and Function  Outcome: Progressing  Goal: Skin Health and Integrity  Outcome: Progressing  Goal: Optimal Wound Healing  Outcome: Progressing     Problem: Fall Injury Risk  Goal: Absence of Fall and Fall-Related Injury  Outcome: Progressing     Problem: Infection  Goal: Absence of Infection Signs and Symptoms  Outcome: Progressing     Problem: Pain Acute  Goal: Optimal Pain Control and Function  Outcome: Progressing     Problem: Comorbidity Management  Goal: Blood Pressure in Desired Range  Outcome: Progressing

## 2024-09-27 NOTE — DISCHARGE SUMMARY
[facility]  General Surgery  Discharge Summary      Patient Name: William Mccauley  MRN: [id number]  Admission Date: 9/23/2024  Hospital Length of Stay: 4 days  Discharge Date and Time:  09/27/2024 8:10 AM  Attending Physician: Vernon Scott Jr.,*   Discharging Provider: Gabriella Kuo MD  Primary Care Provider: Meaghan, Primary Doctor     HPI: 74 year old male with hx of diabetes, HTN, and degenerative disk disease presenting with right inguinal hernia with obstructive symptoms s/p open right inguinal hernia repair with mesh 9/24/24.     Procedure(s) (LRB):  REPAIR, HERNIA, INGUINAL (Right)  LAPAROSCOPY, DIAGNOSTIC (N/A)     Hospital Course: 75 yo male presented 9/23 with abdominal pain, nausea, and vomiting. Imaging showed incarcerated right inguinal hernia and small bowel obstruction. NGT was placed. He was taken to the OR 9/24 for hernia repair with mesh placement. Patient tolerated procedure well without complications. Prior to discharge patient is ambulating with walker, tolerating diet without nausea/vomiting, has return of bowel function, and has pain controlled with oral medication.     Consults:   Consults (From admission, onward)          Status Ordering Provider     Inpatient consult to Social Work/Case Management  Once        Provider:  (Not yet assigned)    KRISTINA Argueta     Inpatient consult to General surgery  Once        Provider:  Gabriella Kuo MD    Completed KRISTINA MESSER            Significant Diagnostic Studies: Radiology: CT scan: CT ABDOMEN PELVIS WITH CONTRAST:   Results for orders placed or performed during the hospital encounter of 09/23/24   CT Abdomen Pelvis With IV Contrast NO Oral Contrast    Narrative    EXAMINATION:  CT ABDOMEN PELVIS WITH IV CONTRAST    CLINICAL HISTORY:  Bowel obstruction suspected;    TECHNIQUE:  Low dose axial images, sagittal and coronal reformations were obtained from the lung bases to the pubic symphysis following the IV administration of  contrast. Automatic exposure control (AEC) is utilized to reduce patient radiation exposure.    COMPARISON:  None.    FINDINGS:  The lung bases are clear.    There is a hiatal hernia.    The liver appears normal.  No liver mass or lesion is seen.  Portal and hepatic veins appear normal.    The gallbladder appears normal.  No obvious gallstones are seen.  No biliary dilatation is seen.  No pericholecystic fluid is seen.    The pancreas appears normal.  No pancreatic mass or lesion is seen.    The spleen shows no acute abnormality.    The adrenal glands appear normal.  No adrenal nodule is seen.    The kidneys appear normal.  No hydronephrosis is seen.  No hydroureter is seen.  No nephrolithiasis is seen.  No obvious ureteral stones are seen.    Urinary bladder appears grossly unremarkable.    There are dilated loops of small bowel seen consistent with a small-bowel obstruction.  The point of obstruction is a inguinal hernia in the right side.  There is some fluid seen in the scrotal sac on the right side and area of calcification seen in the scrotal sac on the right side.    There are some scattered areas of calcifications seen in the intraperitoneal cavity.    No free air is seen.  No free fluid is seen.      Impression    Incarcerated inguinal hernia on the right side causing secondary small-bowel obstruction.    This report was flagged in Epic as abnormal.      Electronically signed by: Paras Solis  Date:    09/23/2024  Time:    12:14       Pending Diagnostic Studies:       None          Final Active Diagnoses:    Diagnosis Date Noted POA    Hypertension [I10] 09/23/2024 Yes    Diabetes mellitus [E11.9] 09/23/2024 Yes      Problems Resolved During this Admission:    Diagnosis Date Noted Date Resolved POA    PRINCIPAL PROBLEM:  Incarcerated right inguinal hernia [K40.30] 09/23/2024 09/27/2024 Yes    Small bowel obstruction [K56.609] 09/23/2024 09/27/2024 Yes      Discharged Condition: good    Disposition: Home or  "Self Care    Follow Up:   Follow-up Information       Vernon Scott Jr., MD Follow up in 2 week(s).    Specialty: General Surgery  Contact information:  [address] [facility] [address] [phone]                           Patient Instructions:      WALKER FOR HOME USE     Order Specific Question Answer Comments   Type of Walker: David (4'4"-5'6")    With wheels? Yes    Height: 5' 0.24" (1.53 m)    Weight: 62.6 kg (138 lb)    Length of need (1-99 months): 99    Does patient have medical equipment at home? rollator    Please check all that apply: Patient's condition impairs ambulation.      Diet Adult Regular     Lifting restrictions   Order Comments: No lifting over 10 pounds for 6 weeks after surgery     Change dressing (specify)   Order Comments: Can shower when you get home. Do not submerge your incisions in water (not sitting in a bath tub or swimming) for two weeks. Surgical glue will come off on its own over time. If it has not come off in 10 days you can peel it off.     Notify your health care provider if you experience any of the following:  temperature >100.4     Notify your health care provider if you experience any of the following:  persistent nausea and vomiting or diarrhea     Notify your health care provider if you experience any of the following:  severe uncontrolled pain     Notify your health care provider if you experience any of the following:  redness, tenderness, or signs of infection (pain, swelling, redness, odor or green/yellow discharge around incision site)     Activity as tolerated     Medications:  Reconciled Home Medications:      Medication List        START taking these medications      oxyCODONE-acetaminophen 5-325 mg per tablet  Commonly known as: PERCOCET  Take 1 tablet by mouth every 4 (four) hours as needed for Pain.            ASK your doctor about these medications      walker Misc  1 each by Misc.(Non-Drug; Combo Route) route once. for 1 dose  Ask about: " Should I take this medication?              Gabriella Kuo MD  General Surgery  [facility]

## 2024-09-27 NOTE — PROGRESS NOTES
RW delivered to pt's room by Steven. Discharge Summary submitted to [facility] via [facility].

## 2024-09-27 NOTE — MEDICAL/APP STUDENT
"[facility] General Surgery - [facility] Team  Daily Progress Note    Patient ID: [id number]  Patient Name: William Mccauley  YOB: 1950     Subjective:  AF, -170, HR WNL  NAEON.   Pt feels well.   Tolerating regular diet without nausea, vomiting  Denies fevers or chills  Pt having bowel movements, last BM yesterday morning (9/26)  Ambulating with RW     Objective:    Vitals:  Vitals:    09/27/24 0436   BP: (!) 163/81   Pulse:    Resp:    Temp:         Intake/Output:  IV/PO: 2,265  Urine: 1,050    Physical Exam:  Gen: NAD  Neuro: awake, alert, answering questions appropriately  CV: RRR  Resp: non-labored breathing, BALJEET  Abd: soft, ND, NT  : R groin incision site is clean and intact, skin glue in place, no erythema or drainage. Appropriately TTP.   Ext: moves all 4 spontaneously and purposefully  Skin: warm, well perfused    Labs:  Renal:  Recent Labs     09/25/24 0357 09/26/24 0335   BUN 16.8 19.7   CREATININE 0.70* 0.76     No results for input(s): "LACTIC" in the last 72 hours.  FENGI:  Recent Labs     09/25/24 0357 09/26/24 0335 09/27/24 0327    137  --    K 3.8 3.6  --     106  --    CO2 25 28  --    CALCIUM 8.3* 8.0*  --    MG 1.50* 1.60 1.60   PHOS 3.6 2.6 2.7   ALBUMIN 2.7* 2.5*  --    BILITOT 0.6 0.6  --    AST 16 17  --    ALKPHOS 42 38*  --    ALT 11 11  --      Heme:  Recent Labs     09/25/24 0357 09/26/24 0334   HGB 10.4* 8.5*   HCT 32.2* 26.8*    180     ID:  Recent Labs     09/25/24 0357 09/26/24 0334   WBC 9.43 9.55     CBG:  Recent Labs     09/25/24 0357 09/26/24 0335   GLUCOSE 196* 131*      Lactic Acid  2.1 -> 2.5 -> 1.5 -> 1.1 -> 2.0     Micro/Path/Other:  Soft tissue from inguinal region, excision:  - Hernia sac.     Assessment/Plan:  74 year old male with hx of diabetes, HTN, and degenerative disk disease presenting with right inguinal hernia with obstructive symptoms s/p open right inguinal hernia repair with mesh 9/24/24.     - Tolerating regular diet  - Pt having " bowel movements  - MMPC  - Hydralazine and labetalol PRN for HTN  - Zofran PRN for nausea  - add home BP med? BP consistently high (SBP 150s-170s) during length of stay     DVT ppx: lovenox     Dispo: will likely DC this AM     Cayla Addison MS-3  [facility] General Surgery

## 2024-09-27 NOTE — PROGRESS NOTES
Inpatient Nutrition Assessment    Admit Date: 9/23/2024   Total duration of encounter: 4 days   Patient Age: 74 y.o.    Nutrition Recommendation/Prescription     Pt tolerating regular diet; pt with hx DM--suggest change to 1800 diabetic diet   Order ONS--boost glucose control tid; Boost Glucose Control (provides 190 kcal, 16 g protein per serving)   MVI/fe  Biweekly wt  Will monitor nutrition status     Communication of Recommendations: reviewed with nurse    Nutrition Assessment     Malnutrition Assessment/Nutrition-Focused Physical Exam    Malnutrition Context: acute illness or injury (09/24/24 1409)  Malnutrition Level: other (see comments) (unable to fully complete malnutrition assessment at this time) (09/24/24 1409)  Energy Intake (Malnutrition): other (see comments) (unable to obtain diet hx) (09/24/24 1409)  Weight Loss (Malnutrition): other (see comments) (unable to obtain wt hx) (09/24/24 1409)     Orbital Region (Subcutaneous Fat Loss): well nourished  Upper Arm Region (Subcutaneous Fat Loss): well nourished        [place] Region (Muscle Loss): well nourished  Clavicle Bone Region (Muscle Loss): well nourished            A minimum of two characteristics is recommended for diagnosis of either severe or non-severe malnutrition.    Chart Review    Reason Seen: continuous nutrition monitoring and follow-up    Malnutrition Screening Tool Results   Have you recently lost weight without trying?: No  Have you been eating poorly because of a decreased appetite?: No   MST Score: 0   Diagnosis:  Incarcerated R hernia; obstruction ; (9/24) S/P open R inguinal hernia repair/mesh    Relevant Medical History: DM, HTN, degenerative joint dz, R inguinal hernia     Scheduled Medications:  enoxparin, 40 mg, Daily  mupirocin, , BID    Continuous Infusions:  lactated ringers    PRN Medications:  acetaminophen, 650 mg, Q6H PRN  dextrose 10%, 12.5 g, PRN  dextrose 10%, 25 g, PRN  glucagon (human recombinant), 1 mg, PRN  glucose,  16 g, PRN  glucose, 24 g, PRN  hydrALAZINE, 10 mg, Q6H PRN  insulin aspart U-100, 0-10 Units, QID (AC + HS) PRN  labetalol, 10 mg, Q6H PRN  ondansetron, 4 mg, Q6H PRN  oxyCODONE, 5 mg, Q4H PRN  sodium chloride 0.9%, 10 mL, PRN    Calorie Containing IV Medications: no significant kcals from medications at this time    Recent Labs   Lab 09/23/24  0952 09/23/24 2040 09/24/24 0446 09/25/24  0357 09/26/24  0334 09/26/24  0335 09/27/24  0327     --  137 138  --  137 137   K 4.3  --  3.8 3.8  --  3.6 3.9   CALCIUM 10.4*  --  8.8 8.3*  --  8.0* 8.3*   PHOS  --   --  3.7 3.6  --  2.6 2.7   MG  --   --  1.70 1.50*  --  1.60 1.60     --  104 105  --  106 104   CO2 26  --  27 25  --  28 27   BUN 9.2  --  11.1 16.8  --  19.7 16.6   CREATININE 0.84  --  0.71* 0.70*  --  0.76 0.64*   EGFRNORACEVR >60  --  >60 >60  --  >60 >60   GLUCOSE 180*  --  130* 196*  --  131* 138*   BILITOT  --   --  0.6 0.6  --  0.6 0.6   ALKPHOS  --   --  46 42  --  38* 41   ALT  --   --  12 11  --  11 16   AST  --   --  16 16  --  17 21   ALBUMIN  --   --  2.9* 2.7*  --  2.5* 2.7*   WBC 11.89* 11.21 11.07 9.43 9.55  --  11.39   HGB 14.0 13.4* 11.7* 10.4* 8.5*  --  9.0*   HCT 43.8 42.3 36.8* 32.2* 26.8*  --  28.6*     Nutrition Orders:  Diet Adult Regular  Diet Adult Regular      Appetite/Oral Intake: good/% of meals  Factors Affecting Nutritional Intake: none identified  Social Needs Impacting Access to Food: none identified  Food/Catholic/Cultural Preferences: none reported  Food Allergies: none reported  Last Bowel Movement: 09/26/24  Wound(s):  surgical wound    Comments  (9/27) Pt diet progressed to regular; tolerating oral diet ; eating well; no N/V; hx DM--suggest change to diabetic diet. S/P hernia repair. No new wt. Pt to be discharge today. Encourage oral intake. Labs (9/27) Gluc (H)    (9/24) Used Pashto  service during assessment; pt not communicating with ; family not available during rounds. Unable  "to obtain diet/wt hx at this time. Per EMR--pt admitted with N/V past 1-2 days; + hernia; to go to OR today for hernia repair. Currently NGT suction. BMI 26.7--overwt. No fat/muscle wasting noted during PA. PPN recs provided if unable to advance diet. Will monitor diet progression/tolerance.     Anthropometrics    Height: 5' 0.24" (153 cm), Height Method: Stated  Last Weight: 62.6 kg (138 lb) (09/23/24 0925), Weight Method: Standard Scale  BMI (Calculated): 26.7  BMI Classification: overweight (BMI 25-29.9)        Ideal Body Weight (IBW), Male: 107.44 lb     % Ideal Body Weight, Male (lb): 128.44 %                 Usual Body Weight (UBW), kg:  (pt not able to provide UBW)        Usual Weight Provided By: EMR weight history    Wt Readings from Last 5 Encounters:   09/23/24 62.6 kg (138 lb)     Weight Change(s) Since Admission: no wt hx   Wt Readings from Last 1 Encounters:   09/23/24 0925 62.6 kg (138 lb)   Admit Weight: 62.6 kg (138 lb) (09/23/24 0925), Weight Method: Standard Scale    Estimated Needs    Weight Used For Calorie Calculations: 62.6 kg (138 lb 0.1 oz)  Energy Calorie Requirements (kcal): 1878 kcal/d; 30 walter/kg  Energy Need Method: Kcal/kg  Weight Used For Protein Calculations: 62.6 kg (138 lb 0.1 oz)  Protein Requirements: 75 gm protein/d; 1.2 gm/kg  Fluid Requirements (mL): 1878 ml/d; 1ml/walter  CHO Requirement: 211 gm CHO/d     Enteral Nutrition     Patient not receiving enteral nutrition at this time.    Parenteral Nutrition     Patient not receiving parenteral nutrition support at this time.    Evaluation of Received Nutrient Intake    Calories: meeting estimated needs  Protein: meeting estimated needs    Patient Education     Not applicable.    Nutrition Diagnosis     PES: Inadequate oral intake related to acute illness as evidenced by NPO/NGT suction . (resolved)         Nutrition Interventions     Intervention(s): modified composition of meals/snacks, commercial beverage, multivitamin/mineral " supplement therapy, and collaboration with other providers    Goal: Meet greater than 80% of nutritional needs by follow-up. (goal progressing)  Goal: Maintain weight throughout hospitalization. (goal progressing)    Nutrition Goals & Monitoring     Dietitian will monitor: food and beverage intake, weight, and glucose/endocrine profile  Discharge planning: continue diabetic  diet  Nutrition Risk/Follow-Up: moderate (follow-up in 3-5 days)   Please consult if re-assessment needed sooner.

## 2024-09-27 NOTE — PT/OT/SLP PROGRESS
Physical Therapy Treatment    Patient Name:  William Mccauley   MRN:  [id number]    Recommendations     Therapy Intensity Recommendations at Discharge: Low Intensity Therapy  Discharge Equipment Recommendations: walker, rolling   Barriers to discharge: decreased endurance    Assessment     William Mccauley is a 74 y.o. male admitted with a medical diagnosis of  Incarcerated right inguinal hernia.    He presents with the following impairments/functional limitations:  weakness, impaired endurance, impaired functional mobility, gait instability, impaired balance.    Rehab Prognosis: Good.    Family does not want pt to be placed in a NH and will have someone available at home to provide SBA while pt is walking.     Patient would benefit from continued skilled acute PT services to: address above listed impairments/functional limitations; receive patient/caregiver education; reduce fall risk; and maximize independency/safety with functional mobility.    Recent Surgery: Procedure(s) (LRB):  REPAIR, HERNIA, INGUINAL (Right)  LAPAROSCOPY, DIAGNOSTIC (N/A) 3 Days Post-Op    Plan     During this hospitalization, patient to be seen 3 x/week to address the identified impairments/functional limitations via gait training, therapeutic activities, therapeutic exercises and progress toward the established goals.    Plan of Care Expires:  10/25/24    Subjective     Communicated with patient's nurse  prior to session.    Patient agreeable to participate in treatment session.    Chief Complaint: none  Patient/Family Comments/goals: to get stronger  Pain/Comfort:  Pain Rating 1: 0/10  Pain Rating Post-Intervention 1: 0/10    Objective     Patient found sitting in wheelchair with peripheral IV  upon PT entry to room.    General Precautions: Standard, fall   Orthopedic Precautions:    Braces:    Respiratory Status: room air    Functional Mobility:    Bed Mobility:  Seated in WC at start of session and left in WC at end of session    Transfers:  Sit to Stand:  stand by assistance with rolling walker  Stand to Sit: stand by assistance with rolling walker    Gait:  Patient ambulated 260ft with rolling walker and stand by assistance.  Patient demonstrates :       no loss of balance       decreased amado       decreased bilateral step length       shuffle gait.    Other Mobility:  Therapeutic Activities performed:        -standing balance activities:              stationary marching    Patient left sitting in wheelchair with all lines intact, call button in reach, tray table at bedside, and patient's nurse notified.    Education     Patient educated on and assisted with functional mobility as noted above.  Patient educated on PT Plan of Care.  Patient was instructed to utilize staff assistance for mobility/transfers.  White board updated regarding patient's safest level of mobility with staff assistance    Goals     Multidisciplinary Problems       Physical Therapy Goals          Problem: Physical Therapy    Goal Priority Disciplines Outcome Goal Variances Interventions   Physical Therapy Goal     PT, PT/OT Progressing     Description: Goals to be met by: 10/25/2024     Patient will increase functional independence with mobility by performin. Supine to sit with Modified [place]  2. Sit to supine with Modified [place]  3. Sit to stand transfer with Modified [place]  4. Gait  x 500 feet with Stand-by Assistance using Rolling Walker.                        Time Tracking     PT Received On: 24  PT Start Time: 1017     PT Stop Time: 1032  PT Total Time (min): 15 min     Billable Minutes: Gait Training 15 mins    2024

## 2024-09-27 NOTE — PT/OT/SLP DISCHARGE
POST DISCHARGE DOCUMENTATION - 2024 2:14 PM    Physical Therapy Discharge Summary    Name: William Mccauley  MRN: [id number]   Principal Problem: Incarcerated right inguinal hernia   1. Incarcerated hernia    2. SBO (small bowel obstruction)    3. Right inguinal hernia    4. Right lower quadrant abdominal pain    5. Preop examination    6. Irreducible right inguinal hernia    7. Small bowel obstruction       Patient Active Problem List   Diagnosis    Hypertension    Diabetes mellitus      Recommendations - per last treatment session     Therapy Intensity Recommendations at Discharge: Low Intensity Therapy  Discharge Equipment Recommendations: walker, rolling     Assessment:     Patient was unexpectedly discharged from hospital.    Objective     GOALS:  Multidisciplinary Problems       Physical Therapy Goals          Problem: Physical Therapy    Goal Priority Disciplines Outcome Goal Variances Interventions   Physical Therapy Goal     PT, PT/OT Progressing     Description: Goals to be met by: 10/25/2024     Patient will increase functional independence with mobility by performin. Supine to sit with Modified [place]  2. Sit to supine with Modified [place]  3. Sit to stand transfer with Modified [place]  4. Gait  x 500 feet with Stand-by Assistance using Rolling Walker.                        Plan     Patient Discharged to: home or self care per chart.    2024

## 2024-10-10 ENCOUNTER — OFFICE VISIT (OUTPATIENT)
Dept: SURGERY | Facility: CLINIC | Age: 74
End: 2024-10-10
Payer: MEDICAID

## 2024-10-10 VITALS
HEIGHT: 60 IN | DIASTOLIC BLOOD PRESSURE: 78 MMHG | RESPIRATION RATE: 20 BRPM | HEART RATE: 99 BPM | BODY MASS INDEX: 26.84 KG/M2 | WEIGHT: 136.69 LBS | TEMPERATURE: 98 F | OXYGEN SATURATION: 99 % | SYSTOLIC BLOOD PRESSURE: 134 MMHG

## 2024-10-10 DIAGNOSIS — K40.90 RIGHT INGUINAL HERNIA: Primary | ICD-10-CM

## 2024-10-10 PROCEDURE — 99215 OFFICE O/P EST HI 40 MIN: CPT | Mod: PBBFAC

## 2024-10-10 NOTE — PROGRESS NOTES
[facility]   Clinic Note       CC: 2 week post-op follow up      HPI: William Mccauley is a 74 y.o. male with PMHx of HTN, DM, degenerative disc disease, and incarcerated right inguinal hernia s/p open right inguinal hernia repair with mesh 9/24/24 presenting to clinic for a 2 week post-op follow up. Pt reports some incision site pain but denies fevers or chills. He is tolerating a regular diet without nausea or vomiting and has been having normal bowel movements. Pt has not been taking any pain meds since 2-3 days after discharge.        PMH:  Past Medical History:   Diagnosis Date    Diabetes mellitus, type 2     HTN (hypertension)     Ptosis     Sciatica     Stroke     Type 2 diabetes mellitus without complications          PSH:  Past Surgical History:   Procedure Laterality Date    DIAGNOSTIC LAPAROSCOPY N/A 9/24/2024    Procedure: LAPAROSCOPY, DIAGNOSTIC;  Surgeon: Adalberto Rock MD;  Location: [facility];  Service: General;  Laterality: N/A;    KIDNEY STONE SURGERY Left     REPAIR, HERNIA, INGUINAL Right 9/24/2024    Procedure: REPAIR, HERNIA, INGUINAL;  Surgeon: Adalberto Rock MD;  Location: [facility];  Service: General;  Laterality: Right;  Incarcerated Hernia         Medications:  Current Outpatient Medications on File Prior to Visit   Medication Sig Dispense Refill    aspirin (ECOTRIN) 81 MG EC tablet Take 1 tablet (81 mg total) by mouth once daily. 30 tablet 11    diclofenac sodium (VOLTAREN) 1 % Gel Apply 2 g topically once daily. To back and leg 450 g 11    empagliflozin (JARDIANCE) 25 mg tablet Take 1 tablet (25 mg total) by mouth once daily. 90 tablet 3    ferrous sulfate 325 (65 FE) MG EC tablet Take 1 tablet (325 mg total) by mouth 3 (three) times a week. 30 tablet 2    gabapentin (NEURONTIN) 300 MG capsule Take 2 capsules (600 mg total) by mouth 3 (three) times daily. 180 capsule 11    hydrocortisone 2.5 % cream Apply topically 2 (two) times daily. 28 g 11    ICAR-C 100-250 mg Tab Take by  mouth.      ketoconazole (NIZORAL) 2 % shampoo Apply topically twice a week. 120 mL 11    metFORMIN (GLUMETZA) 1000 MG (MOD) 24hr tablet Take 1 tablet (1,000 mg total) by mouth 2 (two) times daily with meals. 180 tablet 3    nystatin (MYCOSTATIN) cream Apply topically 2 (two) times daily. 30 g 11    oxyCODONE-acetaminophen (PERCOCET) 5-325 mg per tablet Take 1 tablet by mouth every 4 (four) hours as needed for Pain. 5 tablet 0    atorvastatin (LIPITOR) 40 MG tablet Take 1 tablet (40 mg total) by mouth once daily. 90 tablet 3    losartan (COZAAR) 25 MG tablet Take 1 tablet (25 mg total) by mouth once daily. 90 tablet 3     Current Facility-Administered Medications on File Prior to Visit   Medication Dose Route Frequency Provider Last Rate Last Admin    influenza 65up-adj (QUADRIVALENT ADJUVANTED PF) vaccine 0.5 mL  0.5 mL Intramuscular vaccine x 1 dose Shannon Alonso MD        sodium chloride 0.9% flush 10 mL  10 mL Intravenous PRN Krystina Galeana MD        sodium chloride 0.9% flush 10 mL  10 mL Intravenous PRN Be Laws MD            Allergies:  Review of patient's allergies indicates:  No Known Allergies      Social Hx:  Social History     Tobacco Use   Smoking Status Never    Passive exposure: Never   Smokeless Tobacco Never      Social History     Substance and Sexual Activity   Alcohol Use Never    Alcohol/week: 1.0 standard drink of alcohol    Types: 1 Cans of beer per week         Relevant Family Hx:  No family history on file.       Physical Exam:   Vitals:    10/10/24 1123   BP: 134/78   Pulse: 99   Resp: 20   Temp: 98.2 °F (36.8 °C)      Gen: NAD, AAOx3   Eye: EOMI   CV: RR  Pulm: NWOB on RA  Abd: soft, non-tender, non-distended  : R groin incision site is clean and intact, skin glue in place, no erythema or drainage. Appropriately TTP.   Ext:  Move all 4 extremities.      A/P: William Mccauley is a 74 y.o. male with PMHx of HTN, DM, degenerative disc disease, and right incarcerated inguinal  hernia s/p open right inguinal hernia repair (9/24/24) with mesh 9/24/24 presenting to clinic for a 2 week post-op follow up.     - Advised pt that he can take Tylenol or ibuprofen as needed for post-op incisional pain  - Follow up in 3 weeks.    Cayla Cyn MS-3  [facility]  10/10/2024     Attestation   I have seen and examined the patient with the medical student above. The above note was edited as necessary. I agree with above assessment and plan.     Lexi Rangel MD  [facility], PGY-3

## 2024-10-10 NOTE — PROGRESS NOTES
[facility]   Clinic Note       CC: 2 week post-op follow up      HPI: William Mccauley is a 74 y.o. male with PMHx of HTN, DM, degenerative disc disease, and incarcerated right inguinal hernia s/p open right inguinal hernia repair with mesh 9/24/24 presenting to clinic for a 2 week post-op follow up. Pt reports some incision site pain but denies fevers or chills. He is tolerating a regular diet without nausea or vomiting and has been having normal bowel movements. Pt has not been taking any pain meds since 2-3 days after discharge.        PMH:  Past Medical History:   Diagnosis Date    Diabetes mellitus, type 2     HTN (hypertension)     Ptosis     Sciatica     Stroke     Type 2 diabetes mellitus without complications          PSH:  Past Surgical History:   Procedure Laterality Date    DIAGNOSTIC LAPAROSCOPY N/A 9/24/2024    Procedure: LAPAROSCOPY, DIAGNOSTIC;  Surgeon: Adalberto Rock MD;  Location: [facility];  Service: General;  Laterality: N/A;    KIDNEY STONE SURGERY Left     REPAIR, HERNIA, INGUINAL Right 9/24/2024    Procedure: REPAIR, HERNIA, INGUINAL;  Surgeon: Adalberto Rock MD;  Location: [facility];  Service: General;  Laterality: Right;  Incarcerated Hernia         Medications:  Current Outpatient Medications on File Prior to Visit   Medication Sig Dispense Refill    aspirin (ECOTRIN) 81 MG EC tablet Take 1 tablet (81 mg total) by mouth once daily. 30 tablet 11    diclofenac sodium (VOLTAREN) 1 % Gel Apply 2 g topically once daily. To back and leg 450 g 11    empagliflozin (JARDIANCE) 25 mg tablet Take 1 tablet (25 mg total) by mouth once daily. 90 tablet 3    ferrous sulfate 325 (65 FE) MG EC tablet Take 1 tablet (325 mg total) by mouth 3 (three) times a week. 30 tablet 2    gabapentin (NEURONTIN) 300 MG capsule Take 2 capsules (600 mg total) by mouth 3 (three) times daily. 180 capsule 11    hydrocortisone 2.5 % cream Apply topically 2 (two) times daily. 28 g 11    ICAR-C 100-250 mg Tab Take by  mouth.      ketoconazole (NIZORAL) 2 % shampoo Apply topically twice a week. 120 mL 11    metFORMIN (GLUMETZA) 1000 MG (MOD) 24hr tablet Take 1 tablet (1,000 mg total) by mouth 2 (two) times daily with meals. 180 tablet 3    nystatin (MYCOSTATIN) cream Apply topically 2 (two) times daily. 30 g 11    oxyCODONE-acetaminophen (PERCOCET) 5-325 mg per tablet Take 1 tablet by mouth every 4 (four) hours as needed for Pain. 5 tablet 0    atorvastatin (LIPITOR) 40 MG tablet Take 1 tablet (40 mg total) by mouth once daily. 90 tablet 3    losartan (COZAAR) 25 MG tablet Take 1 tablet (25 mg total) by mouth once daily. 90 tablet 3     Current Facility-Administered Medications on File Prior to Visit   Medication Dose Route Frequency Provider Last Rate Last Admin    influenza 65up-adj (QUADRIVALENT ADJUVANTED PF) vaccine 0.5 mL  0.5 mL Intramuscular vaccine x 1 dose Shannon Alonso MD        sodium chloride 0.9% flush 10 mL  10 mL Intravenous PRN Krystina Galeana MD        sodium chloride 0.9% flush 10 mL  10 mL Intravenous PRN Be Laws MD            Allergies:  Review of patient's allergies indicates:  No Known Allergies      Social Hx:  Social History     Tobacco Use   Smoking Status Never    Passive exposure: Never   Smokeless Tobacco Never      Social History     Substance and Sexual Activity   Alcohol Use Never    Alcohol/week: 1.0 standard drink of alcohol    Types: 1 Cans of beer per week         Relevant Family Hx:  No family history on file.       Physical Exam:   Vitals:    10/10/24 1123   BP: 134/78   Pulse: 99   Resp: 20   Temp: 98.2 °F (36.8 °C)      Gen: NAD, AAOx3   Eye: EOMI   CV: RR  Pulm: NWOB on RA  Abd: soft, non-tender, non-distended  : R groin incision site is clean and intact, skin glue in place, no erythema or drainage. Appropriately TTP.   Ext:  Move all 4 extremities.      A/P: William Mccauley is a 74 y.o. male with PMHx of HTN, DM, degenerative disc disease, and right incarcerated inguinal  hernia s/p open right inguinal hernia repair (9/24/24) with mesh 9/24/24 presenting to clinic for a 2 week post-op follow up.     - Advised pt that he can take Tylenol or ibuprofen as needed for post-op incisional pain  - Follow up in 3 weeks.    Cayla Cyn MS-3  [facility] General Surgery  10/10/2024     Attestation   I have seen and examined the patient with the medical student above. The above note was edited as necessary. I agree with above assessment and plan.     Lexi Rangel MD  [facility], PGY-3

## 2024-10-17 ENCOUNTER — EXTERNAL HOME HEALTH (OUTPATIENT)
Dept: HOME HEALTH SERVICES | Facility: HOSPITAL | Age: 74
End: 2024-10-17
Payer: MEDICAID

## 2024-10-18 ENCOUNTER — OFFICE VISIT (OUTPATIENT)
Dept: FAMILY MEDICINE | Facility: CLINIC | Age: 74
End: 2024-10-18
Payer: MEDICAID

## 2024-10-18 VITALS
BODY MASS INDEX: 25.72 KG/M2 | DIASTOLIC BLOOD PRESSURE: 90 MMHG | SYSTOLIC BLOOD PRESSURE: 146 MMHG | HEIGHT: 60 IN | OXYGEN SATURATION: 96 % | HEART RATE: 64 BPM | WEIGHT: 131 LBS | TEMPERATURE: 98 F

## 2024-10-18 DIAGNOSIS — E11.42 TYPE 2 DIABETES MELLITUS WITH DIABETIC POLYNEUROPATHY, WITHOUT LONG-TERM CURRENT USE OF INSULIN: ICD-10-CM

## 2024-10-18 DIAGNOSIS — I10 HYPERTENSION, UNSPECIFIED TYPE: ICD-10-CM

## 2024-10-18 DIAGNOSIS — E11.65 UNCONTROLLED TYPE 2 DIABETES MELLITUS WITH HYPERGLYCEMIA: ICD-10-CM

## 2024-10-18 DIAGNOSIS — E78.5 HYPERLIPIDEMIA, UNSPECIFIED HYPERLIPIDEMIA TYPE: Primary | ICD-10-CM

## 2024-10-18 PROBLEM — E11.9 TYPE 2 DIABETES MELLITUS WITHOUT COMPLICATION: Status: RESOLVED | Noted: 2023-02-22 | Resolved: 2024-10-18

## 2024-10-18 PROCEDURE — 99214 OFFICE O/P EST MOD 30 MIN: CPT | Mod: PBBFAC,PN | Performed by: STUDENT IN AN ORGANIZED HEALTH CARE EDUCATION/TRAINING PROGRAM

## 2024-10-18 RX ORDER — METFORMIN HYDROCHLORIDE 1000 MG/1
1000 TABLET, FILM COATED, EXTENDED RELEASE ORAL 2 TIMES DAILY WITH MEALS
Qty: 180 TABLET | Refills: 3 | Status: SHIPPED | OUTPATIENT
Start: 2024-10-18 | End: 2025-10-18

## 2024-10-18 RX ORDER — ATORVASTATIN CALCIUM 40 MG/1
40 TABLET, FILM COATED ORAL DAILY
Qty: 90 TABLET | Refills: 3 | Status: SHIPPED | OUTPATIENT
Start: 2024-10-18 | End: 2025-10-18

## 2024-10-18 RX ORDER — LOSARTAN POTASSIUM 25 MG/1
25 TABLET ORAL DAILY
Qty: 90 TABLET | Refills: 1 | Status: SHIPPED | OUTPATIENT
Start: 2024-10-18 | End: 2025-10-18

## 2024-10-18 NOTE — ASSESSMENT & PLAN NOTE
Improved;Continuing Jardiance 25 mg daily and metformin    Completed immigration paperwork for patient.

## 2024-10-18 NOTE — ASSESSMENT & PLAN NOTE
Blood pressure is elevated at this visit to 146/90   It appears patient has not been taking losartan 25 mg daily   Discuss this with patient and daughter and have sent refills for patient

## 2024-10-31 ENCOUNTER — OFFICE VISIT (OUTPATIENT)
Dept: SURGERY | Facility: CLINIC | Age: 74
End: 2024-10-31
Payer: MEDICAID

## 2024-10-31 VITALS
TEMPERATURE: 98 F | BODY MASS INDEX: 25.13 KG/M2 | HEIGHT: 60 IN | HEART RATE: 102 BPM | WEIGHT: 128 LBS | DIASTOLIC BLOOD PRESSURE: 77 MMHG | RESPIRATION RATE: 18 BRPM | OXYGEN SATURATION: 99 % | SYSTOLIC BLOOD PRESSURE: 120 MMHG

## 2024-10-31 DIAGNOSIS — K40.90 RIGHT INGUINAL HERNIA: Primary | ICD-10-CM

## 2024-10-31 PROCEDURE — 99214 OFFICE O/P EST MOD 30 MIN: CPT | Mod: PBBFAC

## 2025-01-08 ENCOUNTER — EXTERNAL HOME HEALTH (OUTPATIENT)
Dept: HOME HEALTH SERVICES | Facility: HOSPITAL | Age: 75
End: 2025-01-08
Payer: MEDICAID

## 2025-01-21 ENCOUNTER — DOCUMENT SCAN (OUTPATIENT)
Dept: HOME HEALTH SERVICES | Facility: HOSPITAL | Age: 75
End: 2025-01-21
Payer: MEDICAID

## 2025-02-05 ENCOUNTER — OFFICE VISIT (OUTPATIENT)
Dept: NEUROLOGY | Facility: CLINIC | Age: 75
End: 2025-02-05
Payer: MEDICAID

## 2025-02-05 VITALS
DIASTOLIC BLOOD PRESSURE: 78 MMHG | HEIGHT: 60 IN | TEMPERATURE: 98 F | WEIGHT: 138.63 LBS | RESPIRATION RATE: 16 BRPM | SYSTOLIC BLOOD PRESSURE: 145 MMHG | HEART RATE: 60 BPM | BODY MASS INDEX: 27.22 KG/M2 | OXYGEN SATURATION: 100 %

## 2025-02-05 DIAGNOSIS — R60.0 BILATERAL LOWER EXTREMITY EDEMA: ICD-10-CM

## 2025-02-05 DIAGNOSIS — Z78.9 LANGUAGE BARRIER TO COMMUNICATION: ICD-10-CM

## 2025-02-05 DIAGNOSIS — Z86.73 HISTORY OF CVA (CEREBROVASCULAR ACCIDENT): Primary | Chronic | ICD-10-CM

## 2025-02-05 DIAGNOSIS — R29.6 FREQUENT FALLS: ICD-10-CM

## 2025-02-05 DIAGNOSIS — M48.062 SPINAL STENOSIS OF LUMBAR REGION WITH NEUROGENIC CLAUDICATION: ICD-10-CM

## 2025-02-05 PROCEDURE — 99215 OFFICE O/P EST HI 40 MIN: CPT | Mod: S$PBB,,, | Performed by: NURSE PRACTITIONER

## 2025-02-05 PROCEDURE — 1160F RVW MEDS BY RX/DR IN RCRD: CPT | Mod: CPTII,,, | Performed by: NURSE PRACTITIONER

## 2025-02-05 PROCEDURE — 1159F MED LIST DOCD IN RCRD: CPT | Mod: CPTII,,, | Performed by: NURSE PRACTITIONER

## 2025-02-05 PROCEDURE — 99215 OFFICE O/P EST HI 40 MIN: CPT | Mod: PBBFAC | Performed by: NURSE PRACTITIONER

## 2025-02-05 PROCEDURE — 3288F FALL RISK ASSESSMENT DOCD: CPT | Mod: CPTII,,, | Performed by: NURSE PRACTITIONER

## 2025-02-05 PROCEDURE — 1100F PTFALLS ASSESS-DOCD GE2>/YR: CPT | Mod: CPTII,,, | Performed by: NURSE PRACTITIONER

## 2025-02-05 PROCEDURE — 3078F DIAST BP <80 MM HG: CPT | Mod: CPTII,,, | Performed by: NURSE PRACTITIONER

## 2025-02-05 PROCEDURE — 3077F SYST BP >= 140 MM HG: CPT | Mod: CPTII,,, | Performed by: NURSE PRACTITIONER

## 2025-02-05 PROCEDURE — 1125F AMNT PAIN NOTED PAIN PRSNT: CPT | Mod: CPTII,,, | Performed by: NURSE PRACTITIONER

## 2025-02-05 RX ORDER — POLYETHYLENE GLYCOL 3350 17 G/17G
POWDER, FOR SOLUTION ORAL
COMMUNITY

## 2025-02-05 NOTE — PROGRESS NOTES
"[facility] Neurology Follow Up Office Visit Note    Initial Visit Date: 11/1/2023  Last Visit Date: 5/1/2024  Current Visit Date: 2/5/2025    [place]  [id number]  Subjective:      Patient ID: William Mccauley is a 74 y.o. male.    Chief Complaint: Stroke (Pt states trouble with swelling in both legs)    HPI  This is a 75 YO [place] R handed male with a PMX of HTN, DM  was referred by his PCP for stroke history. CT and MRI brain performed. Carotid U/S, Echo were also ordered and still pending, Pt no-showed for all three appts. PCP visit note indicates that Pt's son reports that Pt had difficulty walking 7 mths prior to being seen in 2/22/2023. Son also noted that patient's suffered w/a facial droop. CT head was ordered and referral to PT/OT was sent. During last visit, ASA 81mg daily was continued. Pt was cleared to have cataract surgery. Daughter was instructed to call cardiology and set up f/u appointment (missed last appt). Pt subsequently presented to ED on 9/23/2024 w/abdominal pain, N/V. Dx with small bowel obstruction and incarcerated R inguinal hernia. Hernia repair on 9/24/2024    Today, Pt presents to office with his daughter. Admits to several falls since last visit due to   Weakness and "the way I walk", usually when walking to restroom. Utilizes walker at home routinely. New c/o edema to lower ext that daughter states has been a problem. PCP visit was cancelled du eto weather. Ha snot scheduled cardiology appt. Denies pain to calves while walking.     Previous visit:  Has completed PT at [facility] on [address]. Suffers with chronic lower back pain. Admits to improvement to ambulation and R upper ext weakness. Ambulates w/Rolator walker. Continues to live with his daughter who provides assistance. Is able to bathe himself, dresses himself, feeds self. May need some assistance with tying shoes. Appetite good, enjoys sweets. Sleeps well at night. Missed his cardiology appt 4/4/2024. Cataract surgery " cancelled. Denies chest pain or SOB w/ambulation. Denies smoking or alcohol intake Denies any further stroke like symptoms.     Stroke Work-Up    - CTA head/neck 11/1/2023: calcified plaque seen in both vertebral arteries at the base of the skull w/60-70% stenosis of R vertebral artery at the base of the skull and 40% stenosis seen in the L vertebral artery distally. MCAs are patent  - MRI brain w/out Mihai 3/10/2023: bilat thalamic and pontine old lacunar infarcts, chronic microvascular ischemia and atrophy  - NCHCT 2/22/2023: chronic age-related changes. Multiple punctate areas of lacunar infarct seen at bilat basal ganglia  - Echo w/bubble study 7/25/2023: L EF 65%; negative bubble  - nuclear stress test 3/7/2024: abnormal myocardial perfusion scan    Intervention:   IV tPA: no   Interventional Thrombectomy: no    Risk Factors:   Cardiovascular risk factors: Yes hypertension, DM  Tobacco use:   Alcohol use:   Recreational drug use: No    Results for orders placed or performed during the hospital encounter of 09/23/24   Basic metabolic panel    Collection Time: 09/23/24  9:52 AM   Result Value Ref Range    Sodium 137 136 - 145 mmol/L    Potassium 4.3 3.5 - 5.1 mmol/L    Chloride 101 98 - 107 mmol/L    CO2 26 23 - 31 mmol/L    Glucose 180 (H) 82 - 115 mg/dL    Blood Urea Nitrogen 9.2 8.4 - 25.7 mg/dL    Creatinine 0.84 0.73 - 1.18 mg/dL    BUN/Creatinine Ratio 11     Calcium 10.4 (H) 8.8 - 10.0 mg/dL    Anion Gap 10.0 mEq/L    eGFR >60 mL/min/1.73/m2   CBC with Differential    Collection Time: 09/23/24  9:52 AM   Result Value Ref Range    WBC 11.89 (H) 4.50 - 11.50 x10(3)/mcL    RBC 5.37 4.70 - 6.10 x10(6)/mcL    Hgb 14.0 14.0 - 18.0 g/dL    Hct 43.8 42.0 - 52.0 %    MCV 81.6 80.0 - 94.0 fL    MCH 26.1 (L) 27.0 - 31.0 pg    MCHC 32.0 (L) 33.0 - 36.0 g/dL    RDW 16.9 11.5 - 17.0 %    Platelet 293 130 - 400 x10(3)/mcL    MPV 10.3 7.4 - 10.4 fL    Neut % 73.6 %    Lymph % 12.4 %    Mono % 6.6 %    Eos % 6.8 %     Basophil % 0.3 %    Lymph # 1.47 0.6 - 4.6 x10(3)/mcL    Neut # 8.75 2.1 - 9.2 x10(3)/mcL    Mono # 0.78 0.1 - 1.3 x10(3)/mcL    Eos # 0.81 0 - 0.9 x10(3)/mcL    Baso # 0.04 <=0.2 x10(3)/mcL    Imm Gran # 0.04 0 - 0.04 x10(3)/mcL    Imm Grans % 0.3 %    NRBC% 0.0 %   Light Blue Top Hold    Collection Time: 09/23/24 10:18 AM   Result Value Ref Range    Extra Tube Hold for add-ons.    Gold Top Hold    Collection Time: 09/23/24 10:18 AM   Result Value Ref Range    Extra Tube Hold for add-ons.    Pink Top Hold    Collection Time: 09/23/24 10:18 AM   Result Value Ref Range    Extra Tube Hold for add-ons.    Urinalysis, Reflex to Urine Culture    Collection Time: 09/23/24 11:06 AM    Specimen: Urine   Result Value Ref Range    Color, UA Colorless (A) Yellow, Light-Yellow, Dark Yellow, Soni, Straw    Appearance, UA Clear Clear    Specific Gravity, UA 1.006 1.005 - 1.030    pH, UA 6.5 5.0 - 8.5    Protein, UA Negative Negative    Glucose, UA Normal Negative, Normal    Ketones, UA Negative Negative    Blood, UA Negative Negative    Bilirubin, UA Negative Negative    Urobilinogen, UA Normal 0.2, 1.0, Normal    Nitrites, UA Negative Negative    Leukocyte Esterase, UA Negative Negative    RBC, UA 0-5 None Seen, 0-2, 3-5, 0-5 /HPF    WBC, UA 0-5 None Seen, 0-2, 3-5, 0-5 /HPF    Bacteria, UA None Seen None Seen /HPF    Squamous Epithelial Cells, UA Trace (A) None Seen /HPF    Hyaline Casts, UA None Seen None Seen /lpf   Lactic acid, plasma    Collection Time: 09/23/24  2:39 PM   Result Value Ref Range    Lactic Acid Level 2.1 0.5 - 2.2 mmol/L   POCT glucose    Collection Time: 09/23/24  2:44 PM   Result Value Ref Range    POCT Glucose 147 (H) 70 - 110 mg/dL   Lactic Acid, Plasma    Collection Time: 09/23/24  4:29 PM   Result Value Ref Range    Lactic Acid Level 2.5 (H) 0.5 - 2.2 mmol/L   POCT glucose    Collection Time: 09/23/24  5:22 PM   Result Value Ref Range    POCT Glucose 134 (H) 70 - 110 mg/dL   POCT glucose    Collection  Time: 09/23/24  8:04 PM   Result Value Ref Range    POCT Glucose 147 (H) 70 - 110 mg/dL   Lactic Acid, Plasma    Collection Time: 09/23/24  8:40 PM   Result Value Ref Range    Lactic Acid Level 1.5 0.5 - 2.2 mmol/L   CBC with Differential    Collection Time: 09/23/24  8:40 PM   Result Value Ref Range    WBC 11.21 4.50 - 11.50 x10(3)/mcL    RBC 5.19 4.70 - 6.10 x10(6)/mcL    Hgb 13.4 (L) 14.0 - 18.0 g/dL    Hct 42.3 42.0 - 52.0 %    MCV 81.5 80.0 - 94.0 fL    MCH 25.8 (L) 27.0 - 31.0 pg    MCHC 31.7 (L) 33.0 - 36.0 g/dL    RDW 17.2 (H) 11.5 - 17.0 %    Platelet 258 130 - 400 x10(3)/mcL    MPV 9.8 7.4 - 10.4 fL    Neut % 67.6 %    Lymph % 13.8 %    Mono % 6.4 %    Eos % 11.4 %    Basophil % 0.4 %    Lymph # 1.55 0.6 - 4.6 x10(3)/mcL    Neut # 7.57 2.1 - 9.2 x10(3)/mcL    Mono # 0.72 0.1 - 1.3 x10(3)/mcL    Eos # 1.28 (H) 0 - 0.9 x10(3)/mcL    Baso # 0.05 <=0.2 x10(3)/mcL    Imm Gran # 0.04 0 - 0.04 x10(3)/mcL    Imm Grans % 0.4 %    NRBC% 0.0 %   Comprehensive metabolic panel    Collection Time: 09/24/24  4:46 AM   Result Value Ref Range    Sodium 137 136 - 145 mmol/L    Potassium 3.8 3.5 - 5.1 mmol/L    Chloride 104 98 - 107 mmol/L    CO2 27 23 - 31 mmol/L    Glucose 130 (H) 82 - 115 mg/dL    Blood Urea Nitrogen 11.1 8.4 - 25.7 mg/dL    Creatinine 0.71 (L) 0.73 - 1.18 mg/dL    Calcium 8.8 8.8 - 10.0 mg/dL    Protein Total 6.2 5.8 - 7.6 gm/dL    Albumin 2.9 (L) 3.4 - 4.8 g/dL    Globulin 3.3 2.4 - 3.5 gm/dL    Albumin/Globulin Ratio 0.9 (L) 1.1 - 2.0 ratio    Bilirubin Total 0.6 <=1.5 mg/dL    ALP 46 40 - 150 unit/L    ALT 12 0 - 55 unit/L    AST 16 5 - 34 unit/L    eGFR >60 mL/min/1.73/m2    Anion Gap 6.0 mEq/L    BUN/Creatinine Ratio 16    Magnesium    Collection Time: 09/24/24  4:46 AM   Result Value Ref Range    Magnesium Level 1.70 1.60 - 2.60 mg/dL   Phosphorus    Collection Time: 09/24/24  4:46 AM   Result Value Ref Range    Phosphorus Level 3.7 2.3 - 4.7 mg/dL   Lactic Acid, Plasma    Collection Time: 09/24/24   4:46 AM   Result Value Ref Range    Lactic Acid Level 1.1 0.5 - 2.2 mmol/L   CBC with Differential    Collection Time: 09/24/24  4:46 AM   Result Value Ref Range    WBC 11.07 4.50 - 11.50 x10(3)/mcL    RBC 4.51 (L) 4.70 - 6.10 x10(6)/mcL    Hgb 11.7 (L) 14.0 - 18.0 g/dL    Hct 36.8 (L) 42.0 - 52.0 %    MCV 81.6 80.0 - 94.0 fL    MCH 25.9 (L) 27.0 - 31.0 pg    MCHC 31.8 (L) 33.0 - 36.0 g/dL    RDW 17.3 (H) 11.5 - 17.0 %    Platelet 241 130 - 400 x10(3)/mcL    MPV 10.4 7.4 - 10.4 fL    Neut % 65.4 %    Lymph % 16.9 %    Mono % 7.0 %    Eos % 9.9 %    Basophil % 0.5 %    Lymph # 1.87 0.6 - 4.6 x10(3)/mcL    Neut # 7.24 2.1 - 9.2 x10(3)/mcL    Mono # 0.78 0.1 - 1.3 x10(3)/mcL    Eos # 1.10 (H) 0 - 0.9 x10(3)/mcL    Baso # 0.05 <=0.2 x10(3)/mcL    Imm Gran # 0.03 0 - 0.04 x10(3)/mcL    Imm Grans % 0.3 %    NRBC% 0.0 %   EKG 12-lead    Collection Time: 09/24/24  8:09 AM   Result Value Ref Range    QRS Duration 78 ms    [facility] QTC Calculation 401 ms   POCT glucose    Collection Time: 09/24/24  8:58 AM   Result Value Ref Range    POCT Glucose 120 (H) 70 - 110 mg/dL   POCT glucose    Collection Time: 09/24/24 11:08 AM   Result Value Ref Range    POCT Glucose 119 (H) 70 - 110 mg/dL   POCT glucose    Collection Time: 09/24/24  2:19 PM   Result Value Ref Range    POCT Glucose 120 (H) 70 - 110 mg/dL   Specimen to Pathology    Collection Time: 09/24/24  3:27 PM   Result Value Ref Range    Case Report       [facility]                            Case: [id number]                                 Authorizing Provider:  Adalberto Rock MD   Collected:           09/24/2024 03:27 PM          Ordering Location:     [facility] -      Received:            09/25/2024 10:31 AM                                 [facility] [facility]                                                               Pathologist:           Dinorah Cornejo MD                                                        Specimen:    Hernia Sac, Right  "Inguinal                                                                 Clinical Information       Procedure:  REPAIR, HERNIA, INGUINAL - Right  LAPAROSCOPY, DIAGNOSTIC  Pre-op Diagnosis:  K40.90 - Right inguinal hernia [ICD-10-CM]  Post-op Diagnosis:  K40.90 - Right inguinal hernia [ICD-10-CM]      Final Diagnosis         Soft tissue from inguinal region, excision:  - Hernia sac.      Microscopic Description       A microscopic examination was performed and the diagnosis reflects the findings.          Gross Description       1. Hernia Sac, Right Inguinal:   Received in 10% buffered neutral formalin is a single irregularly shaped fragment of red-tan fibromembranous tissue measuring 4.6 x 1.2 x 0.3 cm.  Representative sections submitted in a single cassette.      Report Footnotes       Unless the case is a "gross only" or additional testing only, the final diagnosis for each specimen is based on a microscopic examination of appropriate tissue sections.     POCT glucose    Collection Time: 09/24/24  4:34 PM   Result Value Ref Range    POCT Glucose 124 (H) 70 - 110 mg/dL   Lactic Acid, Plasma    Collection Time: 09/24/24  5:32 PM   Result Value Ref Range    Lactic Acid Level 2.0 0.5 - 2.2 mmol/L   POCT glucose    Collection Time: 09/24/24  8:37 PM   Result Value Ref Range    POCT Glucose 172 (H) 70 - 110 mg/dL   Comprehensive metabolic panel    Collection Time: 09/25/24  3:57 AM   Result Value Ref Range    Sodium 138 136 - 145 mmol/L    Potassium 3.8 3.5 - 5.1 mmol/L    Chloride 105 98 - 107 mmol/L    CO2 25 23 - 31 mmol/L    Glucose 196 (H) 82 - 115 mg/dL    Blood Urea Nitrogen 16.8 8.4 - 25.7 mg/dL    Creatinine 0.70 (L) 0.73 - 1.18 mg/dL    Calcium 8.3 (L) 8.8 - 10.0 mg/dL    Protein Total 5.7 (L) 5.8 - 7.6 gm/dL    Albumin 2.7 (L) 3.4 - 4.8 g/dL    Globulin 3.0 2.4 - 3.5 gm/dL    Albumin/Globulin Ratio 0.9 (L) 1.1 - 2.0 ratio    Bilirubin Total 0.6 <=1.5 mg/dL    ALP 42 40 - 150 unit/L    ALT 11 0 - 55 unit/L    " AST 16 5 - 34 unit/L    eGFR >60 mL/min/1.73/m2    Anion Gap 8.0 mEq/L    BUN/Creatinine Ratio 24    Magnesium    Collection Time: 09/25/24  3:57 AM   Result Value Ref Range    Magnesium Level 1.50 (L) 1.60 - 2.60 mg/dL   Phosphorus    Collection Time: 09/25/24  3:57 AM   Result Value Ref Range    Phosphorus Level 3.6 2.3 - 4.7 mg/dL   CBC with Differential    Collection Time: 09/25/24  3:57 AM   Result Value Ref Range    WBC 9.43 4.50 - 11.50 x10(3)/mcL    RBC 3.98 (L) 4.70 - 6.10 x10(6)/mcL    Hgb 10.4 (L) 14.0 - 18.0 g/dL    Hct 32.2 (L) 42.0 - 52.0 %    MCV 80.9 80.0 - 94.0 fL    MCH 26.1 (L) 27.0 - 31.0 pg    MCHC 32.3 (L) 33.0 - 36.0 g/dL    RDW 17.2 (H) 11.5 - 17.0 %    Platelet 221 130 - 400 x10(3)/mcL    MPV 10.8 (H) 7.4 - 10.4 fL    Neut % 88.3 %    Lymph % 6.7 %    Mono % 4.5 %    Eos % 0.0 %    Basophil % 0.1 %    Lymph # 0.63 0.6 - 4.6 x10(3)/mcL    Neut # 8.33 2.1 - 9.2 x10(3)/mcL    Mono # 0.42 0.1 - 1.3 x10(3)/mcL    Eos # 0.00 0 - 0.9 x10(3)/mcL    Baso # 0.01 <=0.2 x10(3)/mcL    Imm Gran # 0.04 0 - 0.04 x10(3)/mcL    Imm Grans % 0.4 %    NRBC% 0.0 %   POCT glucose    Collection Time: 09/25/24  7:36 AM   Result Value Ref Range    POCT Glucose 154 (H) 70 - 110 mg/dL   POCT glucose    Collection Time: 09/25/24 12:09 PM   Result Value Ref Range    POCT Glucose 168 (H) 70 - 110 mg/dL   POCT glucose    Collection Time: 09/25/24  4:56 PM   Result Value Ref Range    POCT Glucose 129 (H) 70 - 110 mg/dL   POCT glucose    Collection Time: 09/25/24  8:13 PM   Result Value Ref Range    POCT Glucose 172 (H) 70 - 110 mg/dL   CBC with Differential    Collection Time: 09/26/24  3:34 AM   Result Value Ref Range    WBC 9.55 4.50 - 11.50 x10(3)/mcL    RBC 3.26 (L) 4.70 - 6.10 x10(6)/mcL    Hgb 8.5 (L) 14.0 - 18.0 g/dL    Hct 26.8 (L) 42.0 - 52.0 %    MCV 82.2 80.0 - 94.0 fL    MCH 26.1 (L) 27.0 - 31.0 pg    MCHC 31.7 (L) 33.0 - 36.0 g/dL    RDW 17.6 (H) 11.5 - 17.0 %    Platelet 180 130 - 400 x10(3)/mcL    MPV 10.2  7.4 - 10.4 fL    Neut % 54.9 %    Lymph % 24.3 %    Mono % 7.2 %    Eos % 12.9 %    Basophil % 0.4 %    Lymph # 2.32 0.6 - 4.6 x10(3)/mcL    Neut # 5.24 2.1 - 9.2 x10(3)/mcL    Mono # 0.69 0.1 - 1.3 x10(3)/mcL    Eos # 1.23 (H) 0 - 0.9 x10(3)/mcL    Baso # 0.04 <=0.2 x10(3)/mcL    Imm Gran # 0.03 0 - 0.04 x10(3)/mcL    Imm Grans % 0.3 %    NRBC% 0.0 %   Comprehensive metabolic panel    Collection Time: 09/26/24  3:35 AM   Result Value Ref Range    Sodium 137 136 - 145 mmol/L    Potassium 3.6 3.5 - 5.1 mmol/L    Chloride 106 98 - 107 mmol/L    CO2 28 23 - 31 mmol/L    Glucose 131 (H) 82 - 115 mg/dL    Blood Urea Nitrogen 19.7 8.4 - 25.7 mg/dL    Creatinine 0.76 0.73 - 1.18 mg/dL    Calcium 8.0 (L) 8.8 - 10.0 mg/dL    Protein Total 5.2 (L) 5.8 - 7.6 gm/dL    Albumin 2.5 (L) 3.4 - 4.8 g/dL    Globulin 2.7 2.4 - 3.5 gm/dL    Albumin/Globulin Ratio 0.9 (L) 1.1 - 2.0 ratio    Bilirubin Total 0.6 <=1.5 mg/dL    ALP 38 (L) 40 - 150 unit/L    ALT 11 0 - 55 unit/L    AST 17 5 - 34 unit/L    eGFR >60 mL/min/1.73/m2    Anion Gap 3.0 mEq/L    BUN/Creatinine Ratio 26    Magnesium    Collection Time: 09/26/24  3:35 AM   Result Value Ref Range    Magnesium Level 1.60 1.60 - 2.60 mg/dL   Phosphorus    Collection Time: 09/26/24  3:35 AM   Result Value Ref Range    Phosphorus Level 2.6 2.3 - 4.7 mg/dL   POCT glucose    Collection Time: 09/26/24  7:25 AM   Result Value Ref Range    POCT Glucose 133 (H) 70 - 110 mg/dL   POCT glucose    Collection Time: 09/26/24 11:34 AM   Result Value Ref Range    POCT Glucose 141 (H) 70 - 110 mg/dL   POCT glucose    Collection Time: 09/26/24  5:02 PM   Result Value Ref Range    POCT Glucose 163 (H) 70 - 110 mg/dL   POCT glucose    Collection Time: 09/26/24  8:30 PM   Result Value Ref Range    POCT Glucose 129 (H) 70 - 110 mg/dL   Comprehensive metabolic panel    Collection Time: 09/27/24  3:27 AM   Result Value Ref Range    Sodium 137 136 - 145 mmol/L    Potassium 3.9 3.5 - 5.1 mmol/L    Chloride 104  98 - 107 mmol/L    CO2 27 23 - 31 mmol/L    Glucose 138 (H) 82 - 115 mg/dL    Blood Urea Nitrogen 16.6 8.4 - 25.7 mg/dL    Creatinine 0.64 (L) 0.73 - 1.18 mg/dL    Calcium 8.3 (L) 8.8 - 10.0 mg/dL    Protein Total 5.8 5.8 - 7.6 gm/dL    Albumin 2.7 (L) 3.4 - 4.8 g/dL    Globulin 3.1 2.4 - 3.5 gm/dL    Albumin/Globulin Ratio 0.9 (L) 1.1 - 2.0 ratio    Bilirubin Total 0.6 <=1.5 mg/dL    ALP 41 40 - 150 unit/L    ALT 16 0 - 55 unit/L    AST 21 5 - 34 unit/L    eGFR >60 mL/min/1.73/m2    Anion Gap 6.0 mEq/L    BUN/Creatinine Ratio 26    Magnesium    Collection Time: 09/27/24  3:27 AM   Result Value Ref Range    Magnesium Level 1.60 1.60 - 2.60 mg/dL   Phosphorus    Collection Time: 09/27/24  3:27 AM   Result Value Ref Range    Phosphorus Level 2.7 2.3 - 4.7 mg/dL   CBC with Differential    Collection Time: 09/27/24  3:27 AM   Result Value Ref Range    WBC 11.39 4.50 - 11.50 x10(3)/mcL    RBC 3.48 (L) 4.70 - 6.10 x10(6)/mcL    Hgb 9.0 (L) 14.0 - 18.0 g/dL    Hct 28.6 (L) 42.0 - 52.0 %    MCV 82.2 80.0 - 94.0 fL    MCH 25.9 (L) 27.0 - 31.0 pg    MCHC 31.5 (L) 33.0 - 36.0 g/dL    RDW 17.8 (H) 11.5 - 17.0 %    Platelet 189 130 - 400 x10(3)/mcL    MPV 10.8 (H) 7.4 - 10.4 fL    NRBC% 0.0 %   Manual Differential    Collection Time: 09/27/24  3:27 AM   Result Value Ref Range    Neutrophils % 66 47 - 80 %    Lymphs % 7 (L) 13 - 40 %    Monocytes % 1 (L) 2 - 11 %    Eosinophils % 25 (H) 0 - 8 %    Basophils % 1 0 - 2 %    Neutrophils Abs Calc 7.5174 2.1 - 9.2 x10(3)/mcL    Basophils Abs 0.1139 0 - 0.2 x10(3)/mcL    Lymphs Abs 0.7973 0.6 - 4.6 x10(3)/mcL    Eosinophils Abs 2.8475 (H) 0 - 0.9 x10(3)/mcL    Monocytes Abs 0.1139 0.1 - 1.3 x10(3)/mcL    Platelets Adequate Normal, Adequate    RBC Morph Abnormal (A) Normal    Anisocytosis 1+ (A) (none)   POCT glucose    Collection Time: 09/27/24  7:24 AM   Result Value Ref Range    POCT Glucose 132 (H) 70 - 110 mg/dL   POCT glucose    Collection Time: 09/27/24 10:52 AM   Result Value  Ref Range    POCT Glucose 187 (H) 70 - 110 mg/dL       Review of Systems   As per HPI. All other systems negative     Objective:      Physical Exam  Constitutional:       Appearance: Normal appearance. He is normal weight.   HENT:      Head: Normocephalic.      Right Ear: External ear normal.      Left Ear: External ear normal.      Nose: Nose normal.      Mouth/Throat:      Mouth: Mucous membranes are moist.   Eyes:      Pupils: Pupils are equal, round, and reactive to light.   Cardiovascular:      Rate and Rhythm: Normal rate and regular rhythm.      Pulses: Normal pulses.      Heart sounds: Normal heart sounds.   Pulmonary:      Effort: Pulmonary effort is normal.   Abdominal:      General: Abdomen is flat.   Musculoskeletal:         General: Swelling present. Normal range of motion.      Cervical back: Normal range of motion.      Comments: +2 pitting bilat lower ext   Skin:     General: Skin is warm and dry.   Neurological:      General: No focal deficit present.      Mental Status: He is alert.   Psychiatric:         Mood and Affect: Mood normal.         Comprehensive Neurological Exam:  Mental Status: Alert Oriented to Self, Date, and Place. Naming and repetition wnl. Comprehension wnl. No dysarthria.   CN II - XII: JUVENCIO, No APD, cataract to L eye, difficulty assessing VA as Pt turned head to L when assessing L side, VFFC, pterygium OS, lid lag bilaterally, EOMI without nystagmus, LT/Temp symmetric in CN V1-3 distribution, Hearing grossly intact, Face Symmetric, Tongue and Uvula midline, Trapezius symmetric bilateral.   Motor: tone and bulk wnl throughout, no abnormal involuntary or voluntary movements, 5/5 to confrontation, Fine finger movements wnl b/l (slow), No pronator drift.   Sensory: LT, Proprioception, Vibration, PP, Temp symmetric   Reflexes: 2+ throughout  Cerebellar: FNF wnl b/l  Romberg: unable to assess  Gait: short stride, narrow gait, 6 step shuffling turn, slow, careful, utilizing Rolator  walker, slightly stooped posture    Assessment:       This is a 75 YO Italian R handed male with a PMX of HTN, DM  was referred by his PCP for stroke history. CT and MRI brain performed. Carotid U/S, Echo were also ordered and still pending, Pt no-showed for all three appts. PCP visit note indicates that Pt's son reports that Pt had difficulty walking 7 mths prior to being seen in 2/22/2023. Son also noted that patient's suffered w/a facial droop. Today, Pt c/o swelling to bilat lower ext that has been going on for quite some time. Appt with PCP rescheduled due to weather. Has not f/u with cardiology. Admits to frequent falls since last visit. Continues to utilizes rolling walker. Difficulty with balance.     1. History of CVA (cerebrovascular accident)  - Ambulatory Referral/Consult to Physical Therapy/Occupational Therapy; Future    2. Spinal stenosis of lumbar region with neurogenic claudication    3. Language barrier to communication    4. Bilateral lower extremity edema  - CV Ultrasound doppler venous legs bilat; Future    5. Frequent falls  - Ambulatory Referral/Consult to Physical Therapy/Occupational Therapy; Future        Plan:       [] c/w ASA 81mg daily  [] c/w statin  [] needs to f/u with PCP regarding gabapentin possibly causing bilat lower ext edema  [] carotid U/S to complete CVA work up  [] U/S bilat lower ext for bilat lower ext edema  [] must f/u with cardiology and set up appt  [] order physical therapy at [facility] for frequency falls    RTC 9 mths    Stroke Education Provided - including DASH diet, Blood Pressure Control < 130/90, HgA1c < 7.0%, LDL < 100, sleep apnea, and physical activity of at least 150 min per week     I have explained the treatment plan, diagnosis, and prognosis to patient. All questions are answered to the best of my knowledge.     Face to face time 30 minutes, including documentation, chart review, counseling, education, review of test results, relevant medical records,  and coordination of care.     Stacie Newman, NP-C  General Neurology              right index

## 2025-03-24 ENCOUNTER — TELEPHONE (OUTPATIENT)
Dept: NEUROLOGY | Facility: CLINIC | Age: 75
End: 2025-03-24
Payer: MEDICAID

## 2025-03-24 NOTE — TELEPHONE ENCOUNTER
Spoke with pt daughter in reference to ultrasounds she informed me they are scheduled for 3/25/2025@1pm at [facility]

## 2025-03-26 ENCOUNTER — HOSPITAL ENCOUNTER (OUTPATIENT)
Dept: RADIOLOGY | Facility: HOSPITAL | Age: 75
Discharge: HOME | End: 2025-03-26
Attending: NURSE PRACTITIONER
Payer: MEDICAID

## 2025-03-26 DIAGNOSIS — Z86.73 HISTORY OF CVA (CEREBROVASCULAR ACCIDENT): Chronic | ICD-10-CM

## 2025-03-26 DIAGNOSIS — R60.0 BILATERAL LOWER EXTREMITY EDEMA: ICD-10-CM

## 2025-03-26 PROCEDURE — 93880 EXTRACRANIAL BILAT STUDY: CPT

## 2025-03-26 PROCEDURE — 93970 EXTREMITY STUDY: CPT

## 2025-03-27 ENCOUNTER — RESULTS FOLLOW-UP (OUTPATIENT)
Dept: NEUROLOGY | Facility: CLINIC | Age: 75
End: 2025-03-27

## 2025-03-27 ENCOUNTER — TELEPHONE (OUTPATIENT)
Dept: NEUROLOGY | Facility: CLINIC | Age: 75
End: 2025-03-27
Payer: MEDICAID

## 2025-03-27 LAB
LEFT CCA DIST DIAS: 16 CM/S
LEFT CCA DIST SYS: 75 CM/S
LEFT CCA PROX DIAS: 25 CM/S
LEFT CCA PROX SYS: 144 CM/S
LEFT ECA DIAS: 5 CM/S
LEFT ECA SYS: 100 CM/S
LEFT ICA DIST DIAS: 22 CM/S
LEFT ICA DIST SYS: 69 CM/S
LEFT ICA MID DIAS: 18 CM/S
LEFT ICA MID SYS: 58 CM/S
LEFT ICA PROX DIAS: 13 CM/S
LEFT ICA PROX SYS: 56 CM/S
LEFT VERTEBRAL DIAS: 13 CM/S
LEFT VERTEBRAL SYS: 39 CM/S
OHS CV CAROTID RIGHT ICA EDV HIGHEST: 34
OHS CV CAROTID ULTRASOUND LEFT ICA/CCA RATIO: 0.92
OHS CV CAROTID ULTRASOUND RIGHT ICA/CCA RATIO: 1.14
OHS CV PV CAROTID LEFT HIGHEST CCA: 144
OHS CV PV CAROTID LEFT HIGHEST ICA: 69
OHS CV PV CAROTID RIGHT HIGHEST CCA: 92
OHS CV PV CAROTID RIGHT HIGHEST ICA: 90
OHS CV US CAROTID LEFT HIGHEST EDV: 22
RIGHT CCA DIST DIAS: 22 CM/S
RIGHT CCA DIST SYS: 79 CM/S
RIGHT CCA PROX DIAS: 17 CM/S
RIGHT CCA PROX SYS: 92 CM/S
RIGHT ECA DIAS: 0 CM/S
RIGHT ECA SYS: 105 CM/S
RIGHT ICA DIST DIAS: 34 CM/S
RIGHT ICA DIST SYS: 90 CM/S
RIGHT ICA MID DIAS: 18 CM/S
RIGHT ICA MID SYS: 57 CM/S
RIGHT ICA PROX DIAS: 10 CM/S
RIGHT ICA PROX SYS: 46 CM/S
RIGHT VERTEBRAL DIAS: 7 CM/S
RIGHT VERTEBRAL SYS: 31 CM/S

## 2025-03-27 NOTE — TELEPHONE ENCOUNTER
Called and left message on Pt's daughter's VM to return my call so I can give results of carotid and bilat lower ext U/S. For our information, both negative for stenosis

## 2025-03-28 ENCOUNTER — TELEPHONE (OUTPATIENT)
Dept: NEUROLOGY | Facility: CLINIC | Age: 75
End: 2025-03-28
Payer: MEDICAID

## 2025-03-28 NOTE — TELEPHONE ENCOUNTER
----- Message from Elida sent at 3/28/2025  9:46 AM CDT -----  Regarding: return call about results  Pt daughter returned Ms Quinones call. [phone]

## 2025-04-01 ENCOUNTER — PATIENT MESSAGE (OUTPATIENT)
Dept: NEUROLOGY | Facility: CLINIC | Age: 75
End: 2025-04-01
Payer: MEDICAID

## 2025-04-02 ENCOUNTER — OFFICE VISIT (OUTPATIENT)
Dept: FAMILY MEDICINE | Facility: CLINIC | Age: 75
End: 2025-04-02
Payer: MEDICAID

## 2025-04-02 ENCOUNTER — TELEPHONE (OUTPATIENT)
Dept: FAMILY MEDICINE | Facility: CLINIC | Age: 75
End: 2025-04-02

## 2025-04-02 VITALS
SYSTOLIC BLOOD PRESSURE: 122 MMHG | HEART RATE: 72 BPM | BODY MASS INDEX: 25.72 KG/M2 | WEIGHT: 131 LBS | HEIGHT: 60 IN | OXYGEN SATURATION: 99 % | DIASTOLIC BLOOD PRESSURE: 74 MMHG | TEMPERATURE: 98 F

## 2025-04-02 DIAGNOSIS — I10 PRIMARY HYPERTENSION: Primary | ICD-10-CM

## 2025-04-02 DIAGNOSIS — Z23 ENCOUNTER FOR IMMUNIZATION: ICD-10-CM

## 2025-04-02 DIAGNOSIS — E11.42 TYPE 2 DIABETES MELLITUS WITH DIABETIC POLYNEUROPATHY, WITHOUT LONG-TERM CURRENT USE OF INSULIN: ICD-10-CM

## 2025-04-02 LAB
ALBUMIN SERPL-MCNC: 4 G/DL (ref 3.4–4.8)
ALBUMIN/GLOB SERPL: 0.9 RATIO (ref 1.1–2)
ALP SERPL-CCNC: 129 UNIT/L (ref 40–150)
ALT SERPL-CCNC: 136 UNIT/L (ref 0–55)
ANION GAP SERPL CALC-SCNC: 6 MEQ/L
AST SERPL-CCNC: 54 UNIT/L (ref 11–45)
BILIRUB SERPL-MCNC: 0.7 MG/DL
BUN SERPL-MCNC: 15.7 MG/DL (ref 8.4–25.7)
CALCIUM SERPL-MCNC: 9.6 MG/DL (ref 8.8–10)
CHLORIDE SERPL-SCNC: 105 MMOL/L (ref 98–107)
CHOLEST SERPL-MCNC: 143 MG/DL
CHOLEST/HDLC SERPL: 2 {RATIO} (ref 0–5)
CO2 SERPL-SCNC: 26 MMOL/L (ref 23–31)
CREAT SERPL-MCNC: 0.8 MG/DL (ref 0.72–1.25)
CREAT/UREA NIT SERPL: 20
EST. AVERAGE GLUCOSE BLD GHB EST-MCNC: 188.6 MG/DL
GFR SERPLBLD CREATININE-BSD FMLA CKD-EPI: >60 ML/MIN/1.73/M2
GLOBULIN SER-MCNC: 4.6 GM/DL (ref 2.4–3.5)
GLUCOSE SERPL-MCNC: 171 MG/DL (ref 82–115)
HBA1C MFR BLD: 8.2 %
HDLC SERPL-MCNC: 67 MG/DL (ref 35–60)
LDLC SERPL CALC-MCNC: 60 MG/DL (ref 50–140)
POTASSIUM SERPL-SCNC: 4.1 MMOL/L (ref 3.5–5.1)
PROT SERPL-MCNC: 8.6 GM/DL (ref 5.8–7.6)
SODIUM SERPL-SCNC: 137 MMOL/L (ref 136–145)
TRIGL SERPL-MCNC: 78 MG/DL (ref 34–140)
VLDLC SERPL CALC-MCNC: 16 MG/DL

## 2025-04-02 PROCEDURE — 80061 LIPID PANEL: CPT

## 2025-04-02 PROCEDURE — 90653 IIV ADJUVANT VACCINE IM: CPT | Mod: PBBFAC,PN

## 2025-04-02 PROCEDURE — 36415 COLL VENOUS BLD VENIPUNCTURE: CPT

## 2025-04-02 PROCEDURE — 80053 COMPREHEN METABOLIC PANEL: CPT

## 2025-04-02 PROCEDURE — 3074F SYST BP LT 130 MM HG: CPT | Mod: CPTII,,,

## 2025-04-02 PROCEDURE — 1101F PT FALLS ASSESS-DOCD LE1/YR: CPT | Mod: CPTII,,,

## 2025-04-02 PROCEDURE — 1125F AMNT PAIN NOTED PAIN PRSNT: CPT | Mod: CPTII,,,

## 2025-04-02 PROCEDURE — 3288F FALL RISK ASSESSMENT DOCD: CPT | Mod: CPTII,,,

## 2025-04-02 PROCEDURE — 3078F DIAST BP <80 MM HG: CPT | Mod: CPTII,,,

## 2025-04-02 PROCEDURE — 90471 IMMUNIZATION ADMIN: CPT | Mod: PBBFAC,PN

## 2025-04-02 PROCEDURE — 99213 OFFICE O/P EST LOW 20 MIN: CPT | Mod: S$PBB,,,

## 2025-04-02 PROCEDURE — 1159F MED LIST DOCD IN RCRD: CPT | Mod: CPTII,,,

## 2025-04-02 PROCEDURE — 99214 OFFICE O/P EST MOD 30 MIN: CPT | Mod: PBBFAC,PN

## 2025-04-02 PROCEDURE — 83036 HEMOGLOBIN GLYCOSYLATED A1C: CPT

## 2025-04-02 RX ADMIN — INFLUENZA A VIRUS A/VICTORIA/4897/2022 IVR-238 (H1N1) ANTIGEN (FORMALDEHYDE INACTIVATED), INFLUENZA A VIRUS A/THAILAND/8/2022 IVR-237 (H3N2) ANTIGEN (FORMALDEHYDE INACTIVATED), INFLUENZA B VIRUS B/AUSTRIA/1359417/2021 BVR-26 ANTIGEN (FORMALDEHYDE INACTIVATED) 0.5 ML: 15; 15; 15 INJECTION, SUSPENSION INTRAMUSCULAR at 10:04

## 2025-04-02 NOTE — ASSESSMENT & PLAN NOTE
Continue Jardiance and metformin.  Check A1c today.    Lab Results   Component Value Date    HGBA1C 7.4 (H) 05/07/2024    HGBA1C 6.7 12/07/2023    LDL 56.00 05/07/2024    CREATININE 0.64 (L) 09/27/2024      Diabetes Medications              empagliflozin (JARDIANCE) 25 mg tablet Take 1 tablet (25 mg total) by mouth once daily.    metFORMIN (GLUMETZA) 1000 MG (MOD) 24hr tablet Take 1 tablet (1,000 mg total) by mouth 2 (two) times daily with meals.          On ACE and Statin according to guidelines.  Discussed caution with SGLT2s + diuretics as concomitant use can cause volume depletion. Discussed caution with DPP-Ivs and HF risk.  Follow ADA Diet. Avoid soda, sweets, and limit carbs (no more than 45-50 grams per meal).  Maintain healthy weight with goal BMI <30.  Exercise 5 times per week for 30 minutes per day.  Stressed importance of daily foot exams.  Patient to make appointment for eye exam

## 2025-04-02 NOTE — TELEPHONE ENCOUNTER
Patients CBC has spiked so the lab is requesting a new cbc be reordered to make sure these numbers are accurate.     Need to order a new CBC that will need to be recollected.

## 2025-04-02 NOTE — PROGRESS NOTES
[facility]  Tania Boswell MD    Patient ID: [id number]     Chief Complaint: Annual Exam (Back pain 6. Complains of swelling in his leg that has been going on for a while. Needs med refills. )      HPI:     William Mccauley is a 74 y.o. male here today for follow up.    CVA  Patient has a history of thalamic and pontine lacunar strokes bilaterally.  He is followed by Neurology.  Bilateral ICAs patent with less than 50% stenosis.  No intracardiac shunt on echo bubble study.    Type 2 diabetes  He is on metformin 1000 mg b.i.d. and Jardiance 25 mg daily.    Patient has a positive Cologuard.  He was referred to GI    Past Medical History:   Diagnosis Date    Diabetes mellitus, type 2     HTN (hypertension)     Ptosis     Sciatica     Stroke     Type 2 diabetes mellitus without complications         Past Surgical History:   Procedure Laterality Date    DIAGNOSTIC LAPAROSCOPY N/A 9/24/2024    Procedure: LAPAROSCOPY, DIAGNOSTIC;  Surgeon: Adalberto Rock MD;  Location: [facility];  Service: General;  Laterality: N/A;    KIDNEY STONE SURGERY Left     REPAIR, HERNIA, INGUINAL Right 9/24/2024    Procedure: REPAIR, HERNIA, INGUINAL;  Surgeon: Adalberto Rock MD;  Location: [facility];  Service: General;  Laterality: Right;  Incarcerated Hernia        Social History     Tobacco Use    Smoking status: Never     Passive exposure: Never    Smokeless tobacco: Never   Substance and Sexual Activity    Alcohol use: Never     Alcohol/week: 1.0 standard drink of alcohol     Types: 1 Cans of beer per week    Drug use: Never    Sexual activity: Not Currently     Partners: Female        Current Outpatient Medications   Medication Instructions    aspirin (ECOTRIN) 81 mg, Oral, Daily    atorvastatin (LIPITOR) 40 mg, Oral, Daily    empagliflozin (JARDIANCE) 25 mg, Oral, Daily    gabapentin (NEURONTIN) 600 mg, Oral, 3 times daily    ketoconazole (NIZORAL) 2 % shampoo Topical (Top), Twice weekly    losartan (COZAAR) 25 mg, Oral,  Daily    metFORMIN (GLUMETZA) 1,000 mg, Oral, 2 times daily with meals    polyethylene glycol (GLYCOLAX) 17 gram/dose powder Oral       Review of patient's allergies indicates:  No Known Allergies     Patient Care Team:  Shannon Alonso MD as PCP - General (Family Medicine)  Suzanne Ling LPN as Care Coordinator  Shannon Alonso MD (Family Medicine)     Subjective:     Review of Systems    12 point review of systems conducted, negative except as stated in the history of present illness. See HPI for details.    Objective:     Visit Vitals  /74 (BP Location: Right arm, Patient Position: Sitting)   Pulse 72   Temp 98.2 °F (36.8 °C) (Oral)   Ht 5' (1.524 m)   Wt 59.4 kg (131 lb)   SpO2 99%   BMI 25.58 kg/m²       Physical Exam  Vitals and nursing note reviewed.   Constitutional:       General: He is not in acute distress.     Appearance: He is not diaphoretic.      Comments: Patient is sitting upright in chair.  Drowsy, falls asleep at times.   HENT:      Head: Normocephalic and atraumatic.      Mouth/Throat:      Mouth: Mucous membranes are moist.      Pharynx: Oropharynx is clear.   Eyes:      Extraocular Movements: Extraocular movements intact.      Conjunctiva/sclera: Conjunctivae normal.   Cardiovascular:      Rate and Rhythm: Normal rate and regular rhythm.      Pulses: Normal pulses.           Dorsalis pedis pulses are 2+ on the right side and 2+ on the left side.        Posterior tibial pulses are 2+ on the right side and 2+ on the left side.      Heart sounds: No murmur heard.  Pulmonary:      Effort: Pulmonary effort is normal. No respiratory distress.      Breath sounds: Normal breath sounds. No wheezing, rhonchi or rales.   Musculoskeletal:      Right lower leg: No edema.      Left lower leg: No edema.      Right foot: No deformity.      Left foot: No deformity.   Feet:      Right foot:      Protective Sensation: 5 sites tested.  5 sites sensed.      Skin integrity: Skin integrity  normal. No ulcer, blister, skin breakdown, erythema, warmth, callus, dry skin or fissure.      Toenail Condition: Right toenails are normal.      Left foot:      Protective Sensation: 5 sites tested.  5 sites sensed.      Skin integrity: Skin integrity normal. No ulcer, blister, skin breakdown, erythema, warmth, callus, dry skin or fissure.      Toenail Condition: Left toenails are normal.   Skin:     General: Skin is warm and dry.   Neurological:      General: No focal deficit present.      Mental Status: He is oriented to person, place, and time. Mental status is at baseline.   Psychiatric:         Mood and Affect: Mood normal.         Labs Reviewed:     Chemistry:  Lab Results   Component Value Date     09/27/2024    K 3.9 09/27/2024    BUN 16.6 09/27/2024    CREATININE 0.64 (L) 09/27/2024    EGFRNORACEVR >60 09/27/2024    GLUCOSE 138 (H) 09/27/2024    CALCIUM 8.3 (L) 09/27/2024    ALKPHOS 41 09/27/2024    LABPROT 5.8 09/27/2024    ALBUMIN 2.7 (L) 09/27/2024    AST 21 09/27/2024    ALT 16 09/27/2024    MG 1.60 09/27/2024    PHOS 2.7 09/27/2024    XDVVDZGQ64NY 32.1 05/07/2024    TSH 0.873 05/07/2024    DMOKPT9IFEM 0.95 05/07/2024    PSA 0.26 05/07/2024        Lab Results   Component Value Date    HGBA1C 7.4 (H) 05/07/2024        Hematology:  Lab Results   Component Value Date    WBC 11.39 09/27/2024    HGB 9.0 (L) 09/27/2024    HCT 28.6 (L) 09/27/2024     09/27/2024       Lipid Panel:  Lab Results   Component Value Date    CHOL 130 05/07/2024    HDL 63 (H) 05/07/2024    LDL 56.00 05/07/2024    TRIG 55 05/07/2024    TOTALCHOLEST 2 05/07/2024        Urine:  Lab Results   Component Value Date    APPEARANCEUA Clear 09/23/2024    SGUA 1.006 09/23/2024    PROTEINUA Negative 09/23/2024    KETONESUA Negative 09/23/2024    LEUKOCYTESUR Negative 09/23/2024    RBCUA 0-5 09/23/2024    WBCUA 0-5 09/23/2024    BACTERIA None Seen 09/23/2024    SQEPUA Trace (A) 09/23/2024    HYALINECASTS None Seen 09/23/2024     JAYASHREE 71.1 05/07/2024        Assessment:       ICD-10-CM ICD-9-CM   1. Primary hypertension  I10 401.9   2. Type 2 diabetes mellitus with diabetic polyneuropathy, without long-term current use of insulin  E11.42 250.60     357.2   3. Encounter for immunization  Z23 V03.89        Plan:     1. Primary hypertension  Assessment & Plan:  Well controlled.  Continue below regimen  Hypertension Medications              losartan (COZAAR) 25 MG tablet Take 1 tablet (25 mg total) by mouth once daily.          AHA/ACC goal <130/80. JNC8 goal <140/90 (all ages if DM or CKD OR <60), <150/90 (>60 w/o CKD or DM)  Low Sodium Diet (DASH Diet - Less than 2 grams of sodium per day).  Monitor blood pressure daily and log. Report consistent numbers greater than 140/90.  Maintain healthy weight with goal BMI <30. Exercise 30 minutes per day, 5 days per week.  Smoking cessation encouraged to aid in BP reduction.      Orders:  -     CBC Auto Differential  -     Comprehensive Metabolic Panel    2. Type 2 diabetes mellitus with diabetic polyneuropathy, without long-term current use of insulin  Assessment & Plan:  Continue Jardiance and metformin.  Check A1c today.    Lab Results   Component Value Date    HGBA1C 7.4 (H) 05/07/2024    HGBA1C 6.7 12/07/2023    LDL 56.00 05/07/2024    CREATININE 0.64 (L) 09/27/2024      Diabetes Medications              empagliflozin (JARDIANCE) 25 mg tablet Take 1 tablet (25 mg total) by mouth once daily.    metFORMIN (GLUMETZA) 1000 MG (MOD) 24hr tablet Take 1 tablet (1,000 mg total) by mouth 2 (two) times daily with meals.          On ACE and Statin according to guidelines.  Discussed caution with SGLT2s + diuretics as concomitant use can cause volume depletion. Discussed caution with DPP-Ivs and HF risk.  Follow ADA Diet. Avoid soda, sweets, and limit carbs (no more than 45-50 grams per meal).  Maintain healthy weight with goal BMI <30.  Exercise 5 times per week for 30 minutes per day.  Stressed  importance of daily foot exams.  Patient to make appointment for eye exam        Orders:  -     Lipid Panel  -     Hemoglobin A1C    3. Encounter for immunization  -     influenza (adjuvanted) (Fluad) 45 mcg/0.5 mL IM vaccine (> or = 66 yo) 0.5 mL         Follow up in about 3 months (around 7/2/2025). In addition to their scheduled follow up, the patient has also been instructed to follow up on as needed basis.     Future Appointments   Date Time Provider Department Center   4/22/2025  3:30 PM Be Rahman MD [facility] CARD [place]    11/5/2025  9:30 AM Stacie Newman ANP [facility] NEURO [place]        Tania Boswell MD

## 2025-04-02 NOTE — ASSESSMENT & PLAN NOTE
Well controlled.  Continue below regimen  Hypertension Medications              losartan (COZAAR) 25 MG tablet Take 1 tablet (25 mg total) by mouth once daily.          AHA/ACC goal <130/80. JNC8 goal <140/90 (all ages if DM or CKD OR <60), <150/90 (>60 w/o CKD or DM)  Low Sodium Diet (DASH Diet - Less than 2 grams of sodium per day).  Monitor blood pressure daily and log. Report consistent numbers greater than 140/90.  Maintain healthy weight with goal BMI <30. Exercise 30 minutes per day, 5 days per week.  Smoking cessation encouraged to aid in BP reduction.

## 2025-04-03 ENCOUNTER — RESULTS FOLLOW-UP (OUTPATIENT)
Dept: FAMILY MEDICINE | Facility: CLINIC | Age: 75
End: 2025-04-03

## 2025-04-03 DIAGNOSIS — R74.01 TRANSAMINITIS: Primary | ICD-10-CM

## 2025-04-03 NOTE — TELEPHONE ENCOUNTER
Called patients daughter. Notified her that patient needs to get this lab redrawn. She understood.

## 2025-04-07 ENCOUNTER — TELEPHONE (OUTPATIENT)
Dept: FAMILY MEDICINE | Facility: CLINIC | Age: 75
End: 2025-04-07
Payer: MEDICAID

## 2025-04-07 NOTE — TELEPHONE ENCOUNTER
Called patients daughter. Jeanne confirmed. Notified her that there are some new signs of liver damage and the patient will need to get some additional lab work done as well as he will need to get an ultrasound completed. She understood.

## 2025-04-07 NOTE — TELEPHONE ENCOUNTER
----- Message from Tania Boswell MD sent at 4/3/2025  4:45 PM CDT -----  Please let the patient's daughter know that his labs show new signs of liver damage. I am going to order some other labs to be collected with his CBC from yesterday. I am also going to order an   ultrasound of his liver.  ----- Message -----  From: Lab, Background User  Sent: 4/2/2025  12:24 PM CDT  To: Tania Boswell MD

## 2025-04-09 ENCOUNTER — HOSPITAL ENCOUNTER (OUTPATIENT)
Dept: RADIOLOGY | Facility: HOSPITAL | Age: 75
Discharge: HOME | End: 2025-04-09
Payer: MEDICAID

## 2025-04-09 ENCOUNTER — RESULTS FOLLOW-UP (OUTPATIENT)
Dept: FAMILY MEDICINE | Facility: CLINIC | Age: 75
End: 2025-04-09

## 2025-04-09 DIAGNOSIS — R74.01 TRANSAMINITIS: ICD-10-CM

## 2025-04-09 DIAGNOSIS — D50.9 IRON DEFICIENCY ANEMIA, UNSPECIFIED IRON DEFICIENCY ANEMIA TYPE: Primary | ICD-10-CM

## 2025-04-09 PROCEDURE — 76705 ECHO EXAM OF ABDOMEN: CPT | Mod: TC

## 2025-04-09 RX ORDER — FERROUS SULFATE 325(65) MG
325 TABLET ORAL DAILY
Qty: 90 TABLET | Refills: 0 | Status: SHIPPED | OUTPATIENT
Start: 2025-04-09

## 2025-04-11 ENCOUNTER — TELEPHONE (OUTPATIENT)
Dept: FAMILY MEDICINE | Facility: CLINIC | Age: 75
End: 2025-04-11
Payer: MEDICAID

## 2025-04-11 NOTE — TELEPHONE ENCOUNTER
----- Message from Tania Boswell MD sent at 4/9/2025  2:50 PM CDT -----  Patient has  iron-deficiency anemia and positive Cologuard.  He was referred to GI for colonoscopy in the past. I will place another referral. I am going to restart him on iron.   We need to recheck iron levels and CBC in 1 month.   His liver ultrasound was normal.   ----- Message -----  From: Interface, Rad Results In  Sent: 4/9/2025   8:39 AM CDT  To: Tania Boswell MD

## 2025-04-11 NOTE — TELEPHONE ENCOUNTER
Called patient daughter. Notified her of her fathers iron deficiency and the recommendations to start iron tablets. Also notified her liver ultrasound was normal. Informed her that his cologuard was positive so we are sending a referral for him to have a colonoscopy. She understood.

## 2025-04-13 ENCOUNTER — HOSPITAL ENCOUNTER (INPATIENT)
Facility: HOSPITAL | Age: 75
LOS: 17 days | Discharge: NURSING, REHAB OR LONG-TERM CARE | DRG: 521 | End: 2025-04-30
Attending: STUDENT IN AN ORGANIZED HEALTH CARE EDUCATION/TRAINING PROGRAM | Admitting: INTERNAL MEDICINE
Payer: MEDICAID

## 2025-04-13 DIAGNOSIS — S72.001A CLOSED DISPLACED FRACTURE OF RIGHT FEMORAL NECK: Primary | ICD-10-CM

## 2025-04-13 DIAGNOSIS — S72.90XA FEMUR FRACTURE: ICD-10-CM
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
## 2025-04-13 DIAGNOSIS — D50.9 IRON DEFICIENCY ANEMIA, UNSPECIFIED IRON DEFICIENCY ANEMIA TYPE: ICD-10-CM

## 2025-04-13 DIAGNOSIS — D62 ACUTE BLOOD LOSS ANEMIA: ICD-10-CM

## 2025-04-13 DIAGNOSIS — I63.9 STROKE: ICD-10-CM

## 2025-04-13 DIAGNOSIS — Z01.818 PRE-OP EVALUATION: ICD-10-CM

## 2025-04-13 DIAGNOSIS — I63.9 CEREBROVASCULAR ACCIDENT (CVA), UNSPECIFIED MECHANISM: ICD-10-CM

## 2025-04-13 LAB
ABORH RETYPE: NORMAL
ACCEPTIBLE SP GR UR QL: >1.05 (ref 1–1.03)
ALBUMIN SERPL-MCNC: 3.8 G/DL (ref 3.4–4.8)
ALBUMIN/GLOB SERPL: 1 RATIO (ref 1.1–2)
ALP SERPL-CCNC: 119 UNIT/L (ref 40–150)
ALT SERPL-CCNC: 63 UNIT/L (ref 0–55)
AMPHET UR QL SCN: NEGATIVE
ANION GAP SERPL CALC-SCNC: 12 MEQ/L
AST SERPL-CCNC: 48 UNIT/L (ref 11–45)
BACTERIA #/AREA URNS AUTO: ABNORMAL /HPF
BARBITURATE SCN PRESENT UR: NEGATIVE
BASOPHILS # BLD AUTO: 0.06 X10(3)/MCL
BASOPHILS NFR BLD AUTO: 0.6 %
BENZODIAZ UR QL SCN: NEGATIVE
BILIRUB SERPL-MCNC: 0.8 MG/DL
BILIRUB UR QL STRIP.AUTO: NEGATIVE
BUN SERPL-MCNC: 9.2 MG/DL (ref 8.4–25.7)
CALCIUM SERPL-MCNC: 8.7 MG/DL (ref 8.8–10)
CANNABINOIDS UR QL SCN: NEGATIVE
CHLORIDE SERPL-SCNC: 103 MMOL/L (ref 98–107)
CHOLEST SERPL-MCNC: 127 MG/DL
CHOLEST/HDLC SERPL: 2 {RATIO} (ref 0–5)
CLARITY UR: CLEAR
CO2 SERPL-SCNC: 20 MMOL/L (ref 23–31)
COCAINE UR QL SCN: NEGATIVE
COLOR UR AUTO: COLORLESS
CREAT SERPL-MCNC: 0.58 MG/DL (ref 0.72–1.25)
CREAT/UREA NIT SERPL: 16
CRP SERPL-MCNC: 3.7 MG/L
EOSINOPHIL # BLD AUTO: 0.48 X10(3)/MCL (ref 0–0.9)
EOSINOPHIL NFR BLD AUTO: 4.7 %
ERYTHROCYTE [DISTWIDTH] IN BLOOD BY AUTOMATED COUNT: 20.6 % (ref 11.5–17)
ERYTHROCYTE [SEDIMENTATION RATE] IN BLOOD: 52 MM/HR (ref 0–20)
EST. AVERAGE GLUCOSE BLD GHB EST-MCNC: 182.9 MG/DL
FENTANYL UR QL SCN: NEGATIVE
GFR SERPLBLD CREATININE-BSD FMLA CKD-EPI: >60 ML/MIN/1.73/M2
GLOBULIN SER-MCNC: 3.9 GM/DL (ref 2.4–3.5)
GLUCOSE SERPL-MCNC: 138 MG/DL (ref 82–115)
GLUCOSE UR QL STRIP: ABNORMAL
GROUP & RH: NORMAL
HBA1C MFR BLD: 8 %
HCT VFR BLD AUTO: 30.2 % (ref 42–52)
HDLC SERPL-MCNC: 63 MG/DL (ref 35–60)
HGB BLD-MCNC: 8.8 G/DL (ref 14–18)
HGB UR QL STRIP: NEGATIVE
IMM GRANULOCYTES # BLD AUTO: 0.04 X10(3)/MCL (ref 0–0.04)
IMM GRANULOCYTES NFR BLD AUTO: 0.4 %
INDIRECT COOMBS: NORMAL
INR PPP: 1.1
KETONES UR QL STRIP: ABNORMAL
LDLC SERPL CALC-MCNC: 54 MG/DL (ref 50–140)
LEUKOCYTE ESTERASE UR QL STRIP: NEGATIVE
LYMPHOCYTES # BLD AUTO: 0.99 X10(3)/MCL (ref 0.6–4.6)
LYMPHOCYTES NFR BLD AUTO: 9.8 %
MCH RBC QN AUTO: 21.4 PG (ref 27–31)
MCHC RBC AUTO-ENTMCNC: 29.1 G/DL (ref 33–36)
MCV RBC AUTO: 73.5 FL (ref 80–94)
MDMA UR QL SCN: NEGATIVE
MONOCYTES # BLD AUTO: 0.52 X10(3)/MCL (ref 0.1–1.3)
MONOCYTES NFR BLD AUTO: 5.1 %
MUCOUS THREADS URNS QL MICRO: ABNORMAL /LPF
NEUTROPHILS # BLD AUTO: 8.03 X10(3)/MCL (ref 2.1–9.2)
NEUTROPHILS NFR BLD AUTO: 79.4 %
NITRITE UR QL STRIP: NEGATIVE
NRBC BLD AUTO-RTO: 0 %
OHS QRS DURATION: 70 MS
OHS QTC CALCULATION: 467 MS
OPIATES UR QL SCN: POSITIVE
PCP UR QL: NEGATIVE
PH UR STRIP: 5.5 [PH]
PH UR: 5.5 [PH] (ref 3–11)
PLATELET # BLD AUTO: 388 X10(3)/MCL (ref 130–400)
PMV BLD AUTO: 10 FL (ref 7.4–10.4)
POTASSIUM SERPL-SCNC: 3.8 MMOL/L (ref 3.5–5.1)
PROT SERPL-MCNC: 7.7 GM/DL (ref 5.8–7.6)
PROT UR QL STRIP: NEGATIVE
PROTHROMBIN TIME: 13.9 SECONDS (ref 12.5–14.5)
RBC # BLD AUTO: 4.11 X10(6)/MCL (ref 4.7–6.1)
RBC #/AREA URNS AUTO: ABNORMAL /HPF
SODIUM SERPL-SCNC: 135 MMOL/L (ref 136–145)
SP GR UR STRIP.AUTO: >1.05 (ref 1–1.03)
SPECIMEN OUTDATE: NORMAL
SQUAMOUS #/AREA URNS LPF: ABNORMAL /HPF
TRIGL SERPL-MCNC: 48 MG/DL (ref 34–140)
TSH SERPL-ACNC: 0.87 UIU/ML (ref 0.35–4.94)
UROBILINOGEN UR STRIP-ACNC: NORMAL
VLDLC SERPL CALC-MCNC: 10 MG/DL
WBC # BLD AUTO: 10.12 X10(3)/MCL (ref 4.5–11.5)
WBC #/AREA URNS AUTO: ABNORMAL /HPF

## 2025-04-13 PROCEDURE — 96374 THER/PROPH/DIAG INJ IV PUSH: CPT

## 2025-04-13 PROCEDURE — 83036 HEMOGLOBIN GLYCOSYLATED A1C: CPT | Performed by: PHYSICIAN ASSISTANT

## 2025-04-13 PROCEDURE — 81001 URINALYSIS AUTO W/SCOPE: CPT | Performed by: PHYSICIAN ASSISTANT

## 2025-04-13 PROCEDURE — 11000001 HC ACUTE MED/SURG PRIVATE ROOM

## 2025-04-13 PROCEDURE — 93010 ELECTROCARDIOGRAM REPORT: CPT | Mod: ,,, | Performed by: INTERNAL MEDICINE

## 2025-04-13 PROCEDURE — 80061 LIPID PANEL: CPT | Performed by: PHYSICIAN ASSISTANT

## 2025-04-13 PROCEDURE — 85610 PROTHROMBIN TIME: CPT | Performed by: STUDENT IN AN ORGANIZED HEALTH CARE EDUCATION/TRAINING PROGRAM

## 2025-04-13 PROCEDURE — 21400001 HC TELEMETRY ROOM

## 2025-04-13 PROCEDURE — 84443 ASSAY THYROID STIM HORMONE: CPT | Performed by: PHYSICIAN ASSISTANT

## 2025-04-13 PROCEDURE — 99291 CRITICAL CARE FIRST HOUR: CPT

## 2025-04-13 PROCEDURE — 25500020 PHARM REV CODE 255: Performed by: INTERNAL MEDICINE

## 2025-04-13 PROCEDURE — 85025 COMPLETE CBC W/AUTO DIFF WBC: CPT | Performed by: STUDENT IN AN ORGANIZED HEALTH CARE EDUCATION/TRAINING PROGRAM

## 2025-04-13 PROCEDURE — 96376 TX/PRO/DX INJ SAME DRUG ADON: CPT

## 2025-04-13 PROCEDURE — 85652 RBC SED RATE AUTOMATED: CPT | Performed by: PHYSICIAN ASSISTANT

## 2025-04-13 PROCEDURE — 80053 COMPREHEN METABOLIC PANEL: CPT | Performed by: STUDENT IN AN ORGANIZED HEALTH CARE EDUCATION/TRAINING PROGRAM

## 2025-04-13 PROCEDURE — 63600175 PHARM REV CODE 636 W HCPCS: Performed by: PHYSICIAN ASSISTANT

## 2025-04-13 PROCEDURE — 93005 ELECTROCARDIOGRAM TRACING: CPT

## 2025-04-13 PROCEDURE — A4216 STERILE WATER/SALINE, 10 ML: HCPCS | Performed by: PHYSICIAN ASSISTANT

## 2025-04-13 PROCEDURE — 86901 BLOOD TYPING SEROLOGIC RH(D): CPT | Performed by: STUDENT IN AN ORGANIZED HEALTH CARE EDUCATION/TRAINING PROGRAM

## 2025-04-13 PROCEDURE — 99222 1ST HOSP IP/OBS MODERATE 55: CPT | Mod: ,,, | Performed by: ORTHOPAEDIC SURGERY

## 2025-04-13 PROCEDURE — 86140 C-REACTIVE PROTEIN: CPT | Performed by: PHYSICIAN ASSISTANT

## 2025-04-13 PROCEDURE — 63600175 PHARM REV CODE 636 W HCPCS: Performed by: STUDENT IN AN ORGANIZED HEALTH CARE EDUCATION/TRAINING PROGRAM

## 2025-04-13 PROCEDURE — 80307 DRUG TEST PRSMV CHEM ANLYZR: CPT | Performed by: PHYSICIAN ASSISTANT

## 2025-04-13 PROCEDURE — 96361 HYDRATE IV INFUSION ADD-ON: CPT

## 2025-04-13 PROCEDURE — 25000003 PHARM REV CODE 250: Performed by: PHYSICIAN ASSISTANT

## 2025-04-13 PROCEDURE — 96375 TX/PRO/DX INJ NEW DRUG ADDON: CPT

## 2025-04-13 RX ORDER — MORPHINE SULFATE 4 MG/ML
2 INJECTION, SOLUTION INTRAMUSCULAR; INTRAVENOUS EVERY 4 HOURS PRN
Refills: 0 | Status: DISPENSED | OUTPATIENT
Start: 2025-04-13 | End: 2025-04-15

## 2025-04-13 RX ORDER — BISACODYL 10 MG/1
10 SUPPOSITORY RECTAL DAILY PRN
Status: DISCONTINUED | OUTPATIENT
Start: 2025-04-13 | End: 2025-04-30 | Stop reason: HOSPADM

## 2025-04-13 RX ORDER — ONDANSETRON HYDROCHLORIDE 2 MG/ML
4 INJECTION, SOLUTION INTRAVENOUS
Status: COMPLETED | OUTPATIENT
Start: 2025-04-13 | End: 2025-04-13

## 2025-04-13 RX ORDER — LABETALOL HYDROCHLORIDE 5 MG/ML
10 INJECTION, SOLUTION INTRAVENOUS
Status: DISCONTINUED | OUTPATIENT
Start: 2025-04-13 | End: 2025-04-16

## 2025-04-13 RX ORDER — MORPHINE SULFATE 4 MG/ML
4 INJECTION, SOLUTION INTRAMUSCULAR; INTRAVENOUS
Refills: 0 | Status: COMPLETED | OUTPATIENT
Start: 2025-04-13 | End: 2025-04-13

## 2025-04-13 RX ORDER — SODIUM CHLORIDE 0.9 % (FLUSH) 0.9 %
10 SYRINGE (ML) INJECTION EVERY 8 HOURS
Status: DISCONTINUED | OUTPATIENT
Start: 2025-04-13 | End: 2025-04-30 | Stop reason: HOSPADM

## 2025-04-13 RX ADMIN — MORPHINE SULFATE 4 MG: 4 INJECTION INTRAVENOUS at 10:04

## 2025-04-13 RX ADMIN — MORPHINE SULFATE 2 MG: 4 INJECTION INTRAVENOUS at 02:04

## 2025-04-13 RX ADMIN — IOHEXOL 74 ML: 350 INJECTION, SOLUTION INTRAVENOUS at 12:04

## 2025-04-13 RX ADMIN — MORPHINE SULFATE 4 MG: 4 INJECTION INTRAVENOUS at 09:04

## 2025-04-13 RX ADMIN — SODIUM CHLORIDE, POTASSIUM CHLORIDE, SODIUM LACTATE AND CALCIUM CHLORIDE 1000 ML: 600; 310; 30; 20 INJECTION, SOLUTION INTRAVENOUS at 09:04

## 2025-04-13 RX ADMIN — SODIUM CHLORIDE, PRESERVATIVE FREE 10 ML: 5 INJECTION INTRAVENOUS at 10:04

## 2025-04-13 RX ADMIN — ONDANSETRON 4 MG: 2 INJECTION INTRAMUSCULAR; INTRAVENOUS at 09:04

## 2025-04-13 RX ADMIN — SODIUM CHLORIDE, PRESERVATIVE FREE 10 ML: 5 INJECTION INTRAVENOUS at 04:04

## 2025-04-13 NOTE — CONSULTS
[facility] - 8th Floor Med Surg  Orthopedic Trauma  Consult Note    Patient Name: William Mccauley  MRN: [id number]  Admission Date: 4/13/2025  Hospital Length of Stay: 0 days  Attending Provider: Warren Covarrubias MD  Primary Care Provider: Shannon Alonso MD        Inpatient consult to Orthopedic Surgery  Consult performed by: Angel Knox DO  Consult ordered by: Johnson Ross MD        Subjective:         Chief Complaint:   Chief Complaint   Patient presents with    Fall     Lost balance when getting up from chair and fell backwards. Denies hitting head. C/o R hip pain. LLE rotated inwards. +PMS. 50 mcg Fent given in route per EMS.         HPI:  Patient has right groin pain after a fall.  Family is bedside.  Patient not very talkative at this time.  Painful logroll.  No numbness no tingling.    Past Medical History:   Diagnosis Date    Diabetes mellitus, type 2     HTN (hypertension)     Ptosis     Sciatica     Stroke     Type 2 diabetes mellitus without complications        Past Surgical History:   Procedure Laterality Date    DIAGNOSTIC LAPAROSCOPY N/A 9/24/2024    Procedure: LAPAROSCOPY, DIAGNOSTIC;  Surgeon: Adalberto Rock MD;  Location: [facility];  Service: General;  Laterality: N/A;    KIDNEY STONE SURGERY Left     REPAIR, HERNIA, INGUINAL Right 9/24/2024    Procedure: REPAIR, HERNIA, INGUINAL;  Surgeon: Adalberto Rock MD;  Location: [facility];  Service: General;  Laterality: Right;  Incarcerated Hernia       Review of patient's allergies indicates:  No Known Allergies    Current Facility-Administered Medications   Medication    bisacodyL suppository 10 mg    labetaloL injection 10 mg    morphine injection 2 mg    sodium chloride 0.9% flush 10 mL     Family History    None       Tobacco Use    Smoking status: Never     Passive exposure: Never    Smokeless tobacco: Never   Substance and Sexual Activity    Alcohol use: Never     Alcohol/week: 1.0 standard drink of alcohol     Types: 1  Cans of beer per week    Drug use: Never    Sexual activity: Not Currently     Partners: Female       ROS:  Constitutional: Denies fever chills  Eyes: No change in vision  ENT: No ringing or current infections  CV: No chest pain  Resp: No labored breathing  MSK: Pain evident at site of injury located in HPI,   Integ: No signs of abrasions or lacerations  Neuro: No numbness or tingling  Lymphatic: No swelling outside the area of injury   Objective:     Vital Signs (Most Recent):  Temp: 98.3 °F (36.8 °C) (04/13/25 1235)  Pulse: 93 (04/13/25 1250)  Resp: 20 (04/13/25 1438)  BP: (!) 156/82 (04/13/25 1235)  SpO2: (!) 93 % (04/13/25 1235) Vital Signs (24h Range):  Temp:  [97.9 °F (36.6 °C)-98.3 °F (36.8 °C)] 98.3 °F (36.8 °C)  Pulse:  [79-93] 93  Resp:  [17-20] 20  SpO2:  [93 %-96 %] 93 %  BP: (141-156)/(76-82) 156/82     Weight: 63.5 kg (139 lb 15.9 oz)  Height: 5' (152.4 cm)  Body mass index is 27.34 kg/m².      Intake/Output Summary (Last 24 hours) at 4/13/2025 1834  Last data filed at 4/13/2025 1700  Gross per 24 hour   Intake 1000 ml   Output 500 ml   Net 500 ml       Ortho/SPM Exam  General the patient is alert a no acute distress nontoxic-appearing appropriate affect.    Constitutional: Vital signs are examined and stable.  Resp: No signs of labored breathing                        RLE: -Skin:  Painful logroll painful axial compression test s           -MSK: : HEHL/FHL, Gastroc/Tib anterior Strength 5/5           -Neuro:  Sensation intact to light touch L3-S1 dermatomes           -Lymphatic: No signs of lymphadenopathy           -CV: Capillary refill is less than 2 seconds. DP/PT pulses  2/4. Compartments soft and compressible.     Significant Labs:  I have reviewed all labs in relation to Orthopedics  Recent Lab Results  (Last 5 results in the past 72 hours)        04/13/25  1618   04/13/25  1616   04/13/25  1100   04/13/25  1025   04/13/25  0929        Phencyclidine Negative               Albumin/Globulin Ratio          1.0       ABO and RH       A POS         Albumin         3.8       ALP         119       ALT         63       Amphetamines, Urine Negative               Anion Gap         12.0       Appearance, UA   Clear             AST         48       Bacteria, UA   None Seen             Barbituates, Urine Negative               Baso #         0.06       Basophil %         0.6       Benzodiazepine, Urine Negative               Bilirubin (UA)   Negative             BILIRUBIN TOTAL         0.8       BUN         9.2       BUN/CREAT RATIO         16       Calcium         8.7       Cannabinoids, Urine Negative               Chloride         103       Cholesterol Total         127       CO2         20       Cocaine, Urine Negative               Color, UA   Colorless             Creatinine         0.58       CRP         3.70       eGFR         >60  Comment: Estimated GFR calculated using the CKD-EPI creatinine (2021) equation.       Eos #         0.48       Eos %         4.7       Estimated Avg Glucose         182.9       Fentanyl, Urine Negative               Globulin, Total         3.9       Glucose         138       Glucose, UA   4+             Group & Rh         A POS       HDL         63       Hematocrit         30.2       Hemoglobin         8.8       Hemoglobin A1C External         8.0       Immature Grans (Abs)         0.04       Immature Granulocytes         0.4       Indirect Dave GEL         NEG       INR         1.1       Ketones, UA   3+             LDL Cholesterol         54.00  Comment: LDL calculated using the Friedewald equation.       Leukocyte Esterase, UA   Negative             Lymph #         0.99       LYMPH %         9.8       MCH         21.4       MCHC         29.1       MCV         73.5       MDMA, Urine Negative               Mono #         0.52       Mono %         5.1       MPV         10.0       Mucous, UA   Trace             Neut #         8.03       Neut %         79.4       NITRITE UA   Negative              nRBC         0.0       Blood, UA   Negative             QRS Duration     70           [facility] QTC Calculation     467           Opiates, Urine Positive               pH, UA   5.5             pH, Urine 5.5               Platelet Count         388       Potassium         3.8       PROTEIN TOTAL         7.7       Protein, UA   Negative             PT         13.9       RBC         4.11       RBC, UA   0-5             RDW         20.6       Sed Rate         52       Sodium         135       Specific Gravity,UA   >1.050             Specific Gravity, Urine Auto >1.050               Specimen Outdate         04/16/2025 23:59       Squamous Epithelial Cells, UA   None Seen             Total Cholesterol/HDL Ratio         2       Triglycerides         48       TSH         0.869       Urobilinogen, UA   Normal             Very Low Density Lipoprotein         10       WBC, UA   0-5             WBC         10.12                              Significant Imaging: I have reviewed all pertinent imaging results/findings.  CTA Head and Neck (xpd)  Result Date: 4/13/2025  EXAMINATION: CTA HEAD AND NECK (XPD) CLINICAL HISTORY: Stroke, follow up; TECHNIQUE: Non contrast low dose axial images were obtained through the head.  CT angiogram was performed from the level of the ranjana to the top of the head following the IV administration of 100mL of Omnipaque 350.   Sagittal and coronal reconstructions and maximum intensity projection reconstructions were performed. Arterial stenosis percentages are based on NASCET measurement criteria.  DLP 2906.  Automated exposure control used. COMPARISON: 11/15/2023 FINDINGS: Common carotid arteries, carotid bulbs, internal carotid arteries are patent without significant stenosis or occlusion.  Mild atherosclerotic plaque noted at the carotid bulbs bilaterally.  Mild moderate narrowing of the left dominant vertebral artery origin.  Vertebral arteries are otherwise patent.  No soft tissue mass, fluid  collection, hematoma, lymphadenopathy. Head: Prominent atherosclerotic plaque noted involving the distal vertebral arteries bilaterally suspected moderate severe narrowing.  Basilar artery patent.  Prominent atherosclerotic plaque along the carotid siphons bilaterally we severe narrowing bilaterally particular involving the ophthalmic and clinoid segments likely slightly greater on the left.  Middle cerebral arteries and branches patent bilaterally.  Anterior cerebral arteries, posterior cerebral arteries patent bilaterally.  No aneurysm evident..     Mild moderate narrowing of the left vertebral artery origin. Severe narrowing of the distal vertebral arteries bilaterally. Severe narrowing of the clinoid, ophthalmic segments of the internal carotid artery siphons bilaterally.  Suspected near occlusion on the left. No significant interval change from the prior. Electronically signed by: Taras Dunn MD Date:    04/13/2025 Time:    12:31    MRI Brain Without Contrast  Result Date: 4/13/2025  EXAMINATION: MRI BRAIN WITHOUT CONTRAST CLINICAL HISTORY: Stroke, follow up; TECHNIQUE: Multiplanar multisequence MR imaging of the brain was performed without contrast. COMPARISON: CT same day FINDINGS: Small linear area of susceptibility artifact posterior left thalamus with a punctate area of increased T1 signal consistent with acute/subacute thalamic hemorrhage. No visible diffusion restriction on diffusion sequence. Advanced involutional change.  Moderate severe chronic microvascular white matter ischemic change.  Scattered remote lacunar infarcts in the subinsular basal ganglia regions. Ventricles, sulci, cisterns otherwise normal with no mass effect or midline shift.  No intra or extra-axial mass. Posterior fossa brainstem otherwise grossly normal.  Sella and orbits grossly normal. Mild ethmoid mucosal thickening.  Cranium and extracranial structures otherwise unremarkable.     Small linear/punctate area of  acute/subacute left thalamic hemorrhage, possible hemorrhagic infarct although with no visible diffusion restriction, region obscured by hemorrhagic artifact. Advanced involutional change, moderate severe chronic microvascular white matter ischemic change. Electronically signed by: Taras Dunn MD Date:    04/13/2025 Time:    12:11    CT Head Without Contrast  Result Date: 4/13/2025  EXAMINATION: CT HEAD WITHOUT CONTRAST CLINICAL HISTORY: fall; TECHNIQUE: Sequential axial images were performed of the brain without contrast. Dose product length of 1281 mGycm. Automated exposure control was utilized to minimize radiation dose. COMPARISON: CT brain March 20, 2024. FINDINGS: There is left basal ganglia blush like subtle hyperdense which could represent subacute minimal hemorrhage on image 32 series 2.  This may be related to hemorrhagic infarct.  There are bilateral thalami old lacunar infarcts.  There is also pontine old lacunar infarct.  No acute large vessel territory infarct identified.  There is chronic microvascular ischemia and atrophy.  No mass effect, midline shift or hydrocephalus.  No acute extra-axial fluid collections.  There is no acute depressed calvarial fracture. There is mild mucoperiosteal thickening of the ethmoidal air cells and minimally the right maxillary sinus.  Otherwise, visualized paranasal sinuses are clear without mucosal thickening, polypoidal abnormality or air-fluid levels. Mastoid air cells aeration is optimal.     1.  Left thalamic minimal hyperdensity may represent subacute hemorrhage related to subacute infarct.  Please correlate clinically.  This may be further assessed with MRI brain. 2.  No convincing acute brain traumatic findings. Electronically signed by: Fabio Godwin Date:    04/13/2025 Time:    10:09    CT Cervical Spine Without Contrast  Result Date: 4/13/2025  EXAMINATION: CT CERVICAL SPINE WITHOUT CONTRAST CLINICAL HISTORY: fall; TECHNIQUE: Low dose axial images,  sagittal and coronal reformations were performed though the cervical spine.  Contrast was not administered.  DLP 1281.  Automated exposure control used. COMPARISON: None FINDINGS: No fracture or dislocation evident.  Vertebral body height is maintained.  Odontoid intact. C2-C3 demonstrates minimal facet arthrosis. C3-C4 demonstrates mild left-sided facet arthrosis, right-sided uncovertebral spurring and moderate right-sided osseous foraminal narrowing. C4-C5 demonstrates mild disc space narrowing, osteophytic lipping and uncovertebral spurring, moderate severe osseous foraminal narrowing right greater than left. C5-C6 demonstrates mild disc space narrowing, moderate osteophytic change and uncovertebral spurring, moderate severe osseous foraminal narrowing. C6-C7 moderate disc space narrowing, osteophytic change, prominent uncovertebral spurring and severe osseous foraminal narrowing bilaterally. No soft tissue mass, fluid collection, hematoma, lymphadenopathy.     No acute findings.  Spondylosis as above. Electronically signed by: Taras Dunn MD Date:    04/13/2025 Time:    10:06    X-Ray Hip 2 or 3 views Right with Pelvis when performed  Result Date: 4/13/2025  EXAMINATION: XR HIP WITH PELVIS WHEN PERFORMED 2 OR 3 VIEWS RIGHT CLINICAL HISTORY: Fall. COMPARISON: None available. FINDINGS: There is right femoral neck fracture.  Femoral neck however is not well seen and is obscured due to some proximal retraction of the femoral shaft.  Femoral head is situated within the acetabulum.  No other fracture or dislocation     Fracture. Electronically signed by: Fabio Godwin Date:    04/13/2025 Time:    09:46    X-Ray Chest AP Portable  Result Date: 4/13/2025  EXAMINATION: XR CHEST AP PORTABLE CLINICAL HISTORY: Unspecified fall, initial encounter TECHNIQUE: One view COMPARISON: March 27, 2023. FINDINGS: Cardiopericardial silhouette enlarged appearance is similar.  Lungs are without dense focal or segmental consolidation,  congestive process, pleural effusions or pneumothorax.     No acute cardiopulmonary process identified. Electronically signed by: Fabio Godwin Date:    04/13/2025 Time:    09:43    US Abdomen Limited  Result Date: 4/9/2025  EXAMINATION: US ABDOMEN LIMITED CLINICAL HISTORY: elevated liver enzymes;  Elevation of levels of liver transaminase levels COMPARISON: None FINDINGS: Liver measures 13.2 cm.  No abnormalities are demonstrated.  There is hepatopetal flow in the portal vein.  Gallbladder is anechoic.  Common bile duct measures 5 mm. The pancreas is incompletely visualized due to patient's body habitus.  An abnormality is not demonstrated.  There is flow present the proximal IVC. The right kidney measures 9.8 by 5 x 5 cm.  There are no focal lesions noted there is no hydronephrosis     No acute abnormalities are demonstrated. Limited exam Electronically signed by: Vic Turner MD Date:    04/09/2025 Time:    08:36    CV Ultrasound doppler venous legs bilat  Result Date: 3/27/2025    There is no evidence of a right lower extremity DVT.   There is no evidence of a left lower extremity DVT. Negative for deep and superficial vein thrombosis in bilateral lower extremities.     CV Ultrasound Bilateral Doppler Carotid  Result Date: 3/27/2025  The right internal carotid artery is patent with less than 50% stenosis. The left internal carotid artery is patent with less than 50% stenosis. Bilateral vertebral arteries are patent with antegrade flow.        Assessment/Plan:     Active Diagnoses:    Diagnosis Date Noted POA    PRINCIPAL PROBLEM:  Closed displaced fracture of right femoral neck [S72.001A] 04/13/2025 Yes      Problems Resolved During this Admission:       Independent Radiology ordered by other provider:   Two views right hip skeletally mature individual shows a displaced femoral neck fracture    Pt has acute injury with risk of severe bodily function with their injury right hip.     -Risks included with this type  of injury painful ambulation avascular necrosis    Patient has a fall onto the right hip.  Has a displaced femoral neck fracture.  Patient also has a possible neuro pathology which is currently being worked up.  Place the patient on the OR schedule for tomorrow for possible hip hemiarthroplasty.  They understand the risks and benefits with the procedure.      I explained that surgery and the nature of their condition are not without risks. These include, but are not limited to, bleeding, infection, neurovascular compromise, malunion, nonunion, hardware complications, wound complications, scarring, cosmetic defects, need for later and/or repeated surgeries, avascular necrosis, bone death due to initial trauma, pain, loss of ROM, loss of function, PTOA, deformity, stance/gait and/or functional abnormalities, thromboembolic complications, compartment syndrome, loss of limb, loss of life, anesthetic complications, and other imponderables. I explained that these can occur despite the adequacy of treatments rendered, and that their risks are heightened given the nature of their condition.  I have also discussed the importance not using nicotine products due to the increased risk of infection surgical wound healing complications and nonunion of the fracture.  They verbalized understanding.  No guarantees were made.  They would like to continue with surgery at this time. If appropriate family was involved with surgical discussion.             This note/OR report was created with the assistance of  voice recognition software or phone  dictation.  There may be transcription errors as a result of using this technology however minimal. Effort has been made to assure accuracy of transcription but any obvious errors or omissions should be clarified with the author of the document.       Angel Knox, DO   Orthopedic Trauma Surgery  [facility] - 8th Floor Med Surg

## 2025-04-13 NOTE — H&P
[facility] Hospital Medicine History & Physical Examination       Patient Name: William Mccauley  MRN: [id number]  Patient Class: IP- Inpatient   Admission Date: 4/13/2025   Admitting Physician: Warren Covarrubias MD   Length of Stay: 0  Attending Physician: Warren Covarrubias MD   Primary Care Provider: Shannon Alonso MD  Face-to-Face encounter date: 04/13/2025  Code Status: Full Code    Chief Complaint: Fall (Lost balance when getting up from chair and fell backwards. Denies hitting head. C/o R hip pain. LLE rotated inwards. +PMS. 50 mcg Fent given in route per EMS. )        Patient information was obtained from patient, patient's family, past medical records and ER records.     HISTORY OF PRESENT ILLNESS:   William Mccauley is a 74 y.o. Bengali speaking male with a past medical history of hypertension, hyperlipidemia, diabetes mellitus type 2, ptosis, CVA without deficits and sciatica. The patient presented to [facility] on 4/13/2025 with a primary complaint of right hip pain following a fall which occurred yesterday (04/12/2025).  Patient was ambulating with a walker when he reached for the door he lost his balance falling onto his right hip.  Patient denies hitting his head.  Son who is at bedside translating states patient did complain of left eye blurriness.  He denies shortness a breath, chest pain, nausea, vomiting, diarrhea, headache and dizziness.  He received 50 mcg of fentanyl in route.    Upon presentation to the ED, temperature 97.9° F, heart rate 88, blood pressure 145/76, respiratory rate 18 and SpO2 96% on room air.  Labs with H&H 8.8/30.2, MCV 73.5, CO2 20, AST 48, ALT 63.  EKG with sinus rhythm with first-degree AV block, ST and T-wave abnormalities considering lateral ischemia and unable to rule out anterior infarct right hip x-ray revealed a right femoral neck fracture.  Chest x-ray with no acute cardiopulmonary process.  CT of the head revealed a left thalamic minimal hyperdensity which  may represent subacute hemorrhage related to subacute infarct but no convincing acute brain traumatic findings.  CT cervical spine without acute findings.  MRI of the brain with small linear/punctuate area of acute/subacute left thalamic hemorrhage, possible hemorrhagic infarct although with no visible diffusion restriction, region obscured by hemorrhagic artifact, advanced involutional change, moderate severe chronic microvascular white ischemic changes.  In ED patient received IV fluid hydration, morphine, Zofran.  Orthopedics and neurology consulted.  Patient is admitted to hospital medicine services for further medical management.    PAST MEDICAL HISTORY:     Past Medical History:   Diagnosis Date    Diabetes mellitus, type 2     HTN (hypertension)     Ptosis     Sciatica     Stroke     Type 2 diabetes mellitus without complications        PAST SURGICAL HISTORY:     Past Surgical History:   Procedure Laterality Date    DIAGNOSTIC LAPAROSCOPY N/A 9/24/2024    Procedure: LAPAROSCOPY, DIAGNOSTIC;  Surgeon: Adalberto Rock MD;  Location: [facility];  Service: General;  Laterality: N/A;    KIDNEY STONE SURGERY Left     REPAIR, HERNIA, INGUINAL Right 9/24/2024    Procedure: REPAIR, HERNIA, INGUINAL;  Surgeon: Adalberto Rock MD;  Location: [facility];  Service: General;  Laterality: Right;  Incarcerated Hernia       ALLERGIES:   Patient has no known allergies.    FAMILY HISTORY:   Reviewed and negative    SOCIAL HISTORY:     Social History     Tobacco Use    Smoking status: Never     Passive exposure: Never    Smokeless tobacco: Never   Substance Use Topics    Alcohol use: Never     Alcohol/week: 1.0 standard drink of alcohol     Types: 1 Cans of beer per week     Denies tobacco, alcohol or illicit drug use     Screening for Social Drivers for health:  Patient screened for food insecurity, housing instability, transportation needs, utility difficulties, and interpersonal safety (select all that apply as  identified as concern)  []Housing or Food  []Transportation Needs  []Utility Difficulties  []Interpersonal safety  []None    HOME MEDICATIONS:     Prior to Admission medications    Medication Sig Start Date End Date Taking? Authorizing Provider   aspirin (ECOTRIN) 81 MG EC tablet Take 1 tablet (81 mg total) by mouth once daily. 5/1/24 5/1/25  Stacie Newman ANP   atorvastatin (LIPITOR) 40 MG tablet Take 1 tablet (40 mg total) by mouth once daily. 10/18/24 10/18/25  Shannon Alonso MD   empagliflozin (JARDIANCE) 25 mg tablet Take 1 tablet (25 mg total) by mouth once daily. 10/18/24   Shannon Alonso MD   ferrous sulfate (FEOSOL) 325 mg (65 mg iron) Tab tablet Take 1 tablet (325 mg total) by mouth once daily. 4/9/25   Tania Boswell MD   gabapentin (NEURONTIN) 300 MG capsule Take 2 capsules (600 mg total) by mouth 3 (three) times daily. 5/8/24 5/8/25  Shannon Alonso MD   ketoconazole (NIZORAL) 2 % shampoo Apply topically twice a week. 5/9/24   Shannon Alonso MD   losartan (COZAAR) 25 MG tablet Take 1 tablet (25 mg total) by mouth once daily. 10/18/24 10/18/25  Shannon Alonso MD   metFORMIN (GLUMETZA) 1000 MG (MOD) 24hr tablet Take 1 tablet (1,000 mg total) by mouth 2 (two) times daily with meals. 10/18/24 10/18/25  Shannon Alonso MD   polyethylene glycol (GLYCOLAX) 17 gram/dose powder Oral    Provider, Historical       REVIEW OF SYSTEMS:   Except as documented, all other systems reviewed and negative     PHYSICAL EXAM:     VITAL SIGNS: 24 HRS MIN & MAX LAST   Temp  Min: 97.9 °F (36.6 °C)  Max: 98.3 °F (36.8 °C) 98.3 °F (36.8 °C)   BP  Min: 141/78  Max: 156/82 (!) 156/82   Pulse  Min: 79  Max: 93  93   Resp  Min: 17  Max: 20 20   SpO2  Min: 93 %  Max: 96 % (!) 93 %       General appearance: Well-developed, well-nourished male in no apparent distress.  Son at bedside.  HEENT: Atraumatic head.  Dry mucous membranes of oral cavity.  Lungs: Clear to auscultation bilaterally.   Heart: Regular rate  and rhythm.   Abdomen: Soft, non-distended, non-tender. Bowel sounds are normal.   Extremities: No cyanosis, clubbing.  Right hip externally rotated.  Skin: No Rash. Warm and dry.  Neuro: Awake, alert.  No pronator drift.  No facial droop.  5/5 strength to bilateral upper extremities and left lower extremity.  Psych/mental status: Appropriate mood and affect. Cooperative. Responds appropriately to questions.       LABS AND IMAGING:     Recent Labs   Lab 04/09/25  0819 04/13/25  0929   WBC 7.29 10.12   RBC 4.30* 4.11*   HGB 9.0* 8.8*   HCT 32.3* 30.2*   MCV 75.1* 73.5*   MCH 20.9* 21.4*   MCHC 27.9* 29.1*   RDW 20.2* 20.6*   * 388   MPV 10.1 10.0       Recent Labs   Lab 04/09/25 0819 04/13/25  0929    135*   K 4.1 3.8    103   CO2 27 20*   BUN 17.7 9.2   CREATININE 0.77 0.58*   CALCIUM 9.1 8.7*   ALBUMIN 3.9 3.8   ALKPHOS 128 119   ALT 92* 63*   AST 48* 48*   BILITOT 0.6 0.8       Microbiology Results (last 7 days)       ** No results found for the last 168 hours. **             CTA Head and Neck (xpd)  Narrative: EXAMINATION:  CTA HEAD AND NECK (XPD)    CLINICAL HISTORY:  Stroke, follow up;    TECHNIQUE:  Non contrast low dose axial images were obtained through the head.  CT angiogram was performed from the level of the ranjana to the top of the head following the IV administration of 100mL of Omnipaque 350.   Sagittal and coronal reconstructions and maximum intensity projection reconstructions were performed. Arterial stenosis percentages are based on NASCET measurement criteria.  DLP 2906.  Automated exposure control used.    COMPARISON:  11/15/2023    FINDINGS:  Common carotid arteries, carotid bulbs, internal carotid arteries are patent without significant stenosis or occlusion.  Mild atherosclerotic plaque noted at the carotid bulbs bilaterally.  Mild moderate narrowing of the left dominant vertebral artery origin.  Vertebral arteries are otherwise patent.  No soft tissue mass, fluid  collection, hematoma, lymphadenopathy.    Head: Prominent atherosclerotic plaque noted involving the distal vertebral arteries bilaterally suspected moderate severe narrowing.  Basilar artery patent.  Prominent atherosclerotic plaque along the carotid siphons bilaterally we severe narrowing bilaterally particular involving the ophthalmic and clinoid segments likely slightly greater on the left.  Middle cerebral arteries and branches patent bilaterally.  Anterior cerebral arteries, posterior cerebral arteries patent bilaterally.  No aneurysm evident..  Impression: Mild moderate narrowing of the left vertebral artery origin.    Severe narrowing of the distal vertebral arteries bilaterally.    Severe narrowing of the clinoid, ophthalmic segments of the internal carotid artery siphons bilaterally.  Suspected near occlusion on the left.    No significant interval change from the prior.    Electronically signed by: Taras Dunn MD  Date:    04/13/2025  Time:    12:31  MRI Brain Without Contrast  Narrative: EXAMINATION:  MRI BRAIN WITHOUT CONTRAST    CLINICAL HISTORY:  Stroke, follow up;    TECHNIQUE:  Multiplanar multisequence MR imaging of the brain was performed without contrast.    COMPARISON:  CT same day    FINDINGS:  Small linear area of susceptibility artifact posterior left thalamus with a punctate area of increased T1 signal consistent with acute/subacute thalamic hemorrhage.    No visible diffusion restriction on diffusion sequence.    Advanced involutional change.  Moderate severe chronic microvascular white matter ischemic change.  Scattered remote lacunar infarcts in the subinsular basal ganglia regions.    Ventricles, sulci, cisterns otherwise normal with no mass effect or midline shift.  No intra or extra-axial mass.    Posterior fossa brainstem otherwise grossly normal.  Sella and orbits grossly normal.    Mild ethmoid mucosal thickening.  Cranium and extracranial structures otherwise  unremarkable.  Impression: Small linear/punctate area of acute/subacute left thalamic hemorrhage, possible hemorrhagic infarct although with no visible diffusion restriction, region obscured by hemorrhagic artifact.    Advanced involutional change, moderate severe chronic microvascular white matter ischemic change.    Electronically signed by: Taras Dunn MD  Date:    04/13/2025  Time:    12:11  CT Head Without Contrast  Narrative: EXAMINATION:  CT HEAD WITHOUT CONTRAST    CLINICAL HISTORY:  fall;    TECHNIQUE:  Sequential axial images were performed of the brain without contrast.    Dose product length of 1281 mGycm. Automated exposure control was utilized to minimize radiation dose.    COMPARISON:  CT brain March 20, 2024.    FINDINGS:  There is left basal ganglia blush like subtle hyperdense which could represent subacute minimal hemorrhage on image 32 series 2.  This may be related to hemorrhagic infarct.  There are bilateral thalami old lacunar infarcts.  There is also pontine old lacunar infarct.  No acute large vessel territory infarct identified.  There is chronic microvascular ischemia and atrophy.  No mass effect, midline shift or hydrocephalus.  No acute extra-axial fluid collections.  There is no acute depressed calvarial fracture.    There is mild mucoperiosteal thickening of the ethmoidal air cells and minimally the right maxillary sinus.  Otherwise, visualized paranasal sinuses are clear without mucosal thickening, polypoidal abnormality or air-fluid levels. Mastoid air cells aeration is optimal.  Impression: 1.  Left thalamic minimal hyperdensity may represent subacute hemorrhage related to subacute infarct.  Please correlate clinically.  This may be further assessed with MRI brain.    2.  No convincing acute brain traumatic findings.    Electronically signed by: Fabio Godwin  Date:    04/13/2025  Time:    10:09  CT Cervical Spine Without Contrast  Narrative: EXAMINATION:  CT CERVICAL SPINE  WITHOUT CONTRAST    CLINICAL HISTORY:  fall;    TECHNIQUE:  Low dose axial images, sagittal and coronal reformations were performed though the cervical spine.  Contrast was not administered.  DLP 1281.  Automated exposure control used.    COMPARISON:  None    FINDINGS:  No fracture or dislocation evident.  Vertebral body height is maintained.  Odontoid intact.    C2-C3 demonstrates minimal facet arthrosis.    C3-C4 demonstrates mild left-sided facet arthrosis, right-sided uncovertebral spurring and moderate right-sided osseous foraminal narrowing.    C4-C5 demonstrates mild disc space narrowing, osteophytic lipping and uncovertebral spurring, moderate severe osseous foraminal narrowing right greater than left.    C5-C6 demonstrates mild disc space narrowing, moderate osteophytic change and uncovertebral spurring, moderate severe osseous foraminal narrowing.    C6-C7 moderate disc space narrowing, osteophytic change, prominent uncovertebral spurring and severe osseous foraminal narrowing bilaterally.    No soft tissue mass, fluid collection, hematoma, lymphadenopathy.  Impression: No acute findings.  Spondylosis as above.    Electronically signed by: Taras Dunn MD  Date:    04/13/2025  Time:    10:06  X-Ray Hip 2 or 3 views Right with Pelvis when performed  Narrative: EXAMINATION:  XR HIP WITH PELVIS WHEN PERFORMED 2 OR 3 VIEWS RIGHT    CLINICAL HISTORY:  Fall.    COMPARISON:  None available.    FINDINGS:  There is right femoral neck fracture.  Femoral neck however is not well seen and is obscured due to some proximal retraction of the femoral shaft.  Femoral head is situated within the acetabulum.  No other fracture or dislocation  Impression: Fracture.    Electronically signed by: Fabio Godwin  Date:    04/13/2025  Time:    09:46  X-Ray Chest AP Portable  Narrative: EXAMINATION:  XR CHEST AP PORTABLE    CLINICAL HISTORY:  Unspecified fall, initial encounter    TECHNIQUE:  One view    COMPARISON:  March 27,  2023.    FINDINGS:  Cardiopericardial silhouette enlarged appearance is similar.  Lungs are without dense focal or segmental consolidation, congestive process, pleural effusions or pneumothorax.  Impression: No acute cardiopulmonary process identified.    Electronically signed by: Fabio Godwin  Date:    04/13/2025  Time:    09:43        ASSESSMENT & PLAN:   Assessment:  Right femoral neck fracture secondary to mechanical fall   Acute/subacute left thalamic hemorrhage  Microcytic anemia, stable   Metabolic acidosis   Transaminitis   Diabetes mellitus type 2, uncontrolled  History of hypertension, hyperlipidemia, ptosis, CVA without deficits and sciatica    Plan:  - Ortho consulted and planning for surgical intervention tomorrow   - IV morphine as needed for pain  - Neurology consulted. Appreciate recommendations  - CVA work up:  > CTA head and neck mild-to-moderate narrowing of the left vertebral artery origin, severe narrowing of the distal vertebral arteries bilaterally, severe narrowing of the clinoid, ophthalmic segment of the internal carotid siphons bilaterally, suspect near occlusion on the left.  > Echo - ordered  > Carotid US - preliminary read with less than 50% stenosis of bilateral ICA and bilateral vertebral arteries patent with antegrade flow  > Hemoglobin A1C - 8.0  > Lipid Panel - within limits  > CRP 3.7, ESR 52  > UDS - ordered  > Physical, Occupation and Speech Therapy consulted  > Permissive hypertension, IV Labetalol and Hydralazine as needed for SBP >220 or DBP> 120  - Resume appropriate home medications when deemed necessary   - Labs in AM      VTE Prophylaxis: will be placed on SCD for DVT prophylaxis and will be advised to be as mobile as possible and sit in a chair as tolerated      __________________________________________________________________________  INPATIENT LIST OF MEDICATIONS   Current Medications[1]      Scheduled Meds:   sodium chloride 0.9%  10 mL Intravenous Q8H     Continuous  Infusions:  PRN Meds:.  Current Facility-Administered Medications:     bisacodyL, 10 mg, Rectal, Daily PRN    labetalol, 10 mg, Intravenous, Q15 Min PRN    morphine, 2 mg, Intravenous, Q4H PRN      Discharge Planning and Disposition: Anticipated discharge to be determined.    Janee ROSADO PA, have reviewed and discussed the case with Dr. Warren Covarrubias MD    Please see the following addendum for further assessment and plan from there attending MD.      Portion of this note is dictated using EMR integrated voice recognition software and may be subjected to voice recognition errors not corrected with proofreading. Please  for clarification if needed.       Janee Valerio PA-C  04/13/2025         [1]   Current Facility-Administered Medications:     bisacodyL suppository 10 mg, 10 mg, Rectal, Daily PRN, Janee Valerio PA-C    labetaloL injection 10 mg, 10 mg, Intravenous, Q15 Min PRN, Janee Valerio PA-C    morphine injection 2 mg, 2 mg, Intravenous, Q4H PRN, Janee Valerio PA-C, 2 mg at 04/13/25 1438    sodium chloride 0.9% flush 10 mL, 10 mL, Intravenous, Q8H, Janee Valerio PA-C

## 2025-04-13 NOTE — CONSULTS
Pt has right fem neck fracture. Possible neuro pathology. Still getting worked up. We will place on OR schedule for tomorrow.    Abilio

## 2025-04-13 NOTE — ED PROVIDER NOTES
Encounter Date: 4/13/2025    SCRIBE #1 NOTE: I, Camille Carroll, am scribing for, and in the presence of,  Johnson Ross MD. I have scribed the following portions of the note - Other sections scribed: HPI, ROS, PE.       History     Chief Complaint   Patient presents with    Fall     Lost balance when getting up from chair and fell backwards. Denies hitting head. C/o R hip pain. LLE rotated inwards. +PMS. 50 mcg Fent given in route per EMS.      Patient is a 74 year old male with a past medical history of T2DM, hypertension, and stroke presenting to the ED via EMS for evaluation of right hip pain onset after a fall. Patient's daughter who presents at [facility] states he was using a walker when he reached for a door and lost his balance and fell onto his bottom. Patient denies hitting his head. EMS gave 50 mcg fentanyl en route.    The history is provided by a relative. No  was used.     Review of patient's allergies indicates:  No Known Allergies  Past Medical History:   Diagnosis Date    Diabetes mellitus, type 2     HTN (hypertension)     Ptosis     Sciatica     Stroke     Type 2 diabetes mellitus without complications      Past Surgical History:   Procedure Laterality Date    DIAGNOSTIC LAPAROSCOPY N/A 9/24/2024    Procedure: LAPAROSCOPY, DIAGNOSTIC;  Surgeon: Adalberto Rock MD;  Location: [facility];  Service: General;  Laterality: N/A;    KIDNEY STONE SURGERY Left     REPAIR, HERNIA, INGUINAL Right 9/24/2024    Procedure: REPAIR, HERNIA, INGUINAL;  Surgeon: Adalberto Rock MD;  Location: [facility];  Service: General;  Laterality: Right;  Incarcerated Hernia     No family history on file.  Social History[1]  Review of Systems   Constitutional:  Negative for chills and fever.   HENT:  Negative for drooling and sore throat.    Eyes:  Negative for pain and redness.   Respiratory:  Negative for shortness of breath, wheezing and stridor.    Cardiovascular:  Negative for chest pain,  palpitations and leg swelling.   Gastrointestinal:  Negative for abdominal pain, nausea and vomiting.   Genitourinary:  Negative for dysuria and hematuria.   Musculoskeletal:  Positive for arthralgias (right hip). Negative for back pain, neck pain and neck stiffness.   Skin:  Negative for rash and wound.   Neurological:  Negative for weakness and numbness.   Hematological:  Does not bruise/bleed easily.       Physical Exam     Initial Vitals [04/13/25 0911]   BP Pulse Resp Temp SpO2   (!) 145/76 88 18 97.9 °F (36.6 °C) 96 %      MAP       --         Physical Exam    Nursing note and vitals reviewed.  Constitutional: He appears well-developed and well-nourished. He is not diaphoretic. No distress.   Frail appearing   HENT: Mouth/Throat: Oropharynx is clear and moist. Mucous membranes are dry.   Eyes: EOM are normal. Pupils are equal, round, and reactive to light.   Cardiovascular:  Normal rate, regular rhythm and normal pulses.           No murmur heard.  Pulmonary/Chest: Breath sounds normal. No respiratory distress. He has no wheezes. He has no rales.   Abdominal: Abdomen is soft. He exhibits no distension. There is no abdominal tenderness.   Musculoskeletal:         General: No edema. Normal range of motion.      Comments: Right hip tenderness to the inguinal and lateral regions  Pain with log roll  Right hip is shortened and externally rotated      Neurological: He is alert and oriented to person, place, and time. He has normal strength. No cranial nerve deficit or sensory deficit.   Intact sensation to light touch   Skin: Skin is warm. Capillary refill takes less than 2 seconds. No rash noted.         ED Course   Critical Care    Date/Time: 4/13/2025 10:45 AM    Performed by: Johnson Ross MD  Authorized by: Johnson Ross MD  Direct patient critical care time: 35 minutes  Total critical care time (exclusive of procedural time) : 35 minutes  Critical care time was exclusive of separately billable  procedures and treating other patients.  Critical care was necessary to treat or prevent imminent or life-threatening deterioration of the following conditions: CNS failure or compromise.  Critical care was time spent personally by me on the following activities: development of treatment plan with patient or surrogate, discussions with consultants, evaluation of patient's response to treatment, examination of patient, obtaining history from patient or surrogate, ordering and review of laboratory studies, ordering and performing treatments and interventions, ordering and review of radiographic studies, pulse oximetry, re-evaluation of patient's condition and review of old charts.        Labs Reviewed   COMPREHENSIVE METABOLIC PANEL - Abnormal       Result Value    Sodium 135 (*)     Potassium 3.8      Chloride 103      CO2 20 (*)     Glucose 138 (*)     Blood Urea Nitrogen 9.2      Creatinine 0.58 (*)     Calcium 8.7 (*)     Protein Total 7.7 (*)     Albumin 3.8      Globulin 3.9 (*)     Albumin/Globulin Ratio 1.0 (*)     Bilirubin Total 0.8            ALT 63 (*)     AST 48 (*)     eGFR >60      Anion Gap 12.0      BUN/Creatinine Ratio 16     CBC WITH DIFFERENTIAL - Abnormal    WBC 10.12      RBC 4.11 (*)     Hgb 8.8 (*)     Hct 30.2 (*)     MCV 73.5 (*)     MCH 21.4 (*)     MCHC 29.1 (*)     RDW 20.6 (*)     Platelet 388      MPV 10.0      Neut % 79.4      Lymph % 9.8      Mono % 5.1      Eos % 4.7      Basophil % 0.6      Imm Grans % 0.4      Neut # 8.03      Lymph # 0.99      Mono # 0.52      Eos # 0.48      Baso # 0.06      Imm Gran # 0.04      NRBC% 0.0     PROTIME-INR - Normal    PT 13.9      INR 1.1      Narrative:     Protimes are used to monitor anticoagulant agents such as warfarin. PT INR values are based on the current patient normal mean and the KRISTINA value for the specific instrument reagent used.  **Routine theraputic target values for the INR are 2.0-3.0**   CBC W/ AUTO DIFFERENTIAL    Narrative:      The following orders were created for panel order CBC auto differential.  Procedure                               Abnormality         Status                     ---------                               -----------         ------                     CBC with Differential[[id number]]       Abnormal            Final result                 Please view results for these tests on the individual orders.   TYPE & SCREEN    Group & Rh A POS      Indirect Dave GEL NEG      Specimen Outdate 04/16/2025 23:59     ABORH RETYPE          Imaging Results              CT Cervical Spine Without Contrast (Final result)  Result time 04/13/25 10:06:46      Final result by Tony Dunn MD (04/13/25 10:06:46)                   Impression:      No acute findings.  Spondylosis as above.      Electronically signed by: Taras Dunn MD  Date:    04/13/2025  Time:    10:06               Narrative:    EXAMINATION:  CT CERVICAL SPINE WITHOUT CONTRAST    CLINICAL HISTORY:  fall;    TECHNIQUE:  Low dose axial images, sagittal and coronal reformations were performed though the cervical spine.  Contrast was not administered.  DLP 1281.  Automated exposure control used.    COMPARISON:  None    FINDINGS:  No fracture or dislocation evident.  Vertebral body height is maintained.  Odontoid intact.    C2-C3 demonstrates minimal facet arthrosis.    C3-C4 demonstrates mild left-sided facet arthrosis, right-sided uncovertebral spurring and moderate right-sided osseous foraminal narrowing.    C4-C5 demonstrates mild disc space narrowing, osteophytic lipping and uncovertebral spurring, moderate severe osseous foraminal narrowing right greater than left.    C5-C6 demonstrates mild disc space narrowing, moderate osteophytic change and uncovertebral spurring, moderate severe osseous foraminal narrowing.    C6-C7 moderate disc space narrowing, osteophytic change, prominent uncovertebral spurring and severe osseous foraminal narrowing bilaterally.    No  soft tissue mass, fluid collection, hematoma, lymphadenopathy.                                       CT Head Without Contrast (Final result)  Result time 04/13/25 10:09:10      Final result by Fabio Godwin MD (04/13/25 10:09:10)                   Impression:      1.  Left thalamic minimal hyperdensity may represent subacute hemorrhage related to subacute infarct.  Please correlate clinically.  This may be further assessed with MRI brain.    2.  No convincing acute brain traumatic findings.      Electronically signed by: Fabio Godwin  Date:    04/13/2025  Time:    10:09               Narrative:    EXAMINATION:  CT HEAD WITHOUT CONTRAST    CLINICAL HISTORY:  fall;    TECHNIQUE:  Sequential axial images were performed of the brain without contrast.    Dose product length of 1281 mGycm. Automated exposure control was utilized to minimize radiation dose.    COMPARISON:  CT brain March 20, 2024.    FINDINGS:  There is left basal ganglia blush like subtle hyperdense which could represent subacute minimal hemorrhage on image 32 series 2.  This may be related to hemorrhagic infarct.  There are bilateral thalami old lacunar infarcts.  There is also pontine old lacunar infarct.  No acute large vessel territory infarct identified.  There is chronic microvascular ischemia and atrophy.  No mass effect, midline shift or hydrocephalus.  No acute extra-axial fluid collections.  There is no acute depressed calvarial fracture.    There is mild mucoperiosteal thickening of the ethmoidal air cells and minimally the right maxillary sinus.  Otherwise, visualized paranasal sinuses are clear without mucosal thickening, polypoidal abnormality or air-fluid levels. Mastoid air cells aeration is optimal.                                       X-Ray Chest AP Portable (Final result)  Result time 04/13/25 09:43:42      Final result by Fabio Godwin MD (04/13/25 09:43:42)                   Impression:      No acute cardiopulmonary process  identified.      Electronically signed by: Fabio Godwin  Date:    04/13/2025  Time:    09:43               Narrative:    EXAMINATION:  XR CHEST AP PORTABLE    CLINICAL HISTORY:  Unspecified fall, initial encounter    TECHNIQUE:  One view    COMPARISON:  March 27, 2023.    FINDINGS:  Cardiopericardial silhouette enlarged appearance is similar.  Lungs are without dense focal or segmental consolidation, congestive process, pleural effusions or pneumothorax.                                       X-Ray Hip 2 or 3 views Right with Pelvis when performed (Final result)  Result time 04/13/25 09:46:34      Final result by Fabio Godwin MD (04/13/25 09:46:34)                   Impression:      Fracture.      Electronically signed by: Fabio Godwin  Date:    04/13/2025  Time:    09:46               Narrative:    EXAMINATION:  XR HIP WITH PELVIS WHEN PERFORMED 2 OR 3 VIEWS RIGHT    CLINICAL HISTORY:  Fall.    COMPARISON:  None available.    FINDINGS:  There is right femoral neck fracture.  Femoral neck however is not well seen and is obscured due to some proximal retraction of the femoral shaft.  Femoral head is situated within the acetabulum.  No other fracture or dislocation                                       Medications   lactated ringers bolus 1,000 mL (1,000 mLs Intravenous New Bag 4/13/25 0935)   morphine injection 4 mg (4 mg Intravenous Given 4/13/25 0934)   ondansetron injection 4 mg (4 mg Intravenous Given 4/13/25 0935)   morphine injection 4 mg (4 mg Intravenous Given 4/13/25 1040)     Medical Decision Making  Problems Addressed:  Cerebrovascular accident (CVA), unspecified mechanism: acute illness or injury  Fall: acute illness or injury  Femur fracture: acute illness or injury    Amount and/or Complexity of Data Reviewed  Labs: ordered.  Radiology: ordered. Decision-making details documented in ED Course.    Risk  Prescription drug management.  Decision regarding hospitalization.    Differential diagnosis  (includes but is not limited to):   Fall, trauma, fracture, ICH, CVA, TIA, spinal injury, soft tissue injury    MDM Narrative  74-year-old male presents after a fall while walking with his walker.  He fell backwards onto his bottom.  He has had persistent right hip pain with inability to ambulate since that time.  Patient appears to be at his neurologic baseline, he is moving all extremities with intact sensation aside from the affected right lower extremity.  His right lower extremity is shortened and externally rotated.  Palpable pulses the affected extremity, injury appears closed.  X-ray confirms a right femoral neck fracture.  Orthopedic surgery consulted.  CT of the head and C-spine obtained due to age and risk factors from the fall.  CT of the head appears to show an area concerning for a possible subacute infarct.  Given this, MRI ordered and vascular neurology consulted.  Case discussed with the hospitalist, will admit for further care.    Dispo: Admit    My independent radiology interpretation: as above  Point of care US (independently performed and interpreted):   Decision rules/clinical scoring:     Sepsis Perfusion Assessment:     Amount and/or Complexity of Data Reviewed  Independent historian: EMS   Summary of history: report received  External data reviewed: notes from previous ED visits and notes from clinic visits  Summary of data reviewed: Prior records reviewed  Risk and benefits of testing: discussed   Labs: ordered and reviewed  Radiology: ordered and independent interpretation performed (see above or ED course)  ECG/medicine tests: ordered and independent interpretation performed (see above or ED course)  Discussion of management or test interpretation with external provider(s): discussed with hospitalist physician and discussed with neuro, ortho consultant   Summary of discussion: as above    Risk  Parenteral controlled substances   Drug therapy requiring intense monitoring for toxicity    Decision regarding hospitalization  Shared decision making     Critical Care  30-74 minutes     Data Reviewed/Counseling: I have personally reviewed the patient's vital signs, nursing notes, and other relevant tests, information, and imaging. I had a detailed discussion regarding the historical points, exam findings, and any diagnostic results supporting the discharge diagnosis. I personally performed the history, PE, MDM and procedures as documented above and agree with the scribe's documentation.    Portions of this note were dictated using voice recognition software. Although it was reviewed for accuracy, some inherent voice recognition errors may have occurred and may be present in this document.    Additional MDM:     NIH Stroke Scale:   Interval = baseline (upon arrival/admit)  Level of consciousness = 0 - alert  LOC questions = 0 - answers both correctly  LOC commands = 0 - performs both correctly  Best gaze = 0 - normal  Visual = 0 - no visual loss  Facial palsy = 0 - normal  Motor left arm =  0 - no drift  Motor right arm =  0 - no drift  Motor left leg = 0 - no drift  Motor right leg =  UN - amputation, joint fusion (Unable to assess due to hip fracture)  Limb ataxia = 0 - absent  Sensory = 0 - normal  Best language = 0 - no aphasia  Dysarthria = 0 - normal articulation  Extinction and inattention = 0 - no neglect  NIH Stroke Scale Total = 0           Scribe Attestation:   Scribe #1: I performed the above scribed service and the documentation accurately describes the services I performed. I attest to the accuracy of the note.    Attending Attestation:           Physician Attestation for Scribe:  Physician Attestation Statement for Scribe #1: I, Johnson Ross MD, reviewed documentation, as scribed by Camille Carroll in my presence, and it is both accurate and complete.             ED Course as of 04/13/25 1104   Sun Apr 13, 2025   0950 X-Ray Chest AP Portable  Independently visualized/reviewed by  me during the ED visit.  - No lobar consolidation or PTX []   1008 X-Ray Hip 2 or 3 views Right with Pelvis when performed  Independently visualized/reviewed by me during the ED visit.  - Right femoral neck fracture []   1036 Paged hospitalist [AB]   1043 Discussed with PHYLLIS Weller hospitalist: admit to Dr. Covarrubias []   1103 EKG independently interpreted by me.  EKG: SR @ 84, no STEMI, , Qtc 467 []      ED Course User Index  [AB] Camille Carroll  [] Johnson Ross MD                           Clinical Impression:  Final diagnoses:  [W19.XXXA] Fall  [S72.90XA] Femur fracture  [I63.9] Cerebrovascular accident (CVA), unspecified mechanism - subacute          ED Disposition Condition    Admit Stable                  [1]   Social History  Tobacco Use    Smoking status: Never     Passive exposure: Never    Smokeless tobacco: Never   Substance Use Topics    Alcohol use: Never     Alcohol/week: 1.0 standard drink of alcohol     Types: 1 Cans of beer per week    Drug use: Never        Johnson Ross MD  04/13/25 5928

## 2025-04-14 ENCOUNTER — ANESTHESIA (OUTPATIENT)
Dept: SURGERY | Facility: HOSPITAL | Age: 75
End: 2025-04-14
Payer: MEDICAID

## 2025-04-14 ENCOUNTER — ANESTHESIA EVENT (OUTPATIENT)
Dept: SURGERY | Facility: HOSPITAL | Age: 75
End: 2025-04-14
Payer: MEDICAID

## 2025-04-14 LAB
ALBUMIN SERPL-MCNC: 3.7 G/DL (ref 3.4–4.8)
ALBUMIN/GLOB SERPL: 1 RATIO (ref 1.1–2)
ALP SERPL-CCNC: 121 UNIT/L (ref 40–150)
ALT SERPL-CCNC: 54 UNIT/L (ref 0–55)
ANION GAP SERPL CALC-SCNC: 18 MEQ/L
ANISOCYTOSIS BLD QL SMEAR: ABNORMAL
APICAL FOUR CHAMBER EJECTION FRACTION: 71 %
APTT PPP: 36 SECONDS (ref 23.2–33.7)
AST SERPL-CCNC: 39 UNIT/L (ref 11–45)
AV INDEX (PROSTH): 0.64
AV MEAN GRADIENT: 5 MMHG
AV PEAK GRADIENT: 9 MMHG
AV VALVE AREA BY VELOCITY RATIO: 2.1 CM²
AV VALVE AREA: 1.8 CM²
AV VELOCITY RATIO: 0.73
BASOPHILS # BLD AUTO: 0.05 X10(3)/MCL
BASOPHILS NFR BLD AUTO: 0.4 %
BILIRUB SERPL-MCNC: 1 MG/DL
BSA FOR ECHO PROCEDURE: 1.64 M2
BUN SERPL-MCNC: 12.5 MG/DL (ref 8.4–25.7)
CALCIUM SERPL-MCNC: 8.9 MG/DL (ref 8.8–10)
CHLORIDE SERPL-SCNC: 103 MMOL/L (ref 98–107)
CO2 SERPL-SCNC: 16 MMOL/L (ref 23–31)
CREAT SERPL-MCNC: 0.67 MG/DL (ref 0.72–1.25)
CREAT/UREA NIT SERPL: 19
CV ECHO LV RWT: 0.49 CM
DOP CALC AO PEAK VEL: 1.5 M/S
DOP CALC AO VTI: 29.7 CM
DOP CALC LVOT AREA: 2.8 CM2
DOP CALC LVOT DIAMETER: 1.9 CM
DOP CALC LVOT PEAK VEL: 1.1 M/S
DOP CALC LVOT STROKE VOLUME: 54.1 CM3
DOP CALC MV VTI: 27.7 CM
DOP CALCLVOT PEAK VEL VTI: 19.1 CM
E WAVE DECELERATION TIME: 193 MSEC
E/A RATIO: 0.93
E/E' RATIO: 12 M/S
ECHO LV POSTERIOR WALL: 1 CM (ref 0.6–1.1)
EOSINOPHIL # BLD AUTO: 0.14 X10(3)/MCL (ref 0–0.9)
EOSINOPHIL NFR BLD AUTO: 1.2 %
ERYTHROCYTE [DISTWIDTH] IN BLOOD BY AUTOMATED COUNT: 20.6 % (ref 11.5–17)
FRACTIONAL SHORTENING: 46.3 % (ref 28–44)
GFR SERPLBLD CREATININE-BSD FMLA CKD-EPI: >60 ML/MIN/1.73/M2
GLOBULIN SER-MCNC: 3.8 GM/DL (ref 2.4–3.5)
GLUCOSE SERPL-MCNC: 114 MG/DL (ref 82–115)
HCT VFR BLD AUTO: 34.3 % (ref 42–52)
HGB BLD-MCNC: 9.4 G/DL (ref 14–18)
HR MV ECHO: 82 BPM
HYPOCHROMIA BLD QL SMEAR: ABNORMAL
IMM GRANULOCYTES # BLD AUTO: 0.06 X10(3)/MCL (ref 0–0.04)
IMM GRANULOCYTES NFR BLD AUTO: 0.5 %
INR PPP: 1.2
INTERVENTRICULAR SEPTUM: 1 CM (ref 0.6–1.1)
LEFT ATRIUM AREA SYSTOLIC (APICAL 2 CHAMBER): 18.2 CM2
LEFT ATRIUM AREA SYSTOLIC (APICAL 4 CHAMBER): 17.2 CM2
LEFT ATRIUM SIZE: 3.8 CM
LEFT ATRIUM VOLUME INDEX MOD: 28 ML/M2
LEFT ATRIUM VOLUME MOD: 44 ML
LEFT INTERNAL DIMENSION IN SYSTOLE: 2.2 CM (ref 2.1–4)
LEFT VENTRICLE DIASTOLIC VOLUME INDEX: 46.25 ML/M2
LEFT VENTRICLE DIASTOLIC VOLUME: 74 ML
LEFT VENTRICLE END DIASTOLIC VOLUME APICAL 4 CHAMBER: 66 ML
LEFT VENTRICLE END SYSTOLIC VOLUME APICAL 2 CHAMBER: 46.3 ML
LEFT VENTRICLE END SYSTOLIC VOLUME APICAL 4 CHAMBER: 42.2 ML
LEFT VENTRICLE MASS INDEX: 82.6 G/M2
LEFT VENTRICLE SYSTOLIC VOLUME INDEX: 10 ML/M2
LEFT VENTRICLE SYSTOLIC VOLUME: 16 ML
LEFT VENTRICULAR INTERNAL DIMENSION IN DIASTOLE: 4.1 CM (ref 3.5–6)
LEFT VENTRICULAR MASS: 132.1 G
LV LATERAL E/E' RATIO: 11 M/S
LV SEPTAL E/E' RATIO: 14.1 M/S
LVED V (TEICH): 74.2 ML
LVES V (TEICH): 16.2 ML
LVOT MG: 3 MMHG
LVOT MV: 0.7 CM/S
LYMPHOCYTES # BLD AUTO: 1.04 X10(3)/MCL (ref 0.6–4.6)
LYMPHOCYTES NFR BLD AUTO: 8.6 %
MACROCYTES BLD QL SMEAR: ABNORMAL
MAGNESIUM SERPL-MCNC: 1.8 MG/DL (ref 1.6–2.6)
MCH RBC QN AUTO: 20.9 PG (ref 27–31)
MCHC RBC AUTO-ENTMCNC: 27.4 G/DL (ref 33–36)
MCV RBC AUTO: 76.2 FL (ref 80–94)
MONOCYTES # BLD AUTO: 0.57 X10(3)/MCL (ref 0.1–1.3)
MONOCYTES NFR BLD AUTO: 4.7 %
MV MEAN GRADIENT: 3 MMHG
MV PEAK A VEL: 1.06 M/S
MV PEAK E VEL: 0.99 M/S
MV PEAK GRADIENT: 4 MMHG
MV STENOSIS PRESSURE HALF TIME: 57 MS
MV VALVE AREA BY CONTINUITY EQUATION: 1.95 CM2
MV VALVE AREA P 1/2 METHOD: 3.86 CM2
NEUTROPHILS # BLD AUTO: 10.2 X10(3)/MCL (ref 2.1–9.2)
NEUTROPHILS NFR BLD AUTO: 84.6 %
NRBC BLD AUTO-RTO: 0 %
PHOSPHATE SERPL-MCNC: 4.5 MG/DL (ref 2.3–4.7)
PISA TR MAX VEL: 2.6 M/S
PLATELET # BLD AUTO: 411 X10(3)/MCL (ref 130–400)
PLATELET # BLD EST: ABNORMAL 10*3/UL
PMV BLD AUTO: 10 FL (ref 7.4–10.4)
POCT GLUCOSE: 162 MG/DL (ref 70–110)
POIKILOCYTOSIS BLD QL SMEAR: ABNORMAL
POTASSIUM SERPL-SCNC: 4.8 MMOL/L (ref 3.5–5.1)
PROT SERPL-MCNC: 7.5 GM/DL (ref 5.8–7.6)
PROTHROMBIN TIME: 14.9 SECONDS (ref 12.5–14.5)
RA PRESSURE ESTIMATED: 3 MMHG
RBC # BLD AUTO: 4.5 X10(6)/MCL (ref 4.7–6.1)
RBC MORPH BLD: ABNORMAL
RV TB RVSP: 6 MMHG
SINUS: 3.4 CM
SODIUM SERPL-SCNC: 137 MMOL/L (ref 136–145)
TARGETS BLD QL SMEAR: ABNORMAL
TDI LATERAL: 0.09 M/S
TDI SEPTAL: 0.07 M/S
TDI: 0.08 M/S
TR MAX PG: 26 MMHG
TRICUSPID ANNULAR PLANE SYSTOLIC EXCURSION: 2.92 CM
TV REST PULMONARY ARTERY PRESSURE: 30 MMHG
WBC # BLD AUTO: 12.06 X10(3)/MCL (ref 4.5–11.5)
Z-SCORE OF LEFT VENTRICULAR DIMENSION IN END DIASTOLE: -1.05
Z-SCORE OF LEFT VENTRICULAR DIMENSION IN END SYSTOLE: -1.97

## 2025-04-14 PROCEDURE — 63600175 PHARM REV CODE 636 W HCPCS: Performed by: ORTHOPAEDIC SURGERY

## 2025-04-14 PROCEDURE — 63600175 PHARM REV CODE 636 W HCPCS: Performed by: ANESTHESIOLOGY

## 2025-04-14 PROCEDURE — 71000033 HC RECOVERY, INTIAL HOUR: Performed by: ORTHOPAEDIC SURGERY

## 2025-04-14 PROCEDURE — 63600175 PHARM REV CODE 636 W HCPCS

## 2025-04-14 PROCEDURE — 88305 TISSUE EXAM BY PATHOLOGIST: CPT | Performed by: ORTHOPAEDIC SURGERY

## 2025-04-14 PROCEDURE — 36000710: Performed by: ORTHOPAEDIC SURGERY

## 2025-04-14 PROCEDURE — 27236 TREAT THIGH FRACTURE: CPT | Mod: AS,RT,,

## 2025-04-14 PROCEDURE — C1776 JOINT DEVICE (IMPLANTABLE): HCPCS | Performed by: ORTHOPAEDIC SURGERY

## 2025-04-14 PROCEDURE — 85610 PROTHROMBIN TIME: CPT | Performed by: PHYSICIAN ASSISTANT

## 2025-04-14 PROCEDURE — 84100 ASSAY OF PHOSPHORUS: CPT | Performed by: PHYSICIAN ASSISTANT

## 2025-04-14 PROCEDURE — 63600175 PHARM REV CODE 636 W HCPCS: Performed by: NURSE ANESTHETIST, CERTIFIED REGISTERED

## 2025-04-14 PROCEDURE — 25000003 PHARM REV CODE 250: Performed by: PHYSICIAN ASSISTANT

## 2025-04-14 PROCEDURE — 36415 COLL VENOUS BLD VENIPUNCTURE: CPT | Performed by: PHYSICIAN ASSISTANT

## 2025-04-14 PROCEDURE — 25000003 PHARM REV CODE 250: Performed by: INTERNAL MEDICINE

## 2025-04-14 PROCEDURE — 0SRR01Z REPLACEMENT OF RIGHT HIP JOINT, FEMORAL SURFACE WITH METAL SYNTHETIC SUBSTITUTE, OPEN APPROACH: ICD-10-PCS | Performed by: ORTHOPAEDIC SURGERY

## 2025-04-14 PROCEDURE — 85025 COMPLETE CBC W/AUTO DIFF WBC: CPT | Performed by: PHYSICIAN ASSISTANT

## 2025-04-14 PROCEDURE — 37000008 HC ANESTHESIA 1ST 15 MINUTES: Performed by: ORTHOPAEDIC SURGERY

## 2025-04-14 PROCEDURE — 27236 TREAT THIGH FRACTURE: CPT | Mod: RT,,, | Performed by: ORTHOPAEDIC SURGERY

## 2025-04-14 PROCEDURE — 85730 THROMBOPLASTIN TIME PARTIAL: CPT | Performed by: PHYSICIAN ASSISTANT

## 2025-04-14 PROCEDURE — 64447 NJX AA&/STRD FEMORAL NRV IMG: CPT | Performed by: ANESTHESIOLOGY

## 2025-04-14 PROCEDURE — 36000711: Performed by: ORTHOPAEDIC SURGERY

## 2025-04-14 PROCEDURE — 21400001 HC TELEMETRY ROOM

## 2025-04-14 PROCEDURE — 27201423 OPTIME MED/SURG SUP & DEVICES STERILE SUPPLY: Performed by: ORTHOPAEDIC SURGERY

## 2025-04-14 PROCEDURE — 83735 ASSAY OF MAGNESIUM: CPT | Performed by: PHYSICIAN ASSISTANT

## 2025-04-14 PROCEDURE — 99233 SBSQ HOSP IP/OBS HIGH 50: CPT | Mod: 57,ICN,, | Performed by: ORTHOPAEDIC SURGERY

## 2025-04-14 PROCEDURE — 25000003 PHARM REV CODE 250: Performed by: NURSE ANESTHETIST, CERTIFIED REGISTERED

## 2025-04-14 PROCEDURE — 37000009 HC ANESTHESIA EA ADD 15 MINS: Performed by: ORTHOPAEDIC SURGERY

## 2025-04-14 PROCEDURE — A4216 STERILE WATER/SALINE, 10 ML: HCPCS | Performed by: PHYSICIAN ASSISTANT

## 2025-04-14 PROCEDURE — 80053 COMPREHEN METABOLIC PANEL: CPT | Performed by: PHYSICIAN ASSISTANT

## 2025-04-14 PROCEDURE — 88311 DECALCIFY TISSUE: CPT

## 2025-04-14 DEVICE — HIP STEM - HIGH OFFSET
Type: IMPLANTABLE DEVICE | Site: HIP | Status: FUNCTIONAL
Brand: INSIGNIA

## 2025-04-14 DEVICE — LFIT V40 FEMORAL HEAD
Type: IMPLANTABLE DEVICE | Site: HIP | Status: FUNCTIONAL
Brand: V40 HEAD

## 2025-04-14 DEVICE — BIPOLAR COMPONENT
Type: IMPLANTABLE DEVICE | Site: HIP | Status: FUNCTIONAL
Brand: UHR

## 2025-04-14 RX ORDER — CEFAZOLIN SODIUM 2 G/50ML
2 SOLUTION INTRAVENOUS
Status: DISCONTINUED | OUTPATIENT
Start: 2025-04-14 | End: 2025-04-14 | Stop reason: HOSPADM

## 2025-04-14 RX ORDER — DEXAMETHASONE SODIUM PHOSPHATE 10 MG/ML
INJECTION, SOLUTION INTRA-ARTICULAR; INTRALESIONAL; INTRAMUSCULAR; INTRAVENOUS; SOFT TISSUE
Status: COMPLETED | OUTPATIENT
Start: 2025-04-14 | End: 2025-04-14

## 2025-04-14 RX ORDER — ROPIVACAINE HYDROCHLORIDE 5 MG/ML
INJECTION, SOLUTION EPIDURAL; INFILTRATION; PERINEURAL
Status: COMPLETED | OUTPATIENT
Start: 2025-04-14 | End: 2025-04-14

## 2025-04-14 RX ORDER — EPHEDRINE SULFATE 50 MG/ML
INJECTION, SOLUTION INTRAVENOUS
Status: DISCONTINUED | OUTPATIENT
Start: 2025-04-14 | End: 2025-04-14

## 2025-04-14 RX ORDER — MIDAZOLAM HYDROCHLORIDE 2 MG/2ML
2 INJECTION, SOLUTION INTRAMUSCULAR; INTRAVENOUS ONCE
Status: DISCONTINUED | OUTPATIENT
Start: 2025-04-14 | End: 2025-04-14 | Stop reason: HOSPADM

## 2025-04-14 RX ORDER — ROCURONIUM BROMIDE 10 MG/ML
INJECTION, SOLUTION INTRAVENOUS
Status: DISCONTINUED | OUTPATIENT
Start: 2025-04-14 | End: 2025-04-14

## 2025-04-14 RX ORDER — KETOROLAC TROMETHAMINE 30 MG/ML
INJECTION, SOLUTION INTRAMUSCULAR; INTRAVENOUS
Status: DISCONTINUED | OUTPATIENT
Start: 2025-04-14 | End: 2025-04-14 | Stop reason: HOSPADM

## 2025-04-14 RX ORDER — MORPHINE SULFATE 10 MG/ML
INJECTION INTRAMUSCULAR; INTRAVENOUS; SUBCUTANEOUS
Status: DISCONTINUED | OUTPATIENT
Start: 2025-04-14 | End: 2025-04-14 | Stop reason: HOSPADM

## 2025-04-14 RX ORDER — ROPIVACAINE HYDROCHLORIDE 5 MG/ML
INJECTION, SOLUTION EPIDURAL; INFILTRATION; PERINEURAL
Status: DISCONTINUED | OUTPATIENT
Start: 2025-04-14 | End: 2025-04-14 | Stop reason: HOSPADM

## 2025-04-14 RX ORDER — LIDOCAINE HYDROCHLORIDE 20 MG/ML
INJECTION, SOLUTION EPIDURAL; INFILTRATION; INTRACAUDAL; PERINEURAL
Status: DISCONTINUED | OUTPATIENT
Start: 2025-04-14 | End: 2025-04-14

## 2025-04-14 RX ORDER — DEXAMETHASONE SODIUM PHOSPHATE 4 MG/ML
4 INJECTION, SOLUTION INTRA-ARTICULAR; INTRALESIONAL; INTRAMUSCULAR; INTRAVENOUS; SOFT TISSUE ONCE
Status: COMPLETED | OUTPATIENT
Start: 2025-04-14 | End: 2025-04-14

## 2025-04-14 RX ORDER — PROPOFOL 10 MG/ML
VIAL (ML) INTRAVENOUS
Status: DISCONTINUED | OUTPATIENT
Start: 2025-04-14 | End: 2025-04-14

## 2025-04-14 RX ORDER — FENTANYL CITRATE 50 UG/ML
INJECTION, SOLUTION INTRAMUSCULAR; INTRAVENOUS
Status: DISCONTINUED | OUTPATIENT
Start: 2025-04-14 | End: 2025-04-14

## 2025-04-14 RX ORDER — EPINEPHRINE 1 MG/ML
INJECTION, SOLUTION, CONCENTRATE INTRAVENOUS
Status: DISCONTINUED | OUTPATIENT
Start: 2025-04-14 | End: 2025-04-14 | Stop reason: HOSPADM

## 2025-04-14 RX ORDER — ATORVASTATIN CALCIUM 40 MG/1
40 TABLET, FILM COATED ORAL NIGHTLY
Status: DISCONTINUED | OUTPATIENT
Start: 2025-04-14 | End: 2025-04-17

## 2025-04-14 RX ORDER — ASPIRIN 300 MG/1
300 SUPPOSITORY RECTAL DAILY
Status: DISCONTINUED | OUTPATIENT
Start: 2025-04-14 | End: 2025-04-15

## 2025-04-14 RX ORDER — ROPIVACAINE HYDROCHLORIDE 5 MG/ML
30 INJECTION, SOLUTION EPIDURAL; INFILTRATION; PERINEURAL
Status: COMPLETED | OUTPATIENT
Start: 2025-04-14 | End: 2025-04-14

## 2025-04-14 RX ORDER — ONDANSETRON HYDROCHLORIDE 2 MG/ML
INJECTION, SOLUTION INTRAVENOUS
Status: DISCONTINUED | OUTPATIENT
Start: 2025-04-14 | End: 2025-04-14

## 2025-04-14 RX ORDER — CEFEPIME HYDROCHLORIDE 2 G/1
2 INJECTION, POWDER, FOR SOLUTION INTRAVENOUS
Status: COMPLETED | OUTPATIENT
Start: 2025-04-14 | End: 2025-04-15

## 2025-04-14 RX ORDER — PHENYLEPHRINE HYDROCHLORIDE 10 MG/ML
INJECTION INTRAVENOUS
Status: DISCONTINUED | OUTPATIENT
Start: 2025-04-14 | End: 2025-04-14

## 2025-04-14 RX ORDER — VANCOMYCIN HYDROCHLORIDE 1 G/20ML
INJECTION, POWDER, LYOPHILIZED, FOR SOLUTION INTRAVENOUS
Status: DISCONTINUED | OUTPATIENT
Start: 2025-04-14 | End: 2025-04-14 | Stop reason: HOSPADM

## 2025-04-14 RX ORDER — SODIUM CHLORIDE 9 MG/ML
INJECTION, SOLUTION INTRAVENOUS CONTINUOUS
Status: DISCONTINUED | OUTPATIENT
Start: 2025-04-14 | End: 2025-04-15

## 2025-04-14 RX ORDER — FENTANYL CITRATE 50 UG/ML
INJECTION, SOLUTION INTRAMUSCULAR; INTRAVENOUS
Status: COMPLETED
Start: 2025-04-14 | End: 2025-04-14

## 2025-04-14 RX ADMIN — SODIUM CHLORIDE: 9 INJECTION, SOLUTION INTRAVENOUS at 11:04

## 2025-04-14 RX ADMIN — SODIUM CHLORIDE, PRESERVATIVE FREE 10 ML: 5 INJECTION INTRAVENOUS at 11:04

## 2025-04-14 RX ADMIN — FENTANYL CITRATE 25 MCG: 50 INJECTION, SOLUTION INTRAMUSCULAR; INTRAVENOUS at 01:04

## 2025-04-14 RX ADMIN — ASPIRIN 300 MG: 300 SUPPOSITORY RECTAL at 11:04

## 2025-04-14 RX ADMIN — PHENYLEPHRINE HYDROCHLORIDE 100 MCG: 10 INJECTION INTRAVENOUS at 01:04

## 2025-04-14 RX ADMIN — PHENYLEPHRINE HYDROCHLORIDE 100 MCG: 10 INJECTION INTRAVENOUS at 12:04

## 2025-04-14 RX ADMIN — FENTANYL CITRATE 50 MCG: 50 INJECTION, SOLUTION INTRAMUSCULAR; INTRAVENOUS at 12:04

## 2025-04-14 RX ADMIN — DEXAMETHASONE SODIUM PHOSPHATE 4 MG: 4 INJECTION, SOLUTION INTRA-ARTICULAR; INTRALESIONAL; INTRAMUSCULAR; INTRAVENOUS; SOFT TISSUE at 12:04

## 2025-04-14 RX ADMIN — DEXAMETHASONE SODIUM PHOSPHATE 4 MG: 10 INJECTION, SOLUTION INTRAMUSCULAR; INTRAVENOUS at 12:04

## 2025-04-14 RX ADMIN — LIDOCAINE HYDROCHLORIDE 80 MG: 20 INJECTION, SOLUTION EPIDURAL; INFILTRATION; INTRACAUDAL; PERINEURAL at 12:04

## 2025-04-14 RX ADMIN — CEFEPIME 2 G: 2 INJECTION, POWDER, FOR SOLUTION INTRAVENOUS at 05:04

## 2025-04-14 RX ADMIN — CEFAZOLIN SODIUM 2 G: 2 SOLUTION INTRAVENOUS at 12:04

## 2025-04-14 RX ADMIN — ROPIVACAINE HYDROCHLORIDE 30 ML: 5 INJECTION, SOLUTION EPIDURAL; INFILTRATION; PERINEURAL at 12:04

## 2025-04-14 RX ADMIN — SODIUM CHLORIDE, SODIUM GLUCONATE, SODIUM ACETATE, POTASSIUM CHLORIDE AND MAGNESIUM CHLORIDE: 526; 502; 368; 37; 30 INJECTION, SOLUTION INTRAVENOUS at 12:04

## 2025-04-14 RX ADMIN — ONDANSETRON 4 MG: 2 INJECTION INTRAMUSCULAR; INTRAVENOUS at 01:04

## 2025-04-14 RX ADMIN — ROCURONIUM BROMIDE 50 MG: 10 SOLUTION INTRAVENOUS at 12:04

## 2025-04-14 RX ADMIN — EPHEDRINE SULFATE 10 MG: 50 INJECTION INTRAVENOUS at 01:04

## 2025-04-14 RX ADMIN — FENTANYL CITRATE 50 MCG: 50 INJECTION, SOLUTION INTRAMUSCULAR; INTRAVENOUS at 02:04

## 2025-04-14 RX ADMIN — EPHEDRINE SULFATE 40 MG: 50 INJECTION INTRAVENOUS at 02:04

## 2025-04-14 RX ADMIN — SUGAMMADEX 150 MG: 100 INJECTION, SOLUTION INTRAVENOUS at 01:04

## 2025-04-14 RX ADMIN — PROPOFOL 120 MG: 10 INJECTION, EMULSION INTRAVENOUS at 12:04

## 2025-04-14 RX ADMIN — CEFEPIME 2 G: 2 INJECTION, POWDER, FOR SOLUTION INTRAVENOUS at 11:04

## 2025-04-14 RX ADMIN — ROCURONIUM BROMIDE 20 MG: 10 SOLUTION INTRAVENOUS at 12:04

## 2025-04-14 NOTE — OP NOTE
[facility]                       [address] [place] [address]    PATIENT NAME:      CATY LEONARD   YOB: 1950  CSN:               [id number]  MRN:               [id number]  ADMIT DATE:        04/13/2025 09:18:00  PHYSICIAN:         Raghu Hunt MD                          OPERATIVE REPORT      DATE OF SURGERY:    04/14/2025 00:00:00    SURGEON:  Raghu Hunt MD    PREOPERATIVE DIAGNOSIS:  Right displaced femoral neck fracture.    POSTOPERATIVE DIAGNOSIS:  Right displaced femoral neck fracture.    PROCEDURE:  Right hip hemiarthroplasty, CPT 35890.    ANESTHESIA:  General.    ESTIMATED BLOOD LOSS:  200 cc.    ASSISTANT:  ALBANIA Sosa, necessary for a skilled set of hands to assist   with deep retraction as well as application of hardware and layered closure.    IMPLANTS:  Libby Insignia size 3 stem with a 28, +0 metal head and a 49 mm   bipolar.    COMPLICATIONS:  None.    COUNTS:  All counts correct x2 at the end of the case.    INDICATIONS FOR PROCEDURE:  Mr. Leonard is a 74-year-old male, who has a history of   prior CVA.  He has some right-sided deficits.  He had a fall at home.  He was   seen and evaluated in the emergency department.  He was unable to ambulate.  He   was found have a displaced femoral neck fracture.  He was admitted to the   Hospital Medicine Service.  The risks and benefits of treatment were discussed   with the patient and his family.  He is brought to the operating room for a   right hip hemiarthroplasty.    PROCEDURE IN DETAIL:  After informed consent was obtained, the patient was met   in the preoperative holding area and the site was marked.  He was taken to the   operating room.  General anesthesia was induced.  He was then placed in the   lateral decubitus position on the operating table, stabilized on a pegboard.    All bony prominences were well padded.  He had an axillary  roll placed.  His   right lower extremity was prepped and draped in a standard sterile fashion and a   time-out was done indicating correct operative limb, procedure.  An incision   was made over the lateral aspect of the hip.  It was carried down through the IT   band.  Anterolateral approach to the hip joint was performed taking down the   anterior third of the abductors from the greater trochanter.  Capsulotomy was   performed.  The fracture was identified.  The neck was cut.  The head was   removed and sized to a 45 mm bipolar trial, reapproximated the head size well.    We then placed the limb across the body in the bag and the canal was prepared   using a box osteotome, canal finder, and broaches.  He was broached up to a size   3.  He was then trialed with a +0 head.  This reapproximated his limb lengths   well.  His hip was stable through range of motion.  The final components were   then impacted into place.  He was again put through range of motion and his hip   was found to be stable.  It was thoroughly irrigated with pulse lavage and a   layered closure was then performed with a #1 Vicryl repair of his capsule,   followed by #5 FiberWire repair of the abductors back through drill tunnels in   the greater trochanter.  A joint cocktail injection was provided for   postoperative pain relief and a gram of vancomycin was injected into the hip   capsule.  A layered closure was performed of the IT band using a #1 Stratafix,   followed by #1 Vicryl, 2-0 Monocryl, and 3-0 Monocryl suture closure for the   skin with Dermabond and island dressing.  The patient was awakened and   extubated, and taken to Recovery in stable condition.    POSTOPERATIVE PLAN:  He will be admitted to the floor.  He can weightbear as   tolerated to the right lower extremity.  Full range of motion of the right hip.    Lovenox for DVT prophylaxis.  Currently, he can start oral anticoagulation if   indicated.  He will need Case Management  evaluation for placement.        ______________________________  MD CORNELIUS Campos/ENRIQUETA  DD:  04/14/2025  Time:  01:55PM  DT:  04/14/2025  Time:  02:17PM  Job #:  [id number]      OPERATIVE REPORT       n/a

## 2025-04-14 NOTE — PLAN OF CARE
Pt had surgery today- spoke to therapist and asked to give recs on evals done.  CM to follow for d/c planning.  Returned from  around 1600

## 2025-04-14 NOTE — CONSULTS
Patient off unit in OR at time of rounding. Chart reviewed with MD.   Full consultation note with recommendations tomorrow.

## 2025-04-14 NOTE — PT/OT/SLP PROGRESS
Physical Therapy      Patient Name:  William Mccauley   MRN:  [id number]    PT eval orders received. Pt off floor in OR with ortho today. Will f/u tomorrow for PT eval.    4/14/2025

## 2025-04-14 NOTE — PROGRESS NOTES
Patient Name: William Mccauley  MRN: [id number]  Admission Date: 4/13/2025  Hospital Length of Stay: 1 days  Attending Provider: Warren Covarrubias MD  Primary Care Provider: Shannon Alonso MD  Follow-up For: Procedure(s) (LRB):  HEMIARTHROPLASTY, HIP (Right)    Post-Operative Day: * Day of Surgery *  Subjective:       Principal Orthopedic Problem: Right femur neck fracture    Interval History:  No acute issues overnight.  Son-in-law at the bedside this morning.  Patient is awake and alert and follows commands.    Review of patient's allergies indicates:  No Known Allergies    Current Facility-Administered Medications   Medication    bisacodyL suppository 10 mg    labetaloL injection 10 mg    morphine injection 2 mg    sodium chloride 0.9% flush 10 mL     Objective:     Vital Signs (Most Recent):  Temp: 97.9 °F (36.6 °C) (04/14/25 0737)  Pulse: 77 (04/14/25 0737)  Resp: 18 (04/14/25 0737)  BP: 131/74 (04/14/25 0737)  SpO2: 96 % (04/14/25 0737) Vital Signs (24h Range):  Temp:  [97.9 °F (36.6 °C)-98.6 °F (37 °C)] 97.9 °F (36.6 °C)  Pulse:  [77-93] 77  Resp:  [17-20] 18  SpO2:  [93 %-100 %] 96 %  BP: (117-156)/(68-82) 131/74     Weight: 63.5 kg (139 lb 15.9 oz)  Height: 5' (152.4 cm)  Body mass index is 27.34 kg/m².      Intake/Output Summary (Last 24 hours) at 4/14/2025 0912  Last data filed at 4/13/2025 2215  Gross per 24 hour   Intake 1000 ml   Output 1150 ml   Net -150 ml       Physical Exam:   Ortho/SPM Exam    General the patient is alert and in no acute distress, nontoxic-appearing     Constitutional: Vital signs are examined and stable.  Resp: No signs of labored breathing    Musculoskeletal Exam:  Right lower extremity:  Site marked.  Limb externally rotated.  Thigh soft and compressible.  No open wounds or abrasions noted.  Able to perform range motion of the ankle and digits though limited    Diagnostic Findings:   Significant Labs: CBC:   Recent Labs   Lab 04/13/25  0929 04/14/25  0320   WBC 10.12 12.06*   HGB 8.8*  9.4*   HCT 30.2* 34.3*    411*     CMP:   Recent Labs   Lab 04/13/25  0929 04/14/25  0320   * 137   K 3.8 4.8    103   CO2 20* 16*   BUN 9.2 12.5   CREATININE 0.58* 0.67*   CALCIUM 8.7* 8.9   ALBUMIN 3.8 3.7   BILITOT 0.8 1.0   ALKPHOS 119 121   AST 48* 39   ALT 63* 54     Coagulation:   Recent Labs   Lab 04/13/25  0929 04/14/25  0320   LABPROT 7.7*  13.9 7.5  14.9*   INR 1.1 1.2   APTT  --  36.0*     All pertinent labs within the past 24 hours have been reviewed.        Significant Imaging: I have reviewed and interpreted all pertinent imaging results/findings.     Assessment/Plan:     Active Diagnoses:    Diagnosis Date Noted POA    PRINCIPAL PROBLEM:  Closed displaced fracture of right femoral neck [S72.001A] 04/13/2025 Yes      Problems Resolved During this Admission:     I have agreed to assume care of the patient's femoral neck fracture from my partner due to my earlier operative availability.  An extensive discussion was held today with the patient and his son-in-law.  He will benefit from right hip hemiarthroplasty.  Neurology consultation has been placed due to possible new CVA.  He has a history of stroke in the past with prior deficits.  He we will proceed to the operating room months medically optimized for operative intervention.  Call with questions or concerns.  Maintain NPO and nonweightbearing to the right lower extremity, bed rest until surgery.  Family understands and agrees with our plan and all questions and concerns were addressed.      This note/OR report was created with the assistance of  voice recognition software or phone  dictation.  There may be transcription errors as a result of using this technology however minimal. Effort has been made to assure accuracy of transcription but any obvious errors or omissions should be clarified with the author of the document.         Raghu Hunt MD  Orthopedic Trauma Surgery  [facility] - 8th Floor [facility]  Surg

## 2025-04-14 NOTE — SUBJECTIVE & OBJECTIVE
Past Medical History:   Diagnosis Date    Diabetes mellitus, type 2     HTN (hypertension)     Ptosis     Sciatica     Stroke     Type 2 diabetes mellitus without complications        Past Surgical History:   Procedure Laterality Date    DIAGNOSTIC LAPAROSCOPY N/A 9/24/2024    Procedure: LAPAROSCOPY, DIAGNOSTIC;  Surgeon: Adalberto Rock MD;  Location: [facility];  Service: General;  Laterality: N/A;    KIDNEY STONE SURGERY Left     REPAIR, HERNIA, INGUINAL Right 9/24/2024    Procedure: REPAIR, HERNIA, INGUINAL;  Surgeon: Adalberto Rock MD;  Location: [facility];  Service: General;  Laterality: Right;  Incarcerated Hernia       Review of patient's allergies indicates:  No Known Allergies      No current facility-administered medications on file prior to encounter.     Current Outpatient Medications on File Prior to Encounter   Medication Sig    atorvastatin (LIPITOR) 40 MG tablet Take 1 tablet (40 mg total) by mouth once daily.    empagliflozin (JARDIANCE) 25 mg tablet Take 1 tablet (25 mg total) by mouth once daily.    losartan (COZAAR) 25 MG tablet Take 1 tablet (25 mg total) by mouth once daily.    metFORMIN (GLUMETZA) 1000 MG (MOD) 24hr tablet Take 1 tablet (1,000 mg total) by mouth 2 (two) times daily with meals.    aspirin (ECOTRIN) 81 MG EC tablet Take 1 tablet (81 mg total) by mouth once daily.    ferrous sulfate (FEOSOL) 325 mg (65 mg iron) Tab tablet Take 1 tablet (325 mg total) by mouth once daily.    gabapentin (NEURONTIN) 300 MG capsule Take 2 capsules (600 mg total) by mouth 3 (three) times daily.    ketoconazole (NIZORAL) 2 % shampoo Apply topically twice a week.    polyethylene glycol (GLYCOLAX) 17 gram/dose powder Oral     Family History    None       Tobacco Use    Smoking status: Never     Passive exposure: Never    Smokeless tobacco: Never   Substance and Sexual Activity    Alcohol use: Never     Alcohol/week: 1.0 standard drink of alcohol     Types: 1 Cans of beer per week    Drug use:  Never    Sexual activity: Not Currently     Partners: Female     Review of Systems   Eyes:  Negative for photophobia and visual disturbance.   Gastrointestinal:  Positive for abdominal pain.        *Translation services used*  Patient reported midline abdominal pain   Neurological:  Negative for tremors, weakness, numbness and headaches.   All other systems reviewed and are negative.    Objective:     Vital Signs (Most Recent):  Temp: 97.9 °F (36.6 °C) (04/14/25 0349)  Pulse: 80 (04/14/25 0349)  Resp: 18 (04/13/25 1921)  BP: 117/68 (04/14/25 0349)  SpO2: 96 % (04/14/25 0349) Vital Signs (24h Range):  Temp:  [97.9 °F (36.6 °C)-98.6 °F (37 °C)] 97.9 °F (36.6 °C)  Pulse:  [79-93] 80  Resp:  [17-20] 18  SpO2:  [93 %-100 %] 96 %  BP: (117-156)/(68-82) 117/68     Weight: 63.5 kg (139 lb 15.9 oz)  Body mass index is 27.34 kg/m².     Physical Exam  Vitals and nursing note reviewed.   Constitutional:       General: He is not in acute distress.     Appearance: Normal appearance.   HENT:      Head: Normocephalic and atraumatic.   Eyes:      Extraocular Movements: Extraocular movements intact.      Pupils: Pupils are equal, round, and reactive to light.   Pulmonary:      Effort: Pulmonary effort is normal. No respiratory distress.   Musculoskeletal:         General: No swelling. Normal range of motion.      Cervical back: Normal range of motion.      Right lower leg: No edema.      Left lower leg: No edema.   Skin:     General: Skin is warm and dry.      Capillary Refill: Capillary refill takes less than 2 seconds.   Neurological:      Mental Status: He is alert.      GCS: GCS eye subscore is 4. GCS verbal subscore is 5. GCS motor subscore is 6.      Cranial Nerves: Cranial nerves 2-12 are intact. No dysarthria or facial asymmetry.      Sensory: Sensation is intact. No sensory deficit.      Motor: Motor function is intact. No weakness, tremor, abnormal muscle tone, seizure activity or pronator drift.   Psychiatric:         Mood  and Affect: Mood normal.         Behavior: Behavior normal.          NEUROLOGICAL EXAMINATION:     CRANIAL NERVES   Cranial nerves II through XII intact.     CN III, IV, VI   Pupils are equal, round, and reactive to light.      Significant Labs: All pertinent lab results from the past 24 hours have been reviewed.    Significant Imaging: I have reviewed all pertinent imaging results/findings within the past 24 hours.

## 2025-04-14 NOTE — HPI
William Mccauley is a 74 y.o. male with past medical history of T2DM, HTN, stroke presented on 4/13 to [facility] for evaluation of right hip pain onset after fall same day. Patient daughter reporteed he was using a walker when he reached for a door and lost his balance and fell backwards onto his buttocks. Denies hitting head. EMS gave 50 mcg of fentanyl en route. CTH showed left thalamic minimal hypodensity may represent subacute hemorrhage related to subacute infarct clinical correlation and further assessment with MRI brain recommended.  No convincing acute brain traumatic findings.  CTA H/N showed moderate narrowing of the left vertebral artery origin.  Severe narrowing of the distal vertebral arteries bilaterally.  Severe narrowing of the clinoid, ophthalmic segments of internal carotid artery siphons bilaterally.  Suspect for near occlusion on the left.  No significant interval change from the prior.  MRI brain showed small linear/punctuate area of acute/subacute left thalamic hemorrhage, possible hemorrhagic infarct although with no visible diffusion restriction, region obscured by hemorrhagic artifact.  Advanced involutional change, moderate severe chronic microvascular white matter ischemia change seen. Patient admitted to Hospital medicine services with stroke neurology consult for subacute hemorrhagic infarct.

## 2025-04-14 NOTE — TRANSFER OF CARE
Anesthesia Transfer of Care Note    Patient: William Mccauley    Procedure(s) Performed: Procedure(s) (LRB):  HEMIARTHROPLASTY, HIP (Right)    Patient location: PACU    Anesthesia Type: general    Transport from OR: Transported from OR on room air with adequate spontaneous ventilation    Post pain: adequate analgesia    Post assessment: no apparent anesthetic complications    Post vital signs: stable    Level of consciousness: responds to stimulation    Nausea/Vomiting: no nausea/vomiting    Complications: none    Transfer of care protocol was followed      Last vitals: Visit Vitals  BP (!) 95/54 (BP Location: Right arm, Patient Position: Lying)   Pulse 71   Temp 36.5 °C (97.7 °F) (Skin)   Resp 15   Ht 5' (1.524 m)   Wt 63.5 kg (139 lb 15.9 oz)   SpO2 100%   BMI 27.34 kg/m²

## 2025-04-14 NOTE — ANESTHESIA PROCEDURE NOTES
Peripheral Block    Patient location during procedure: holding area   Block not for primary anesthetic.  Reason for block: at surgeon's request and post-op pain management   Post-op Pain Location: right hip   Start time: 4/14/2025 12:10 PM  Timeout: 4/14/2025 12:09 PM   End time: 4/14/2025 12:11 PM    Staffing  Authorizing Provider: Kendell Kasper MD  Performing Provider: Kendell Kasper MD    Staffing  Performed by: Kendell Kasper MD  Authorized by: Kendell Kasper MD    Preanesthetic Checklist  Completed: patient identified, IV checked, site marked, risks and benefits discussed, surgical consent, monitors and equipment checked, pre-op evaluation and timeout performed  Peripheral Block  Patient position: supine  Prep: ChloraPrep  Patient monitoring: heart rate, cardiac monitor, continuous pulse ox and continuous capnometry  Block type: fascia iliaca  Laterality: right  Injection technique: single shot  Needle  Needle gauge: 22 G  Needle length: 3.5 in  Needle localization: ultrasound guidance   -ultrasound image captured on disc.  Assessment  Injection assessment: local visualized surrounding nerve  Paresthesia pain: none  Heart rate change: no  Slow fractionated injection: no  Pain Tolerance: comfortable throughout block  Medications:    Medications: ropivacaine (NAROPIN) injection 0.5% - Perineural   30 mL - 4/14/2025 12:10:00 PM  dexAMETHasone sodium phos (PF) injection 10 mg/mL - Intravenous   4 mg - 4/14/2025 12:10:00 PM

## 2025-04-14 NOTE — PT/OT/SLP PROGRESS
Orders received, chart reviewed.  Pt NPO pending ortho surgery.  SLP to follow, will complete evaluation as appropriate.

## 2025-04-14 NOTE — BRIEF OP NOTE
[facility] - Periop Services  Brief Operative Note    SUMMARY     Surgery Date: 4/14/2025     Surgeons and Role:     * Raghu Hunt MD - Primary    Assisting Surgeon: None    Pre-op Diagnosis:  Closed displaced fracture of right femoral neck [S72.032A]    Post-op Diagnosis:  same    Procedure(s) (LRB):  HEMIARTHROPLASTY, HIP (Right)- 42283    Anesthesia: General    Implants:  Implant Name Type Inv. Item Serial No.  Lot No. LRB No. Used Action   HEAD UHR UNIV BPLR [id number] - [id number]  HEAD UHR UNIV BPLR [id number]  [employer]. [id number] Right 1 Implanted   STEM INSINGIA HIP HIGH SZ 3 - [id number]  STEM INSINGIA HIP HIGH SZ 3  [employer]. [id number] Right 1 Implanted   HEAD FEMORAL V40 +0X28MM COCR - [id number]  HEAD FEMORAL V40 +0X28MM COCR  [employer]. [id number] Right 1 Implanted       Operative Findings: see op report    Estimated Blood Loss: 200 mL    Estimated Blood Loss has been documented.         Specimens:   Specimen (24h ago, onward)      None          ID Type Source Tests Collected by Time Destination   A : Rightg Femoral Head Bone Femoral Head, Right SPECIMEN TO PATHOLOGY Raghu Hunt MD 4/14/2025 1338        [id number]  A/P: Tolerated procedure well. Admit to floor. WBAT to RLE. Full ROM. CM eval for placement. Ok for oral anticoagulation if indicated. Ancef for 24hrs.       Raghu Hunt MD  Orthopedic Trauma  [facility]

## 2025-04-14 NOTE — ANESTHESIA PREPROCEDURE EVALUATION
04/14/2025  William Mccauley is a 74 y.o., male.      Pre-op Assessment    I have reviewed the Patient Summary Reports.    I have reviewed the NPO Status.   I have reviewed the Medications.     Review of Systems  Anesthesia Hx:  No problems with previous Anesthesia             Denies Family Hx of Anesthesia complications.    Denies Personal Hx of Anesthesia complications.                    Cardiovascular:  Cardiovascular Normal                   No Cardiac Complaints                           Pulmonary:  Pulmonary Normal        No Pulmonary Complaints               Hepatic/GI:        No Current GERD Sx               Physical Exam  General: Cooperative    Airway:  Mallampati: II / I  Mouth Opening: Normal    Dental:  Periodontal disease  Missing upper and lower teeth / existing teeth do no appear loose    Anesthesia Plan  Type of Anesthesia, risks & benefits discussed:    Anesthesia Type: Gen ETT, Regional, Gen Supraglottic Airway  Intra-op Monitoring Plan: Standard ASA Monitors  Post Op Pain Control Plan: multimodal analgesia and peripheral nerve block  Airway Plan: Direct, Awake  Informed Consent: Informed consent signed with the Patient and all parties understand the risks and agree with anesthesia plan.  All questions answered.   ASA Score: 3  Day of Surgery Review of History & Physical: H&P Update referred to the surgeon/provider.  Anesthesia Plan Notes: Fascia illiaca block     Unclear possible stroke subacute.  Keep MAP > 64mmHg     Ready For Surgery From Anesthesia Perspective.   .

## 2025-04-14 NOTE — ANESTHESIA PROCEDURE NOTES
Intubation    Date/Time: 4/14/2025 12:37 PM    Performed by: Solomon Villa CRNA  Authorized by: Kendell Kasper MD    Intubation:     Induction:  Intravenous    Intubated:  Postinduction    Mask Ventilation:  Easy mask    Attempts:  1    Attempted By:  CRNA    Method of Intubation:  Video laryngoscopy    Blade:  Caraballo 3    Laryngeal View Grade: Grade I - full view of cords      Difficult Airway Encountered?: No      Complications:  None    Airway Device:  Oral endotracheal tube    Airway Device Size:  7.5    Style/Cuff Inflation:  Cuffed (inflated to minimal occlusive pressure)    Inflation Amount (mL):  6    Tube secured:  22    Secured at:  The lips    Placement Verified By:  Capnometry    Complicating Factors:  None    Findings Post-Intubation:  BS equal bilateral and atraumatic/condition of teeth unchanged

## 2025-04-15 PROBLEM — I61.9 INTRAPARENCHYMAL HEMORRHAGE OF BRAIN: Status: ACTIVE | Noted: 2025-04-15

## 2025-04-15 LAB
ALBUMIN SERPL-MCNC: 3.4 G/DL (ref 3.4–4.8)
ALBUMIN/GLOB SERPL: 0.9 RATIO (ref 1.1–2)
ALP SERPL-CCNC: 96 UNIT/L (ref 40–150)
ALT SERPL-CCNC: 37 UNIT/L (ref 0–55)
ANION GAP SERPL CALC-SCNC: 15 MEQ/L
AST SERPL-CCNC: 41 UNIT/L (ref 11–45)
BASOPHILS # BLD AUTO: 0.03 X10(3)/MCL
BASOPHILS NFR BLD AUTO: 0.2 %
BILIRUB SERPL-MCNC: 0.7 MG/DL
BUN SERPL-MCNC: 29.1 MG/DL (ref 8.4–25.7)
CALCIUM SERPL-MCNC: 8.5 MG/DL (ref 8.8–10)
CHLORIDE SERPL-SCNC: 106 MMOL/L (ref 98–107)
CO2 SERPL-SCNC: 15 MMOL/L (ref 23–31)
CREAT SERPL-MCNC: 0.81 MG/DL (ref 0.72–1.25)
CREAT/UREA NIT SERPL: 36
EOSINOPHIL # BLD AUTO: 0 X10(3)/MCL (ref 0–0.9)
EOSINOPHIL NFR BLD AUTO: 0 %
ERYTHROCYTE [DISTWIDTH] IN BLOOD BY AUTOMATED COUNT: 20.2 % (ref 11.5–17)
GFR SERPLBLD CREATININE-BSD FMLA CKD-EPI: >60 ML/MIN/1.73/M2
GLOBULIN SER-MCNC: 3.7 GM/DL (ref 2.4–3.5)
GLUCOSE SERPL-MCNC: 160 MG/DL (ref 82–115)
HCT VFR BLD AUTO: 29.6 % (ref 42–52)
HGB BLD-MCNC: 8.2 G/DL (ref 14–18)
IMM GRANULOCYTES # BLD AUTO: 0.1 X10(3)/MCL (ref 0–0.04)
IMM GRANULOCYTES NFR BLD AUTO: 0.8 %
LYMPHOCYTES # BLD AUTO: 0.8 X10(3)/MCL (ref 0.6–4.6)
LYMPHOCYTES NFR BLD AUTO: 6 %
MCH RBC QN AUTO: 20.9 PG (ref 27–31)
MCHC RBC AUTO-ENTMCNC: 27.7 G/DL (ref 33–36)
MCV RBC AUTO: 75.3 FL (ref 80–94)
MONOCYTES # BLD AUTO: 0.94 X10(3)/MCL (ref 0.1–1.3)
MONOCYTES NFR BLD AUTO: 7.1 %
NEUTROPHILS # BLD AUTO: 11.42 X10(3)/MCL (ref 2.1–9.2)
NEUTROPHILS NFR BLD AUTO: 85.9 %
NRBC BLD AUTO-RTO: 0 %
PLATELET # BLD AUTO: 366 X10(3)/MCL (ref 130–400)
PMV BLD AUTO: 9.3 FL (ref 7.4–10.4)
POTASSIUM SERPL-SCNC: 5.1 MMOL/L (ref 3.5–5.1)
PROT SERPL-MCNC: 7.1 GM/DL (ref 5.8–7.6)
RBC # BLD AUTO: 3.93 X10(6)/MCL (ref 4.7–6.1)
SODIUM SERPL-SCNC: 136 MMOL/L (ref 136–145)
WBC # BLD AUTO: 13.29 X10(3)/MCL (ref 4.5–11.5)

## 2025-04-15 PROCEDURE — 92611 MOTION FLUOROSCOPY/SWALLOW: CPT

## 2025-04-15 PROCEDURE — 97535 SELF CARE MNGMENT TRAINING: CPT

## 2025-04-15 PROCEDURE — 92610 EVALUATE SWALLOWING FUNCTION: CPT

## 2025-04-15 PROCEDURE — 21400001 HC TELEMETRY ROOM

## 2025-04-15 PROCEDURE — 85025 COMPLETE CBC W/AUTO DIFF WBC: CPT | Performed by: PHYSICIAN ASSISTANT

## 2025-04-15 PROCEDURE — 25000003 PHARM REV CODE 250: Performed by: INTERNAL MEDICINE

## 2025-04-15 PROCEDURE — A4216 STERILE WATER/SALINE, 10 ML: HCPCS | Performed by: PHYSICIAN ASSISTANT

## 2025-04-15 PROCEDURE — 80053 COMPREHEN METABOLIC PANEL: CPT | Performed by: PHYSICIAN ASSISTANT

## 2025-04-15 PROCEDURE — 63600175 PHARM REV CODE 636 W HCPCS

## 2025-04-15 PROCEDURE — 25500020 PHARM REV CODE 255: Performed by: INTERNAL MEDICINE

## 2025-04-15 PROCEDURE — A9698 NON-RAD CONTRAST MATERIALNOC: HCPCS | Performed by: INTERNAL MEDICINE

## 2025-04-15 PROCEDURE — 25000003 PHARM REV CODE 250: Performed by: PHYSICIAN ASSISTANT

## 2025-04-15 PROCEDURE — 36415 COLL VENOUS BLD VENIPUNCTURE: CPT | Performed by: PHYSICIAN ASSISTANT

## 2025-04-15 PROCEDURE — 97166 OT EVAL MOD COMPLEX 45 MIN: CPT

## 2025-04-15 RX ADMIN — SODIUM CHLORIDE, PRESERVATIVE FREE 10 ML: 5 INJECTION INTRAVENOUS at 09:04

## 2025-04-15 RX ADMIN — SODIUM CHLORIDE, PRESERVATIVE FREE 10 ML: 5 INJECTION INTRAVENOUS at 01:04

## 2025-04-15 RX ADMIN — CEFEPIME 2 G: 2 INJECTION, POWDER, FOR SOLUTION INTRAVENOUS at 05:04

## 2025-04-15 RX ADMIN — LEUCINE, PHENYLALANINE, LYSINE, METHIONINE, ISOLEUCINE, VALINE, HISTIDINE, THREONINE, TRYPTOPHAN, ALANINE, GLYCINE, ARGININE, PROLINE, SERINE, TYROSINE, SODIUM ACETATE, DIBASIC POTASSIUM PHOSPHATE, MAGNESIUM CHLORIDE, SODIUM CHLORIDE, CALCIUM CHLORIDE, DEXTROSE
880; 489; 33; 5; 438; 204; 255; 311; 247; 51; 170; 238; 261; 289; 213; 297; 77; 179; 77; 17; 247 INJECTION INTRAVENOUS at 06:04

## 2025-04-15 RX ADMIN — BARIUM SULFATE 5 ML: 0.81 POWDER, FOR SUSPENSION ORAL at 10:04

## 2025-04-15 RX ADMIN — SODIUM CHLORIDE, PRESERVATIVE FREE 10 ML: 5 INJECTION INTRAVENOUS at 05:04

## 2025-04-15 NOTE — PLAN OF CARE
Problem: Occupational Therapy  Goal: Occupational Therapy Goal  Description: Goals to be met by: 5/15/25     Patient will increase functional independence with ADLs by performing:    UE Dressing with Stand-by Assistance.  LE Dressing with Minimal Assistance and Assistive Devices as needed.  Grooming while standing at sink with Contact Guard Assistance and Assistive Devices as needed.  Toileting from toilet with [place] and Assistive Devices as needed for hygiene and clothing management.   Toilet transfer to toilet with Contact Guard Assistance and using LRAD.    Outcome: Progressing

## 2025-04-15 NOTE — PT/OT/SLP EVAL
Occupational Therapy  Evaluation    Name: William Mccauley  MRN: [id number]  Admitting Diagnosis: fall, R femur fx, CVA  Recent Surgery: Procedure(s) (LRB):  HEMIARTHROPLASTY, HIP (Right) 1 Day Post-Op    Recommendations:     Discharge therapy intensity: High Intensity Therapy   Discharge Equipment Recommendations:  to be determined by next level of care  Barriers to discharge:   (medically complex, impaired ADLs/mobility)    Assessment:     William Mccauley is a 74 y.o. male with a medical diagnosis of fall, Right femoral neck fx s/p R LEANDRA, and MRI brain showed small linear/punctuate area of acute/subacute left thalamic hemorrhage.  He presents with the following performance deficits affecting function: weakness, impaired endurance, orthopedic precautions, impaired self care skills, impaired sensation, impaired functional mobility, gait instability, impaired balance, pain, decreased safety awareness, decreased lower extremity function, impaired skin.     Initial evaluation limited 2/2 pt out of BP parameters (SBP <140) and social history obtained via  services as family was not present today. Upon arrival pt presents with saturated bedding/SCDs/hip abd wedge and required increased time for bedding/clothing change and cleanup. He requires maxA of 1-2 for all bed mobility, Chito for brief STS with RW. He currently requires max A for dressing/toileting and is strictly NPO. Per pt, he was independent and using RW for mobility prior to this admit. Recommending high intensity at this time, but will update as appropriate pending further insight of pt's PLOF/home support & his progress with therapy.     PT left in bed with wedge & pillows positioned between BLE to facilitate hip abduction 2/2 hip abd wedge saturated with urine and RN Kulwinder confirms she ordered a new one.     Rehab Prognosis: Good; patient would benefit from acute skilled OT services to address these deficits and reach maximum level of function.       Plan:  "    Patient to be seen 5 x/week to address the above listed problems via self-care/home management, therapeutic activities, therapeutic exercises, cognitive retraining  Plan of Care Expires: 05/15/25  Plan of Care Reviewed with:      Subjective   Interpretation service utilized to assist communication t/o today's session:   Interpretor: John ID # [id number]    Chief Complaint: "I have to pee every 15 minutes".  Patient/Family Comments/goals: no family present during today's eval.     Occupational Profile: warrants further clarification pending presence of family during sessions  Living Environment: lives with wife and dtr in [facility]. BR set-up warrants further assessment  Previous level of function: independent, using RW for ambulation   Roles and Routines: father, .   Equipment Used at Home: walker, rolling  Assistance upon Discharge: TBD.     Pain/Comfort:  Pain Rating 1: 0/10    Patients cultural, spiritual, Buddhist conflicts given the current situation: no    Objective:     OT communicated with RN prior to session.      Patient was found HOB elevated with pulse ox (continuous), telemetry, PureWick, peripheral IV (hip ABD wedge) upon OT entry to room.    General Precautions: Standard, aspiration, fall, NPO (SBP <140)  Orthopedic Precautions: RLE weight bearing as tolerated  Braces: N/A    Vital Signs: Blood Pressure: 148/79; s/p sitting /94; 157/89 (RN Kulwinder informed)  Respiratory Status: on room air    Bed Mobility:    Patient completed Rolling/Turning to Left with  maximal assistance  Patient completed Rolling/Turning to Right with maximal assistance  Patient completed Scooting/Bridging with maximal assistance  Patient completed Supine to Sit with maximal assistance and 1-2 persons  Patient completed Sit to Supine with maximal assistance and 2 persons    Functional Mobility/Transfers:  Patient completed Sit <> Stand Transfer with minimum assistance and of 2 persons  with  rolling walker   Required " mod to maxA to maintain standing 2/2 fwd flexed posture and pt w/ limited tolerance of WB RLE  Functional Mobility: does not occur 2/2 elevated BP     Activities of Daily Living:  Upper Body Dressing: moderate assistance hmg gown change seated EOB  Lower Body Dressing: maximal assistance to don socks   Toileting: maximal assistance for pericare and cleansing BLE 2/2 pt saturated with urine upon arrival. Required all pads changed/gown change    AMPA 6 Click ADL:  [facility] Total Score: 11    Functional Cognition:  Limited assessment 2/2 pt with wet vocals and very soft speech, limiting interpretor's ability to hear/understand pt reports.     Visual Perceptual Skills:  Intact    Upper Extremity Function:  Right Upper Extremity:   WFL    Left Upper Extremity:  WFL    Balance:   Static sitting balance: WFL  SBA  Dynamic sitting balance:Impaired. Fair   Static standing balance:Impaired. Fair with AD  Dynamic standing balance:limited assessment 2/2 elevated BP.     Therapeutic Positioning  Risk for acquired pressure injuries is significantly increased due to relative decrease in mobility d/t hospitalization , impaired mobility, and incontinence. & language barrier increases difficulty communicating toileting needs.     OT interventions performed during the course of today's session:   Education was provided on benefits of and recommendations for therapeutic positioning  Therapeutic positioning was provided at the conclusion of session to offload all bony prominences for the prevention and/or reduction of pressure injuries  Positioning recommendations were communicated to care team     Skin assessment: all bony prominences were assessed    Findings:  visible skin intact, post-op bandages C/D/I    OT recommendations for therapeutic positioning throughout hospitalization:   Follow [facility] Pressure Injury Prevention Protocol  Geomat recommended for sacral protection while [facility]    Patient Education:  Patient provided with verbal  education education regarding OT role/goals/POC, post op precautions, fall prevention, safety awareness, Discharge/DME recommendations, and pressure ulcer prevention.   Communicated via  services.       Patient left HOB elevated with all lines intact, call button in reach, RN notified, and wedge & pillows positioned between BLE to facilitate hip abduction 2/2 hip abd wedge saturated with urine and RN Kulwinder confirms she ordered a new one .    GOALS:   Multidisciplinary Problems       Occupational Therapy Goals          Problem: Occupational Therapy    Goal Priority Disciplines Outcome Interventions   Occupational Therapy Goal     OT, PT/OT Progressing    Description: Goals to be met by: 5/15/25     Patient will increase functional independence with ADLs by performing:    UE Dressing with Stand-by Assistance.  LE Dressing with Minimal Assistance and Assistive Devices as needed.  Grooming while standing at sink with Contact Guard Assistance and Assistive Devices as needed.  Toileting from toilet with Chito and Assistive Devices as needed for hygiene and clothing management.   Toilet transfer to toilet with Contact Guard Assistance and using LRAD.                         History:     Past Medical History:   Diagnosis Date    Diabetes mellitus, type 2     HTN (hypertension)     Ptosis     Sciatica     Stroke     Type 2 diabetes mellitus without complications          Past Surgical History:   Procedure Laterality Date    DIAGNOSTIC LAPAROSCOPY N/A 9/24/2024    Procedure: LAPAROSCOPY, DIAGNOSTIC;  Surgeon: Adalberto Rock MD;  Location: [facility] OR;  Service: General;  Laterality: N/A;    HEMIARTHROPLASTY OF HIP Right 4/14/2025    Procedure: HEMIARTHROPLASTY, HIP;  Surgeon: Raghu Hunt MD;  Location: [facility] OR;  Service: Orthopedics;  Laterality: Right;  lateral peg board rodney    KIDNEY STONE SURGERY Left     REPAIR, HERNIA, INGUINAL Right 9/24/2024    Procedure: REPAIR, HERNIA, INGUINAL;  Surgeon:  Adalberto Rock MD;  Location: [facility];  Service: General;  Laterality: Right;  Incarcerated Hernia       Time Tracking:     OT Date of Treatment: 04/15/25  OT Start Time: 1520  OT Stop Time: 1603  OT Total Time (min): 43 min    Billable Minutes:Evaluation moderate  Self Care/Home Management 2    4/15/2025

## 2025-04-15 NOTE — PROGRESS NOTES
[facility] - 4th Floor Medical Telemetry  Orthopedics  Progress Note    Patient Name: William Mccauley  MRN: [id number]  Admission Date: 4/13/2025  Hospital Length of Stay: 2 days  Attending Provider: Warren Covarrubias MD  Primary Care Provider: Shannon Alonso MD    Subjective:     Principal Problem:Closed displaced fracture of right femoral neck    Principal Orthopedic Problem: 1 Day Post-Op   R hip hemiarthroplasty    Interval History: Seen this am. Son at bedside. Discussed weight bearing and out of bed. Questions answered. Still NPO until he passes swallow evaluation.     Review of patient's allergies indicates:  No Known Allergies    Current Facility-Administered Medications   Medication    0.9% NaCl infusion    atorvastatin tablet 40 mg    bisacodyL suppository 10 mg    labetaloL injection 10 mg    morphine injection 2 mg    sodium chloride 0.9% flush 10 mL     Objective:     Vital Signs (Most Recent):  Temp: 99.1 °F (37.3 °C) (04/15/25 1047)  Pulse: 89 (04/15/25 1047)  Resp: 20 (04/15/25 1047)  BP: (!) 150/85 (04/15/25 1128)  SpO2: 99 % (04/15/25 1135) Vital Signs (24h Range):  Temp:  [96.7 °F (35.9 °C)-99.7 °F (37.6 °C)] 99.1 °F (37.3 °C)  Pulse:  [] 89  Resp:  [13-20] 20  SpO2:  [96 %-100 %] 99 %  BP: ()/(54-85) 150/85     Weight: 63.5 kg (139 lb 15.9 oz)  Height: 5' (152.4 cm)  Body mass index is 27.34 kg/m².      Intake/Output Summary (Last 24 hours) at 4/15/2025 1137  Last data filed at 4/15/2025 0911  Gross per 24 hour   Intake 850 ml   Output 2800 ml   Net -1950 ml       Physical Exam:   General the patient is alert and in no acute distress;  nontoxic-appearing appropriate affect.    Constitutional: Vital signs are examined and stable.  Resp: No signs of labored breathing                       RLE: -Skin: Dressing CDI           -MSK: tolerates gentle ROM            -Neuro:  Sensation intact to light touch throughout           -CV: Capillary refill is less than 2 seconds. +DP.  Compartments soft and compressible    Diagnostic Findings:     Significant Labs: CBC:   Recent Labs   Lab 04/14/25  0320 04/15/25  0509   WBC 12.06* 13.29*   HGB 9.4* 8.2*   HCT 34.3* 29.6*   * 366     All pertinent labs within the past 24 hours have been reviewed.    Significant Imaging: I have reviewed all pertinent imaging results/findings.     Assessment/Plan:     Active Diagnoses:    Diagnosis Date Noted POA    PRINCIPAL PROBLEM:  Closed displaced fracture of right femoral neck [S72.001A] 04/13/2025 Yes    Intraparenchymal hemorrhage of brain [I61.9] 04/15/2025 Yes      Problems Resolved During this Admission:   73 YO M   POD #1 R hip hemiarthroplasty    Diet: per primary when cleared by SLP  Pain: IV PRN  DVT: ok from ortho perspective when cleared by neurology   ABX: periop ancef  PT/OT: Eval and Treat  Activity: WBAT RLE  Dry dressing changes begin tomorrow; No creams/ointments   OK open to air of dry  Follow up in 4 weeks with Dr Hunt    The above findings, diagnostics, and treatment plan were discussed with Dr Hunt who is in agreement with the plan of care except as stated in additional documentation.      TOMMY Kramer   Orthopedic Trauma Surgery  [facility]

## 2025-04-15 NOTE — HOSPITAL COURSE
William Mccauley is a 74 y.o. male with past medical history of T2DM, HTN, and CVA (no deficits) who presented on 4/13 to the ED vis EMS for evaluation of right hip pain following a fall the day prior.  Patient daughter reporteed he was using a walker when he reached for a door and lost his balance and fell backwards onto his buttocks. Denies hitting head. EMS gave 50 mcg of fentanyl en route. CTH showed left thalamic minimal hyperdensity.  CTA H/N showed moderate narrowing of the left vertebral artery origin.  Severe narrowing of the distal vertebral arteries bilaterally.  Severe narrowing of the clinoid, ophthalmic segments of internal carotid artery siphons bilaterally.  Suspect for near occlusion on the left.  MRI brain showed small linear/punctuate area of acute/subacute left thalamic hemorrhage, possible hemorrhagic infarct.  Advanced involutional change, moderate severe chronic microvascular white matter ischemia change seen. Patient was admitted to Hospital medicine services with stroke neurology consult for subacute hemorrhagic infarct.  He underwent right hip hemiarthroplasty with Dr. Hunt on 4/14/25.

## 2025-04-15 NOTE — PROCEDURES
[facility]  Speech Language Pathology Department  Modified Barium Swallow (MBS) Study    Patient Name:  William Mccauley   MRN:  [id number]    Recommendations     General recommendations:  dysphagia therapy  Repeat MBS study: 5-7 days  Solid texture recommendation:  NPO  Liquid consistency recommendation: NPO   Medications: NPO  General precautions: aspiration    History     William Mccauley is a/n 74 y.o. male admitted s/p fall on hip. Ortho sx for fracture of R femoral neck on 4/14. MRI of the brain with small linear/punctuate area of acute/subacute left thalamic hemorrhage, possible hemorrhagic infarct although with no visible diffusion restriction, region obscured by hemorrhagic artifact, advanced involutional change, moderate severe chronic microvascular white ischemic changes.      Failed roa swallow screen.      Past Medical History:   Diagnosis Date    Diabetes mellitus, type 2     HTN (hypertension)     Ptosis     Sciatica     Stroke     Type 2 diabetes mellitus without complications      Past Surgical History:   Procedure Laterality Date    DIAGNOSTIC LAPAROSCOPY N/A 9/24/2024    Procedure: LAPAROSCOPY, DIAGNOSTIC;  Surgeon: Adalberto Rock MD;  Location: [facility];  Service: General;  Laterality: N/A;    HEMIARTHROPLASTY OF HIP Right 4/14/2025    Procedure: HEMIARTHROPLASTY, HIP;  Surgeon: Raghu Hunt MD;  Location: [facility] OR;  Service: Orthopedics;  Laterality: Right;  lateral peg board rodney    KIDNEY STONE SURGERY Left     REPAIR, HERNIA, INGUINAL Right 9/24/2024    Procedure: REPAIR, HERNIA, INGUINAL;  Surgeon: Adalberto Rock MD;  Location: [facility] OR;  Service: General;  Laterality: Right;  Incarcerated Hernia     A MBS Study was completed to assess the efficiency of his swallow function, rule out aspiration and make recommendations regarding safe dietary consistencies, effective compensatory strategies, and safe eating environment.     Home diet texture/consistency:  "unknown  Current Method of Nutrition: NPO    Patient complaint: "lay down" "just one more"    Imaging   No results found for this or any previous visit.    No results found for this or any previous visit.    Results for orders placed during the hospital encounter of 04/13/25    MRI Brain Without Contrast    Narrative  EXAMINATION:  MRI BRAIN WITHOUT CONTRAST    CLINICAL HISTORY:  Stroke, follow up;    TECHNIQUE:  Multiplanar multisequence MR imaging of the brain was performed without contrast.    COMPARISON:  CT same day    FINDINGS:  Small linear area of susceptibility artifact posterior left thalamus with a punctate area of increased T1 signal consistent with acute/subacute thalamic hemorrhage.    No visible diffusion restriction on diffusion sequence.    Advanced involutional change.  Moderate severe chronic microvascular white matter ischemic change.  Scattered remote lacunar infarcts in the subinsular basal ganglia regions.    Ventricles, sulci, cisterns otherwise normal with no mass effect or midline shift.  No intra or extra-axial mass.    Posterior fossa brainstem otherwise grossly normal.  Sella and orbits grossly normal.    Mild ethmoid mucosal thickening.  Cranium and extracranial structures otherwise unremarkable.    Impression  Small linear/punctate area of acute/subacute left thalamic hemorrhage, possible hemorrhagic infarct although with no visible diffusion restriction, region obscured by hemorrhagic artifact.    Advanced involutional change, moderate severe chronic microvascular white matter ischemic change.      Electronically signed by: Taras Dunn MD  Date:    04/13/2025  Time:    12:11    Subjective     Patient awake and alert.    Spiritual/Cultural/Baptism Beliefs/Practices that affect care: no  Pain/Comfort: Pain Rating 1: 0/10    Respiratory Status:  room air    Restraints/positioning devices: none    Fluoroscopic Findings     Oral Musculature  Dentition: own teeth and teeth in poor " condition  Secretion Management: adequate  Mucosal Quality: good    Setup  Upright in bed  Able to self feed  Adequate head control    Visualization  Lateral view    Oral Phase:   Adequate anterior-posterior transport  Prespillage to pyriform sinuses    Pharyngeal Phase:   Reduced base of tongue retraction  Reduced epiglottic deflection  Reduced hyolaryngeal excursion  Aspiration of mildly thick and moderately thick liquids    Consistency Fed by Laryngeal Penetration Aspiration Residue   Mildly thick liquid by spoon SLP To the vocal folds SILENT Mild-moderate   Puree SLP None None Moderate-severe   Moderately thick liquid by spoon SLP None None Moderate   Moderately thick liquid by cup Self To the vocal folds SILENT  Delayed cough response NOT productive Moderate     Cervical Esophageal Phase:   UES appeared to accommodate all bolus types without stasis or retrograde movement visualized    Assessment     Patient exhibited severe oropharyngeal dysphagia characterized by the findings noted above.  SILENT aspiration of mildly thick and moderately thick liquids.   Both swallow safety and efficiency are impaired.     Patient appears to be at high risk for aspiration related pneumonia when considering severity of dysphagia.  Prognosis for behavioral swallow rehabilitation is  TBD .    SLP provided verbal education ot patient and son at bedside following MBS about results and recommendations for ~12 minutes.    Outcome Measures     Functional Oral Intake Scale: 1 - Nothing by mouth    Education     Patient and son/s were provided with verbal education regarding results and recommendations.  Additional teaching is warranted.    Plan     SLP Follow-Up:  Yes    Patient to be seen:  daily   Plan of Care expires:  04/22/25  Plan of Care reviewed with:  patient, son     Time Tracking     SLP Treatment Date:   04/15/25  Speech Start Time:  1025  Speech Stop Time:  1050     Speech Total Time (min):  25 min    Billable minutes:    Motion Fluoroscopic Evaluation, Video Recording, 13 minutes  Self Care/Home Management, 12 minutes     04/15/2025

## 2025-04-15 NOTE — PROGRESS NOTES
[facility] - 4th Floor Medical Telemetry  Neurology  Progress Note    Patient Name: William Mccauley  MRN: [id number]  Admission Date: 4/13/2025  Hospital Length of Stay: 2 days  Code Status: Full Code   Attending Provider: Warren Covarrubias MD  Primary Care Physician: Shannon Alonso MD   Principal Problem:Closed displaced fracture of right femoral neck    HPI:   William Mccauley is a 74 y.o. male with past medical history of T2DM, HTN, stroke presented on 4/13 to [facility] vis EMS for evaluation of right hip pain onset after fall same day. Patient daughter reporteed he was using a walker when he reached for a door and lost his balance and fell backwards onto his buttocks. Denies hitting head. EMS gave 50 mcg of fentanyl en route. CTH showed left thalamic minimal hypodensity may represent subacute hemorrhage related to subacute infarct clinical correlation and further assessment with MRI brain recommended.  No convincing acute brain traumatic findings.  CTA H/N showed moderate narrowing of the left vertebral artery origin.  Severe narrowing of the distal vertebral arteries bilaterally.  Severe narrowing of the clinoid, ophthalmic segments of internal carotid artery siphons bilaterally.  Suspect for near occlusion on the left.  No significant interval change from the prior.  MRI brain showed small linear/punctuate area of acute/subacute left thalamic hemorrhage, possible hemorrhagic infarct although with no visible diffusion restriction, region obscured by hemorrhagic artifact.  Advanced involutional change, moderate severe chronic microvascular white matter ischemia change seen. Patient admitted to Hospital medicine services with stroke neurology consult for subacute hemorrhagic infarct.      Overview/Hospital Course:  William Mccauley is a 74 y.o. male with past medical history of T2DM, HTN, and CVA (no deficits) who presented on 4/13 to the ED vis EMS for evaluation of right hip pain following a fall the day prior.  Patient  daughter reporteed he was using a walker when he reached for a door and lost his balance and fell backwards onto his buttocks. Denies hitting head. EMS gave 50 mcg of fentanyl en route. CTH showed left thalamic minimal hyperdensity.  CTA H/N showed moderate narrowing of the left vertebral artery origin.  Severe narrowing of the distal vertebral arteries bilaterally.  Severe narrowing of the clinoid, ophthalmic segments of internal carotid artery siphons bilaterally.  Suspect for near occlusion on the left.  MRI brain showed small linear/punctuate area of acute/subacute left thalamic hemorrhage, possible hemorrhagic infarct.  Advanced involutional change, moderate severe chronic microvascular white matter ischemia change seen. Patient was admitted to Hospital medicine services with stroke neurology consult for subacute hemorrhagic infarct.  He underwent right hip hemiarthroplasty with Dr. Hunt on 4/14/25.         Past Medical History:   Diagnosis Date    Diabetes mellitus, type 2     HTN (hypertension)     Ptosis     Sciatica     Stroke     Type 2 diabetes mellitus without complications        Past Surgical History:   Procedure Laterality Date    DIAGNOSTIC LAPAROSCOPY N/A 9/24/2024    Procedure: LAPAROSCOPY, DIAGNOSTIC;  Surgeon: Adalberto Rock MD;  Location: [facility];  Service: General;  Laterality: N/A;    KIDNEY STONE SURGERY Left     REPAIR, HERNIA, INGUINAL Right 9/24/2024    Procedure: REPAIR, HERNIA, INGUINAL;  Surgeon: Adalberto Rock MD;  Location: [facility] OR;  Service: General;  Laterality: Right;  Incarcerated Hernia       Review of patient's allergies indicates:  No Known Allergies      No current facility-administered medications on file prior to encounter.     Current Outpatient Medications on File Prior to Encounter   Medication Sig    atorvastatin (LIPITOR) 40 MG tablet Take 1 tablet (40 mg total) by mouth once daily.    empagliflozin (JARDIANCE) 25 mg tablet Take 1 tablet (25 mg total) by  mouth once daily.    losartan (COZAAR) 25 MG tablet Take 1 tablet (25 mg total) by mouth once daily.    metFORMIN (GLUMETZA) 1000 MG (MOD) 24hr tablet Take 1 tablet (1,000 mg total) by mouth 2 (two) times daily with meals.    aspirin (ECOTRIN) 81 MG EC tablet Take 1 tablet (81 mg total) by mouth once daily.    ferrous sulfate (FEOSOL) 325 mg (65 mg iron) Tab tablet Take 1 tablet (325 mg total) by mouth once daily.    gabapentin (NEURONTIN) 300 MG capsule Take 2 capsules (600 mg total) by mouth 3 (three) times daily.    ketoconazole (NIZORAL) 2 % shampoo Apply topically twice a week.    polyethylene glycol (GLYCOLAX) 17 gram/dose powder Oral     Family History    None       Tobacco Use    Smoking status: Never     Passive exposure: Never    Smokeless tobacco: Never   Substance and Sexual Activity    Alcohol use: Never     Alcohol/week: 1.0 standard drink of alcohol     Types: 1 Cans of beer per week    Drug use: Never    Sexual activity: Not Currently     Partners: Female     Review of Systems   Eyes:  Negative for photophobia and visual disturbance.   Gastrointestinal:  Positive for abdominal pain.        *Translation services used*  Patient reported midline abdominal pain   Neurological:  Negative for tremors, weakness, numbness and headaches.   All other systems reviewed and are negative.    Objective:     Vital Signs (Most Recent):  Temp: 97.9 °F (36.6 °C) (04/14/25 0349)  Pulse: 80 (04/14/25 0349)  Resp: 18 (04/13/25 1921)  BP: 117/68 (04/14/25 0349)  SpO2: 96 % (04/14/25 0349) Vital Signs (24h Range):  Temp:  [97.9 °F (36.6 °C)-98.6 °F (37 °C)] 97.9 °F (36.6 °C)  Pulse:  [79-93] 80  Resp:  [17-20] 18  SpO2:  [93 %-100 %] 96 %  BP: (117-156)/(68-82) 117/68     Weight: 63.5 kg (139 lb 15.9 oz)  Body mass index is 27.34 kg/m².     Physical Exam  Vitals and nursing note reviewed.   Constitutional:       General: He is not in acute distress.     Appearance: Normal appearance.   HENT:      Head: Normocephalic and  atraumatic.   Eyes:      Extraocular Movements: Extraocular movements intact.      Pupils: Pupils are equal, round, and reactive to light.   Pulmonary:      Effort: Pulmonary effort is normal. No respiratory distress.   Musculoskeletal:         General: No swelling. Normal range of motion.      Cervical back: Normal range of motion.      Right lower leg: No edema.      Left lower leg: No edema.   Skin:     General: Skin is warm and dry.      Capillary Refill: Capillary refill takes less than 2 seconds.   Neurological:      Mental Status: He is alert.      GCS: GCS eye subscore is 4. GCS verbal subscore is 5. GCS motor subscore is 6.      Cranial Nerves: Cranial nerves 2-12 are intact. No dysarthria or facial asymmetry.      Sensory: Sensation is intact. No sensory deficit.      Motor: Motor function is intact. No weakness, tremor, abnormal muscle tone, seizure activity or pronator drift.   Psychiatric:         Mood and Affect: Mood normal.         Behavior: Behavior normal.          NEUROLOGICAL EXAMINATION:     CRANIAL NERVES   Cranial nerves II through XII intact.     CN III, IV, VI   Pupils are equal, round, and reactive to light.      Significant Labs: All pertinent lab results from the past 24 hours have been reviewed.    Significant Imaging: I have reviewed all pertinent imaging results/findings within the past 24 hours.  Assessment and Plan:     Intraparenchymal hemorrhage of brain  - presented with right hip pain, left eye blurriness following a ground level fall on 4/12  - Stroke RF: HTN, HLD, T2DM, CVA (no deficits)  - Intervention: None  - Etiology: TBD-- likely hypertensive    Stroke workup:  -CTh: left thalamic minimal hyperdensity   -CTA h/n: Mild moderate narrowing of the left vertebral artery origin. Severe narrowing of the distal vertebral arteries bilaterally. Severe narrowing of the clinoid, ophthalmic segments of the internal carotid artery siphons bilaterally.  Suspected near occlusion on the  left.   -MRI brain: Small linear/punctate area of acute/subacute left thalamic hemorrhage, possible hemorrhagic infarct although with no visible diffusion restriction, region obscured by hemorrhagic artifact. Advanced involutional change, moderate severe chronic microvascular white matter ischemic change.   -ECHO: EF 65-70%, bubble study negative for PFO   -CUS: The right internal carotid artery is patent with less than 50% stenosis. The left internal carotid artery is patent with less than 50% stenosis. Bilateral vertebral arteries are patent with antegrade flow.   -LDL: 54   -A1c: 8.0   -TSH: 0.869   -home medications include: Aspirin, atorvastatin    NIH Stroke Scale      1a  Level of consciousness: 0=alert; keenly responsive   1b. LOC questions:  0=Answers both tasks correctly   1c. LOC commands: 0=Answers both tasks correctly   2.  Best Gaze: 0=normal   3.  Visual: 0=No visual loss   4. Facial Palsy: 0=Normal symmetric movement   5a.  Motor left arm: 0=No drift, limb holds 90 (or 45) degrees for full 10 seconds   5b.  Motor right arm: 0=No drift, limb holds 90 (or 45) degrees for full 10 seconds   6a. motor left le=No drift, limb holds 90 (or 45) degrees for full 10 seconds   6b  Motor right le=No drift, limb holds 90 (or 45) degrees for full 10 seconds   7. Limb Ataxia: 0=Absent   8.  Sensory: 0=Normal; no sensory loss   9. Best Language:  0=No aphasia, normal   10. Dysarthria: 0=Normal   11. Extinction and Inattention: 0=No abnormality   12. Distal motor function: 0=Normal    Total:   0         Plan:  -continue atorvastatin 40 mg  -PT/OT/ST to evaluate  -SCD for DVT prophylaxis   -Neuro checks q4hr ... stat CTh if any neuro change   -Continuous telemetry monitoring  -NPO until passes [place] or cleared by SLP  -No bedrest, ok for patient to ambulate, RN to observe first ambulation for safety   -strict blood pressure control ... SBP less than 140  -avoid antiplatelet or anticoagulation at this time--  OK to resume Aspirin 81 mg daily on Thursday (4/17/2025)  -seizure precautions ... notify neurology of any seizure-like activity  -recommend aggressive risk factor modification with BP control at home  -Will need follow-up in outpatient stroke clinic with Homa Avila NP in 3 months      Further recommendations to follow from MD         VTE Risk Mitigation (From admission, onward)           Ordered     Reason for No Pharmacological VTE Prophylaxis  Once        Question:  Reasons:  Answer:  Risk of Bleeding    04/13/25 1132     IP VTE HIGH RISK PATIENT  Once         04/13/25 1132     Place sequential compression device  Until discontinued         04/13/25 1132                    Kiesha Yeager NP  Neurology  [facility] - 4th Floor Medical Telemetry

## 2025-04-15 NOTE — PROGRESS NOTES
[facility] Hospital Medicine Progress Note        Chief Complaint: Inpatient Follow-up for     HPI:   William Mccauley is a 74 y.o. Wallisian speaking male with a past medical history of hypertension, hyperlipidemia, diabetes mellitus type 2, ptosis, CVA without deficits and sciatica. The patient presented to [facility] on 4/13/2025 with a primary complaint of right hip pain following a fall which occurred yesterday (04/12/2025).  Patient was ambulating with a walker when he reached for the door he lost his balance falling onto his right hip.  Patient denies hitting his head.  Son who is at bedside translating states patient did complain of left eye blurriness.  He denies shortness a breath, chest pain, nausea, vomiting, diarrhea, headache and dizziness.  He received 50 mcg of fentanyl in route.     Upon presentation to the ED, temperature 97.9° F, heart rate 88, blood pressure 145/76, respiratory rate 18 and SpO2 96% on room air.  Labs with H&H 8.8/30.2, MCV 73.5, CO2 20, AST 48, ALT 63.  EKG with sinus rhythm with first-degree AV block, ST and T-wave abnormalities considering lateral ischemia and unable to rule out anterior infarct right hip x-ray revealed a right femoral neck fracture.  Chest x-ray with no acute cardiopulmonary process.  CT of the head revealed a left thalamic minimal hyperdensity which may represent subacute hemorrhage related to subacute infarct but no convincing acute brain traumatic findings.  CT cervical spine without acute findings.  MRI of the brain with small linear/punctuate area of acute/subacute left thalamic hemorrhage, possible hemorrhagic infarct although with no visible diffusion restriction, region obscured by hemorrhagic artifact, advanced involutional change, moderate severe chronic microvascular white ischemic changes.  In ED patient received IV fluid hydration, morphine, Zofran.  Orthopedics and neurology consulted  Interval Hx:   Patient seen and examined he  had his surgery today orthopedics appreciated Neurology were not able to see him due to him being in the OR I have started him on rectal aspirin as well as Lipitor but to speech evaluation was not done either I have him on IV fluids  Case was discussed with patient's nurse and  on the floor.    Objective/physical exam:  General: In no acute distress, afebrile  Chest: Clear to auscultation bilaterally  Heart: RRR, +S1, S2, no appreciable murmur  Abdomen: Soft, nontender, BS +  MSK: Warm, no lower extremity edema, no clubbing or cyanosis  Neurologic: Alert and oriented x4, Cranial nerve II-XII intact, Strength 5/5 in all 4 extremities    VITAL SIGNS: 24 HRS MIN & MAX LAST   Temp  Min: 97.7 °F (36.5 °C)  Max: 98.6 °F (37 °C) 98.2 °F (36.8 °C)   BP  Min: 95/54  Max: 146/77 112/72   Pulse  Min: 71  Max: 105  98   Resp  Min: 13  Max: 20 16   SpO2  Min: 93 %  Max: 100 % 98 %     I have reviewed the following labs:  Recent Labs   Lab 04/09/25  0819 04/13/25  0929 04/14/25  0320   WBC 7.29 10.12 12.06*   RBC 4.30* 4.11* 4.50*   HGB 9.0* 8.8* 9.4*   HCT 32.3* 30.2* 34.3*   MCV 75.1* 73.5* 76.2*   MCH 20.9* 21.4* 20.9*   MCHC 27.9* 29.1* 27.4*   RDW 20.2* 20.6* 20.6*   * 388 411*   MPV 10.1 10.0 10.0     Recent Labs   Lab 04/09/25  0819 04/13/25  0929 04/14/25  0320    135* 137   K 4.1 3.8 4.8    103 103   CO2 27 20* 16*   BUN 17.7 9.2 12.5   CREATININE 0.77 0.58* 0.67*   CALCIUM 9.1 8.7* 8.9   MG  --   --  1.80   ALBUMIN 3.9 3.8 3.7   ALKPHOS 128 119 121   ALT 92* 63* 54   AST 48* 48* 39   BILITOT 0.6 0.8 1.0     Microbiology Results (last 7 days)       ** No results found for the last 168 hours. **             See below for Radiology    Assessment/Plan:  Right femoral neck fracture secondary to mechanical fall   Acute/subacute left thalamic hemorrhage  Microcytic anemia, stable   Metabolic acidosis   Transaminitis   Diabetes mellitus type 2, uncontrolled  History of hypertension,  hyperlipidemia, ptosis, CVA without deficits and sciatica     Plan:  - Ortho consulted and planning for surgical intervention today  - IV morphine as needed for pain  - Neurology consulted. Appreciate recommendations will evaluate tomorrow  - CVA work up:  > CTA head and neck mild-to-moderate narrowing of the left vertebral artery origin, severe narrowing of the distal vertebral arteries bilaterally, severe narrowing of the clinoid, ophthalmic segment of the internal carotid siphons bilaterally, suspect near occlusion on the left.  > Echo - ordered  > Carotid US - preliminary read with less than 50% stenosis of bilateral ICA and bilateral vertebral arteries patent with antegrade flow  > Hemoglobin A1C - 8.0  > Lipid Panel - within limits  > CRP 3.7, ESR 52  > UDS - ordered  > Physical, Occupation and Speech Therapy consulted  > Permissive hypertension, IV Labetalol and Hydralazine as needed for SBP >220 or DBP> 120  - Resume appropriate home medications when deemed necessary patient NPO  - Labs in AM        VTE Prophylaxis: will be placed on SCD      will need placement    Full code  All diagnosis and differential diagnosis have been reviewed; assessment and plan has been documented; I have personally reviewed the labs and test results that are presently available; I have reviewed the patients medication list; I have reviewed the consulting providers response and recommendations. I have reviewed or attempted to review medical records based upon their availability    All of the patient's questions have been  addressed and answered. Patient's is agreeable to the above stated plan. I will continue to monitor closely and make adjustments to medical management as needed.    Portions of this note dictated using EMR integrated voice recognition software, and may be subject to voice recognition errors not corrected at proofreading. Please contact writer for clarification if needed.    _____________________________________________________________________    Malnutrition Status:  Nutrition consulted. Most recent weight and BMI monitored-     Measurements:  Wt Readings from Last 1 Encounters:   04/13/25 63.5 kg (139 lb 15.9 oz)   Body mass index is 27.34 kg/m².    Patient has been screened and assessed by RD.    Malnutrition Type:  Context:    Level:      Malnutrition Characteristic Summary:       Interventions/Recommendations (treatment strategy):        Scheduled Med:   aspirin  300 mg Rectal Daily    atorvastatin  40 mg Oral QHS    ceFEPime IV (PEDS and ADULTS)  2 g Intravenous Q8H    sodium chloride 0.9%  10 mL Intravenous Q8H      Continuous Infusions:   0.9% NaCl   Intravenous Continuous          PRN Meds:    Current Facility-Administered Medications:     bisacodyL, 10 mg, Rectal, Daily PRN    labetalol, 10 mg, Intravenous, Q15 Min PRN    morphine, 2 mg, Intravenous, Q4H PRN     Radiology:  I have personally reviewed the following imaging and agree with the radiologist.     Echo Saline Bubble? Yes    Left Ventricle: The left ventricle is normal in size. Normal wall   thickness. There is normal systolic function with a visually estimated   ejection fraction of 65 - 70%.    Right Ventricle: The right ventricle is normal in size. Systolic   function is normal.    Left Atrium: Agitated saline study of the atrial septum is negative,   suggesting absence of intracardiac shunt at the atrial level.    Aortic Valve: There is aortic valve sclerosis. There is no stenosis.   There is trace aortic regurgitation.    Mitral Valve: There is no significant regurgitation.    Tricuspid Valve: There is trace regurgitation.    Pulmonary Artery: The estimated pulmonary artery systolic pressure is   30 mmHg.    IVC/SVC: Normal venous pressure at 3 mmHg.    Pericardium: There is no pericardial effusion.  X-Ray Hip 2 or 3 views Right with Pelvis when performed  Narrative: EXAMINATION:  XR HIP WITH PELVIS WHEN  PERFORMED 2 OR 3 VIEWS RIGHT    CLINICAL HISTORY:  post op;  fracture    COMPARISON:  Yesterday    FINDINGS:  Frontal view of the pelvis with two views of the right hip.  There are expected changes of interval placement of right hip arthroplasty.  Impression: As above.    Electronically signed by: Bossman Lawrence  Date:    04/14/2025  Time:    14:35      Warren Covarrubias MD  Department of Hospital Medicine   [facility]   04/14/2025

## 2025-04-15 NOTE — PT/OT/SLP PROGRESS
Physical Therapy      Patient Name:  William Mccauley   MRN:  [id number]    PT eval orders received and chart reviewed. Attempted to see pt for therapy eval, however BP outside <140 parameters (151/84). RN notified; will f/u as schedule permits.    4/15/2025

## 2025-04-15 NOTE — PT/OT/SLP PROGRESS
Occupational Therapy      Patient Name:  William Mccauley   MRN:  [id number]    Initial OT eval & treat orders received, pt chart reviewed. Pt BP outside of parameters this morning (150/85; 151/84). Pt noted to have wet vocals with residual white substance from swallow study pooling. RN informed and verbalized she would notify MD and request RT consult. F/u this PM as appropriate.     4/15/2025

## 2025-04-15 NOTE — ASSESSMENT & PLAN NOTE
- presented with right hip pain, left eye blurriness following a ground level fall on   - Stroke RF: HTN, HLD, T2DM, CVA (no deficits)  - Intervention: None  - Etiology: TBD-- likely hypertensive    Stroke workup:  -CTh: left thalamic minimal hyperdensity   -CTA h/n: Mild moderate narrowing of the left vertebral artery origin. Severe narrowing of the distal vertebral arteries bilaterally. Severe narrowing of the clinoid, ophthalmic segments of the internal carotid artery siphons bilaterally.  Suspected near occlusion on the left.   -MRI brain: Small linear/punctate area of acute/subacute left thalamic hemorrhage, possible hemorrhagic infarct although with no visible diffusion restriction, region obscured by hemorrhagic artifact. Advanced involutional change, moderate severe chronic microvascular white matter ischemic change.   -ECHO: EF 65-70%, bubble study negative for PFO   -CUS: The right internal carotid artery is patent with less than 50% stenosis. The left internal carotid artery is patent with less than 50% stenosis. Bilateral vertebral arteries are patent with antegrade flow.   -LDL: 54   -A1c: 8.0   -TSH: 0.869   -home medications include: Aspirin, atorvastatin    NIH Stroke Scale      1a  Level of consciousness: 0=alert; keenly responsive   1b. LOC questions:  0=Answers both tasks correctly   1c. LOC commands: 0=Answers both tasks correctly   2.  Best Gaze: 0=normal   3.  Visual: 0=No visual loss   4. Facial Palsy: 0=Normal symmetric movement   5a.  Motor left arm: 0=No drift, limb holds 90 (or 45) degrees for full 10 seconds   5b.  Motor right arm: 0=No drift, limb holds 90 (or 45) degrees for full 10 seconds   6a. motor left le=No drift, limb holds 90 (or 45) degrees for full 10 seconds   6b  Motor right le=No drift, limb holds 90 (or 45) degrees for full 10 seconds   7. Limb Ataxia: 0=Absent   8.  Sensory: 0=Normal; no sensory loss   9. Best Language:  0=No aphasia, normal   10. Dysarthria:  0=Normal   11. Extinction and Inattention: 0=No abnormality   12. Distal motor function: 0=Normal    Total:   0         Plan:  -continue atorvastatin 40 mg  -PT/OT/ST to evaluate  -SCD for DVT prophylaxis   -Neuro checks q4hr ... stat CTh if any neuro change   -Continuous telemetry monitoring  -NPO until passes [place] or cleared by SLP  -No bedrest, ok for patient to ambulate, RN to observe first ambulation for safety   -strict blood pressure control ... SBP less than 140  -avoid antiplatelet or anticoagulation at this time-- OK to resume Aspirin 81 mg daily on Thursday (4/17/2025)  -seizure precautions ... notify neurology of any seizure-like activity  -recommend aggressive risk factor modification with BP control at home  -Will need follow-up in outpatient stroke clinic with Homa Avila NP in 3 months      Further recommendations to follow from MD

## 2025-04-15 NOTE — ANESTHESIA POSTPROCEDURE EVALUATION
Anesthesia Post Evaluation    Patient: William Mccauley    Procedure(s) Performed: Procedure(s) (LRB):  HEMIARTHROPLASTY, HIP (Right)    Final Anesthesia Type: general      Patient location during evaluation: PACU  Patient participation: Yes- Able to Participate  Level of consciousness: awake  Post-procedure vital signs: reviewed and stable  Pain management: adequate  Airway patency: patent    PONV status at discharge: No PONV  Anesthetic complications: no      Cardiovascular status: blood pressure returned to baseline  Respiratory status: unassisted  Hydration status: euvolemic  Follow-up not needed.          Vitals Value Taken Time   /84 04/15/25 06:55   Temp 37.6 °C (99.7 °F) 04/15/25 06:55   Pulse 78 04/15/25 09:45   Resp 18 04/15/25 06:55   SpO2 100 % 04/15/25 06:55         Event Time   Out of Recovery 04/14/2025 15:10:00         Pain/Kati Score: Pain Rating Prior to Med Admin: 6 (4/14/2025 12:07 PM)  Pain Rating Post Med Admin: 0 (4/15/2025 12:03 AM)  Kati Score: 10 (4/14/2025  3:10 PM)

## 2025-04-15 NOTE — PT/OT/SLP EVAL
"[facility]  Speech Language Pathology Department  Clinical Swallow Evaluation    Patient Name:  William Mccauley   MRN:  [id number]    Recommendations     General recommendations:  Modified Barium Swallow Study  Solid texture recommendation:  NPO  Liquid consistency recommendation: NPO   Medications: NPO  Precautions: aspiration    History     William Mccauley is a/n 74 y.o. male admitted s/p fall on hip. Ortho sx for fracture of R femoral neck on 4/14. MRI of the brain with small linear/punctuate area of acute/subacute left thalamic hemorrhage, possible hemorrhagic infarct although with no visible diffusion restriction, region obscured by hemorrhagic artifact, advanced involutional change, moderate severe chronic microvascular white ischemic changes.     Failed roa swallow screen.    Past Medical History:   Diagnosis Date    Diabetes mellitus, type 2     HTN (hypertension)     Ptosis     Sciatica     Stroke     Type 2 diabetes mellitus without complications      Past Surgical History:   Procedure Laterality Date    DIAGNOSTIC LAPAROSCOPY N/A 9/24/2024    Procedure: LAPAROSCOPY, DIAGNOSTIC;  Surgeon: Adalberto Rock MD;  Location: [facility];  Service: General;  Laterality: N/A;    HEMIARTHROPLASTY OF HIP Right 4/14/2025    Procedure: HEMIARTHROPLASTY, HIP;  Surgeon: Raghu Hunt MD;  Location: [facility];  Service: Orthopedics;  Laterality: Right;  lateral peg board rodney    KIDNEY STONE SURGERY Left     REPAIR, HERNIA, INGUINAL Right 9/24/2024    Procedure: REPAIR, HERNIA, INGUINAL;  Surgeon: Adalberto Rock MD;  Location: [facility];  Service: General;  Laterality: Right;  Incarcerated Hernia       Home diet texture/consistency: unknown  Current method of nutrition: NPO    Patient complaint: "lay down"    Imaging   No results found for this or any previous visit.    No results found for this or any previous visit.    Results for orders placed during the hospital encounter of 04/13/25    MRI " Brain Without Contrast    Narrative  EXAMINATION:  MRI BRAIN WITHOUT CONTRAST    CLINICAL HISTORY:  Stroke, follow up;    TECHNIQUE:  Multiplanar multisequence MR imaging of the brain was performed without contrast.    COMPARISON:  CT same day    FINDINGS:  Small linear area of susceptibility artifact posterior left thalamus with a punctate area of increased T1 signal consistent with acute/subacute thalamic hemorrhage.    No visible diffusion restriction on diffusion sequence.    Advanced involutional change.  Moderate severe chronic microvascular white matter ischemic change.  Scattered remote lacunar infarcts in the subinsular basal ganglia regions.    Ventricles, sulci, cisterns otherwise normal with no mass effect or midline shift.  No intra or extra-axial mass.    Posterior fossa brainstem otherwise grossly normal.  Sella and orbits grossly normal.    Mild ethmoid mucosal thickening.  Cranium and extracranial structures otherwise unremarkable.    Impression  Small linear/punctate area of acute/subacute left thalamic hemorrhage, possible hemorrhagic infarct although with no visible diffusion restriction, region obscured by hemorrhagic artifact.    Advanced involutional change, moderate severe chronic microvascular white matter ischemic change.      Electronically signed by: Taras Dunn MD  Date:    04/13/2025  Time:    12:11    Subjective     Patient awake and alert.    Patient goals: to eat/drink   Spiritual/Cultural/Amish Beliefs/Practices that affect care: no    Pain/Comfort: Pain Rating 1: 0/10    Respiratory Status:  room air    Restraints/positioning devices: none    Objective     ORAL MUSCULATURE  Dentition: own teeth and teeth in poor condition  Secretion Management: adequate  Mucosal Quality: good  Volitional Cough: Able to clear secretions  Volitional Swallow: adequate    PO TRIALS  Consistency Fed By Oral Symptoms Pharyngeal Symptoms   Thin liquid by spoon SLP None Throat clear after swallow    Thin liquid by straw SLP None Cough after swallow   Puree SLP None None     Assessment     MBS warranted to further assess swallowing.    Outcome Measures     Functional Oral Intake Scale: 1 - Nothing by mouth    Education     Patient provided with verbal education regarding POC.  Additional teaching is warranted.    Plan     SLP Follow-Up:  Yes   Plan of Care reviewed with:  patient     Time Tracking     SLP Treatment Date:   04/15/25  Speech Start Time:  0936  Speech Stop Time:  0945     Speech Total Time (min):  9 min    Billable minutes:  Swallow and Oral Function Evaluation, 9 minutes     04/15/2025

## 2025-04-15 NOTE — PLAN OF CARE
04/15/25 1128   Discharge Assessment   Assessment Type Discharge Planning Assessment   Confirmed/corrected address, phone number and insurance Yes   Confirmed Demographics Correct on Facesheet   Source of Information family   If unable to respond/provide information was family/caregiver contacted? Yes   Contact Name/Number Information obtained from Son in law at bedside   Communicated ZIA with patient/caregiver Date not available/Unable to determine   Reason For Admission Right femoral neck fracture, CVA   People in Home child(kavita), adult   Do you expect to return to your current living situation? Other (see comments)  (TBD)   Prior to hospitilization cognitive status: Unable to Assess   Current cognitive status: Unable to Assess   Walking or Climbing Stairs Difficulty yes   Walking or Climbing Stairs ambulation difficulty, requires equipment   Mobility Management Walker   Dressing/Bathing Difficulty no   Home Accessibility stairs to enter home;stairs within home   Number of Stairs, Within Home, Primary none   Number of Stairs, Main Entrance one   Stair Railings, Main Entrance none   Home Layout Able to live on 1st floor   Equipment Currently Used at Home walker, rolling   Patient currently being followed by outpatient case management? No   Do you currently have service(s) that help you manage your care at home? No   Do you take prescription medications? Yes   Do you have prescription coverage? Yes   Coverage [employer]   Do you have any problems affording any of your prescribed medications? No   Is the patient taking medications as prescribed? yes   How do you get to doctors appointments? family or friend will provide   Are you on dialysis? No   Do you take coumadin? No   Discharge Plan A Other  (TBD - pending therapy evals)   Discharge Plan B Other  (TBD)   DME Needed Upon Discharge  other (see comments)  (TBD)   Discharge Plan discussed with: Adult children;Patient   Transition of Care Barriers None   OTHER    Name(s) of People in Home Daughter Mehnaz and family

## 2025-04-16 LAB
ANION GAP SERPL CALC-SCNC: 14 MEQ/L
BUN SERPL-MCNC: 35.1 MG/DL (ref 8.4–25.7)
CALCIUM SERPL-MCNC: 8.6 MG/DL (ref 8.8–10)
CHLORIDE SERPL-SCNC: 113 MMOL/L (ref 98–107)
CO2 SERPL-SCNC: 19 MMOL/L (ref 23–31)
CREAT SERPL-MCNC: 0.62 MG/DL (ref 0.72–1.25)
CREAT/UREA NIT SERPL: 57
GFR SERPLBLD CREATININE-BSD FMLA CKD-EPI: >60 ML/MIN/1.73/M2
GLUCOSE SERPL-MCNC: 206 MG/DL (ref 82–115)
HCT VFR BLD AUTO: 26.8 % (ref 42–52)
HGB BLD-MCNC: 7.5 G/DL (ref 14–18)
POTASSIUM SERPL-SCNC: 3.7 MMOL/L (ref 3.5–5.1)
PSYCHE PATHOLOGY RESULT: NORMAL
SODIUM SERPL-SCNC: 146 MMOL/L (ref 136–145)

## 2025-04-16 PROCEDURE — 25000003 PHARM REV CODE 250: Performed by: INTERNAL MEDICINE

## 2025-04-16 PROCEDURE — 21400001 HC TELEMETRY ROOM

## 2025-04-16 PROCEDURE — 80048 BASIC METABOLIC PNL TOTAL CA: CPT | Performed by: INTERNAL MEDICINE

## 2025-04-16 PROCEDURE — 63600175 PHARM REV CODE 636 W HCPCS: Performed by: NURSE PRACTITIONER

## 2025-04-16 PROCEDURE — 97535 SELF CARE MNGMENT TRAINING: CPT

## 2025-04-16 PROCEDURE — 85018 HEMOGLOBIN: CPT | Performed by: INTERNAL MEDICINE

## 2025-04-16 PROCEDURE — 36415 COLL VENOUS BLD VENIPUNCTURE: CPT | Performed by: INTERNAL MEDICINE

## 2025-04-16 PROCEDURE — 25000003 PHARM REV CODE 250: Performed by: PHYSICIAN ASSISTANT

## 2025-04-16 PROCEDURE — 92526 ORAL FUNCTION THERAPY: CPT

## 2025-04-16 PROCEDURE — 97162 PT EVAL MOD COMPLEX 30 MIN: CPT

## 2025-04-16 PROCEDURE — A4216 STERILE WATER/SALINE, 10 ML: HCPCS | Performed by: PHYSICIAN ASSISTANT

## 2025-04-16 RX ORDER — METFORMIN HYDROCHLORIDE 500 MG/1
1000 TABLET ORAL 2 TIMES DAILY WITH MEALS
Status: DISCONTINUED | OUTPATIENT
Start: 2025-04-16 | End: 2025-04-17

## 2025-04-16 RX ORDER — ASPIRIN 81 MG/1
81 TABLET ORAL DAILY
Status: DISCONTINUED | OUTPATIENT
Start: 2025-04-17 | End: 2025-04-17

## 2025-04-16 RX ORDER — LANOLIN ALCOHOL/MO/W.PET/CERES
1 CREAM (GRAM) TOPICAL 2 TIMES DAILY
Status: DISCONTINUED | OUTPATIENT
Start: 2025-04-16 | End: 2025-04-17

## 2025-04-16 RX ORDER — HYDRALAZINE HYDROCHLORIDE 20 MG/ML
10 INJECTION INTRAMUSCULAR; INTRAVENOUS EVERY 4 HOURS PRN
Status: DISCONTINUED | OUTPATIENT
Start: 2025-04-16 | End: 2025-04-30 | Stop reason: HOSPADM

## 2025-04-16 RX ORDER — GABAPENTIN 300 MG/1
600 CAPSULE ORAL 3 TIMES DAILY
Status: DISCONTINUED | OUTPATIENT
Start: 2025-04-16 | End: 2025-04-17

## 2025-04-16 RX ORDER — LOSARTAN POTASSIUM 25 MG/1
25 TABLET ORAL DAILY
Status: DISCONTINUED | OUTPATIENT
Start: 2025-04-17 | End: 2025-04-17

## 2025-04-16 RX ADMIN — SODIUM CHLORIDE, PRESERVATIVE FREE 10 ML: 5 INJECTION INTRAVENOUS at 05:04

## 2025-04-16 RX ADMIN — LEUCINE, PHENYLALANINE, LYSINE, METHIONINE, ISOLEUCINE, VALINE, HISTIDINE, THREONINE, TRYPTOPHAN, ALANINE, GLYCINE, ARGININE, PROLINE, SERINE, TYROSINE, SODIUM ACETATE, DIBASIC POTASSIUM PHOSPHATE, MAGNESIUM CHLORIDE, SODIUM CHLORIDE, CALCIUM CHLORIDE, DEXTROSE
880; 489; 33; 5; 438; 204; 255; 311; 247; 51; 170; 238; 261; 289; 213; 297; 77; 179; 77; 17; 247 INJECTION INTRAVENOUS at 09:04

## 2025-04-16 RX ADMIN — ATORVASTATIN CALCIUM 40 MG: 40 TABLET, FILM COATED ORAL at 09:04

## 2025-04-16 RX ADMIN — GABAPENTIN 600 MG: 300 CAPSULE ORAL at 09:04

## 2025-04-16 RX ADMIN — FERROUS SULFATE TAB 325 MG (65 MG ELEMENTAL FE) 1 EACH: 325 (65 FE) TAB at 09:04

## 2025-04-16 RX ADMIN — HYDRALAZINE HYDROCHLORIDE 10 MG: 20 INJECTION INTRAMUSCULAR; INTRAVENOUS at 04:04

## 2025-04-16 RX ADMIN — HYDRALAZINE HYDROCHLORIDE 10 MG: 20 INJECTION INTRAMUSCULAR; INTRAVENOUS at 11:04

## 2025-04-16 RX ADMIN — SODIUM CHLORIDE, PRESERVATIVE FREE 10 ML: 5 INJECTION INTRAVENOUS at 02:04

## 2025-04-16 RX ADMIN — SODIUM CHLORIDE, PRESERVATIVE FREE 10 ML: 5 INJECTION INTRAVENOUS at 09:04

## 2025-04-16 NOTE — PROGRESS NOTES
[facility] 4th Floor Medical Telemetry  Orthopedics  Progress Note    Patient Name: William Mccauley  MRN: [id number]  Admission Date: 4/13/2025  Hospital Length of Stay: 3 days  Attending Provider: Warren Covarrubias MD  Primary Care Provider: Shannon Alonso MD    Subjective:     Principal Problem:Closed displaced fracture of right femoral neck    Principal Orthopedic Problem: 2 Days Post-Op   R hip hemiarthroplasty    Interval History: Seen this am. No family at bedside. Resting comfortably.     Review of patient's allergies indicates:  No Known Allergies    Current Facility-Administered Medications   Medication    Amino acid 4.25% - dextrose 5% (CLINIMIX-E) solution (1L provides 42.5 gm AA, 50 gm CHO (170 kcal/L dextrose), Na 35, K 30, Mg 5, Ca 4.5, Acetate 70, Cl 39, Phos 15)    atorvastatin tablet 40 mg    bisacodyL suppository 10 mg    hydrALAZINE injection 10 mg    sodium chloride 0.9% flush 10 mL     Objective:     Vital Signs (Most Recent):  Temp: 100 °F (37.8 °C) (04/16/25 1102)  Pulse: (!) 127 (04/16/25 1152)  Resp: 18 (04/16/25 1102)  BP: 118/77 (04/16/25 1146)  SpO2: 99 % (04/16/25 1102) Vital Signs (24h Range):  Temp:  [97.3 °F (36.3 °C)-100 °F (37.8 °C)] 100 °F (37.8 °C)  Pulse:  [] 127  Resp:  [18-20] 18  SpO2:  [98 %-99 %] 99 %  BP: (118-164)/(74-83) 118/77     Weight: 56.7 kg (125 lb)  Height: 5' (152.4 cm)  Body mass index is 24.41 kg/m².      Intake/Output Summary (Last 24 hours) at 4/16/2025 1245  Last data filed at 4/16/2025 1102  Gross per 24 hour   Intake 10 ml   Output 1750 ml   Net -1740 ml       Physical Exam:   General the patient is alert and in no acute distress;   Resp: No signs of labored breathing                     RLE: -Skin: Dressing CDI thigh soft            -MSK: tolerates gentle ROM; + active DF/PF           -Neuro:  Sensation intact to light touch throughout           -CV: Capillary refill is less than 2 seconds. +DP.     Diagnostic Findings:     Significant  Labs: CBC:   Recent Labs   Lab 04/15/25  0509 04/16/25  1225   WBC 13.29*  --    HGB 8.2* 7.5*   HCT 29.6* 26.8*     --      All pertinent labs within the past 24 hours have been reviewed.    Significant Imaging: I have reviewed all pertinent imaging results/findings.     Assessment/Plan:     Active Diagnoses:    Diagnosis Date Noted POA    PRINCIPAL PROBLEM:  Closed displaced fracture of right femoral neck [S72.001A] 04/13/2025 Yes    Intraparenchymal hemorrhage of brain [I61.9] 04/15/2025 Yes      Problems Resolved During this Admission:   75 YO M   POD #2 R hip hemiarthroplasty    Diet: per primary when cleared by SLP  Pain: IV PRN  DVT: ok from ortho perspective when cleared by neurology   ABX: periop ancef  PT/OT: Eval and Treat  Activity: WBAT RLE  ABLA; expected for fx and post op; transfuse PRN per primary   Dry dressing changes; No creams/ointments   OK open to air of dry  Follow up in 4 weeks with Dr Hunt    The above findings, diagnostics, and treatment plan were discussed with Dr Hunt who is in agreement with the plan of care except as stated in additional documentation.      TOMMY Kramer   Orthopedic Trauma Surgery  [facility]

## 2025-04-16 NOTE — PLAN OF CARE
Problem: Adult Inpatient Plan of Care  Goal: Plan of Care Review  Outcome: Progressing  Goal: Patient-Specific Goal (Individualized)  Outcome: Progressing  Goal: Absence of Hospital-Acquired Illness or Injury  Outcome: Progressing  Goal: Optimal Comfort and Wellbeing  Outcome: Progressing  Goal: Readiness for Transition of Care  Outcome: Progressing     Problem: Stroke, Ischemic (Includes Transient Ischemic Attack)  Goal: Optimal Coping  Outcome: Progressing  Goal: Effective Bowel Elimination  Outcome: Progressing  Goal: Optimal Cerebral Tissue Perfusion  Outcome: Progressing  Goal: Optimal Cognitive Function  Outcome: Progressing  Goal: Improved Communication Skills  Outcome: Progressing  Goal: Optimal Functional Ability  Outcome: Progressing  Goal: Optimal Nutrition Intake  Outcome: Progressing  Goal: Effective Oxygenation and Ventilation  Outcome: Progressing  Goal: Improved Sensorimotor Function  Outcome: Progressing  Goal: Safe and Effective Swallow  Outcome: Progressing  Goal: Effective Urinary Elimination  Outcome: Progressing     Problem: Fall Injury Risk  Goal: Absence of Fall and Fall-Related Injury  Outcome: Progressing     Problem: Skin Injury Risk Increased  Goal: Skin Health and Integrity  Outcome: Progressing     Problem: Wound  Goal: Optimal Coping  Outcome: Progressing  Goal: Optimal Functional Ability  Outcome: Progressing  Goal: Absence of Infection Signs and Symptoms  Outcome: Progressing  Goal: Improved Oral Intake  Outcome: Progressing  Goal: Optimal Pain Control and Function  Outcome: Progressing  Goal: Skin Health and Integrity  Outcome: Progressing  Goal: Optimal Wound Healing  Outcome: Progressing     Problem: Diabetes Comorbidity  Goal: Blood Glucose Level Within Targeted Range  Outcome: Progressing     Problem: Stroke, Intracerebral Hemorrhage  Goal: Optimal Coping  Outcome: Progressing  Goal: Effective Bowel Elimination  Outcome: Progressing  Goal: Optimal Cerebral Tissue  Perfusion  Outcome: Progressing  Goal: Optimal Cognitive Function  Outcome: Progressing  Goal: Effective Communication Skills  Outcome: Progressing  Goal: Optimal Functional Ability  Outcome: Progressing  Goal: Optimal Nutrition Intake  Outcome: Progressing  Goal: Optimal Pain Control and Function  Outcome: Progressing  Goal: Effective Oxygenation and Ventilation  Outcome: Progressing  Goal: Improved Sensorimotor Function  Outcome: Progressing  Goal: Safe and Effective Swallow  Outcome: Progressing  Goal: Effective Urinary Elimination  Outcome: Progressing

## 2025-04-16 NOTE — CONSULTS
"Inpatient Nutrition Assessment    Admit Date: 4/13/2025   Total duration of encounter: 3 days   Patient Age: 74 y.o.    Nutrition Recommendation/Prescription     Consider tube feeding to meet needs until oral intake feasible, will need enteral access.  If/when oral intake feasible, recommend diabetic diet with consistency per SLP.    Tube feeding recommendation:  Isosource 1.5 Alberto 50 ml/hr provides  1500 kcal  100% needs  68 g protein  100% needs  176 g carbohydrate 100% needs  760 ml free water 55% needs, will require additional fluid source, can be given as 35 ml/hr water flush  calculations based on estimated 20 hour run time     Communication of Recommendations: reviewed with nurse and reviewed with family    Nutrition Assessment     Malnutrition Assessment/Nutrition-Focused Physical Exam    Malnutrition Context: acute illness or injury (04/16/25 1238)  Malnutrition Level: other (see comments) (Does not meet criteria) (04/16/25 1238)     Weight Loss (Malnutrition): other (see comments) (Does not meet criteria) (04/16/25 1238)              Muscle Mass (Malnutrition): mild depletion (04/16/25 1238)     Clavicle Bone Region (Muscle Loss): mild depletion, possibly normal for patient given no reported weight/intake decrease                             A minimum of two characteristics is recommended for diagnosis of either severe or non-severe malnutrition.    Chart Review    Reason Seen: continuous nutrition monitoring and physician consult for "NPO due to unable to swallow"    Malnutrition Screening Tool Results   Have you recently lost weight without trying?: No  Have you been eating poorly because of a decreased appetite?: No   MST Score: 0   Diagnosis:  Right femoral neck fracture secondary to mechanical fall   Acute/subacute left thalamic hemorrhage  Microcytic anemia, stable   Metabolic acidosis   Transaminitis     Relevant Medical History: hypertension, hyperlipidemia, diabetes mellitus type 2, ptosis, CVA " without deficits, sciatica     Scheduled Medications:  atorvastatin, 40 mg, QHS  sodium chloride 0.9%, 10 mL, Q8H    Continuous Infusions:  Amino acid 4.25% - dextrose 5% (CLINIMIX-E) solution (1L provides 42.5 gm AA, 50 gm CHO (170 kcal/L dextrose), Na 35, K 30, Mg 5, Ca 4.5, Acetate 70, Cl 39, Phos 15), Last Rate: 75 mL/hr at 04/16/25 0921    PRN Medications:   bisacodyL, 10 mg, Daily PRN  hydrALAZINE, 10 mg, Q4H PRN    Calorie Containing IV Medications: no significant kcals from medications at this time    Recent Labs   Lab 04/13/25  0929 04/14/25  0320 04/15/25  0509   * 137 136   K 3.8 4.8 5.1   CALCIUM 8.7* 8.9 8.5*   PHOS  --  4.5  --    MG  --  1.80  --     103 106   CO2 20* 16* 15*   BUN 9.2 12.5 29.1*   CREATININE 0.58* 0.67* 0.81   EGFRNORACEVR >60 >60 >60   GLUCOSE 138* 114 160*   BILITOT 0.8 1.0 0.7   ALKPHOS 119 121 96   ALT 63* 54 37   AST 48* 39 41   ALBUMIN 3.8 3.7 3.4   CRP 3.70  --   --    TRIG 48  --   --    HGBA1C 8.0*  --   --    WBC 10.12 12.06* 13.29*   HGB 8.8* 9.4* 8.2*   HCT 30.2* 34.3* 29.6*     Nutrition Orders:  Diet NPO  Amino acid 4.25% - dextrose 5% (CLINIMIX-E) solution (1L provides 42.5 gm AA, 50 gm CHO (170 kcal/L dextrose), Na 35, K 30, Mg 5, Ca 4.5, Acetate 70, Cl 39, Phos 15)      Appetite/Oral Intake: NPO/not applicable  Factors Affecting Nutritional Intake: difficulty/impaired swallowing and NPO  Social Needs Impacting Access to Food: none identified  Food/Spiritism/Cultural Preferences: none reported  Food Allergies: no known food allergies  Last Bowel Movement: 04/12/25  Wound(s): no pressure injuries documented at this time     Comments    4/16/25 Spoke with patient's daughter at bedside, denies translation services at this time. Daughter reports good appetite/intake prior to admission, denies weight loss. MBS done 4/15/25 with recommendations for NPO and dysphagia therapy. He is currently receiving Clinimix E 4.25/5 at 75 ml/hr. Recommend consideration of  nasogastric tube feeding to better meet estimated needs and to maintain GI tract integrity.    Anthropometrics    Height: 5' (152.4 cm), Height Method: Stated  Last Weight: 56.7 kg (125 lb) (04/16/25 1234), Weight Method: Standard Scale  BMI (Calculated): 24.4  BMI Classification: overweight (BMI 25-29.9)        Ideal Body Weight (IBW), Male: 106 lb     % Ideal Body Weight, Male (lb): 132.07 %                          Usual Weight Provided By: family/caregiver denies unintentional weight loss    Wt Readings from Last 5 Encounters:   04/16/25 56.7 kg (125 lb)   04/02/25 59.4 kg (131 lb)   02/05/25 62.9 kg (138 lb 9.6 oz)   10/31/24 58.1 kg (128 lb)   10/18/24 59.4 kg (131 lb)     Weight Change(s) Since Admission:   4/16/25 admission weight (63.5 kg) stated, took bed weight (56.7 kg) during rounds  Wt Readings from Last 1 Encounters:   04/16/25 1234 56.7 kg (125 lb)   04/13/25 1306 63.5 kg (139 lb 15.9 oz)   04/13/25 0911 63.5 kg (140 lb)   Admit Weight: 63.5 kg (140 lb) (04/13/25 0911), Weight Method: Stated    Estimated Needs    Weight Used For Calorie Calculations: 56.7 kg (125 lb)  Energy Calorie Requirements (kcal): 8200-7960, 1.2-1.4 stress factor  Energy Need Method: [facility]  Weight Used For Protein Calculations: 56.7 kg (125 lb)  Protein Requirements: 57-80 g, 1-1.4 g/kg  Fluid Requirements (mL): 8425-0839, 1 ml/kcal  CHO Requirement: 138-181 g, 40-45% of kcal     Enteral Nutrition Patient not receiving enteral nutrition at this time.    Parenteral Nutrition     Standard Formula: Clinimix E 4.25/5  Custom Formula: not applicable  Additives: none  Rate/Volume: 75 ml/hr  Lipids: none  Total Nutrition Provided by Parenteral Nutrition:  Calories Provided  612 kcal/d, 44% needs   Protein Provided  77 g/d, 100% needs   Dextrose Provided  90 g/d, GIR 1.1 mg CHO/kg/min   Fluid Provided  1800 ml/d, 112% needs     Evaluation of Received Nutrient Intake    Calories: not meeting estimated needs  Protein: not  meeting estimated needs    Patient Education Not applicable.    Nutrition Diagnosis     PES: Inadequate energy intake related to inability to consume sufficient nutrients as evidenced by less than 80% needs met. (active)    PES: N/A            Nutrition Interventions     Intervention(s): collaboration with other providers  Intervention(s): Enteral nutrition      Goal: Meet greater than 80% of nutritional needs by follow-up. (new)  Goal: Tolerate enteral feeding at goal rate by follow-up. (new)    Nutrition Goals & Monitoring     Dietitian will monitor: food and beverage intake, energy intake, enteral nutrition intake, parenteral nutrition intake, weight, electrolyte/renal panel, beliefs/attitudes, glucose/endocrine profile, and gastrointestinal profile  Discharge planning: too early to determine; pending clinical course  Nutrition Risk/Follow-Up: high (follow-up in 1-4 days)   Please consult if re-assessment needed sooner.

## 2025-04-16 NOTE — PT/OT/SLP PROGRESS
Occupational Therapy   Treatment    Name: William Mccauley  MRN: [id number]  Admitting Diagnosis:  Closed displaced fracture of right femoral neck  2 Days Post-Op    Recommendations:     Recommended therapy intensity at discharge: High Intensity Therapy   Discharge Equipment Recommendations:  to be determined by next level of care  Barriers to discharge:   (medically complex, impaired ADLs/mobility)    Assessment:     William Mccauley is a 74 y.o. male with a medical diagnosis of fall, Right femoral neck fx s/p R LEANDRA, and MRI brain showed small linear/punctuate area of acute/subacute left thalamic hemorrhage.  He presents with BP out of parameters on first attempt, returned s/p IV meds and BP good, however pt with elevated HR at rest - limited session to sitting EOB. Performance deficits affecting function are weakness, impaired endurance, orthopedic precautions, impaired self care skills, impaired sensation, impaired functional mobility, gait instability, impaired balance, pain, decreased safety awareness, decreased lower extremity function, impaired skin.     Pt dtr in room to provide history: pt lives with his dtr and grandson in [facility]. They have a walk-in shower with grab bars and shower chair. Pt was mod (I) in ADLs and using 4ww for mobility prior to this admit. He was going to o/p PT twice per week.     Rehab Prognosis:  Good; patient would benefit from acute skilled OT services to address these deficits and reach maximum level of function.       Plan:     Patient to be seen 5 x/week to address the above listed problems via self-care/home management, therapeutic activities, therapeutic exercises, cognitive retraining  Plan of Care Expires: 05/15/25  Plan of Care Reviewed with:      Subjective     Pain/Comfort:  Pain Rating 1: 0/10    Objective:     Communicated with: RN prior to session.  Patient found HOB elevated with pulse ox (continuous), telemetry, PureWick, peripheral IV upon OT entry to room.    General Precautions:  Standard, fall, aspiration, NPO    Orthopedic Precautions:RLE weight bearing as tolerated  Braces: N/A  Respiratory Status: Room air  Blood Pressure: 142/76 supine, 118/77 sitting EOB, 142/79 returning back to supine  HR 120s at rest. HR 140s sitting EOB.  Asymptomatic      Occupational Performance:     Bed Mobility:    SUP<>SIT: MAXX2    Functional Mobility/Transfers:  Functional Mobility: unable 2/2 elevated HR at rest.     Activities of Daily Living:  Lower Body Dressing: maximal assistance to don socks   Toileting: maximal assistance, pure wick in place.     Skin assessment: visible skin intact  [facility] 6 Click ADL: 11    Patient Education:  Patient and daughter/s were provided with verbal education education regarding OT role/goals/POC, post op precautions, fall prevention, safety awareness, and Discharge/DME recommendations.  Understanding was verbalized, however additional teaching warranted.      Patient left supine with all lines intact, call button in reach, and nurse notified.     GOALS:   Multidisciplinary Problems       Occupational Therapy Goals          Problem: Occupational Therapy    Goal Priority Disciplines Outcome Interventions   Occupational Therapy Goal     OT, PT/OT Progressing    Description: Goals to be met by: 5/15/25     Patient will increase functional independence with ADLs by performing:    UE Dressing with Stand-by Assistance.  LE Dressing with Minimal Assistance and Assistive Devices as needed.  Grooming while standing at sink with Contact Guard Assistance and Assistive Devices as needed.  Toileting from toilet with [place] and Assistive Devices as needed for hygiene and clothing management.   Toilet transfer to toilet with Contact Guard Assistance and using LRAD.                         Time Tracking:     OT Date of Treatment: 04/16/25  OT Start Time: 1132  OT Stop Time: 1156  OT Total Time (min): 24 min    Billable Minutes:Self Care/Home Management 2    OT/YANETH: OT     Number of YANETH  visits since last OT visit: 1    4/16/2025

## 2025-04-16 NOTE — PROGRESS NOTES
[facility] Hospital Medicine Progress Note        Chief Complaint: Inpatient Follow-up for     HPI:   William Mccauley is a 74 y.o. [place]gu[place] speaking male with a past medical history of hypertension, hyperlipidemia, diabetes mellitus type 2, ptosis, CVA without deficits and sciatica. The patient presented to [facility] on 4/13/2025 with a primary complaint of right hip pain following a fall which occurred yesterday (04/12/2025).  Patient was ambulating with a walker when he reached for the door he lost his balance falling onto his right hip.  Patient denies hitting his head.  Son who is at bedside translating states patient did complain of left eye blurriness.  He denies shortness a breath, chest pain, nausea, vomiting, diarrhea, headache and dizziness.  He received 50 mcg of fentanyl in route.     Upon presentation to the ED, temperature 97.9° F, heart rate 88, blood pressure 145/76, respiratory rate 18 and SpO2 96% on room air.  Labs with H&H 8.8/30.2, MCV 73.5, CO2 20, AST 48, ALT 63.  EKG with sinus rhythm with first-degree AV block, ST and T-wave abnormalities considering lateral ischemia and unable to rule out anterior infarct right hip x-ray revealed a right femoral neck fracture.  Chest x-ray with no acute cardiopulmonary process.  CT of the head revealed a left thalamic minimal hyperdensity which may represent subacute hemorrhage related to subacute infarct but no convincing acute brain traumatic findings.  CT cervical spine without acute findings.  MRI of the brain with small linear/punctuate area of acute/subacute left thalamic hemorrhage, possible hemorrhagic infarct although with no visible diffusion restriction, region obscured by hemorrhagic artifact, advanced involutional change, moderate severe chronic microvascular white ischemic changes.  In ED patient received IV fluid hydration, morphine, Zofran.  Orthopedics and neurology consulted  Interval Hx:   Patient seen and examined he  had his surgery today orthopedics appreciated Neurology were not able to see him due to him being in the OR I have started him on rectal aspirin as well as Lipitor but to speech evaluation was not done either I have him on IV fluids     4/15/25  Since seen and examined he has failed speech eval per Neurology he can be started on aspirin on April 17, 2025 4 days post incident.  Continue physical therapy we will likely need placement    4/16/25  Patient seen and examined Clinimix has been DC'd NG tube has been placed and tube feeds were started patient is going to be started on aspirin tomorrow per Neurology recommendation his other home medications have been resumed for NG tube as well he will likely need placement appreciate Orthopedics input    Case was discussed with patient's nurse and  on the floor.    Objective/physical exam:  General: In no acute distress, afebrile language barrier  Chest: Clear to auscultation bilaterally  Heart: RRR, +S1, S2, no appreciable murmur  Abdomen: Soft, nontender, BS +  MSK: Warm, no lower extremity edema, no clubbing or cyanosis  Neurologic:  Can not be assessed    VITAL SIGNS: 24 HRS MIN & MAX LAST   Temp  Min: 97.3 °F (36.3 °C)  Max: 100 °F (37.8 °C) 98.2 °F (36.8 °C)   BP  Min: 118/77  Max: 164/83 138/79   Pulse  Min: 88  Max: 139  (!) 113   Resp  Min: 18  Max: 20 18   SpO2  Min: 98 %  Max: 99 % 99 %     I have reviewed the following labs:  Recent Labs   Lab 04/13/25  0929 04/14/25  0320 04/15/25  0509 04/16/25  1225   WBC 10.12 12.06* 13.29*  --    RBC 4.11* 4.50* 3.93*  --    HGB 8.8* 9.4* 8.2* 7.5*   HCT 30.2* 34.3* 29.6* 26.8*   MCV 73.5* 76.2* 75.3*  --    MCH 21.4* 20.9* 20.9*  --    MCHC 29.1* 27.4* 27.7*  --    RDW 20.6* 20.6* 20.2*  --     411* 366  --    MPV 10.0 10.0 9.3  --      Recent Labs   Lab 04/13/25  0929 04/14/25  0320 04/15/25  0509 04/16/25  1225   * 137 136 146*   K 3.8 4.8 5.1 3.7    103 106 113*   CO2 20* 16* 15* 19*   BUN  9.2 12.5 29.1* 35.1*   CREATININE 0.58* 0.67* 0.81 0.62*   CALCIUM 8.7* 8.9 8.5* 8.6*   MG  --  1.80  --   --    ALBUMIN 3.8 3.7 3.4  --    ALKPHOS 119 121 96  --    ALT 63* 54 37  --    AST 48* 39 41  --    BILITOT 0.8 1.0 0.7  --      Microbiology Results (last 7 days)       ** No results found for the last 168 hours. **             See below for Radiology    Assessment/Plan:  Right femoral neck fracture secondary to mechanical fall   Acute/subacute left thalamic hemorrhage  Microcytic anemia, stable   Metabolic acidosis   Transaminitis   Diabetes mellitus type 2, uncontrolled  History of hypertension, hyperlipidemia, ptosis, CVA without deficits and sciatica     Plan:  - Ortho consulted and planning for surgical intervention 4/15/25  - IV morphine as needed for pain  - Neurology consulted. Appreciate recommendations aspirin will be initiated tomorrow  - CVA work up:  > CTA head and neck mild-to-moderate narrowing of the left vertebral artery origin, severe narrowing of the distal vertebral arteries bilaterally, severe narrowing of the clinoid, ophthalmic segment of the internal carotid siphons bilaterally, suspect near occlusion on the left.  > Echo - ordered  > Carotid US - preliminary read with less than 50% stenosis of bilateral ICA and bilateral vertebral arteries patent with antegrade flow  > Hemoglobin A1C - 8.0  > Lipid Panel - within limits  > CRP 3.7, ESR 52  > Physical, Occupation and Speech Therapy consulted  > Permissive hypertension, IV Labetalol and Hydralazine as needed for SBP >220 or DBP> 120  - Resume appropriate home medications once NG tube has been placed  Patient condition: Fair  Anticipated discharge and Disposition:   Placement        All diagnosis and differential diagnosis have been reviewed; assessment and plan has been documented; I have personally reviewed the labs and test results that are presently available; I have reviewed the patients medication list; I have reviewed the  consulting providers response and recommendations. I have reviewed or attempted to review medical records based upon their availability    All of the patient's questions have been  addressed and answered. Patient's is agreeable to the above stated plan. I will continue to monitor closely and make adjustments to medical management as needed.    Portions of this note dictated using EMR integrated voice recognition software, and may be subject to voice recognition errors not corrected at proofreading. Please contact writer for clarification if needed.   _____________________________________________________________________    Malnutrition Status:  Nutrition consulted. Most recent weight and BMI monitored-     Measurements:  Wt Readings from Last 1 Encounters:   04/16/25 56.7 kg (125 lb)   Body mass index is 24.41 kg/m².    Patient has been screened and assessed by RD.    Malnutrition Type:  Context: acute illness or injury  Level: other (see comments) (Does not meet criteria)    Malnutrition Characteristic Summary:  Weight Loss (Malnutrition): other (see comments) (Does not meet criteria)  Muscle Mass (Malnutrition): mild depletion    Interventions/Recommendations (treatment strategy):  Enteral nutrition     Scheduled Med:   [START ON 4/17/2025] aspirin  81 mg Oral Daily    atorvastatin  40 mg Oral QHS    [START ON 4/17/2025] empagliflozin  25 mg Oral Daily    ferrous sulfate  1 tablet Oral BID    gabapentin  600 mg Oral TID    [START ON 4/17/2025] losartan  25 mg Oral Daily    metFORMIN  1,000 mg Oral BID WM    sodium chloride 0.9%  10 mL Intravenous Q8H      Continuous Infusions:     PRN Meds:    Current Facility-Administered Medications:     bisacodyL, 10 mg, Rectal, Daily PRN    hydrALAZINE, 10 mg, Intravenous, Q4H PRN     Radiology:  I have personally reviewed the following imaging and agree with the radiologist.     XR Gastric tube check, non-radiologist performed  Narrative: EXAMINATION:  XR GASTRIC TUBE CHECK,  NON-RADIOLOGIST PERFORMED    CLINICAL HISTORY:  placement;    TECHNIQUE:  One view    COMPARISON:  September 23, 2024    FINDINGS:  Nasogastric tube traverses through the gastroesophageal junction and tip of tube is within the distal gastric body.  There is adequate length of the tube within the stomach.  Impression: Optimal placement of enteric tube.    Electronically signed by: Fabio Godwin  Date:    04/16/2025  Time:    17:01      Warren Covarrubias MD  Department of Hospital Medicine   [facility]   04/16/2025

## 2025-04-16 NOTE — NURSING
Artificial Intelligence Notification  [facility]  [address] [place] [address]  Phone: [phone]     This documentation was triggered by an Artificial Intelligence Notification.     Admit Date: 2025   LOS: 3  Code Status: Full Code  : 1950  Age: 74 y.o.  Weight:   Wt Readings from Last 1 Encounters:   25 63.5 kg (139 lb 15.9 oz)        Sex: male  Bed: 414/414 A  MRN: [id number]  Attending Physician: Warren Covarrubias MD     Date of Alert: 2025  Time AI Alert Received: 1237             Vitals:    25 1152   BP:    Pulse: (!) 127   Resp:    Temp:        Artificial Intelligence alert discussed with Provider:     Name: Kulwinder VARGAS    Date/Time of Provider Notification: 25 @ 1200      Patient Condition: Patient tachycardic noted above, recently hypertensive and HR 140s, given hydralazine per RN. Patient not complaining of pain, recently worked with PT. Noted labs drawn yesterday, recommend checking H/H & getting EKG. Kulwinder RN will reach out to Dr. Covarrubias to discuss. No need for ICU at this time. Care ongoing by primary care team. Call with any decompensation [phone].

## 2025-04-16 NOTE — PT/OT/SLP PROGRESS
"[facility]  Speech Language Pathology Department  Dysphagia Therapy Progress Note    Patient Name:  William Mccauley   MRN:  [id number]    Recommendations     General recommendations:  dysphagia therapy and Speech/Language and Cognitive Evaluation pending assistance from   Solid texture recommendation:  NPO  Liquid consistency recommendation: NPO   Medications: NPO    Discharge therapy intensity: High Intensity Therapy   Barriers to safe discharge:  acuity of illness and severity of impairment    Subjective     Patient awake and alert.  tati used for session, however patient with breathy vocal quality resulting in  being unable to understand patient.  was able to explain the therapy activities to patient with accuracy.   Spiritual/Cultural/Rastafarian Beliefs/Practices that affect care: no    Pain/Comfort: Pain Rating 1: 0/10    Respiratory Status:  room air    Objective     Therapeutic Activities:  Pt tolerated thermal stimulation to the anterior faucial pillars x20 with 90% swallow responses.  Laryngeal excursion reduced.  Delay 3-5 seconds.    Trails discontinued due to patient continuously attempting to tell SLP something, however  unable to understand. The  was able to provide education of severe dysphagia and patient reported "I understanding" and shook his head.     Assessment     Pt continues to present with oropharyngeal dysphagia and remains unsafe for PO intake.    Outcome Measures     Functional Oral Intake Scale: 1 - Nothing by mouth    Patient Education     Patient provided with verbal education regarding POC.  Understanding was verbalized.    Plan     Will continue to follow and tx as appropriate.    SLP Follow-Up:  Yes   Patient to be seen:  daily   Plan of Care expires:  04/22/25  Plan of Care reviewed with:  patient       Time Tracking     SLP Treatment Date:   04/16/25  Speech Start Time:  1500  Speech Stop Time:  1520   "   Speech Total Time (min):  20 min    Billable minutes:  Treatment of Swallow Dysfunction, 20 minutes       04/16/2025

## 2025-04-16 NOTE — PROGRESS NOTES
[facility] Hospital Medicine Progress Note        Chief Complaint: Inpatient Follow-up for     HPI:   William Mccauley is a 74 y.o. [place]ican speaking male with a past medical history of hypertension, hyperlipidemia, diabetes mellitus type 2, ptosis, CVA without deficits and sciatica. The patient presented to [facility] on 4/13/2025 with a primary complaint of right hip pain following a fall which occurred yesterday (04/12/2025).  Patient was ambulating with a walker when he reached for the door he lost his balance falling onto his right hip.  Patient denies hitting his head.  Son who is at bedside translating states patient did complain of left eye blurriness.  He denies shortness a breath, chest pain, nausea, vomiting, diarrhea, headache and dizziness.  He received 50 mcg of fentanyl in route.     Upon presentation to the ED, temperature 97.9° F, heart rate 88, blood pressure 145/76, respiratory rate 18 and SpO2 96% on room air.  Labs with H&H 8.8/30.2, MCV 73.5, CO2 20, AST 48, ALT 63.  EKG with sinus rhythm with first-degree AV block, ST and T-wave abnormalities considering lateral ischemia and unable to rule out anterior infarct right hip x-ray revealed a right femoral neck fracture.  Chest x-ray with no acute cardiopulmonary process.  CT of the head revealed a left thalamic minimal hyperdensity which may represent subacute hemorrhage related to subacute infarct but no convincing acute brain traumatic findings.  CT cervical spine without acute findings.  MRI of the brain with small linear/punctuate area of acute/subacute left thalamic hemorrhage, possible hemorrhagic infarct although with no visible diffusion restriction, region obscured by hemorrhagic artifact, advanced involutional change, moderate severe chronic microvascular white ischemic changes.  In ED patient received IV fluid hydration, morphine, Zofran.  Orthopedics and neurology consulted  Interval Hx:   Patient seen and examined he  had his surgery today orthopedics appreciated Neurology were not able to see him due to him being in the OR I have started him on rectal aspirin as well as Lipitor but to speech evaluation was not done either I have him on IV fluids    4/15/25  Since seen and examined he has failed speech eval per Neurology he can be started on aspirin on April 17, 2025 4 days post incident.  Continue physical therapy we will likely need placement    Case was discussed with patient's nurse and  on the floor.    Objective/physical exam:  General: In no acute distress, afebrile language barrier  Chest: Clear to auscultation bilaterally  Heart: RRR, +S1, S2, no appreciable murmur  Abdomen: Soft, nontender, BS +  MSK: Warm, no lower extremity edema, no clubbing or cyanosis  Neurologic:  Can not be assessed  VITAL SIGNS: 24 HRS MIN & MAX LAST   Temp  Min: 97.3 °F (36.3 °C)  Max: 100 °F (37.8 °C) 98.2 °F (36.8 °C)   BP  Min: 118/77  Max: 164/83 138/79   Pulse  Min: 88  Max: 139  (!) 113   Resp  Min: 18  Max: 20 18   SpO2  Min: 98 %  Max: 99 % 99 %     I have reviewed the following labs:  Recent Labs   Lab 04/13/25  0929 04/14/25  0320 04/15/25  0509 04/16/25  1225   WBC 10.12 12.06* 13.29*  --    RBC 4.11* 4.50* 3.93*  --    HGB 8.8* 9.4* 8.2* 7.5*   HCT 30.2* 34.3* 29.6* 26.8*   MCV 73.5* 76.2* 75.3*  --    MCH 21.4* 20.9* 20.9*  --    MCHC 29.1* 27.4* 27.7*  --    RDW 20.6* 20.6* 20.2*  --     411* 366  --    MPV 10.0 10.0 9.3  --      Recent Labs   Lab 04/13/25  0929 04/14/25  0320 04/15/25  0509 04/16/25  1225   * 137 136 146*   K 3.8 4.8 5.1 3.7    103 106 113*   CO2 20* 16* 15* 19*   BUN 9.2 12.5 29.1* 35.1*   CREATININE 0.58* 0.67* 0.81 0.62*   CALCIUM 8.7* 8.9 8.5* 8.6*   MG  --  1.80  --   --    ALBUMIN 3.8 3.7 3.4  --    ALKPHOS 119 121 96  --    ALT 63* 54 37  --    AST 48* 39 41  --    BILITOT 0.8 1.0 0.7  --      Microbiology Results (last 7 days)       ** No results found for the last 168  hours. **             See below for Radiology    Assessment/Plan:  Right femoral neck fracture secondary to mechanical fall   Acute/subacute left thalamic hemorrhage  Microcytic anemia, stable   Metabolic acidosis   Transaminitis   Diabetes mellitus type 2, uncontrolled  History of hypertension, hyperlipidemia, ptosis, CVA without deficits and sciatica     Plan:  - Ortho consulted and planning for surgical intervention 4/15/25  - IV morphine as needed for pain  - Neurology consulted. Appreciate recommendations   - CVA work up:  > CTA head and neck mild-to-moderate narrowing of the left vertebral artery origin, severe narrowing of the distal vertebral arteries bilaterally, severe narrowing of the clinoid, ophthalmic segment of the internal carotid siphons bilaterally, suspect near occlusion on the left.  > Echo - ordered  > Carotid US - preliminary read with less than 50% stenosis of bilateral ICA and bilateral vertebral arteries patent with antegrade flow  > Hemoglobin A1C - 8.0  > Lipid Panel - within limits  > CRP 3.7, ESR 52  > UDS - ordered  > Physical, Occupation and Speech Therapy consulted  > Permissive hypertension, IV Labetalol and Hydralazine as needed for SBP >220 or DBP> 120  - Resume appropriate home medications once NG tube has been placed  Patient condition: Fair  Anticipated discharge and Disposition:   Placement      All diagnosis and differential diagnosis have been reviewed; assessment and plan has been documented; I have personally reviewed the labs and test results that are presently available; I have reviewed the patients medication list; I have reviewed the consulting providers response and recommendations. I have reviewed or attempted to review medical records based upon their availability    All of the patient's questions have been  addressed and answered. Patient's is agreeable to the above stated plan. I will continue to monitor closely and make adjustments to medical management as  needed.    Portions of this note dictated using EMR integrated voice recognition software, and may be subject to voice recognition errors not corrected at proofreading. Please contact writer for clarification if needed.   _____________________________________________________________________    Malnutrition Status:  Nutrition consulted. Most recent weight and BMI monitored-     Measurements:  Wt Readings from Last 1 Encounters:   04/16/25 56.7 kg (125 lb)   Body mass index is 24.41 kg/m².    Patient has been screened and assessed by RD.    Malnutrition Type:  Context: acute illness or injury  Level: other (see comments) (Does not meet criteria)    Malnutrition Characteristic Summary:  Weight Loss (Malnutrition): other (see comments) (Does not meet criteria)  Muscle Mass (Malnutrition): mild depletion    Interventions/Recommendations (treatment strategy):  Enteral nutrition     Scheduled Med:   [START ON 4/17/2025] aspirin  81 mg Oral Daily    atorvastatin  40 mg Oral QHS    [START ON 4/17/2025] empagliflozin  25 mg Oral Daily    ferrous sulfate  1 tablet Oral BID    gabapentin  600 mg Oral TID    [START ON 4/17/2025] losartan  25 mg Oral Daily    metFORMIN  1,000 mg Oral BID WM    sodium chloride 0.9%  10 mL Intravenous Q8H      Continuous Infusions:   Amino acid 4.25% - dextrose 5% (CLINIMIX-E) solution (1L provides 42.5 gm AA, 50 gm CHO (170 kcal/L dextrose), Na 35, K 30, Mg 5, Ca 4.5, Acetate 70, Cl 39, Phos 15)   Intravenous Continuous 75 mL/hr at 04/16/25 0921 New Bag at 04/16/25 0921      PRN Meds:    Current Facility-Administered Medications:     bisacodyL, 10 mg, Rectal, Daily PRN    hydrALAZINE, 10 mg, Intravenous, Q4H PRN     Radiology:  I have personally reviewed the following imaging and agree with the radiologist.     Fl Modified Barium Swallow Speech  See procedure notes from Speech Pathologist.    This procedure was auto-finalized.      Warren Covarrubias MD  Department of Hospital Medicine   [facility]   04/16/2025

## 2025-04-16 NOTE — PLAN OF CARE
Problem: Adult Inpatient Plan of Care  Goal: Plan of Care Review  Outcome: Progressing  Goal: Patient-Specific Goal (Individualized)  Outcome: Progressing  Goal: Absence of Hospital-Acquired Illness or Injury  Outcome: Progressing  Goal: Optimal Comfort and Wellbeing  Outcome: Progressing  Goal: Readiness for Transition of Care  Outcome: Progressing     Problem: Stroke, Ischemic (Includes Transient Ischemic Attack)  Goal: Optimal Coping  Outcome: Progressing  Goal: Effective Bowel Elimination  Outcome: Progressing  Goal: Optimal Cerebral Tissue Perfusion  Outcome: Progressing  Goal: Optimal Cognitive Function  Outcome: Progressing  Goal: Improved Communication Skills  Outcome: Progressing  Goal: Optimal Functional Ability  Outcome: Progressing  Goal: Optimal Nutrition Intake  Outcome: Progressing  Goal: Effective Oxygenation and Ventilation  Outcome: Progressing  Goal: Improved Sensorimotor Function  Outcome: Progressing  Goal: Safe and Effective Swallow  Outcome: Progressing  Goal: Effective Urinary Elimination  Outcome: Progressing     Problem: Fall Injury Risk  Goal: Absence of Fall and Fall-Related Injury  Outcome: Progressing     Problem: Skin Injury Risk Increased  Goal: Skin Health and Integrity  Outcome: Progressing     Problem: Stroke, Intracerebral Hemorrhage  Goal: Optimal Coping  Outcome: Progressing  Goal: Effective Bowel Elimination  Outcome: Progressing  Goal: Optimal Cerebral Tissue Perfusion  Outcome: Progressing  Goal: Optimal Cognitive Function  Outcome: Progressing  Goal: Effective Communication Skills  Outcome: Progressing  Goal: Optimal Functional Ability  Outcome: Progressing  Goal: Optimal Nutrition Intake  Outcome: Progressing  Goal: Optimal Pain Control and Function  Outcome: Progressing  Goal: Effective Oxygenation and Ventilation  Outcome: Progressing  Goal: Improved Sensorimotor Function  Outcome: Progressing  Goal: Safe and Effective Swallow  Outcome: Progressing  Goal: Effective  Urinary Elimination  Outcome: Progressing

## 2025-04-16 NOTE — PLAN OF CARE
Problem: Physical Therapy  Goal: Physical Therapy Goal  Description: Goals to be met by: 2025     Patient will increase functional independence with mobility by performin. Supine to sit with Contact Guard Assistance  2. Sit to stand transfer with Contact Guard Assistance  3. Gait  x 150 feet with Contact Guard Assistance using Rolling Walker.     Outcome: Progressing

## 2025-04-16 NOTE — PT/OT/SLP EVAL
Physical Therapy Evaluation    Patient Name:  William Mccauley   MRN:  [id number]    Recommendations:     Discharge therapy intensity: High Intensity Therapy (pending progress)   Discharge Equipment Recommendations: to be determined by next level of care   Barriers to discharge: Impaired mobility and Ongoing medical needs    Assessment:     William Mccauley is a 74 y.o. male admitted with a medical diagnosis of fall, R femur fx, s/p R LEANDRA, L thalamic hemorrhage. Pt is RLE WBAT with full ROM.  He presents with the following impairments/functional limitations: weakness, impaired endurance, impaired self care skills, impaired functional mobility, impaired balance, pain.     Daughter and patient politely declined  and wanted daughter to translate for pt. Pt has been using a walker recently (the past two weeks) due to weakness, back pain, and impaired balance. He lives with his dtr at baseline and they have been assisting him as needed. Prior to his weakness, he was independent with mobility with no AD. Currently, the pt demonstrates generalized weakness and impaired activity tolerance. BP remained stable throughout tx, however HR increased from 120s to sustained 140s with sitting at edge of bed. RN notified and pt was assisted back to supine.     Rehab Prognosis: Good; patient would benefit from acute skilled PT services to address these deficits and reach maximum level of function.    Recent Surgery: Procedure(s) (LRB):  HEMIARTHROPLASTY, HIP (Right) 2 Days Post-Op    Plan:     During this hospitalization, patient would benefit from acute PT services 6 x/week to address the identified rehab impairments via gait training, therapeutic activities, therapeutic exercises and progress toward the following goals:    Plan of Care Expires:  05/16/25    Subjective     Chief Complaint: fear of falling  Patient/Family Comments/goals: to get better and go home  Pain/Comfort:  Location - Side 1: Right  Location 1: hip  Pain Addressed 1:  Reposition    Patients cultural, spiritual, Methodist conflicts given the current situation: no    Living Environment:  Lives with daughter.  Prior to admission, patients level of function was ambulatory with rollator.  Equipment used at home: rollator, grab bar, shower chair.  DME owned (not currently used): bedside commode and RW.  Upon discharge, patient will have assistance from family.    Objective:     Communicated with nurse prior to session.  Patient found supine with pulse ox (continuous), telemetry, PureWick, peripheral IV  upon PT entry to room.    General Precautions: Standard, fall, aspiration, other (see comments), NPO (SBP< 140)  Orthopedic Precautions:RLE weight bearing as tolerated (Full ROM to RLE.)   Braces: N/A  Respiratory Status: Room air  Blood Pressure: 142/76 supine, 118/77 sitting EOB, 142/79 returning back to supine  HR 120s at rest. HR 140s sitting EOB.  Asymptomatic       Exams:  RLE ROM: Deficits: impaired R hip Flexion due to soreness  RLE Strength: Deficits: grossly 3/5   LLE ROM: WNL  LLE Strength: Deficits: grossly 3/5  Skin integrity:  incision to R hip      Functional Mobility:  Bed Mobility:     Scooting: maximal assistance and of 2 persons  Supine to Sit: maximal assistance and of 2 persons  Sit to Supine: maximal assistance and of 2 persons  Balance: CGA for sitting balance at EOB      AM-PAC 6 CLICK MOBILITY  Total Score:8       Treatment & Education:  Performed heel slides R LE x5, AAROM. Encouraged continued RLE ROM.     Patient and daughter/s were provided with verbal education education regarding PT role/goals/POC.  Understanding was verbalized.     Patient left supine with all lines intact, call button in reach, and nurse notified.    GOALS:   Multidisciplinary Problems       Physical Therapy Goals          Problem: Physical Therapy    Goal Priority Disciplines Outcome Interventions   Physical Therapy Goal     PT, PT/OT Progressing    Description: Goals to be met by:  2025     Patient will increase functional independence with mobility by performin. Supine to sit with Contact Guard Assistance  2. Sit to stand transfer with Contact Guard Assistance  3. Gait  x 150 feet with Contact Guard Assistance using Rolling Walker.                          History:     Past Medical History:   Diagnosis Date    Diabetes mellitus, type 2     HTN (hypertension)     Ptosis     Sciatica     Stroke     Type 2 diabetes mellitus without complications        Past Surgical History:   Procedure Laterality Date    DIAGNOSTIC LAPAROSCOPY N/A 2024    Procedure: LAPAROSCOPY, DIAGNOSTIC;  Surgeon: Adalberto Rock MD;  Location: [facility];  Service: General;  Laterality: N/A;    HEMIARTHROPLASTY OF HIP Right 2025    Procedure: HEMIARTHROPLASTY, HIP;  Surgeon: Raghu Hunt MD;  Location: [facility];  Service: Orthopedics;  Laterality: Right;  lateral peg board [place]    KIDNEY STONE SURGERY Left     REPAIR, HERNIA, INGUINAL Right 2024    Procedure: REPAIR, HERNIA, INGUINAL;  Surgeon: Adalberto Rock MD;  Location: [facility];  Service: General;  Laterality: Right;  Incarcerated Hernia       Time Tracking:     PT Received On: 25  PT Start Time: 1130     PT Stop Time: 1151  PT Total Time (min): 21 min     Billable Minutes: Evaluation 2025

## 2025-04-17 LAB
ANION GAP SERPL CALC-SCNC: 11 MEQ/L
BACTERIA #/AREA URNS AUTO: ABNORMAL /HPF
BASOPHILS # BLD AUTO: 0.03 X10(3)/MCL
BASOPHILS NFR BLD AUTO: 0.3 %
BILIRUB UR QL STRIP.AUTO: NEGATIVE
BUN SERPL-MCNC: 35.2 MG/DL (ref 8.4–25.7)
CALCIUM SERPL-MCNC: 9.6 MG/DL (ref 8.8–10)
CHLORIDE SERPL-SCNC: 117 MMOL/L (ref 98–107)
CLARITY UR: CLEAR
CO2 SERPL-SCNC: 26 MMOL/L (ref 23–31)
COLOR UR AUTO: ABNORMAL
CREAT SERPL-MCNC: 0.71 MG/DL (ref 0.72–1.25)
CREAT/UREA NIT SERPL: 50
EOSINOPHIL # BLD AUTO: 0.01 X10(3)/MCL (ref 0–0.9)
EOSINOPHIL NFR BLD AUTO: 0.1 %
ERYTHROCYTE [DISTWIDTH] IN BLOOD BY AUTOMATED COUNT: 21.2 % (ref 11.5–17)
FERRITIN SERPL-MCNC: 27.29 NG/ML (ref 21.81–274.66)
GFR SERPLBLD CREATININE-BSD FMLA CKD-EPI: >60 ML/MIN/1.73/M2
GLUCOSE SERPL-MCNC: 256 MG/DL (ref 82–115)
GLUCOSE UR QL STRIP: ABNORMAL
HCT VFR BLD AUTO: 30 % (ref 42–52)
HGB BLD-MCNC: 8 G/DL (ref 14–18)
HGB UR QL STRIP: NEGATIVE
IMM GRANULOCYTES # BLD AUTO: 0.06 X10(3)/MCL (ref 0–0.04)
IMM GRANULOCYTES NFR BLD AUTO: 0.6 %
IRON SATN MFR SERPL: 4 % (ref 20–50)
IRON SERPL-MCNC: 13 UG/DL (ref 65–175)
KETONES UR QL STRIP: ABNORMAL
LEUKOCYTE ESTERASE UR QL STRIP: NEGATIVE
LYMPHOCYTES # BLD AUTO: 1.13 X10(3)/MCL (ref 0.6–4.6)
LYMPHOCYTES NFR BLD AUTO: 11.5 %
MAGNESIUM SERPL-MCNC: 2.4 MG/DL (ref 1.6–2.6)
MCH RBC QN AUTO: 20.4 PG (ref 27–31)
MCHC RBC AUTO-ENTMCNC: 26.7 G/DL (ref 33–36)
MCV RBC AUTO: 76.5 FL (ref 80–94)
MONOCYTES # BLD AUTO: 1.03 X10(3)/MCL (ref 0.1–1.3)
MONOCYTES NFR BLD AUTO: 10.5 %
MUCOUS THREADS URNS QL MICRO: ABNORMAL /LPF
NEUTROPHILS # BLD AUTO: 7.58 X10(3)/MCL (ref 2.1–9.2)
NEUTROPHILS NFR BLD AUTO: 77 %
NITRITE UR QL STRIP: NEGATIVE
NRBC BLD AUTO-RTO: 0.2 %
PH UR STRIP: 5.5 [PH]
PHOSPHATE SERPL-MCNC: 1.7 MG/DL (ref 2.3–4.7)
PLATELET # BLD AUTO: 397 X10(3)/MCL (ref 130–400)
PMV BLD AUTO: 10 FL (ref 7.4–10.4)
POCT GLUCOSE: 277 MG/DL (ref 70–110)
POCT GLUCOSE: 294 MG/DL (ref 70–110)
POCT GLUCOSE: 374 MG/DL (ref 70–110)
POTASSIUM SERPL-SCNC: 4 MMOL/L (ref 3.5–5.1)
PROT UR QL STRIP: NEGATIVE
RBC # BLD AUTO: 3.92 X10(6)/MCL (ref 4.7–6.1)
RBC #/AREA URNS AUTO: ABNORMAL /HPF
SODIUM SERPL-SCNC: 154 MMOL/L (ref 136–145)
SP GR UR STRIP.AUTO: 1.04 (ref 1–1.03)
SQUAMOUS #/AREA URNS LPF: ABNORMAL /HPF
TIBC SERPL-MCNC: 301 UG/DL (ref 60–240)
TIBC SERPL-MCNC: 314 UG/DL (ref 250–450)
TRANSFERRIN SERPL-MCNC: 283 MG/DL (ref 163–344)
UROBILINOGEN UR STRIP-ACNC: NORMAL
VIT B12 SERPL-MCNC: 1108 PG/ML (ref 213–816)
WBC # BLD AUTO: 9.84 X10(3)/MCL (ref 4.5–11.5)
WBC #/AREA URNS AUTO: ABNORMAL /HPF
YEAST BUDDING URNS QL: ABNORMAL /HPF

## 2025-04-17 PROCEDURE — 25000003 PHARM REV CODE 250: Performed by: INTERNAL MEDICINE

## 2025-04-17 PROCEDURE — 80048 BASIC METABOLIC PNL TOTAL CA: CPT | Performed by: INTERNAL MEDICINE

## 2025-04-17 PROCEDURE — 87641 MR-STAPH DNA AMP PROBE: CPT | Performed by: INTERNAL MEDICINE

## 2025-04-17 PROCEDURE — 85025 COMPLETE CBC W/AUTO DIFF WBC: CPT | Performed by: INTERNAL MEDICINE

## 2025-04-17 PROCEDURE — 36415 COLL VENOUS BLD VENIPUNCTURE: CPT | Performed by: INTERNAL MEDICINE

## 2025-04-17 PROCEDURE — A4216 STERILE WATER/SALINE, 10 ML: HCPCS | Performed by: PHYSICIAN ASSISTANT

## 2025-04-17 PROCEDURE — 81001 URINALYSIS AUTO W/SCOPE: CPT | Performed by: INTERNAL MEDICINE

## 2025-04-17 PROCEDURE — 21400001 HC TELEMETRY ROOM

## 2025-04-17 PROCEDURE — 84100 ASSAY OF PHOSPHORUS: CPT | Performed by: INTERNAL MEDICINE

## 2025-04-17 PROCEDURE — 83550 IRON BINDING TEST: CPT | Performed by: INTERNAL MEDICINE

## 2025-04-17 PROCEDURE — 87040 BLOOD CULTURE FOR BACTERIA: CPT | Performed by: INTERNAL MEDICINE

## 2025-04-17 PROCEDURE — 82607 VITAMIN B-12: CPT | Performed by: INTERNAL MEDICINE

## 2025-04-17 PROCEDURE — 92523 SPEECH SOUND LANG COMPREHEN: CPT

## 2025-04-17 PROCEDURE — 82728 ASSAY OF FERRITIN: CPT | Performed by: INTERNAL MEDICINE

## 2025-04-17 PROCEDURE — 87632 RESP VIRUS 6-11 TARGETS: CPT | Performed by: INTERNAL MEDICINE

## 2025-04-17 PROCEDURE — 25000003 PHARM REV CODE 250: Performed by: PHYSICIAN ASSISTANT

## 2025-04-17 PROCEDURE — 63600175 PHARM REV CODE 636 W HCPCS: Performed by: INTERNAL MEDICINE

## 2025-04-17 PROCEDURE — 83735 ASSAY OF MAGNESIUM: CPT | Performed by: INTERNAL MEDICINE

## 2025-04-17 PROCEDURE — 99900035 HC TECH TIME PER 15 MIN (STAT)

## 2025-04-17 RX ORDER — INSULIN GLARGINE 100 [IU]/ML
12 INJECTION, SOLUTION SUBCUTANEOUS 2 TIMES DAILY
Status: DISCONTINUED | OUTPATIENT
Start: 2025-04-17 | End: 2025-04-30 | Stop reason: HOSPADM

## 2025-04-17 RX ORDER — SODIUM,POTASSIUM PHOSPHATES 280-250MG
2 POWDER IN PACKET (EA) ORAL 2 TIMES DAILY
Status: COMPLETED | OUTPATIENT
Start: 2025-04-17 | End: 2025-04-17

## 2025-04-17 RX ORDER — GABAPENTIN 300 MG/1
600 CAPSULE ORAL 3 TIMES DAILY
Status: DISCONTINUED | OUTPATIENT
Start: 2025-04-17 | End: 2025-04-21

## 2025-04-17 RX ORDER — NAPROXEN SODIUM 220 MG/1
81 TABLET, FILM COATED ORAL DAILY
Status: DISCONTINUED | OUTPATIENT
Start: 2025-04-18 | End: 2025-04-21

## 2025-04-17 RX ORDER — DEXTROSE MONOHYDRATE 50 MG/ML
INJECTION, SOLUTION INTRAVENOUS CONTINUOUS
Status: ACTIVE | OUTPATIENT
Start: 2025-04-17 | End: 2025-04-17

## 2025-04-17 RX ORDER — ACETAMINOPHEN 650 MG/20.3ML
650 LIQUID ORAL EVERY 4 HOURS PRN
Status: DISCONTINUED | OUTPATIENT
Start: 2025-04-17 | End: 2025-04-28

## 2025-04-17 RX ORDER — GLUCAGON 1 MG
1 KIT INJECTION
Status: DISCONTINUED | OUTPATIENT
Start: 2025-04-17 | End: 2025-04-30 | Stop reason: HOSPADM

## 2025-04-17 RX ORDER — FERROUS SULFATE 300 MG/5ML
300 LIQUID (ML) ORAL 2 TIMES DAILY
Status: DISCONTINUED | OUTPATIENT
Start: 2025-04-17 | End: 2025-04-21

## 2025-04-17 RX ORDER — ATORVASTATIN CALCIUM 40 MG/1
40 TABLET, FILM COATED ORAL NIGHTLY
Status: DISCONTINUED | OUTPATIENT
Start: 2025-04-17 | End: 2025-04-21

## 2025-04-17 RX ORDER — METFORMIN HYDROCHLORIDE 500 MG/1
1000 TABLET ORAL 2 TIMES DAILY WITH MEALS
Status: DISCONTINUED | OUTPATIENT
Start: 2025-04-17 | End: 2025-04-21

## 2025-04-17 RX ORDER — LOSARTAN POTASSIUM 25 MG/1
25 TABLET ORAL DAILY
Status: DISCONTINUED | OUTPATIENT
Start: 2025-04-18 | End: 2025-04-19

## 2025-04-17 RX ORDER — INSULIN ASPART 100 [IU]/ML
0-10 INJECTION, SOLUTION INTRAVENOUS; SUBCUTANEOUS EVERY 6 HOURS PRN
Status: DISCONTINUED | OUTPATIENT
Start: 2025-04-17 | End: 2025-04-30 | Stop reason: HOSPADM

## 2025-04-17 RX ADMIN — PIPERACILLIN SODIUM AND TAZOBACTAM SODIUM 4.5 G: 4; .5 INJECTION, POWDER, LYOPHILIZED, FOR SOLUTION INTRAVENOUS at 10:04

## 2025-04-17 RX ADMIN — ASPIRIN 81 MG: 81 TABLET, COATED ORAL at 09:04

## 2025-04-17 RX ADMIN — ATORVASTATIN CALCIUM 40 MG: 40 TABLET, FILM COATED ORAL at 10:04

## 2025-04-17 RX ADMIN — POTASSIUM & SODIUM PHOSPHATES POWDER PACK 280-160-250 MG 2 PACKET: 280-160-250 PACK at 10:04

## 2025-04-17 RX ADMIN — GABAPENTIN 600 MG: 300 CAPSULE ORAL at 10:04

## 2025-04-17 RX ADMIN — GABAPENTIN 600 MG: 300 CAPSULE ORAL at 03:04

## 2025-04-17 RX ADMIN — INSULIN ASPART 2 UNITS: 100 INJECTION, SOLUTION INTRAVENOUS; SUBCUTANEOUS at 10:04

## 2025-04-17 RX ADMIN — FERROUS SULFATE TAB 325 MG (65 MG ELEMENTAL FE) 1 EACH: 325 (65 FE) TAB at 09:04

## 2025-04-17 RX ADMIN — DEXTROSE MONOHYDRATE: 50 INJECTION, SOLUTION INTRAVENOUS at 11:04

## 2025-04-17 RX ADMIN — SODIUM CHLORIDE, PRESERVATIVE FREE 10 ML: 5 INJECTION INTRAVENOUS at 10:04

## 2025-04-17 RX ADMIN — MINERAL SUPPLEMENT IRON 300 MG / 5 ML STRENGTH LIQUID 100 PER BOX UNFLAVORED 300 MG: at 10:04

## 2025-04-17 RX ADMIN — POTASSIUM & SODIUM PHOSPHATES POWDER PACK 280-160-250 MG 2 PACKET: 280-160-250 PACK at 12:04

## 2025-04-17 RX ADMIN — METFORMIN HYDROCHLORIDE 1000 MG: 500 TABLET, FILM COATED ORAL at 06:04

## 2025-04-17 RX ADMIN — GABAPENTIN 600 MG: 300 CAPSULE ORAL at 09:04

## 2025-04-17 RX ADMIN — SODIUM CHLORIDE, PRESERVATIVE FREE 10 ML: 5 INJECTION INTRAVENOUS at 06:04

## 2025-04-17 RX ADMIN — SODIUM CHLORIDE, PRESERVATIVE FREE 10 ML: 5 INJECTION INTRAVENOUS at 03:04

## 2025-04-17 RX ADMIN — INSULIN GLARGINE 12 UNITS: 100 INJECTION, SOLUTION SUBCUTANEOUS at 10:04

## 2025-04-17 RX ADMIN — LOSARTAN POTASSIUM 25 MG: 25 TABLET, FILM COATED ORAL at 09:04

## 2025-04-17 RX ADMIN — ACETAMINOPHEN 650 MG: 650 SOLUTION ORAL at 12:04

## 2025-04-17 RX ADMIN — METFORMIN HYDROCHLORIDE 1000 MG: 500 TABLET, FILM COATED ORAL at 09:04

## 2025-04-17 RX ADMIN — INSULIN GLARGINE 12 UNITS: 100 INJECTION, SOLUTION SUBCUTANEOUS at 11:04

## 2025-04-17 NOTE — PROGRESS NOTES
[facility] Hospital Medicine Progress Note        Chief Complaint: Inpatient Follow-up for stroke/ Rt hip fracture.     HPI:    74 y.o. Indonesian speaking male with a PMHx of hypertension, hyperlipidemia, diabetes mellitus type 2, ptosis, prior CVA without deficits and sciatica  presented to [facility] on 4/13/2025 with a primary complaint of right hip pain following a fall which occurred yesterday (04/12/2025). Patient was ambulating with a walker when he reached for the door, lost his balance and fell onto his right hip.  Patient denies hitting his head. Son who is at bedside translating stated that  patient did complain of some left eye blur vision  He denies shortness a breath, chest pain, nausea, vomiting, diarrhea, headache and dizziness.  He received 50 mcg of fentanyl in route for Rt hip pain.      On arrival  to the ED, Pt was afebrile, HR 88, /76, RR 18 and SpO2 96% on room air.  Labs with H&H 8.8/30.2, CO2 20, AST 48, ALT 63.  EKG with sinus rhythm with first-degree AV block, non specific  ST and T wave change. right hip x-ray revealed a right femoral neck fracture.  Chest x-ray with no acute cardiopulmonary process.  CT of the head revealed a left thalamic minimal hyperdensity which may represent subacute hemorrhage related to subacute infarct but no convincing acute brain traumatic findings.  CT cervical spine without acute findings.  MRI of the brain with small linear/punctuate area of acute/subacute left thalamic hemorrhage, possible hemorrhagic infarct although with no visible diffusion restriction, region obscured by hemorrhagic artifact, advanced involutional change, moderate severe chronic microvascular white ischemic changes.  In ED patient received IV hydration, morphine, Zofran.  Orthopedics and neurology consulted.     Pt underwent Rt hip hemiarthroplasty with Dr. Raghu Hunt on 4/14/25. Neurology initiated stroke workup and suggested possible left thalamic  hypertensive hemorrhagic stoke. CTA head/neck with severe narrowing of jah distal vertebral arteries, severe narrowing of the clinoid, ophthalmic segments of the internal carotid artery siphons bilaterally and near occlusion on the left.. Echo with LVEF 65-70%, neg bubble study, Carotid U/S with less than 50% ICA stenosis. Pt failed SLP eval and placed NPO. NG tube inserted for medication and nutrition. Neurology cleared Pt to start ASA 4 days from stroke onset which was 4/17/25. PT /OT suggested high intensity therapy.      Interval Hx:   New onset fever of 101 and tachycardia noted today.   Pt is awake , able to communicate with family member at bedside. Able to move all 4 ext in the bed.     Labs today WBC 9.8, Hgb 8.0, Na 154, K 4.0, BUN 35, Phos 1.7 - replaced    Case was discussed with patient's nurse and  on the floor.    Objective/physical exam:  General: In no acute distress, afebrile, on RA  Chest: Clear to auscultation bilaterally  Heart: RRR, +S1, S2, no appreciable murmur  Abdomen: Soft, nontender, BS +  MSK: Warm, no lower extremity edema, no clubbing or cyanosis, Surgical dressing to Rt upper lat thigh C/D/I  Neurologic: Alert and oriented  to self and person. Moves all 4 ext    VITAL SIGNS: 24 HRS MIN & MAX LAST   Temp  Min: 97.9 °F (36.6 °C)  Max: 101.2 °F (38.4 °C) 97.9 °F (36.6 °C)   BP  Min: 129/83  Max: 145/83 133/79   Pulse  Min: 101  Max: 120  101   Resp  Min: 18  Max: 20 18   SpO2  Min: 97 %  Max: 98 % 98 %     I have reviewed the following labs:  Recent Labs   Lab 04/14/25  0320 04/15/25  0509 04/16/25  1225 04/17/25  0827   WBC 12.06* 13.29*  --  9.84   RBC 4.50* 3.93*  --  3.92*   HGB 9.4* 8.2* 7.5* 8.0*   HCT 34.3* 29.6* 26.8* 30.0*   MCV 76.2* 75.3*  --  76.5*   MCH 20.9* 20.9*  --  20.4*   MCHC 27.4* 27.7*  --  26.7*   RDW 20.6* 20.2*  --  21.2*   * 366  --  397   MPV 10.0 9.3  --  10.0     Recent Labs   Lab 04/13/25  0929 04/14/25  0320 04/15/25  0509 04/16/25  1224  04/17/25 0827   * 137 136 146* 154*   K 3.8 4.8 5.1 3.7 4.0    103 106 113* 117*   CO2 20* 16* 15* 19* 26   BUN 9.2 12.5 29.1* 35.1* 35.2*   CREATININE 0.58* 0.67* 0.81 0.62* 0.71*   CALCIUM 8.7* 8.9 8.5* 8.6* 9.6   MG  --  1.80  --   --  2.40   ALBUMIN 3.8 3.7 3.4  --   --    ALKPHOS 119 121 96  --   --    ALT 63* 54 37  --   --    AST 48* 39 41  --   --    BILITOT 0.8 1.0 0.7  --   --      Microbiology Results (last 7 days)       Procedure Component Value Units Date/Time    Blood Culture [[id number]] Collected: 04/17/25 0827    Order Status: Resulted Specimen: Blood Updated: 04/17/25 0831    Blood Culture [[id number]] Collected: 04/17/25 0827    Order Status: Resulted Specimen: Blood Updated: 04/17/25 0831             See below for Radiology    Assessment/Plan:  New onset fever with tachycardia / sepsis due to unspecified infection - 4/17/25   Mechanical fall and closed displaced Rt femoral neck fracture, POA - s/p  Rt hip hemiarthroplasty on 4/14/25.  Acute to subacute left thalamic hemorrhage, POA  Oropharyngeal dysphagia due to above, POA- s/p NG tube    Intracranial Atherosclerotic disease, POA   Left eye blur vision with near occlusion of clinoid, ophthalmic segments of the internal carotid artery siphon on the left, POA  Leukocytosis, POA- resolved   Microcytic hypochromic anemia, moderate -POA  Diabetes Mellitis , type 2- uncontrolled due to hyperglycemia , HbA1c 8.0  Hypernatremia due to free water deficit - 4/16/25  Elevated LFT- resolved     Hx- HTN, HLD, prior CVA    Plan-  Monitor fever curve   Infectious workup initiated - CBC, UA, CXR, Blood cultures x 2, Resp PCR panel   Empiric Zosyn started today   D5 infusion for hypernatremia along with increased free water flush via NG tube.   Monitor hemodynamics   Continue ASA, high intensity statin  Continue home meds as appropriate via NG tube  Monitor course   Continue SLP/PT/OT service     Critical care note:  Critical care diagnosis: sepsis  due to unspecified infection requiring IV antibiotic   Critical care interventions: Hands-on evaluation, review of labs/radiographs/records and discussion with patient and family if present  Critical care time spent: 35 minutes      VTE prophylaxis:  SCDs    Patient condition:  Fair    Anticipated discharge and Disposition:     TBD    All diagnosis and differential diagnosis have been reviewed; assessment and plan has been documented; I have personally reviewed the labs and test results that are presently available; I have reviewed the patients medication list; I have reviewed the consulting providers response and recommendations. I have reviewed or attempted to review medical records based upon their availability    All of the patient's questions have been  addressed and answered. Patient's is agreeable to the above stated plan. I will continue to monitor closely and make adjustments to medical management as needed.    Portions of this note dictated using EMR integrated voice recognition software, and may be subject to voice recognition errors not corrected at proofreading. Please contact writer for clarification if needed.   _____________________________________________________________________    Malnutrition Status:  Nutrition consulted. Most recent weight and BMI monitored-     Measurements:  Wt Readings from Last 1 Encounters:   04/16/25 56.7 kg (125 lb)   Body mass index is 24.41 kg/m².    Patient has been screened and assessed by RD.    Malnutrition Type:  Context: acute illness or injury  Level: other (see comments) (Does not meet criteria)    Malnutrition Characteristic Summary:  Weight Loss (Malnutrition): other (see comments) (Does not meet criteria)  Muscle Mass (Malnutrition): mild depletion    Interventions/Recommendations (treatment strategy):  Enteral nutrition     Scheduled Med:   [START ON 4/18/2025] aspirin  81 mg Per NG tube Daily    atorvastatin  40 mg Per NG tube QHS    ferrous sulfate  300 mg  Per NG tube BID    gabapentin  600 mg Per NG tube TID    insulin glargine U-100  12 Units Subcutaneous BID    [START ON 4/18/2025] losartan  25 mg Per NG tube Daily    metFORMIN  1,000 mg Per NG tube BID WM    piperacillin-tazobactam (Zosyn) IV (PEDS and ADULTS) (extended infusion is not appropriate)  4.5 g Intravenous Q8H    potassium, sodium phosphates  2 packet Per NG tube BID    sodium chloride 0.9%  10 mL Intravenous Q8H      Continuous Infusions:   D5W   Intravenous Continuous 100 mL/hr at 04/17/25 1106 New Bag at 04/17/25 1106      PRN Meds:    Current Facility-Administered Medications:     acetaminophen, 650 mg, Per NG tube, Q4H PRN    bisacodyL, 10 mg, Rectal, Daily PRN    dextrose 50%, 12.5 g, Intravenous, PRN    glucagon (human recombinant), 1 mg, Intramuscular, PRN    hydrALAZINE, 10 mg, Intravenous, Q4H PRN    insulin aspart U-100, 0-10 Units, Subcutaneous, Q6H PRN         Marianela Garzon MD  Department of Hospital Medicine   [facility]   04/17/2025

## 2025-04-17 NOTE — PLAN OF CARE
Problem: Adult Inpatient Plan of Care  Goal: Plan of Care Review  Outcome: Progressing  Goal: Patient-Specific Goal (Individualized)  Outcome: Progressing  Goal: Absence of Hospital-Acquired Illness or Injury  Outcome: Progressing  Goal: Optimal Comfort and Wellbeing  Outcome: Progressing  Goal: Readiness for Transition of Care  Outcome: Progressing     Problem: Stroke, Ischemic (Includes Transient Ischemic Attack)  Goal: Optimal Coping  Outcome: Progressing  Goal: Effective Bowel Elimination  Outcome: Progressing  Goal: Optimal Cerebral Tissue Perfusion  Outcome: Progressing  Goal: Optimal Cognitive Function  Outcome: Progressing  Goal: Improved Communication Skills  Outcome: Progressing  Goal: Optimal Functional Ability  Outcome: Progressing  Goal: Optimal Nutrition Intake  Outcome: Progressing  Goal: Effective Oxygenation and Ventilation  Outcome: Progressing  Goal: Improved Sensorimotor Function  Outcome: Progressing  Goal: Safe and Effective Swallow  Outcome: Progressing  Goal: Effective Urinary Elimination  Outcome: Progressing     Problem: Fall Injury Risk  Goal: Absence of Fall and Fall-Related Injury  Outcome: Progressing     Problem: Skin Injury Risk Increased  Goal: Skin Health and Integrity  Outcome: Progressing     Problem: Wound  Goal: Optimal Coping  Outcome: Progressing  Goal: Optimal Functional Ability  Outcome: Progressing  Goal: Absence of Infection Signs and Symptoms  Outcome: Progressing  Goal: Improved Oral Intake  Outcome: Progressing  Goal: Optimal Pain Control and Function  Outcome: Progressing  Goal: Skin Health and Integrity  Outcome: Progressing  Goal: Optimal Wound Healing  Outcome: Progressing

## 2025-04-17 NOTE — PT/OT/SLP EVAL
[facility]  Speech Language Pathology Department  Cognitive-Communication Evaluation    Patient Name:  William Mccauley   MRN:  [id number]    Recommendations     General recommendations:  dysphagia therapy  Communication strategies:       Discharge therapy intensity: High Intensity Therapy  Barriers to safe discharge: severity of impairment    History     William Mccauley is a/n 74 y.o. male admitted admitted s/p fall on hip. Ortho sx for fracture of R femoral neck on 4/14. MRI of the brain with small linear/punctuate area of acute/subacute left thalamic hemorrhage, possible hemorrhagic infarct although with no visible diffusion restriction, region obscured by hemorrhagic artifact, advanced involutional change, moderate severe chronic microvascular white ischemic changes.     Past Medical History:   Diagnosis Date    Diabetes mellitus, type 2     HTN (hypertension)     Ptosis     Sciatica     Stroke     Type 2 diabetes mellitus without complications      Past Surgical History:   Procedure Laterality Date    DIAGNOSTIC LAPAROSCOPY N/A 9/24/2024    Procedure: LAPAROSCOPY, DIAGNOSTIC;  Surgeon: Adalberto Rock MD;  Location: [facility];  Service: General;  Laterality: N/A;    HEMIARTHROPLASTY OF HIP Right 4/14/2025    Procedure: HEMIARTHROPLASTY, HIP;  Surgeon: Raghu Hunt MD;  Location: [facility];  Service: Orthopedics;  Laterality: Right;  lateral peg board [place]    KIDNEY STONE SURGERY Left     REPAIR, HERNIA, INGUINAL Right 9/24/2024    Procedure: REPAIR, HERNIA, INGUINAL;  Surgeon: Adalberto Rock MD;  Location: [facility];  Service: General;  Laterality: Right;  Incarcerated Hernia       Previous level of Function  Lives:  with wife and son's family  Handed: Right  Glasses: no  Hearing Aids: no  Home Responsibilities:  daughter in law reports patient is independent at baseline, no driving    Imaging   Results for orders placed during the hospital encounter of 04/13/25    MRI Brain  Without Contrast    Narrative  EXAMINATION:  MRI BRAIN WITHOUT CONTRAST    CLINICAL HISTORY:  Stroke, follow up;    TECHNIQUE:  Multiplanar multisequence MR imaging of the brain was performed without contrast.    COMPARISON:  CT same day    FINDINGS:  Small linear area of susceptibility artifact posterior left thalamus with a punctate area of increased T1 signal consistent with acute/subacute thalamic hemorrhage.    No visible diffusion restriction on diffusion sequence.    Advanced involutional change.  Moderate severe chronic microvascular white matter ischemic change.  Scattered remote lacunar infarcts in the subinsular basal ganglia regions.    Ventricles, sulci, cisterns otherwise normal with no mass effect or midline shift.  No intra or extra-axial mass.    Posterior fossa brainstem otherwise grossly normal.  Sella and orbits grossly normal.    Mild ethmoid mucosal thickening.  Cranium and extracranial structures otherwise unremarkable.    Impression  Small linear/punctate area of acute/subacute left thalamic hemorrhage, possible hemorrhagic infarct although with no visible diffusion restriction, region obscured by hemorrhagic artifact.    Advanced involutional change, moderate severe chronic microvascular white matter ischemic change.      Electronically signed by: Taras Dunn MD  Date:    04/13/2025  Time:    12:11    Subjective     Patient awake and alert.     Entire evaluation completed using  ([facility] staff nurse who is fluent inh [place]ian) as requested by pt's daughter in law.     Patient goals: to get better   Spiritual/Cultural/Episcopal Beliefs/Practices that affect care: no    Pain/Comfort: Pain Rating 1: 0/10    Respiratory Status:  room air    Objective     ORAL MUSCULATURE  Dentition: teeth in poor condition    SPEECH PRODUCTION  Voice Quality: breathy  Loudness: reduced  Speech Intelligibility   states patient in intelligible    AUDITORY  COMPREHENSION  Identification:  Objects: 100%  Following Directions:  1-Step: 100%  2-Step: 100%  Yes/No Questions:  Biographical: 100%  Environmental: 100%  Simple: 100%  Complex: 100%    VERBAL EXPRESSION  Wh- Questions:  Object name: 100%  Object function: 100%    COGNITION  Orientation:  Person: yes  Place: yes  Time: yes  Situation: yes, however family has not told patient about his stroke and does not wish to at this time.     Attention:  Focused: Within Functional Limits  Sustained: Within Functional Limits  Memory:  Not assessed due to difficulties with translation  Problem Solving  Functional simple: Within Functional Limits  Organization:  Divergent thinkin%    Assessment     Patient presenting with cognitive and communication abilities WFL. SLP to follow up regarding dysphagia.     Patient Education     Patient and daughter/s were provided with verbal education regarding POC.  Understanding was verbalized, however additional teaching warranted.    Plan     SLP Follow-Up:  Yes   Patient to be seen:  daily   Plan of Care expires:  25  Plan of Care reviewed with:  patient, daughter      Time Tracking     SLP Treatment Date:   25  Speech Start Time:  1007  Speech Stop Time:  1025     Speech Total Time (min):  18 min    Billable minutes:  Evaluation of Speech Sound Production with Comprehension and Expression, 18 minutes     2025

## 2025-04-17 NOTE — PT/OT/SLP PROGRESS
Occupational Therapy      Patient Name:  William Mccauley   MRN:  [id number]    Tx attempted in a.m. however pt's BP outside of parameters and running a fever. Returned in p.m. Upon arrival to room, noted pt's NGT sliding out. RN arrived to assess, reinserted and ordering xray. Will follow up as schedule permits.     4/17/2025

## 2025-04-17 NOTE — PLAN OF CARE
04/17/25 0927   Discharge Reassessment   Assessment Type Discharge Planning Reassessment   Communicated ZIA with patient/caregiver Date not available/Unable to determine   Discharge Plan A Rehab   Discharge Plan B Skilled Nursing Facility   DME Needed Upon Discharge  other (see comments)  (TBD)   Why the patient remains in the hospital Requires continued medical care   Post-Acute Status   Post-Acute Authorization Placement   Post-Acute Placement Status Pending medical clearance/testing     Patient remains NPO at this time. Will continue to follow for discharge needs.

## 2025-04-17 NOTE — PROGRESS NOTES
Inpatient Nutrition Assessment    Admit Date: 4/13/2025   Total duration of encounter: 4 days   Patient Age: 74 y.o.    Nutrition Recommendation/Prescription     Continue tube feeding:  Isosource 1.5 Alberto 50 ml/hr provides  1500 kcal  100% needs  68 g protein  100% needs  176 g carbohydrate 100% needs  760 ml free water 55% needs, will require additional fluid source, physician managing water flushes  calculations based on estimated 20 hour run time     Communication of Recommendations: reviewed with nurse    Nutrition Assessment     Malnutrition Assessment/Nutrition-Focused Physical Exam    Malnutrition Context: acute illness or injury (04/16/25 1238)  Malnutrition Level: other (see comments) (Does not meet criteria) (04/16/25 1238)     Weight Loss (Malnutrition): other (see comments) (Does not meet criteria) (04/16/25 1238)              Muscle Mass (Malnutrition): mild depletion (04/16/25 1238)     Clavicle Bone Region (Muscle Loss): mild depletion, possibly normal for patient given no reported weight/intake decrease                             A minimum of two characteristics is recommended for diagnosis of either severe or non-severe malnutrition.    Chart Review    Reason Seen: follow-up    Malnutrition Screening Tool Results   Have you recently lost weight without trying?: No  Have you been eating poorly because of a decreased appetite?: No   MST Score: 0   Diagnosis:  Right femoral neck fracture secondary to mechanical fall   Acute/subacute left thalamic hemorrhage  Microcytic anemia, stable   Metabolic acidosis   Transaminitis     Relevant Medical History: hypertension, hyperlipidemia, diabetes mellitus type 2, ptosis, CVA without deficits, sciatica     Scheduled Medications:  [START ON 4/18/2025] aspirin, 81 mg, Daily  atorvastatin, 40 mg, QHS  ferrous sulfate, 300 mg, BID  gabapentin, 600 mg, TID  insulin glargine U-100, 12 Units, BID  [START ON 4/18/2025] losartan, 25 mg, Daily  metFORMIN, 1,000 mg, BID  WM  potassium, sodium phosphates, 2 packet, BID  sodium chloride 0.9%, 10 mL, Q8H    Continuous Infusions:  D5W, Last Rate: 100 mL/hr at 04/17/25 1106    PRN Medications:   acetaminophen, 650 mg, Q4H PRN  bisacodyL, 10 mg, Daily PRN  dextrose 50%, 12.5 g, PRN  glucagon (human recombinant), 1 mg, PRN  hydrALAZINE, 10 mg, Q4H PRN  insulin aspart U-100, 0-10 Units, Q6H PRN    Calorie Containing IV Medications: no significant kcals from medications at this time    Recent Labs   Lab 04/13/25  0929 04/14/25  0320 04/15/25  0509 04/16/25  1225 04/17/25  0827   * 137 136 146* 154*   K 3.8 4.8 5.1 3.7 4.0   CALCIUM 8.7* 8.9 8.5* 8.6* 9.6   PHOS  --  4.5  --   --  1.7*   MG  --  1.80  --   --  2.40    103 106 113* 117*   CO2 20* 16* 15* 19* 26   BUN 9.2 12.5 29.1* 35.1* 35.2*   CREATININE 0.58* 0.67* 0.81 0.62* 0.71*   EGFRNORACEVR >60 >60 >60 >60 >60   GLUCOSE 138* 114 160* 206* 256*   BILITOT 0.8 1.0 0.7  --   --    ALKPHOS 119 121 96  --   --    ALT 63* 54 37  --   --    AST 48* 39 41  --   --    ALBUMIN 3.8 3.7 3.4  --   --    CRP 3.70  --   --   --   --    TRIG 48  --   --   --   --    HGBA1C 8.0*  --   --   --   --    WBC 10.12 12.06* 13.29*  --  9.84   HGB 8.8* 9.4* 8.2* 7.5* 8.0*   HCT 30.2* 34.3* 29.6* 26.8* 30.0*     Nutrition Orders:  Diet NPO  Tube Feedings/Formulas 50; Isosource 1.5; NG; 75; Every hour    Appetite/Oral Intake: NPO/not applicable  Factors Affecting Nutritional Intake: difficulty/impaired swallowing and NPO  Social Needs Impacting Access to Food: none identified  Food/Sikh/Cultural Preferences: none reported  Food Allergies: no known food allergies  Last Bowel Movement: 04/12/25  Wound(s): no pressure injuries documented at this time     Comments    4/16/25 Spoke with patient's daughter at bedside, denies translation services at this time. Daughter reports good appetite/intake prior to admission, denies weight loss. MBS done 4/15/25 with recommendations for NPO and dysphagia  therapy. He is currently receiving Clinimix E 4.25/5 at 75 ml/hr. Recommend consideration of nasogastric tube feeding to better meet estimated needs and to maintain GI tract integrity.    4/17/25 Patient receiving tube feeding per nasogastric tube, needs met. Hypernatremia noted, physician managing free water flushes at this time, also has D5W ordered.     Anthropometrics    Height: 5' (152.4 cm), Height Method: Stated  Last Weight: 56.7 kg (125 lb) (04/16/25 1234), Weight Method: Standard Scale  BMI (Calculated): 24.4  BMI Classification: overweight (BMI 25-29.9)        Ideal Body Weight (IBW), Male: 106 lb     % Ideal Body Weight, Male (lb): 132.07 %                          Usual Weight Provided By: family/caregiver denies unintentional weight loss    Wt Readings from Last 5 Encounters:   04/16/25 56.7 kg (125 lb)   04/02/25 59.4 kg (131 lb)   02/05/25 62.9 kg (138 lb 9.6 oz)   10/31/24 58.1 kg (128 lb)   10/18/24 59.4 kg (131 lb)     Weight Change(s) Since Admission:   4/16/25 admission weight (63.5 kg) stated, took bed weight (56.7 kg) during rounds  Wt Readings from Last 1 Encounters:   04/16/25 1234 56.7 kg (125 lb)   04/13/25 1306 63.5 kg (139 lb 15.9 oz)   04/13/25 0911 63.5 kg (140 lb)   Admit Weight: 63.5 kg (140 lb) (04/13/25 0911), Weight Method: Stated    Estimated Needs    Weight Used For Calorie Calculations: 56.7 kg (125 lb)  Energy Calorie Requirements (kcal): 9667-3522, 1.2-1.4 stress factor  Energy Need Method: [facility]  Weight Used For Protein Calculations: 56.7 kg (125 lb)  Protein Requirements: 57-80 g, 1-1.4 g/kg  Fluid Requirements (mL): 1562-7685, 1 ml/kcal  CHO Requirement: 138-181 g, 40-45% of kcal     Enteral Nutrition     Formula: Isosource 1.5 Alberto  Rate/Volume: 50 ml/hr  Water Flushes: 75 ml/hr  Additives/Modulars: none at this time  Route: nasogastric tube  Method: continuous  Total Nutrition Provided by Tube Feeding, Additives, and Flushes:  Calories Provided  1500 kcal/d,  100% needs   Protein Provided  68 g/d, 100% needs   Fluid Provided  2260 ml/d, 140% needs   Continuous feeding calculations based on estimated 20 hr/d run time unless otherwise stated.    Parenteral Nutrition discontinued    Evaluation of Received Nutrient Intake    Calories: meeting estimated needs  Protein: meeting estimated needs    Patient Education Not applicable.    Nutrition Diagnosis     PES: Inadequate energy intake related to inability to consume sufficient nutrients as evidenced by less than 80% needs met. (resolved)    PES: N/A            Nutrition Interventions     Intervention(s): collaboration with other providers  Intervention(s): Enteral nutrition      Goal: Meet greater than 80% of nutritional needs by follow-up. (goal met)  Goal: Tolerate enteral feeding at goal rate by follow-up. (goal met)    Nutrition Goals & Monitoring     Dietitian will monitor: food and beverage intake, energy intake, enteral nutrition intake, parenteral nutrition intake, weight, electrolyte/renal panel, beliefs/attitudes, glucose/endocrine profile, and gastrointestinal profile  Discharge planning: too early to determine; pending clinical course  Nutrition Risk/Follow-Up: high (follow-up in 1-4 days)   Please consult if re-assessment needed sooner.

## 2025-04-17 NOTE — PT/OT/SLP PROGRESS
Physical Therapy      Patient Name:  William Mccauley   MRN:  [id number]    Tx attempted in a.m. however pt's BP outside of parameters and running a fever. Returned in p.m. Upon arrival to room, noted pt's NGT sliding out. RN arrived to assess, reinserted and ordering xray. Will follow up as schedule permits.

## 2025-04-18 LAB
ANION GAP SERPL CALC-SCNC: 8 MEQ/L
B PERT.PT PRMT NPH QL NAA+NON-PROBE: NOT DETECTED
BUN SERPL-MCNC: 31 MG/DL (ref 8.4–25.7)
C PNEUM DNA NPH QL NAA+NON-PROBE: NOT DETECTED
CALCIUM SERPL-MCNC: 8.8 MG/DL (ref 8.8–10)
CHLORIDE SERPL-SCNC: 115 MMOL/L (ref 98–107)
CO2 SERPL-SCNC: 28 MMOL/L (ref 23–31)
CREAT SERPL-MCNC: 0.74 MG/DL (ref 0.72–1.25)
CREAT/UREA NIT SERPL: 42
FOLATE SERPL-MCNC: 14.5 NG/ML (ref 7–31.4)
GFR SERPLBLD CREATININE-BSD FMLA CKD-EPI: >60 ML/MIN/1.73/M2
GLUCOSE SERPL-MCNC: 242 MG/DL (ref 82–115)
HADV DNA NPH QL NAA+NON-PROBE: NOT DETECTED
HCOV 229E RNA NPH QL NAA+NON-PROBE: NOT DETECTED
HCOV HKU1 RNA NPH QL NAA+NON-PROBE: NOT DETECTED
HCOV NL63 RNA NPH QL NAA+NON-PROBE: NOT DETECTED
HCOV OC43 RNA NPH QL NAA+NON-PROBE: NOT DETECTED
HMPV RNA NPH QL NAA+NON-PROBE: NOT DETECTED
HPIV1 RNA NPH QL NAA+NON-PROBE: NOT DETECTED
HPIV2 RNA NPH QL NAA+NON-PROBE: NOT DETECTED
HPIV3 RNA NPH QL NAA+NON-PROBE: NOT DETECTED
HPIV4 RNA NPH QL NAA+NON-PROBE: NOT DETECTED
LEFT CCA DIST DIAS: 10 CM/S
LEFT CCA DIST SYS: 77 CM/S
LEFT CCA PROX DIAS: 10 CM/S
LEFT CCA PROX SYS: 115 CM/S
LEFT ECA DIAS: 4 CM/S
LEFT ECA SYS: 92 CM/S
LEFT ICA DIST DIAS: 10 CM/S
LEFT ICA DIST SYS: 58 CM/S
LEFT ICA MID DIAS: 13 CM/S
LEFT ICA MID SYS: 63 CM/S
LEFT ICA PROX DIAS: 20 CM/S
LEFT ICA PROX SYS: 53 CM/S
LEFT VERTEBRAL DIAS: 9 CM/S
LEFT VERTEBRAL SYS: 37 CM/S
M PNEUMO DNA NPH QL NAA+NON-PROBE: NOT DETECTED
MAGNESIUM SERPL-MCNC: 2.2 MG/DL (ref 1.6–2.6)
MRSA PCR SCRN (OHS): NOT DETECTED
OHS CV CAROTID RIGHT ICA EDV HIGHEST: 14
OHS CV CAROTID ULTRASOUND LEFT ICA/CCA RATIO: 0.82
OHS CV CAROTID ULTRASOUND RIGHT ICA/CCA RATIO: 1.01
OHS CV PV CAROTID LEFT HIGHEST CCA: 115
OHS CV PV CAROTID LEFT HIGHEST ICA: 63
OHS CV PV CAROTID RIGHT HIGHEST CCA: 97
OHS CV PV CAROTID RIGHT HIGHEST ICA: 82
OHS CV US CAROTID LEFT HIGHEST EDV: 20
PHOSPHATE SERPL-MCNC: 2.4 MG/DL (ref 2.3–4.7)
POCT GLUCOSE: 207 MG/DL (ref 70–110)
POCT GLUCOSE: 232 MG/DL (ref 70–110)
POCT GLUCOSE: 235 MG/DL (ref 70–110)
POCT GLUCOSE: 244 MG/DL (ref 70–110)
POTASSIUM SERPL-SCNC: 4.2 MMOL/L (ref 3.5–5.1)
RIGHT CCA DIST DIAS: 14 CM/S
RIGHT CCA DIST SYS: 81 CM/S
RIGHT CCA PROX DIAS: 22 CM/S
RIGHT CCA PROX SYS: 97 CM/S
RIGHT ECA DIAS: 2 CM/S
RIGHT ECA SYS: 126 CM/S
RIGHT ICA DIST DIAS: 0 CM/S
RIGHT ICA DIST SYS: 82 CM/S
RIGHT ICA MID DIAS: 13 CM/S
RIGHT ICA MID SYS: 75 CM/S
RIGHT ICA PROX DIAS: 14 CM/S
RIGHT ICA PROX SYS: 76 CM/S
RIGHT VERTEBRAL DIAS: 7 CM/S
RIGHT VERTEBRAL SYS: 35 CM/S
RSV RNA NPH QL NAA+NON-PROBE: NOT DETECTED
RV+EV RNA NPH QL NAA+NON-PROBE: DETECTED
SODIUM SERPL-SCNC: 151 MMOL/L (ref 136–145)

## 2025-04-18 PROCEDURE — 25000003 PHARM REV CODE 250: Performed by: PHYSICIAN ASSISTANT

## 2025-04-18 PROCEDURE — 27000207 HC ISOLATION

## 2025-04-18 PROCEDURE — A4216 STERILE WATER/SALINE, 10 ML: HCPCS | Performed by: PHYSICIAN ASSISTANT

## 2025-04-18 PROCEDURE — 97530 THERAPEUTIC ACTIVITIES: CPT | Mod: CQ

## 2025-04-18 PROCEDURE — 36415 COLL VENOUS BLD VENIPUNCTURE: CPT | Performed by: INTERNAL MEDICINE

## 2025-04-18 PROCEDURE — 84100 ASSAY OF PHOSPHORUS: CPT | Performed by: INTERNAL MEDICINE

## 2025-04-18 PROCEDURE — 25000003 PHARM REV CODE 250: Performed by: INTERNAL MEDICINE

## 2025-04-18 PROCEDURE — 97535 SELF CARE MNGMENT TRAINING: CPT | Mod: CO

## 2025-04-18 PROCEDURE — 82746 ASSAY OF FOLIC ACID SERUM: CPT | Performed by: INTERNAL MEDICINE

## 2025-04-18 PROCEDURE — 80048 BASIC METABOLIC PNL TOTAL CA: CPT | Performed by: INTERNAL MEDICINE

## 2025-04-18 PROCEDURE — 83735 ASSAY OF MAGNESIUM: CPT | Performed by: INTERNAL MEDICINE

## 2025-04-18 PROCEDURE — 63600175 PHARM REV CODE 636 W HCPCS: Performed by: INTERNAL MEDICINE

## 2025-04-18 PROCEDURE — 92526 ORAL FUNCTION THERAPY: CPT

## 2025-04-18 PROCEDURE — 21400001 HC TELEMETRY ROOM

## 2025-04-18 RX ORDER — DOCUSATE SODIUM 50 MG/5ML
100 LIQUID ORAL 2 TIMES DAILY
Status: DISCONTINUED | OUTPATIENT
Start: 2025-04-18 | End: 2025-04-20

## 2025-04-18 RX ORDER — DEXTROSE MONOHYDRATE 50 MG/ML
INJECTION, SOLUTION INTRAVENOUS CONTINUOUS
Status: DISCONTINUED | OUTPATIENT
Start: 2025-04-18 | End: 2025-04-18

## 2025-04-18 RX ORDER — ENOXAPARIN SODIUM 100 MG/ML
40 INJECTION SUBCUTANEOUS EVERY 24 HOURS
Status: DISCONTINUED | OUTPATIENT
Start: 2025-04-18 | End: 2025-04-21

## 2025-04-18 RX ADMIN — ENOXAPARIN SODIUM 40 MG: 40 INJECTION SUBCUTANEOUS at 05:04

## 2025-04-18 RX ADMIN — LOSARTAN POTASSIUM 25 MG: 25 TABLET, FILM COATED ORAL at 10:04

## 2025-04-18 RX ADMIN — INSULIN GLARGINE 12 UNITS: 100 INJECTION, SOLUTION SUBCUTANEOUS at 10:04

## 2025-04-18 RX ADMIN — MINERAL SUPPLEMENT IRON 300 MG / 5 ML STRENGTH LIQUID 100 PER BOX UNFLAVORED 300 MG: at 10:04

## 2025-04-18 RX ADMIN — PIPERACILLIN SODIUM AND TAZOBACTAM SODIUM 4.5 G: 4; .5 INJECTION, POWDER, LYOPHILIZED, FOR SOLUTION INTRAVENOUS at 04:04

## 2025-04-18 RX ADMIN — METFORMIN HYDROCHLORIDE 1000 MG: 500 TABLET, FILM COATED ORAL at 05:04

## 2025-04-18 RX ADMIN — GABAPENTIN 600 MG: 300 CAPSULE ORAL at 10:04

## 2025-04-18 RX ADMIN — ASPIRIN 81 MG CHEWABLE TABLET 81 MG: 81 TABLET CHEWABLE at 10:04

## 2025-04-18 RX ADMIN — INSULIN ASPART 4 UNITS: 100 INJECTION, SOLUTION INTRAVENOUS; SUBCUTANEOUS at 06:04

## 2025-04-18 RX ADMIN — INSULIN ASPART 4 UNITS: 100 INJECTION, SOLUTION INTRAVENOUS; SUBCUTANEOUS at 05:04

## 2025-04-18 RX ADMIN — ATORVASTATIN CALCIUM 40 MG: 40 TABLET, FILM COATED ORAL at 10:04

## 2025-04-18 RX ADMIN — GABAPENTIN 600 MG: 300 CAPSULE ORAL at 03:04

## 2025-04-18 RX ADMIN — SODIUM CHLORIDE, PRESERVATIVE FREE 10 ML: 5 INJECTION INTRAVENOUS at 10:04

## 2025-04-18 RX ADMIN — PIPERACILLIN SODIUM AND TAZOBACTAM SODIUM 4.5 G: 4; .5 INJECTION, POWDER, LYOPHILIZED, FOR SOLUTION INTRAVENOUS at 12:04

## 2025-04-18 RX ADMIN — INSULIN ASPART 4 UNITS: 100 INJECTION, SOLUTION INTRAVENOUS; SUBCUTANEOUS at 11:04

## 2025-04-18 RX ADMIN — INSULIN ASPART 2 UNITS: 100 INJECTION, SOLUTION INTRAVENOUS; SUBCUTANEOUS at 10:04

## 2025-04-18 RX ADMIN — SODIUM CHLORIDE, PRESERVATIVE FREE 10 ML: 5 INJECTION INTRAVENOUS at 03:04

## 2025-04-18 RX ADMIN — ACETAMINOPHEN 650 MG: 650 SOLUTION ORAL at 06:04

## 2025-04-18 RX ADMIN — PIPERACILLIN SODIUM AND TAZOBACTAM SODIUM 4.5 G: 4; .5 INJECTION, POWDER, LYOPHILIZED, FOR SOLUTION INTRAVENOUS at 08:04

## 2025-04-18 RX ADMIN — METFORMIN HYDROCHLORIDE 1000 MG: 500 TABLET, FILM COATED ORAL at 10:04

## 2025-04-18 RX ADMIN — DOCUSATE SODIUM LIQUID 100 MG: 100 LIQUID ORAL at 10:04

## 2025-04-18 NOTE — PT/OT/SLP PROGRESS
[facility]  Speech Language Pathology Department  Dysphagia Therapy Progress Note    Patient Name:  William Mccauley   MRN:  [id number]    Recommendations     General recommendations:  dysphagia therapy and Speech/Language and Cognitive Evaluation pending assistance from   Solid texture recommendation:  NPO  Liquid consistency recommendation: NPO   Medications: NPO    Discharge therapy intensity: High Intensity Therapy   Barriers to safe discharge:  acuity of illness and severity of impairment    Subjective     Patient awake and alert.   Spiritual/Cultural/Adventism Beliefs/Practices that affect care: no    Pain/Comfort: Pain Rating 1: 0/10    Respiratory Status:  room air    Objective     Therapeutic Activities:  Pt tolerated thermal stimulation to the anterior faucial pillars x20 with 10% swallow responses.  Laryngeal excursion reduced.  Delay 3-5 seconds.  Patient grabbed swabs from SLP and attempted to complete the task on his own.    Assessment     Pt continues to present with oropharyngeal dysphagia and remains unsafe for PO intake.    Outcome Measures     Functional Oral Intake Scale: 1 - Nothing by mouth    Patient Education     Patient provided with verbal education regarding POC.  Understanding was verbalized.    Plan     Will continue to follow and tx as appropriate.    SLP Follow-Up:  Yes   Patient to be seen:  daily   Plan of Care expires:  04/22/25  Plan of Care reviewed with:  patient       Time Tracking     SLP Treatment Date:   04/18/25  Speech Start Time:  1330  Speech Stop Time:  1340     Speech Total Time (min):  10 min    Billable minutes:  Treatment of Swallow Dysfunction, 10 minutes       04/16/2025

## 2025-04-18 NOTE — PT/OT/SLP PROGRESS
Physical Therapy Treatment    Patient Name:  William Mccauley   MRN:  [id number]    Recommendations:     Discharge therapy intensity: High Intensity Therapy   Discharge Equipment Recommendations: to be determined by next level of care  Barriers to discharge: Impaired mobility and Ongoing medical needs    Assessment:     William Mccauley is a 74 y.o. male admitted with a medical diagnosis of Right femoral neck fx s/p R LEANDRA, and MRI brain showed small linear/punctuate area of acute/subacute left thalamic hemorrhage.  He presents with the following impairments/functional limitations: weakness, impaired endurance, impaired self care skills, impaired functional mobility, impaired balance, pain.    Pt's daughter not present at this time. Utilized  #[id number] to communicate t/o session. Pt unable/unwilling to speak loud enough for the  to hear therefore pt wrote on paper and therapist held it up for the . Pt was able to verbally correct  when misinterpreting his written words.  Pt's HR increased to 130-140 with activity. R lateral lean seated at EOB. Unable to progress to side steps.     Rehab Prognosis: Good; patient would benefit from acute skilled PT services to address these deficits and reach maximum level of function.    Recent Surgery: Procedure(s) (LRB):  HEMIARTHROPLASTY, HIP (Right) 4 Days Post-Op    Plan:     During this hospitalization, patient would benefit from acute PT services 6 x/week to address the identified rehab impairments via gait training, therapeutic activities, therapeutic exercises and progress toward the following goals:    Plan of Care Expires:  05/16/25    Subjective     Chief Complaint: wants to drink  Patient/Family Comments/goals: none stated  Pain/Comfort:  Pain Rating 1: other (see comments) (grimacing noted with RLE movement)  Pain Addressed 1: Reposition, Nurse notified      Objective:     Communicated with RN prior to session.  Patient found up in chair with peripheral  IV, pulse ox (continuous), telemetry, PureWick upon PT entry to room.     General Precautions: Standard, NPO, aspiration  Orthopedic Precautions: RLE weight bearing as tolerated  Braces: N/A  Respiratory Status: Room air  Blood Pressure: 115/71, -145  Skin Integrity: Visible skin intact      Functional Mobility:  Bed Mobility:    Supine to sit: Total assist at trunk; assisted in mobilizing LE's to EOB  Sit to supine: Max assist x 2  Transfers:    Sit<->stand: Mod assist x 2 with RW  Balance: R lateral lean at EOB, CGA to min assist    Therapeutic Activities/Exercises:      Education:  Patient provided with verbal education education regarding PT role/goals/POC and post-op precautions.  Understanding was verbalized.     Patient left HOB elevated with all lines intact, call button in reach, pressure relief boots, and nurse notified    GOALS:   Multidisciplinary Problems       Physical Therapy Goals          Problem: Physical Therapy    Goal Priority Disciplines Outcome Interventions   Physical Therapy Goal     PT, PT/OT Progressing    Description: Goals to be met by: 2025     Patient will increase functional independence with mobility by performin. Supine to sit with Contact Guard Assistance  2. Sit to stand transfer with Contact Guard Assistance  3. Gait  x 150 feet with Contact Guard Assistance using Rolling Walker.                          Time Tracking:     PT Received On: 25  PT Start Time: 1105     PT Stop Time: 1134  PT Total Time (min): 29 min     Billable Minutes: Therapeutic Activity 2 units    Treatment Type: Treatment  PT/PTA: PTA     Number of PTA visits since last PT visit: 2025

## 2025-04-18 NOTE — PLAN OF CARE
Problem: Adult Inpatient Plan of Care  Goal: Plan of Care Review  Outcome: Progressing  Goal: Patient-Specific Goal (Individualized)  Outcome: Progressing  Goal: Absence of Hospital-Acquired Illness or Injury  Outcome: Progressing  Goal: Optimal Comfort and Wellbeing  Outcome: Progressing  Goal: Readiness for Transition of Care  Outcome: Progressing     Problem: Stroke, Ischemic (Includes Transient Ischemic Attack)  Goal: Optimal Coping  Outcome: Progressing  Goal: Effective Bowel Elimination  Outcome: Progressing  Goal: Optimal Cerebral Tissue Perfusion  Outcome: Progressing  Goal: Optimal Cognitive Function  Outcome: Progressing  Goal: Improved Communication Skills  Outcome: Progressing  Goal: Optimal Functional Ability  Outcome: Progressing  Goal: Optimal Nutrition Intake  Outcome: Progressing  Goal: Effective Oxygenation and Ventilation  Outcome: Progressing  Goal: Improved Sensorimotor Function  Outcome: Progressing  Goal: Safe and Effective Swallow  Outcome: Progressing  Goal: Effective Urinary Elimination  Outcome: Progressing     Problem: Wound  Goal: Optimal Coping  Outcome: Progressing  Goal: Optimal Functional Ability  Outcome: Progressing  Goal: Absence of Infection Signs and Symptoms  Outcome: Progressing  Goal: Improved Oral Intake  Outcome: Progressing  Goal: Optimal Pain Control and Function  Outcome: Progressing  Goal: Skin Health and Integrity  Outcome: Progressing  Goal: Optimal Wound Healing  Outcome: Progressing     Problem: Skin Injury Risk Increased  Goal: Skin Health and Integrity  Outcome: Progressing

## 2025-04-18 NOTE — PT/OT/SLP PROGRESS
Occupational Therapy   Treatment    Name: William Mccauley  MRN: [id number]  Admitting Diagnosis:  Closed displaced fracture of right femoral neck  4 Days Post-Op    Recommendations:     Recommended therapy intensity at discharge: High Intensity Therapy   Discharge Equipment Recommendations:  to be determined by next level of care  Barriers to discharge:       Assessment:     William Mccauley is a 74 y.o. male with a medical diagnosis of Closed displaced fracture of right femoral neck.  He presents with increased weakness and R lateral lean during mobility, overall cueing and redirection required throughout session. Recommending High intensity therapy pending progress.Performance deficits affecting function are weakness, impaired endurance, impaired self care skills, impaired functional mobility, impaired balance.     Rehab Prognosis:  Good; patient would benefit from acute skilled OT services to address these deficits and reach maximum level of function.       Plan:     Patient to be seen 5 x/week to address the above listed problems via self-care/home management, therapeutic activities, therapeutic exercises, cognitive retraining  Plan of Care Expires: 05/15/25  Plan of Care Reviewed with: patient    Subjective    services used- [id number]    Objective:     Communicated with: RN prior to session.  Patient found HOB elevated with   upon OT entry to room.    General Precautions: Standard, fall, aspiration, NPO    Orthopedic Precautions:RLE weight bearing as tolerated  Braces: N/A  Respiratory Status: Room air  Vital Signs: Blood Pressure: 115/71     Occupational Performance:   (Bed Mobility-Max A)  (Sitting balance- Min A/Mod A) R lateral lean noted requiring orientation to midline.   Pt. Performing grooming task (washing face) using R UE for excursion.   Pt. Able to appropriately write questions, presented to .   (Sit to stand- Mod A x 2) using RW for UE support with balance, from EOB.   Therapeutic Positioning    OT  interventions performed during the course of today's session in an effort to prevent and/or reduce acquired pressure injuries:   Therapeutic positioning was provided at the conclusion of session to offload all bony prominences for the prevention and/or reduction of pressure injuries        [facility] 6 Click ADL:      Patient Education:  Patient provided with verbal education education regarding fall prevention, safety awareness, and pressure ulcer prevention.  Additional teaching is warranted.      Patient left HOB elevated with all lines intact and call button in reach.    GOALS:   Multidisciplinary Problems       Occupational Therapy Goals          Problem: Occupational Therapy    Goal Priority Disciplines Outcome Interventions   Occupational Therapy Goal     OT, PT/OT Progressing    Description: Goals to be met by: 5/15/25     Patient will increase functional independence with ADLs by performing:    UE Dressing with Stand-by Assistance.  LE Dressing with Minimal Assistance and Assistive Devices as needed.  Grooming while standing at sink with Contact Guard Assistance and Assistive Devices as needed.  Toileting from toilet with [place] and Assistive Devices as needed for hygiene and clothing management.   Toilet transfer to toilet with Contact Guard Assistance and using LRAD.                         Time Tracking:     OT Date of Treatment: 04/18/25  OT Start Time: 1100  OT Stop Time: 1133  OT Total Time (min): 33 min    Billable Minutes:Self Care/Home Management 2    OT/YANETH: YANETH     Number of YANETH visits since last OT visit: 2    4/18/2025

## 2025-04-18 NOTE — PLAN OF CARE
Problem: Adult Inpatient Plan of Care  Goal: Plan of Care Review  Outcome: Progressing  Goal: Patient-Specific Goal (Individualized)  Outcome: Progressing  Goal: Absence of Hospital-Acquired Illness or Injury  Outcome: Progressing  Goal: Optimal Comfort and Wellbeing  Outcome: Progressing  Goal: Readiness for Transition of Care  Outcome: Progressing     Problem: Stroke, Ischemic (Includes Transient Ischemic Attack)  Goal: Optimal Coping  Outcome: Progressing  Goal: Effective Bowel Elimination  Outcome: Progressing  Goal: Optimal Cerebral Tissue Perfusion  Outcome: Progressing  Goal: Optimal Cognitive Function  Outcome: Progressing  Goal: Improved Communication Skills  Outcome: Progressing  Goal: Optimal Functional Ability  Outcome: Progressing  Goal: Optimal Nutrition Intake  Outcome: Progressing  Goal: Effective Oxygenation and Ventilation  Outcome: Progressing  Goal: Improved Sensorimotor Function  Outcome: Progressing  Goal: Safe and Effective Swallow  Outcome: Progressing  Goal: Effective Urinary Elimination  Outcome: Progressing     Problem: Fall Injury Risk  Goal: Absence of Fall and Fall-Related Injury  Outcome: Progressing     Problem: Skin Injury Risk Increased  Goal: Skin Health and Integrity  Outcome: Progressing     Problem: Wound  Goal: Optimal Coping  Outcome: Progressing  Goal: Optimal Functional Ability  Outcome: Progressing  Goal: Absence of Infection Signs and Symptoms  Outcome: Progressing  Goal: Improved Oral Intake  Outcome: Progressing  Goal: Optimal Pain Control and Function  Outcome: Progressing  Goal: Skin Health and Integrity  Outcome: Progressing  Goal: Optimal Wound Healing  Outcome: Progressing     Problem: Diabetes Comorbidity  Goal: Blood Glucose Level Within Targeted Range  Outcome: Progressing     Problem: Stroke, Intracerebral Hemorrhage  Goal: Optimal Coping  Outcome: Progressing  Goal: Effective Bowel Elimination  Outcome: Progressing  Goal: Optimal Cerebral Tissue  Perfusion  Outcome: Progressing  Goal: Optimal Cognitive Function  Outcome: Progressing  Goal: Effective Communication Skills  Outcome: Progressing  Goal: Optimal Functional Ability  Outcome: Progressing  Goal: Optimal Nutrition Intake  Outcome: Progressing  Goal: Optimal Pain Control and Function  Outcome: Progressing  Goal: Effective Oxygenation and Ventilation  Outcome: Progressing  Goal: Improved Sensorimotor Function  Outcome: Progressing  Goal: Safe and Effective Swallow  Outcome: Progressing  Goal: Effective Urinary Elimination  Outcome: Progressing     Problem: Infection  Goal: Absence of Infection Signs and Symptoms  Outcome: Progressing

## 2025-04-18 NOTE — PROGRESS NOTES
[facility] Hospital Medicine Progress Note        Chief Complaint: Inpatient Follow-up for  stroke/ Rt hip fracture.     HPI:   74 y.o. [place] speaking male with a PMHx of hypertension, hyperlipidemia, diabetes mellitus type 2, ptosis, prior CVA without deficits and sciatica  presented to [facility] on 4/13/2025 with a primary complaint of right hip pain following a fall which occurred yesterday (04/12/2025). Patient was ambulating with a walker when he reached for the door, lost his balance and fell onto his right hip.  Patient denies hitting his head. Son who is at bedside translating stated that  patient did complain of some left eye blur vision  He denies shortness a breath, chest pain, nausea, vomiting, diarrhea, headache and dizziness.  He received 50 mcg of fentanyl in route for Rt hip pain.      On arrival  to the ED, Pt was afebrile, HR 88, /76, RR 18 and SpO2 96% on room air.  Labs with H&H 8.8/30.2, CO2 20, AST 48, ALT 63.  EKG with sinus rhythm with first-degree AV block, non specific  ST and T wave change. right hip x-ray revealed a right femoral neck fracture.  Chest x-ray with no acute cardiopulmonary process.  CT of the head revealed a left thalamic minimal hyperdensity which may represent subacute hemorrhage related to subacute infarct but no convincing acute brain traumatic findings.  CT cervical spine without acute findings.  MRI of the brain with small linear/punctuate area of acute/subacute left thalamic hemorrhage, possible hemorrhagic infarct.  In ED patient received IV hydration, morphine, Zofran.  Orthopedics and neurology were consulted.      Pt underwent Rt hip hemiarthroplasty with Dr. Raghu Hunt on 4/14/25. Neurology initiated stroke workup and suggested possible left thalamic hypertensive hemorrhagic stoke. CTA head/neck with severe narrowing of jah distal vertebral arteries, severe narrowing of the clinoid, ophthalmic segments of the internal  carotid artery siphons bilaterally and near occlusion on the left.. Echo with LVEF 65-70%, neg bubble study, Carotid U/S with less than 50% ICA stenosis. Pt failed SLP eval and placed NPO. NG tube inserted for medication and nutrition. Neurology cleared Pt to start ASA 4 days from stroke onset which was 4/17/25. PT /OT suggested high intensity therapy.Spoke to Dr. Phillip with Neurology and cleared Pt for prophylactic Lovenox as of 4/18/25.     New onset fever of 101 on 4/17/25 associated with tachycardia. Infectious workup include CBC, CXR, UA, blood cultures x2 and Resp PCR ordered. Pt was started on empiric Zosyn IV.       Interval Hx:   Tmax 100.6 today. (+) tachycardia   Awake , alert, oriented to self and family members. Per family , speech is still dysarthric   SLP visited Pt today. Continued NPO status recommended.     Labs today Na 151, Cr 0.7,   Resp PCR (+) Human rhinovirus , UA - no evidence of infection     Case was discussed with patient's nurse and  on the floor.    Objective/physical exam:  General: In no acute distress, afebrile, on RA  Chest: Clear to auscultation bilaterally  Heart: RRR, +S1, S2, no appreciable murmur  Abdomen: Soft, nontender, BS +  MSK: Warm, no lower extremity edema, no clubbing or cyanosis, Surgical dressing to Rt upper lat thigh C/D/I  Neurologic: Alert and oriented  to self and person. Moves all 4 ext    VITAL SIGNS: 24 HRS MIN & MAX LAST   Temp  Min: 97.7 °F (36.5 °C)  Max: 100.6 °F (38.1 °C) (!) 100.6 °F (38.1 °C)   BP  Min: 111/70  Max: 133/79 115/71   Pulse  Min: 101  Max: 120  107   Resp  Min: 18  Max: 20 20   SpO2  Min: 94 %  Max: 98 % (!) 94 %     I have reviewed the following labs:  Recent Labs   Lab 04/14/25  0320 04/15/25  0509 04/16/25  1225 04/17/25  0827   WBC 12.06* 13.29*  --  9.84   RBC 4.50* 3.93*  --  3.92*   HGB 9.4* 8.2* 7.5* 8.0*   HCT 34.3* 29.6* 26.8* 30.0*   MCV 76.2* 75.3*  --  76.5*   MCH 20.9* 20.9*  --  20.4*   MCHC 27.4* 27.7*   --  26.7*   RDW 20.6* 20.2*  --  21.2*   * 366  --  397   MPV 10.0 9.3  --  10.0     Recent Labs   Lab 04/13/25  0929 04/14/25  0320 04/15/25  0509 04/16/25  1225 04/17/25  0827 04/18/25  0501   * 137 136 146* 154* 151*   K 3.8 4.8 5.1 3.7 4.0 4.2    103 106 113* 117* 115*   CO2 20* 16* 15* 19* 26 28   BUN 9.2 12.5 29.1* 35.1* 35.2* 31.0*   CREATININE 0.58* 0.67* 0.81 0.62* 0.71* 0.74   CALCIUM 8.7* 8.9 8.5* 8.6* 9.6 8.8   MG  --  1.80  --   --  2.40 2.20   ALBUMIN 3.8 3.7 3.4  --   --   --    ALKPHOS 119 121 96  --   --   --    ALT 63* 54 37  --   --   --    AST 48* 39 41  --   --   --    BILITOT 0.8 1.0 0.7  --   --   --      Microbiology Results (last 7 days)       Procedure Component Value Units Date/Time    Blood Culture [[id number]]  (Normal) Collected: 04/17/25 0827    Order Status: Completed Specimen: Blood Updated: 04/18/25 1000     Blood Culture No Growth At 24 Hours    Blood Culture [[id number]]  (Normal) Collected: 04/17/25 0827    Order Status: Completed Specimen: Blood Updated: 04/18/25 1000     Blood Culture No Growth At 24 Hours             See below for Radiology    Assessment/Plan:  New onset fever with tachycardia / sepsis due to unspecified infection - 4/17/25   Mechanical fall and closed displaced Rt femoral neck fracture, POA - s/p  Rt hip hemiarthroplasty on 4/14/25.  Acute to subacute left thalamic hemorrhage, POA  Oropharyngeal dysphagia due to above, POA- s/p NG tube    Intracranial Atherosclerotic disease, POA   Left eye blur vision with near occlusion of clinoid, ophthalmic segments of the internal carotid artery siphon on the left, POA  Leukocytosis, POA- resolved   Microcytic hypochromic/ iron deficiency  anemia, moderate -POA  Diabetes Mellitis , type 2- uncontrolled due to hyperglycemia , HbA1c 8.0  Hypernatremia due to free water deficit - 4/16/25  Elevated LFT- resolved      Hx- HTN, HLD, prior CVA     Plan-  Monitor fever curve   Infectious workup initiated -  CBC, UA, CXR, Blood cultures x 2, Resp PCR panel   Empiric Zosyn started on 4/18/25.   CBC with no leukocytosis, Resp PCR (+) for Human Rhinovirus /enterovirus. CXR - no acute disease. Blood cultures x 2- NG 24h. UA- no evidence of infection    D5 infusion for hypernatremia along with increased free water flush via NG tube.   Monitor hemodynamics   Continue ASA, high intensity statin  Continue home meds as appropriate via NG tube  Monitor course   Continue SLP/PT/OT service      Critical care note:  Critical care diagnosis: sepsis due to unspecified infection requiring IV antibiotic   Critical care interventions: Hands-on evaluation, review of labs/radiographs/records and discussion with patient and family if present  Critical care time spent: 35 minutes        VTE prophylaxis:  Lovenox started 4/18/25     Patient condition:  Fair     Anticipated discharge and Disposition:     TBD     All diagnosis and differential diagnosis have been reviewed; assessment and plan has been documented; I have personally reviewed the labs and test results that are presently available; I have reviewed the patients medication list; I have reviewed the consulting providers response and recommendations. I have reviewed or attempted to review medical records based upon their availability    All of the patient's questions have been  addressed and answered. Patient's is agreeable to the above stated plan. I will continue to monitor closely and make adjustments to medical management as needed.    Portions of this note dictated using EMR integrated voice recognition software, and may be subject to voice recognition errors not corrected at proofreading. Please contact writer for clarification if needed.   _____________________________________________________________________    Malnutrition Status:  Nutrition consulted. Most recent weight and BMI monitored-     Measurements:  Wt Readings from Last 1 Encounters:   04/16/25 56.7 kg (125 lb)   Body  mass index is 24.41 kg/m².    Patient has been screened and assessed by RD.    Malnutrition Type:  Context: acute illness or injury  Level: other (see comments) (Does not meet criteria)    Malnutrition Characteristic Summary:  Weight Loss (Malnutrition): other (see comments) (Does not meet criteria)  Muscle Mass (Malnutrition): mild depletion    Interventions/Recommendations (treatment strategy):  Enteral nutrition     Scheduled Med:   aspirin  81 mg Per NG tube Daily    atorvastatin  40 mg Per NG tube QHS    enoxparin  40 mg Subcutaneous Q24H (prophylaxis, 1700)    ferrous sulfate  300 mg Per NG tube BID    gabapentin  600 mg Per NG tube TID    insulin glargine U-100  12 Units Subcutaneous BID    losartan  25 mg Per NG tube Daily    metFORMIN  1,000 mg Per NG tube BID WM    piperacillin-tazobactam (Zosyn) IV (PEDS and ADULTS) (extended infusion is not appropriate)  4.5 g Intravenous Q8H    sodium chloride 0.9%  10 mL Intravenous Q8H      Continuous Infusions:     PRN Meds:    Current Facility-Administered Medications:     acetaminophen, 650 mg, Per NG tube, Q4H PRN    bisacodyL, 10 mg, Rectal, Daily PRN    dextrose 50%, 12.5 g, Intravenous, PRN    glucagon (human recombinant), 1 mg, Intramuscular, PRN    hydrALAZINE, 10 mg, Intravenous, Q4H PRN    insulin aspart U-100, 0-10 Units, Subcutaneous, Q6H PRN           Marianela Garzon MD  Department of Hospital Medicine   [facility]   04/18/2025

## 2025-04-19 LAB
ANION GAP SERPL CALC-SCNC: 8 MEQ/L
BUN SERPL-MCNC: 34.7 MG/DL (ref 8.4–25.7)
CALCIUM SERPL-MCNC: 8.5 MG/DL (ref 8.8–10)
CHLORIDE SERPL-SCNC: 109 MMOL/L (ref 98–107)
CO2 SERPL-SCNC: 28 MMOL/L (ref 23–31)
CREAT SERPL-MCNC: 0.75 MG/DL (ref 0.72–1.25)
CREAT/UREA NIT SERPL: 46
GFR SERPLBLD CREATININE-BSD FMLA CKD-EPI: >60 ML/MIN/1.73/M2
GLUCOSE SERPL-MCNC: 235 MG/DL (ref 82–115)
MAGNESIUM SERPL-MCNC: 2.1 MG/DL (ref 1.6–2.6)
PHOSPHATE SERPL-MCNC: 3.6 MG/DL (ref 2.3–4.7)
POCT GLUCOSE: 150 MG/DL (ref 70–110)
POCT GLUCOSE: 219 MG/DL (ref 70–110)
POTASSIUM SERPL-SCNC: 3.8 MMOL/L (ref 3.5–5.1)
SODIUM SERPL-SCNC: 145 MMOL/L (ref 136–145)

## 2025-04-19 PROCEDURE — 36415 COLL VENOUS BLD VENIPUNCTURE: CPT | Performed by: INTERNAL MEDICINE

## 2025-04-19 PROCEDURE — 27000207 HC ISOLATION

## 2025-04-19 PROCEDURE — 84100 ASSAY OF PHOSPHORUS: CPT | Performed by: INTERNAL MEDICINE

## 2025-04-19 PROCEDURE — 63600175 PHARM REV CODE 636 W HCPCS: Performed by: INTERNAL MEDICINE

## 2025-04-19 PROCEDURE — 25000003 PHARM REV CODE 250

## 2025-04-19 PROCEDURE — 25000003 PHARM REV CODE 250: Performed by: PHYSICIAN ASSISTANT

## 2025-04-19 PROCEDURE — 83735 ASSAY OF MAGNESIUM: CPT | Performed by: INTERNAL MEDICINE

## 2025-04-19 PROCEDURE — 80048 BASIC METABOLIC PNL TOTAL CA: CPT | Performed by: INTERNAL MEDICINE

## 2025-04-19 PROCEDURE — 25000003 PHARM REV CODE 250: Performed by: INTERNAL MEDICINE

## 2025-04-19 PROCEDURE — 92526 ORAL FUNCTION THERAPY: CPT

## 2025-04-19 PROCEDURE — A4216 STERILE WATER/SALINE, 10 ML: HCPCS | Performed by: PHYSICIAN ASSISTANT

## 2025-04-19 PROCEDURE — 21400001 HC TELEMETRY ROOM

## 2025-04-19 PROCEDURE — 97530 THERAPEUTIC ACTIVITIES: CPT | Mod: CQ

## 2025-04-19 RX ORDER — DEXTROSE MONOHYDRATE 50 MG/ML
INJECTION, SOLUTION INTRAVENOUS CONTINUOUS
Status: ACTIVE | OUTPATIENT
Start: 2025-04-19 | End: 2025-04-19

## 2025-04-19 RX ADMIN — GABAPENTIN 600 MG: 300 CAPSULE ORAL at 09:04

## 2025-04-19 RX ADMIN — INSULIN ASPART 4 UNITS: 100 INJECTION, SOLUTION INTRAVENOUS; SUBCUTANEOUS at 05:04

## 2025-04-19 RX ADMIN — ACETAMINOPHEN 650 MG: 650 SOLUTION ORAL at 04:04

## 2025-04-19 RX ADMIN — INSULIN GLARGINE 12 UNITS: 100 INJECTION, SOLUTION SUBCUTANEOUS at 09:04

## 2025-04-19 RX ADMIN — PIPERACILLIN SODIUM AND TAZOBACTAM SODIUM 4.5 G: 4; .5 INJECTION, POWDER, LYOPHILIZED, FOR SOLUTION INTRAVENOUS at 04:04

## 2025-04-19 RX ADMIN — INSULIN GLARGINE 12 UNITS: 100 INJECTION, SOLUTION SUBCUTANEOUS at 08:04

## 2025-04-19 RX ADMIN — ENOXAPARIN SODIUM 40 MG: 40 INJECTION SUBCUTANEOUS at 04:04

## 2025-04-19 RX ADMIN — METFORMIN HYDROCHLORIDE 1000 MG: 500 TABLET, FILM COATED ORAL at 09:04

## 2025-04-19 RX ADMIN — SODIUM CHLORIDE, PRESERVATIVE FREE 10 ML: 5 INJECTION INTRAVENOUS at 05:04

## 2025-04-19 RX ADMIN — DOCUSATE SODIUM LIQUID 100 MG: 100 LIQUID ORAL at 09:04

## 2025-04-19 RX ADMIN — DEXTROSE MONOHYDRATE: 50 INJECTION, SOLUTION INTRAVENOUS at 07:04

## 2025-04-19 RX ADMIN — PIPERACILLIN SODIUM AND TAZOBACTAM SODIUM 4.5 G: 4; .5 INJECTION, POWDER, LYOPHILIZED, FOR SOLUTION INTRAVENOUS at 11:04

## 2025-04-19 RX ADMIN — SODIUM CHLORIDE, PRESERVATIVE FREE 10 ML: 5 INJECTION INTRAVENOUS at 11:04

## 2025-04-19 RX ADMIN — ATORVASTATIN CALCIUM 40 MG: 40 TABLET, FILM COATED ORAL at 08:04

## 2025-04-19 RX ADMIN — INSULIN ASPART 4 UNITS: 100 INJECTION, SOLUTION INTRAVENOUS; SUBCUTANEOUS at 12:04

## 2025-04-19 RX ADMIN — SODIUM CHLORIDE, PRESERVATIVE FREE 10 ML: 5 INJECTION INTRAVENOUS at 12:04

## 2025-04-19 RX ADMIN — ASPIRIN 81 MG CHEWABLE TABLET 81 MG: 81 TABLET CHEWABLE at 09:04

## 2025-04-19 RX ADMIN — METFORMIN HYDROCHLORIDE 1000 MG: 500 TABLET, FILM COATED ORAL at 04:04

## 2025-04-19 RX ADMIN — DOCUSATE SODIUM LIQUID 100 MG: 100 LIQUID ORAL at 08:04

## 2025-04-19 RX ADMIN — DEXTROSE MONOHYDRATE: 50 INJECTION, SOLUTION INTRAVENOUS at 01:04

## 2025-04-19 RX ADMIN — DEXTROSE MONOHYDRATE 500 ML: 50 INJECTION, SOLUTION INTRAVENOUS at 12:04

## 2025-04-19 RX ADMIN — MINERAL SUPPLEMENT IRON 300 MG / 5 ML STRENGTH LIQUID 100 PER BOX UNFLAVORED 300 MG: at 09:04

## 2025-04-19 RX ADMIN — MINERAL SUPPLEMENT IRON 300 MG / 5 ML STRENGTH LIQUID 100 PER BOX UNFLAVORED 300 MG: at 08:04

## 2025-04-19 RX ADMIN — GABAPENTIN 600 MG: 300 CAPSULE ORAL at 08:04

## 2025-04-19 RX ADMIN — GABAPENTIN 600 MG: 300 CAPSULE ORAL at 04:04

## 2025-04-19 NOTE — PT/OT/SLP PROGRESS
Physical Therapy Treatment    Patient Name:  William Mccauley   MRN:  [id number]    Recommendations:     Discharge therapy intensity: High Intensity Therapy   Discharge Equipment Recommendations: to be determined by next level of care  Barriers to discharge: Impaired mobility and Ongoing medical needs    Assessment:     William Mccauley is a 74 y.o. male.  He presents with the following impairments/functional limitations: weakness, impaired endurance, impaired self care skills, impaired functional mobility, impaired balance, pain.  Communicated with NSG prior to session.      Rehab Prognosis: Fair; patient would benefit from acute skilled PT services to address these deficits and reach maximum level of function.    Recent Surgery: Procedure(s) (LRB):  HEMIARTHROPLASTY, HIP (Right) 5 Days Post-Op  General Precautions: Standard, NPO, aspiration  Orthopedic Precautions: RLE weight bearing as tolerated  Braces: N/A  Respiratory Status: Room air  Skin Integrity: Visible skin intact    Plan:     During this hospitalization, patient would benefit from acute PT services 6 x/week to address the identified rehab impairments via gait training, therapeutic activities, therapeutic exercises and progress toward the following goals:    Plan of Care Expires:  05/16/25    Subjective     Patient found supine upon PT entry to room. Greeted pt and explained the treatment planned for today's session. Pt agreed to session.    Objective:     Functional Mobility:    Bed Mobility: Supine to EOB Mod A    Transfers: Sit to Stands Mod A.   Pt performed stand step transfer with Mod A.    Gait Training: Pt took 3 steps during t/f    Balance: SBA to maintain EOB sitting and Mod A during standing.        Education:  Role and goals of PT, transfer training, bed mobility, gait training, balance training, safety awareness, assistive device, strengthening exercises, and importance of participating in PT to return to PLOF     Patient left up in chair with all lines  intact, call button in reach, and family present    GOALS:   Multidisciplinary Problems       Physical Therapy Goals          Problem: Physical Therapy    Goal Priority Disciplines Outcome Interventions   Physical Therapy Goal     PT, PT/OT Progressing    Description: Goals to be met by: 2025     Patient will increase functional independence with mobility by performin. Supine to sit with Contact Guard Assistance  2. Sit to stand transfer with Contact Guard Assistance  3. Gait  x 150 feet with Contact Guard Assistance using Rolling Walker.                          Time Tracking:     Billable Minutes: Therapeutic Activity 15    Treatment Type: Treatment  PT/PTA: PTA     Number of PTA visits since last PT visit: 2     2025

## 2025-04-19 NOTE — PLAN OF CARE
Problem: Adult Inpatient Plan of Care  Goal: Plan of Care Review  Outcome: Progressing  Goal: Patient-Specific Goal (Individualized)  Outcome: Progressing  Goal: Absence of Hospital-Acquired Illness or Injury  Outcome: Progressing  Goal: Optimal Comfort and Wellbeing  Outcome: Progressing  Goal: Readiness for Transition of Care  Outcome: Progressing     Problem: Stroke, Ischemic (Includes Transient Ischemic Attack)  Goal: Effective Bowel Elimination  Outcome: Progressing  Goal: Optimal Cerebral Tissue Perfusion  Outcome: Progressing  Goal: Improved Communication Skills  Outcome: Progressing

## 2025-04-19 NOTE — PROGRESS NOTES
[facility] Hospital Medicine Progress Note        Chief Complaint: Inpatient Follow-up for stroke/ Fall/ Rt hip fracture.     HPI:   74 y.o. [place] speaking male with a PMHx of hypertension, hyperlipidemia, diabetes mellitus type 2, ptosis, prior CVA without deficits and sciatica  presented to [facility] on 4/13/2025 with a primary complaint of right hip pain following a fall which occurred yesterday (04/12/2025). Patient was ambulating with a walker when he reached for the door, lost his balance and fell onto his right hip.  Patient denies hitting his head. Son who is at bedside translating stated that  patient did complain of some left eye blur vision  He denies shortness a breath, chest pain, nausea, vomiting, diarrhea, headache and dizziness.  He received 50 mcg of fentanyl in route for Rt hip pain.      On arrival  to the ED, Pt was afebrile, HR 88, /76, RR 18 and SpO2 96% on room air.  Labs with H&H 8.8/30.2, CO2 20, AST 48, ALT 63.  EKG with sinus rhythm with first-degree AV block, non specific  ST and T wave change. right hip x-ray revealed a right femoral neck fracture.  Chest x-ray with no acute cardiopulmonary process.  CT of the head revealed a left thalamic minimal hyperdensity which may represent subacute hemorrhage related to subacute infarct but no convincing acute brain traumatic findings.  CT cervical spine without acute findings.  MRI of the brain with small linear/punctuate area of acute/subacute left thalamic hemorrhage, possible hemorrhagic infarct.  In ED patient received IV hydration, morphine, Zofran.  Orthopedics and neurology were consulted.      Pt underwent Rt hip hemiarthroplasty with Dr. Raghu Hunt on 4/14/25. Neurology initiated stroke workup and suggested possible left thalamic hypertensive hemorrhagic stoke. CTA head/neck with severe narrowing of jah distal vertebral arteries, severe narrowing of the clinoid, ophthalmic segments of the internal  carotid artery siphons bilaterally and near occlusion on the left.. Echo with LVEF 65-70%, neg bubble study, Carotid U/S with less than 50% ICA stenosis. Pt failed SLP eval and placed NPO. NG tube inserted for medication and nutrition. Neurology cleared Pt to start ASA 4 days from stroke onset which was 4/17/25. PT /OT suggested high intensity therapy.Spoke to Dr. Phillip with Neurology and cleared Pt for prophylactic Lovenox as of 4/18/25.      New onset fever of 101 on 4/17/25 associated with tachycardia. Infectious workup include CBC, CXR, UA, blood cultures x2 and Resp PCR ordered. Pt was started on empiric Zosyn IV. Workup was neg except Resp PCR was positive for Human Rhinovirus infection. Pt became afebrile. Zosyn discontinued. Pt pulled NG tube on 4/19/25. Replaced without difficulty. SLP continued to follow. As of 4/19/25 Pt remained with oropharyngeal dysphagia unsafe for PO intake.       Interval Hx:   No further fever reported.   Blood cultures x 2- NG 48h.   Pt pulled NG tube today. Replaced without difficulty.  SLP re assessed Pt today. Continued NPO status recommended     Labs today Na improved to 145.     Case was discussed with patient's nurse and  on the floor.    Objective/physical exam:  General: In no acute distress, afebrile, on RA  Chest: Clear to auscultation bilaterally  Heart: RRR, +S1, S2, no appreciable murmur  Abdomen: Soft, nontender, BS +  MSK: Warm, no lower extremity edema, no clubbing or cyanosis, Surgical dressing to Rt upper lat thigh C/D/I  Neurologic: Alert and oriented  to self and person. Moves all 4 ext    VITAL SIGNS: 24 HRS MIN & MAX LAST   Temp  Min: 97.5 °F (36.4 °C)  Max: 99.8 °F (37.7 °C) 98.3 °F (36.8 °C)   BP  Min: 77/47  Max: 116/64 105/70   Pulse  Min: 82  Max: 96  85   Resp  Min: 18  Max: 20 20   SpO2  Min: 94 %  Max: 99 % 95 %     I have reviewed the following labs:  Recent Labs   Lab 04/14/25  0320 04/15/25  0509 04/16/25  1225 04/17/25  0827   WBC  12.06* 13.29*  --  9.84   RBC 4.50* 3.93*  --  3.92*   HGB 9.4* 8.2* 7.5* 8.0*   HCT 34.3* 29.6* 26.8* 30.0*   MCV 76.2* 75.3*  --  76.5*   MCH 20.9* 20.9*  --  20.4*   MCHC 27.4* 27.7*  --  26.7*   RDW 20.6* 20.2*  --  21.2*   * 366  --  397   MPV 10.0 9.3  --  10.0     Recent Labs   Lab 04/13/25  0929 04/13/25  0929 04/14/25  0320 04/15/25  0509 04/16/25  1225 04/17/25  0827 04/18/25  0501 04/19/25  0451   *  --  137 136   < > 154* 151* 145   K 3.8  --  4.8 5.1   < > 4.0 4.2 3.8     --  103 106   < > 117* 115* 109*   CO2 20*  --  16* 15*   < > 26 28 28   BUN 9.2  --  12.5 29.1*   < > 35.2* 31.0* 34.7*   CREATININE 0.58*  --  0.67* 0.81   < > 0.71* 0.74 0.75   CALCIUM 8.7*  --  8.9 8.5*   < > 9.6 8.8 8.5*   MG  --    < > 1.80  --   --  2.40 2.20 2.10   ALBUMIN 3.8  --  3.7 3.4  --   --   --   --    ALKPHOS 119  --  121 96  --   --   --   --    ALT 63*  --  54 37  --   --   --   --    AST 48*  --  39 41  --   --   --   --    BILITOT 0.8  --  1.0 0.7  --   --   --   --     < > = values in this interval not displayed.     Microbiology Results (last 7 days)       Procedure Component Value Units Date/Time    Blood Culture [[id number]]  (Normal) Collected: 04/17/25 0827    Order Status: Completed Specimen: Blood Updated: 04/19/25 1000     Blood Culture No Growth At 48 Hours    Blood Culture [[id number]]  (Normal) Collected: 04/17/25 0827    Order Status: Completed Specimen: Blood Updated: 04/19/25 1000     Blood Culture No Growth At 48 Hours             See below for Radiology    Assessment/Plan:  New onset fever with tachycardia / sepsis due to unspecified infection - 4/17/25   Mechanical fall and closed displaced Rt femoral neck fracture, POA - s/p  Rt hip hemiarthroplasty on 4/14/25.  Acute to subacute left thalamic hemorrhage, POA  Oropharyngeal dysphagia due to above, POA- s/p NG tube    Intracranial Atherosclerotic disease, POA   Left eye blur vision with near occlusion of clinoid, ophthalmic  segments of the internal carotid artery siphon on the left, POA  Leukocytosis, POA- resolved   Microcytic hypochromic/ iron deficiency  anemia, moderate -POA  Diabetes Mellitis , type 2- uncontrolled due to hyperglycemia , HbA1c 8.0  Hypernatremia due to free water deficit - 4/16/25  Elevated LFT- resolved      Hx- HTN, HLD, prior CVA     Plan-  No further fever since yesterday  Infectious workup neg except Resp PCR + for human rhino virus infection.   Empiric Zosyn started on 4/18/25. Will stop antibiotic.   Hypernatremia improved with D5 infusion and increased free water per NG tube.  Continue ASA, high intensity statin  Continue home meds as appropriate via NG tube  Monitor course   Continue SLP/PT/OT service         VTE prophylaxis:  Lovenox started 4/18/25     Patient condition:  Fair     Anticipated discharge and Disposition:     TBD        All diagnosis and differential diagnosis have been reviewed; assessment and plan has been documented; I have personally reviewed the labs and test results that are presently available; I have reviewed the patients medication list; I have reviewed the consulting providers response and recommendations. I have reviewed or attempted to review medical records based upon their availability    All of the patient's questions have been  addressed and answered. Patient's is agreeable to the above stated plan. I will continue to monitor closely and make adjustments to medical management as needed.    Portions of this note dictated using EMR integrated voice recognition software, and may be subject to voice recognition errors not corrected at proofreading. Please contact writer for clarification if needed.   _____________________________________________________________________    Malnutrition Status:  Nutrition consulted. Most recent weight and BMI monitored-     Measurements:  Wt Readings from Last 1 Encounters:   04/16/25 56.7 kg (125 lb)   Body mass index is 24.41 kg/m².    Patient  has been screened and assessed by RD.    Malnutrition Type:  Context: acute illness or injury  Level: other (see comments) (Does not meet criteria)    Malnutrition Characteristic Summary:  Weight Loss (Malnutrition): other (see comments) (Does not meet criteria)  Muscle Mass (Malnutrition): mild depletion    Interventions/Recommendations (treatment strategy):  Enteral nutrition     Scheduled Med:   aspirin  81 mg Per NG tube Daily    atorvastatin  40 mg Per NG tube QHS    docusate  100 mg Per NG tube BID    enoxparin  40 mg Subcutaneous Q24H (prophylaxis, 1700)    ferrous sulfate  300 mg Per NG tube BID    gabapentin  600 mg Per NG tube TID    insulin glargine U-100  12 Units Subcutaneous BID    metFORMIN  1,000 mg Per NG tube BID WM    piperacillin-tazobactam (Zosyn) IV (PEDS and ADULTS) (extended infusion is not appropriate)  4.5 g Intravenous Q8H    sodium chloride 0.9%  10 mL Intravenous Q8H      Continuous Infusions:     PRN Meds:    Current Facility-Administered Medications:     acetaminophen, 650 mg, Per NG tube, Q4H PRN    bisacodyL, 10 mg, Rectal, Daily PRN    dextrose 50%, 12.5 g, Intravenous, PRN    glucagon (human recombinant), 1 mg, Intramuscular, PRN    hydrALAZINE, 10 mg, Intravenous, Q4H PRN    insulin aspart U-100, 0-10 Units, Subcutaneous, Q6H PRN         Marianela Garzon MD  Department of Hospital Medicine   [facility]   04/19/2025

## 2025-04-20 LAB
GLUCOSE SERPL-MCNC: 148 MG/DL (ref 70–110)
GLUCOSE SERPL-MCNC: 191 MG/DL (ref 70–110)
POCT GLUCOSE: 163 MG/DL (ref 70–110)
POCT GLUCOSE: 181 MG/DL (ref 70–110)
POCT GLUCOSE: 218 MG/DL (ref 70–110)

## 2025-04-20 PROCEDURE — A4216 STERILE WATER/SALINE, 10 ML: HCPCS | Performed by: PHYSICIAN ASSISTANT

## 2025-04-20 PROCEDURE — 27000207 HC ISOLATION

## 2025-04-20 PROCEDURE — 63600175 PHARM REV CODE 636 W HCPCS: Performed by: INTERNAL MEDICINE

## 2025-04-20 PROCEDURE — 25000003 PHARM REV CODE 250: Performed by: PHYSICIAN ASSISTANT

## 2025-04-20 PROCEDURE — 21400001 HC TELEMETRY ROOM

## 2025-04-20 PROCEDURE — 25000003 PHARM REV CODE 250: Performed by: INTERNAL MEDICINE

## 2025-04-20 RX ORDER — DOCUSATE SODIUM 50 MG/5ML
100 LIQUID ORAL 2 TIMES DAILY PRN
Status: DISCONTINUED | OUTPATIENT
Start: 2025-04-20 | End: 2025-04-28

## 2025-04-20 RX ADMIN — PIPERACILLIN SODIUM AND TAZOBACTAM SODIUM 4.5 G: 4; .5 INJECTION, POWDER, LYOPHILIZED, FOR SOLUTION INTRAVENOUS at 10:04

## 2025-04-20 RX ADMIN — INSULIN ASPART 2 UNITS: 100 INJECTION, SOLUTION INTRAVENOUS; SUBCUTANEOUS at 06:04

## 2025-04-20 RX ADMIN — DOCUSATE SODIUM LIQUID 100 MG: 100 LIQUID ORAL at 09:04

## 2025-04-20 RX ADMIN — ENOXAPARIN SODIUM 40 MG: 40 INJECTION SUBCUTANEOUS at 05:04

## 2025-04-20 RX ADMIN — PIPERACILLIN SODIUM AND TAZOBACTAM SODIUM 4.5 G: 4; .5 INJECTION, POWDER, LYOPHILIZED, FOR SOLUTION INTRAVENOUS at 06:04

## 2025-04-20 RX ADMIN — INSULIN GLARGINE 12 UNITS: 100 INJECTION, SOLUTION SUBCUTANEOUS at 10:04

## 2025-04-20 RX ADMIN — MINERAL SUPPLEMENT IRON 300 MG / 5 ML STRENGTH LIQUID 100 PER BOX UNFLAVORED 300 MG: at 10:04

## 2025-04-20 RX ADMIN — MINERAL SUPPLEMENT IRON 300 MG / 5 ML STRENGTH LIQUID 100 PER BOX UNFLAVORED 300 MG: at 09:04

## 2025-04-20 RX ADMIN — ASPIRIN 81 MG CHEWABLE TABLET 81 MG: 81 TABLET CHEWABLE at 09:04

## 2025-04-20 RX ADMIN — INSULIN ASPART 2 UNITS: 100 INJECTION, SOLUTION INTRAVENOUS; SUBCUTANEOUS at 10:04

## 2025-04-20 RX ADMIN — METFORMIN HYDROCHLORIDE 1000 MG: 500 TABLET, FILM COATED ORAL at 05:04

## 2025-04-20 RX ADMIN — GABAPENTIN 600 MG: 300 CAPSULE ORAL at 03:04

## 2025-04-20 RX ADMIN — GABAPENTIN 600 MG: 300 CAPSULE ORAL at 10:04

## 2025-04-20 RX ADMIN — INSULIN GLARGINE 12 UNITS: 100 INJECTION, SOLUTION SUBCUTANEOUS at 09:04

## 2025-04-20 RX ADMIN — SODIUM CHLORIDE, PRESERVATIVE FREE 10 ML: 5 INJECTION INTRAVENOUS at 03:04

## 2025-04-20 RX ADMIN — GABAPENTIN 600 MG: 300 CAPSULE ORAL at 09:04

## 2025-04-20 RX ADMIN — METFORMIN HYDROCHLORIDE 1000 MG: 500 TABLET, FILM COATED ORAL at 04:04

## 2025-04-20 RX ADMIN — ATORVASTATIN CALCIUM 40 MG: 40 TABLET, FILM COATED ORAL at 10:04

## 2025-04-20 NOTE — PLAN OF CARE
Problem: Adult Inpatient Plan of Care  Goal: Plan of Care Review  4/20/2025 0732 by Lyndon Blake RN  Outcome: Progressing  4/20/2025 0732 by Lyndon Blake RN  Outcome: Progressing  Goal: Patient-Specific Goal (Individualized)  4/20/2025 0732 by Lyndon Blake RN  Outcome: Progressing  4/20/2025 0732 by Lyndon Blake RN  Outcome: Progressing  Goal: Absence of Hospital-Acquired Illness or Injury  4/20/2025 0732 by Lyndon Blake RN  Outcome: Progressing  4/20/2025 0732 by Lyndon Blake RN  Outcome: Progressing  Goal: Optimal Comfort and Wellbeing  4/20/2025 0732 by Lyndon Blake RN  Outcome: Progressing  4/20/2025 0732 by Lyndon Blake RN  Outcome: Progressing  Goal: Readiness for Transition of Care  4/20/2025 0732 by Lyndon Blake RN  Outcome: Progressing  4/20/2025 0732 by Lyndon Blake RN  Outcome: Progressing

## 2025-04-20 NOTE — PLAN OF CARE
Problem: Adult Inpatient Plan of Care  Goal: Plan of Care Review  Outcome: Progressing  Goal: Patient-Specific Goal (Individualized)  Outcome: Progressing  Goal: Absence of Hospital-Acquired Illness or Injury  Outcome: Progressing  Goal: Optimal Comfort and Wellbeing  Outcome: Progressing     Problem: Stroke, Ischemic (Includes Transient Ischemic Attack)  Goal: Optimal Coping  Outcome: Progressing  Goal: Effective Bowel Elimination  Outcome: Progressing  Goal: Optimal Cerebral Tissue Perfusion  Outcome: Progressing

## 2025-04-21 LAB
ABO + RH BLD: NORMAL
ABO + RH BLD: NORMAL
ANION GAP SERPL CALC-SCNC: 7 MEQ/L
ANISOCYTOSIS BLD QL SMEAR: ABNORMAL
BASOPHILS # BLD AUTO: 0.07 X10(3)/MCL
BASOPHILS NFR BLD AUTO: 0.6 %
BLD PROD TYP BPU: NORMAL
BLD PROD TYP BPU: NORMAL
BLOOD UNIT EXPIRATION DATE: NORMAL
BLOOD UNIT EXPIRATION DATE: NORMAL
BLOOD UNIT TYPE CODE: 6200
BLOOD UNIT TYPE CODE: 6200
BUN SERPL-MCNC: 22.4 MG/DL (ref 8.4–25.7)
CALCIUM SERPL-MCNC: 8.5 MG/DL (ref 8.8–10)
CHLORIDE SERPL-SCNC: 104 MMOL/L (ref 98–107)
CO2 SERPL-SCNC: 26 MMOL/L (ref 23–31)
COLOR STL: ABNORMAL
CONSISTENCY STL: ABNORMAL
CREAT SERPL-MCNC: 0.6 MG/DL (ref 0.72–1.25)
CREAT/UREA NIT SERPL: 37
CROSSMATCH INTERPRETATION: NORMAL
CROSSMATCH INTERPRETATION: NORMAL
DISPENSE STATUS: NORMAL
DISPENSE STATUS: NORMAL
EOSINOPHIL # BLD AUTO: 1.81 X10(3)/MCL (ref 0–0.9)
EOSINOPHIL NFR BLD AUTO: 15.6 %
ERYTHROCYTE [DISTWIDTH] IN BLOOD BY AUTOMATED COUNT: 22.1 % (ref 11.5–17)
GFR SERPLBLD CREATININE-BSD FMLA CKD-EPI: >60 ML/MIN/1.73/M2
GLUCOSE SERPL-MCNC: 184 MG/DL (ref 82–115)
GROUP & RH: NORMAL
HCT VFR BLD AUTO: 20.1 % (ref 42–52)
HCT VFR BLD AUTO: 20.7 % (ref 42–52)
HEMOCCULT SP1 STL QL: POSITIVE
HGB BLD-MCNC: 5.5 G/DL (ref 14–18)
HGB BLD-MCNC: 5.6 G/DL (ref 14–18)
HYPOCHROMIA BLD QL SMEAR: ABNORMAL
IMM GRANULOCYTES # BLD AUTO: 0.22 X10(3)/MCL (ref 0–0.04)
IMM GRANULOCYTES NFR BLD AUTO: 1.9 %
INDIRECT COOMBS: NORMAL
LYMPHOCYTES # BLD AUTO: 2.72 X10(3)/MCL (ref 0.6–4.6)
LYMPHOCYTES NFR BLD AUTO: 23.4 %
MACROCYTES BLD QL SMEAR: ABNORMAL
MAGNESIUM SERPL-MCNC: 1.8 MG/DL (ref 1.6–2.6)
MCH RBC QN AUTO: 21.1 PG (ref 27–31)
MCHC RBC AUTO-ENTMCNC: 27.1 G/DL (ref 33–36)
MCV RBC AUTO: 77.8 FL (ref 80–94)
MICROCYTES BLD QL SMEAR: ABNORMAL
MONOCYTES # BLD AUTO: 1.14 X10(3)/MCL (ref 0.1–1.3)
MONOCYTES NFR BLD AUTO: 9.8 %
NEUTROPHILS # BLD AUTO: 5.65 X10(3)/MCL (ref 2.1–9.2)
NEUTROPHILS NFR BLD AUTO: 48.7 %
NRBC BLD AUTO-RTO: 1.6 %
PHOSPHATE SERPL-MCNC: 2.9 MG/DL (ref 2.3–4.7)
PLATELET # BLD AUTO: 310 X10(3)/MCL (ref 130–400)
PLATELET # BLD EST: NORMAL 10*3/UL
PMV BLD AUTO: 10.9 FL (ref 7.4–10.4)
POCT GLUCOSE: 143 MG/DL (ref 70–110)
POCT GLUCOSE: 159 MG/DL (ref 70–110)
POTASSIUM SERPL-SCNC: 4.2 MMOL/L (ref 3.5–5.1)
RBC # BLD AUTO: 2.66 X10(6)/MCL (ref 4.7–6.1)
RBC MORPH BLD: ABNORMAL
SODIUM SERPL-SCNC: 137 MMOL/L (ref 136–145)
SPECIMEN OUTDATE: NORMAL
UNIT NUMBER: NORMAL
UNIT NUMBER: NORMAL
WBC # BLD AUTO: 11.61 X10(3)/MCL (ref 4.5–11.5)

## 2025-04-21 PROCEDURE — 36415 COLL VENOUS BLD VENIPUNCTURE: CPT

## 2025-04-21 PROCEDURE — 25000003 PHARM REV CODE 250: Performed by: PHYSICIAN ASSISTANT

## 2025-04-21 PROCEDURE — 94799 UNLISTED PULMONARY SVC/PX: CPT

## 2025-04-21 PROCEDURE — 83735 ASSAY OF MAGNESIUM: CPT | Performed by: INTERNAL MEDICINE

## 2025-04-21 PROCEDURE — 86923 COMPATIBILITY TEST ELECTRIC: CPT

## 2025-04-21 PROCEDURE — P9016 RBC LEUKOCYTES REDUCED: HCPCS

## 2025-04-21 PROCEDURE — 85018 HEMOGLOBIN: CPT | Performed by: INTERNAL MEDICINE

## 2025-04-21 PROCEDURE — 82272 OCCULT BLD FECES 1-3 TESTS: CPT

## 2025-04-21 PROCEDURE — 84100 ASSAY OF PHOSPHORUS: CPT | Performed by: INTERNAL MEDICINE

## 2025-04-21 PROCEDURE — 36430 TRANSFUSION BLD/BLD COMPNT: CPT

## 2025-04-21 PROCEDURE — 30233N1 TRANSFUSION OF NONAUTOLOGOUS RED BLOOD CELLS INTO PERIPHERAL VEIN, PERCUTANEOUS APPROACH: ICD-10-PCS | Performed by: INTERNAL MEDICINE

## 2025-04-21 PROCEDURE — 80048 BASIC METABOLIC PNL TOTAL CA: CPT | Performed by: INTERNAL MEDICINE

## 2025-04-21 PROCEDURE — 85025 COMPLETE CBC W/AUTO DIFF WBC: CPT | Performed by: INTERNAL MEDICINE

## 2025-04-21 PROCEDURE — 25000003 PHARM REV CODE 250: Performed by: INTERNAL MEDICINE

## 2025-04-21 PROCEDURE — 21400001 HC TELEMETRY ROOM

## 2025-04-21 PROCEDURE — 36415 COLL VENOUS BLD VENIPUNCTURE: CPT | Performed by: INTERNAL MEDICINE

## 2025-04-21 PROCEDURE — 99900035 HC TECH TIME PER 15 MIN (STAT)

## 2025-04-21 PROCEDURE — 63600175 PHARM REV CODE 636 W HCPCS: Performed by: INTERNAL MEDICINE

## 2025-04-21 PROCEDURE — 86850 RBC ANTIBODY SCREEN: CPT

## 2025-04-21 PROCEDURE — 27000207 HC ISOLATION

## 2025-04-21 PROCEDURE — A4216 STERILE WATER/SALINE, 10 ML: HCPCS | Performed by: PHYSICIAN ASSISTANT

## 2025-04-21 PROCEDURE — 92611 MOTION FLUOROSCOPY/SWALLOW: CPT

## 2025-04-21 RX ORDER — PANTOPRAZOLE SODIUM 40 MG/10ML
80 INJECTION, POWDER, LYOPHILIZED, FOR SOLUTION INTRAVENOUS ONCE
Status: COMPLETED | OUTPATIENT
Start: 2025-04-21 | End: 2025-04-21

## 2025-04-21 RX ORDER — PANTOPRAZOLE SODIUM 40 MG/10ML
40 INJECTION, POWDER, LYOPHILIZED, FOR SOLUTION INTRAVENOUS 2 TIMES DAILY
Status: DISCONTINUED | OUTPATIENT
Start: 2025-04-21 | End: 2025-04-22

## 2025-04-21 RX ORDER — METFORMIN HYDROCHLORIDE 500 MG/1
1000 TABLET ORAL 2 TIMES DAILY WITH MEALS
Status: DISCONTINUED | OUTPATIENT
Start: 2025-04-22 | End: 2025-04-21

## 2025-04-21 RX ORDER — HYDROCODONE BITARTRATE AND ACETAMINOPHEN 500; 5 MG/1; MG/1
TABLET ORAL
Status: DISCONTINUED | OUTPATIENT
Start: 2025-04-21 | End: 2025-04-30 | Stop reason: HOSPADM

## 2025-04-21 RX ORDER — ATORVASTATIN CALCIUM 40 MG/1
40 TABLET, FILM COATED ORAL NIGHTLY
Status: DISCONTINUED | OUTPATIENT
Start: 2025-04-21 | End: 2025-04-30 | Stop reason: HOSPADM

## 2025-04-21 RX ORDER — FERROUS SULFATE 300 MG/5ML
300 LIQUID (ML) ORAL 2 TIMES DAILY
Status: DISCONTINUED | OUTPATIENT
Start: 2025-04-21 | End: 2025-04-22

## 2025-04-21 RX ORDER — GABAPENTIN 300 MG/1
600 CAPSULE ORAL 3 TIMES DAILY
Status: DISCONTINUED | OUTPATIENT
Start: 2025-04-21 | End: 2025-04-30 | Stop reason: HOSPADM

## 2025-04-21 RX ADMIN — GABAPENTIN 600 MG: 300 CAPSULE ORAL at 09:04

## 2025-04-21 RX ADMIN — METFORMIN HYDROCHLORIDE 1000 MG: 500 TABLET, FILM COATED ORAL at 05:04

## 2025-04-21 RX ADMIN — INSULIN ASPART 2 UNITS: 100 INJECTION, SOLUTION INTRAVENOUS; SUBCUTANEOUS at 05:04

## 2025-04-21 RX ADMIN — ATORVASTATIN CALCIUM 40 MG: 40 TABLET, FILM COATED ORAL at 09:04

## 2025-04-21 RX ADMIN — MINERAL SUPPLEMENT IRON 300 MG / 5 ML STRENGTH LIQUID 100 PER BOX UNFLAVORED 300 MG: at 10:04

## 2025-04-21 RX ADMIN — SODIUM CHLORIDE, PRESERVATIVE FREE 10 ML: 5 INJECTION INTRAVENOUS at 02:04

## 2025-04-21 RX ADMIN — SODIUM CHLORIDE, PRESERVATIVE FREE 10 ML: 5 INJECTION INTRAVENOUS at 09:04

## 2025-04-21 RX ADMIN — PANTOPRAZOLE SODIUM 80 MG: 40 INJECTION, POWDER, LYOPHILIZED, FOR SOLUTION INTRAVENOUS at 01:04

## 2025-04-21 RX ADMIN — SODIUM CHLORIDE, PRESERVATIVE FREE 10 ML: 5 INJECTION INTRAVENOUS at 01:04

## 2025-04-21 RX ADMIN — PANTOPRAZOLE SODIUM 40 MG: 40 INJECTION, POWDER, LYOPHILIZED, FOR SOLUTION INTRAVENOUS at 09:04

## 2025-04-21 RX ADMIN — GABAPENTIN 600 MG: 300 CAPSULE ORAL at 04:04

## 2025-04-21 RX ADMIN — GABAPENTIN 600 MG: 300 CAPSULE ORAL at 10:04

## 2025-04-21 RX ADMIN — INSULIN GLARGINE 12 UNITS: 100 INJECTION, SOLUTION SUBCUTANEOUS at 10:04

## 2025-04-21 RX ADMIN — MINERAL SUPPLEMENT IRON 300 MG / 5 ML STRENGTH LIQUID 100 PER BOX UNFLAVORED 300 MG: at 09:04

## 2025-04-21 RX ADMIN — METFORMIN HYDROCHLORIDE 1000 MG: 500 TABLET, FILM COATED ORAL at 04:04

## 2025-04-21 NOTE — PROGRESS NOTES
Inpatient Nutrition Assessment    Admit Date: 4/13/2025   Total duration of encounter: 8 days   Patient Age: 74 y.o.    Nutrition Recommendation/Prescription     Continue tube feeding:  Isosource 1.5 Alberto 50 ml/hr provides  1500 kcal  100% needs  68 g protein  100% needs  176 g carbohydrate 100% needs  760 ml free water 55% needs, will require additional fluid source, physician managing water flushes  calculations based on estimated 20 hour run time     Banatrol TF (provides 45 kcal, 2 g protein per serving) BID as needed for loose stools     Communication of Recommendations: reviewed with nurse    Nutrition Assessment     Malnutrition Assessment/Nutrition-Focused Physical Exam    Malnutrition Context: acute illness or injury (04/16/25 1238)  Malnutrition Level: other (see comments) (Does not meet criteria) (04/16/25 1238)     Weight Loss (Malnutrition): other (see comments) (Does not meet criteria) (04/16/25 1238)              Muscle Mass (Malnutrition): mild depletion (04/16/25 1238)     Clavicle Bone Region (Muscle Loss): mild depletion, possibly normal for patient given no reported weight/intake decrease                             A minimum of two characteristics is recommended for diagnosis of either severe or non-severe malnutrition.    Chart Review    Reason Seen: follow-up    Malnutrition Screening Tool Results   Have you recently lost weight without trying?: No  Have you been eating poorly because of a decreased appetite?: No   MST Score: 0   Diagnosis:  Right femoral neck fracture secondary to mechanical fall   Acute/subacute left thalamic hemorrhage  Microcytic anemia, stable   Metabolic acidosis   Transaminitis     Relevant Medical History: hypertension, hyperlipidemia, diabetes mellitus type 2, ptosis, CVA without deficits, sciatica     Scheduled Medications:  atorvastatin, 40 mg, QHS  ferrous sulfate, 300 mg, BID  gabapentin, 600 mg, TID  insulin glargine U-100, 12 Units, BID  metFORMIN, 1,000 mg, BID  WM  pantoprazole, 40 mg, BID  pantoprazole, 80 mg, Once  sodium chloride 0.9%, 10 mL, Q8H    Continuous Infusions:     PRN Medications:   0.9%  NaCl infusion (for blood administration), , Q24H PRN  0.9%  NaCl infusion (for blood administration), , Q24H PRN  acetaminophen, 650 mg, Q4H PRN  bisacodyL, 10 mg, Daily PRN  dextrose 50%, 12.5 g, PRN  docusate, 100 mg, BID PRN  glucagon (human recombinant), 1 mg, PRN  hydrALAZINE, 10 mg, Q4H PRN  insulin aspart U-100, 0-10 Units, Q6H PRN    Calorie Containing IV Medications: no significant kcals from medications at this time    Recent Labs   Lab 04/15/25  0509 04/16/25  1225 04/17/25  0827 04/18/25  0501 04/19/25  0451 04/21/25  0433 04/21/25  0823    146* 154* 151* 145 137  --    K 5.1 3.7 4.0 4.2 3.8 4.2  --    CALCIUM 8.5* 8.6* 9.6 8.8 8.5* 8.5*  --    PHOS  --   --  1.7* 2.4 3.6 2.9  --    MG  --   --  2.40 2.20 2.10 1.80  --     113* 117* 115* 109* 104  --    CO2 15* 19* 26 28 28 26  --    BUN 29.1* 35.1* 35.2* 31.0* 34.7* 22.4  --    CREATININE 0.81 0.62* 0.71* 0.74 0.75 0.60*  --    EGFRNORACEVR >60 >60 >60 >60 >60 >60  --    GLUCOSE 160* 206* 256* 242* 235* 184*  --    BILITOT 0.7  --   --   --   --   --   --    ALKPHOS 96  --   --   --   --   --   --    ALT 37  --   --   --   --   --   --    AST 41  --   --   --   --   --   --    ALBUMIN 3.4  --   --   --   --   --   --    WBC 13.29*  --  9.84  --   --  11.61*  --    HGB 8.2* 7.5* 8.0*  --   --  5.6* 5.5*   HCT 29.6* 26.8* 30.0*  --   --  20.7* 20.1*     Nutrition Orders:  Diet NPO  Tube Feedings/Formulas 50; Isosource 1.5; NG; 75; Every hour    Appetite/Oral Intake: NPO/not applicable  Factors Affecting Nutritional Intake: difficulty/impaired swallowing and NPO  Social Needs Impacting Access to Food: none identified  Food/Roman [employer]/Cultural Preferences: none reported  Food Allergies: no known food allergies  Last Bowel Movement: 04/20/25  Wound(s): no pressure injuries documented at this time      Comments    4/16/25 Spoke with patient's daughter at bedside, denies translation services at this time. Daughter reports good appetite/intake prior to admission, denies weight loss. MBS done 4/15/25 with recommendations for NPO and dysphagia therapy. He is currently receiving Clinimix E 4.25/5 at 75 ml/hr. Recommend consideration of nasogastric tube feeding to better meet estimated needs and to maintain GI tract integrity.    4/17/25 Patient receiving tube feeding per nasogastric tube, needs met. Hypernatremia noted, physician managing free water flushes at this time, also has D5W ordered.     4/21/25 Patient continues with tube feeding, plans for EGD tomorrow. Nurse reports loose stools and patient reporting some fecal incontinence, will add BanatrolTF to give as needed.    Anthropometrics    Height: 5' (152.4 cm), Height Method: Stated  Last Weight: 56.7 kg (125 lb) (04/16/25 1234), Weight Method: Standard Scale  BMI (Calculated): 24.4  BMI Classification: overweight (BMI 25-29.9)        Ideal Body Weight (IBW), Male: 106 lb     % Ideal Body Weight, Male (lb): 132.07 %                          Usual Weight Provided By: family/caregiver denies unintentional weight loss    Wt Readings from Last 5 Encounters:   04/16/25 56.7 kg (125 lb)   04/02/25 59.4 kg (131 lb)   02/05/25 62.9 kg (138 lb 9.6 oz)   10/31/24 58.1 kg (128 lb)   10/18/24 59.4 kg (131 lb)     Weight Change(s) Since Admission:   4/16/25 admission weight (63.5 kg) stated, took bed weight (56.7 kg) during rounds  Wt Readings from Last 1 Encounters:   04/16/25 1234 56.7 kg (125 lb)   04/13/25 1306 63.5 kg (139 lb 15.9 oz)   04/13/25 0911 63.5 kg (140 lb)   Admit Weight: 63.5 kg (140 lb) (04/13/25 0911), Weight Method: Stated    Estimated Needs    Weight Used For Calorie Calculations: 56.7 kg (125 lb)  Energy Calorie Requirements (kcal): 9832-9195, 1.2-1.4 stress factor  Energy Need Method: Gwendolyn Guzman  Weight Used For Protein Calculations: 56.7 kg  (125 lb)  Protein Requirements: 57-80 g, 1-1.4 g/kg  Fluid Requirements (mL): 4150-7580, 1 ml/kcal  CHO Requirement: 138-181 g, 40-45% of kcal     Enteral Nutrition     Formula: Isosource 1.5 Alberto  Rate/Volume: 50 ml/hr  Water Flushes: 75 ml/hr  Additives/Modulars: none at this time  Route: nasogastric tube  Method: continuous  Total Nutrition Provided by Tube Feeding, Additives, and Flushes:  Calories Provided  1500 kcal/d, 100% needs   Protein Provided  68 g/d, 100% needs   Fluid Provided  2260 ml/d, 140% needs   Continuous feeding calculations based on estimated 20 hr/d run time unless otherwise stated.    Parenteral Nutrition discontinued    Evaluation of Received Nutrient Intake    Calories: meeting estimated needs  Protein: meeting estimated needs    Patient Education Not applicable.    Nutrition Diagnosis     PES: Inadequate energy intake related to inability to consume sufficient nutrients as evidenced by less than 80% needs met. (resolved)    PES: N/A            Nutrition Interventions     Intervention(s): collaboration with other providers  Intervention(s): Enteral nutrition      Goal: Meet greater than 80% of nutritional needs by follow-up. (goal met)  Goal: Tolerate enteral feeding at goal rate by follow-up. (goal met)    Nutrition Goals & Monitoring     Dietitian will monitor: food and beverage intake, energy intake, enteral nutrition intake, parenteral nutrition intake, weight, electrolyte/renal panel, beliefs/attitudes, glucose/endocrine profile, and gastrointestinal profile  Discharge planning: too early to determine; pending clinical course  Nutrition Risk/Follow-Up: high (follow-up in 1-4 days)   Please consult if re-assessment needed sooner.

## 2025-04-21 NOTE — PROCEDURES
[facility]  Speech Language Pathology Department  Modified Barium Swallow (MBS) Study    Patient Name:  William Mccauley   MRN:  [id number]    Recommendations     General recommendations:  SLP follow up regarding diet tolerance and dysphagia therapy  Repeat MBS study: 7-10 days  Solid texture recommendation:  Puree Diet - IDDSI Level 4  Liquid consistency recommendation: Moderately thick liquids - IDDSI Level 3   Medications: crushed in puree  General precautions: aspiration    History     William Mccauley is a/n 74 y.o. male admitted s/p fall on hip. Ortho sx for fracture of R femoral neck on 4/14. MRI of the brain with small linear/punctuate area of acute/subacute left thalamic hemorrhage, possible hemorrhagic infarct although with no visible diffusion restriction, region obscured by hemorrhagic artifact, advanced involutional change, moderate severe chronic microvascular white ischemic changes.      MBS: 4/15/25 strict NPO with silent aspiration of mildly and moderately thick liquids.      Past Medical History:   Diagnosis Date    Diabetes mellitus, type 2     HTN (hypertension)     Ptosis     Sciatica     Stroke     Type 2 diabetes mellitus without complications      Past Surgical History:   Procedure Laterality Date    DIAGNOSTIC LAPAROSCOPY N/A 9/24/2024    Procedure: LAPAROSCOPY, DIAGNOSTIC;  Surgeon: Adalberto Rock MD;  Location: [facility];  Service: General;  Laterality: N/A;    HEMIARTHROPLASTY OF HIP Right 4/14/2025    Procedure: HEMIARTHROPLASTY, HIP;  Surgeon: Raghu Hunt MD;  Location: [facility] OR;  Service: Orthopedics;  Laterality: Right;  lateral peg board rodney    KIDNEY STONE SURGERY Left     REPAIR, HERNIA, INGUINAL Right 9/24/2024    Procedure: REPAIR, HERNIA, INGUINAL;  Surgeon: Adalberto Rock MD;  Location: [facility];  Service: General;  Laterality: Right;  Incarcerated Hernia     A MBS Study was completed to assess the efficiency of his swallow function, rule out  aspiration and make recommendations regarding safe dietary consistencies, effective compensatory strategies, and safe eating environment.     Home diet texture/consistency: unknown  Current Method of Nutrition: Tube feeding (NG tube)    Patient complaint: none stated    Subjective     Patient awake and alert.    Spiritual/Cultural/Buddhist Beliefs/Practices that affect care: no  Pain/Comfort: Pain Rating 1: 0/10    Respiratory Status:  room air    Restraints/positioning devices: none    Fluoroscopic Findings     Oral Musculature  Dentition: own teeth and teeth in poor condition  Secretion Management: adequate  Mucosal Quality: good    Setup  Upright in bed  Able to self feed  Adequate head control    Visualization  Lateral view    Oral Phase:   Adequate anterior-posterior transport  Prespillage to pyriform sinuses intermittently    Pharyngeal Phase:   Reduced base of tongue retraction  Reduced epiglottic deflection  Adequate hyolaryngeal excursion    Consistency Fed by Laryngeal Penetration Aspiration Residue   Moderately thick liquid by spoon small and large amount SLP None None Mild   Moderately thick by cup Self Shallow, cleared with swallow None Moderate, increased clearance with independent secondary swallow   Puree SLP None None Mild   Moderately thick liquid by straw Self None None Moderate, increased clearance with independent secondary swallow   Mildly thick liquid by spoon SLP None None Mild   Mildly thick liquid by straw SLP During swallow, did not clear none mild     Cervical Esophageal Phase:   UES appeared to accommodate all bolus types without stasis or retrograde movement visualized    Assessment     Patient exhibited moderate oropharyngeal dysphagia characterized by the findings noted above.  Laryngeal penetration of mildly thick liquids.   Patient with improved, functional swallow. SLP rec: moderately thick liquids, puree, meds crushed in puree, supervised meals    Patient appears to be at high  risk for aspiration related pneumonia when considering severity of dysphagia.  Prognosis for behavioral swallow rehabilitation is good.    Outcome Measures     Functional Oral Intake Scale: 5 - Total oral diet with multiple consistencies, by requiring special preparation or compensations    Education     No learner present/available.    Plan     SLP Follow-Up:  Yes    Patient to be seen:  5 x/week   Plan of Care expires:  05/05/25  Plan of Care reviewed with:  patient     Time Tracking     SLP Treatment Date:   04/21/25  Speech Start Time:  1340  Speech Stop Time:  1355     Speech Total Time (min):  15 min    Billable minutes:   Motion Fluoroscopic Evaluation, Video Recording, 15 minutes     04/21/2025

## 2025-04-21 NOTE — PT/OT/SLP PROGRESS
Physical Therapy      Patient Name:  William Mccauley   MRN:  [id number]    Patient not seen today secondary to low H&H, receiving blood . Will follow-up at later time.

## 2025-04-21 NOTE — PLAN OF CARE
Problem: SLP  Goal: SLP Goal  Description: LTG: Patient will tolerate least restrictive diet with no clinical signs/sx of aspiration.    STGs:  1. The pt will complete tongue base/laryngeal elevation exercises.  2. Patient will tolerate moderately thick liquids.  3. Patient will tolerate pureed solids.  4. Patient will tolerate minced and moist solids.  Outcome: Progressing     Repeat MBS completed, new goals created

## 2025-04-21 NOTE — CONSULTS
Consult Note    Reason for Consult:      We were consulted to evaluate this patient for acute on chronic JN.     HPI:   Patient is a 74-year-old male known to Dr. Andrez garvin/marcos of diabetes, hypertension, stroke.    He presented to the ER  with complaints of fall with right hip pain.  H&H 8.8/32.  CT head:  Left ophthalmic minimal hyperdensity subacute hemorrhage related to subacute infarct.  Right femoral neck fracture.  Patient admitted with fall, femur fracture and CVA.  Underwent right hip hemiarthroplasty .  Following surgery, Hemoglobin has slowly trended down.  Hemoglobin was 8 on , repeat  5.5.  Iron studies:  Iron 13, TIBC 314, ferritin 27, iron sat 4.  He was initially oral iron  b.i.d. + Human Rhinovirus infection.     GI consulted for acute on chronic JN  Hgb lowest today 5.5 (2u prbc tbi)  Patient's daughter at bedside who translates.  Denies any recent black or bloody stool.  No previous history of GI bleeding, anemia requiring oral iron.  No previous EGD. Tolerating Tfs via NGT post CVA.    WBC 11.6, afebrile, no abx  Plt 310, INR 1.2, no thinners.    Was previously on 81 mg ASA and low dose lovenox  Speech therapy to evaluate patient    Previous records reviewed. Collateral information obtained from family member present at bedside.  -------------------------------------------------------------------------------  Colonoscopy 2016 for screenin mm polyp in the ascending colon.  Path-benign. Recall 10 years    PCP:  Shannon Alonso MD    Review of patient's allergies indicates:  No Known Allergies     Current Medications[1]  Prescriptions Prior to Admission[2]    Past Medical History:  Past Medical History:   Diagnosis Date    Diabetes mellitus, type 2     HTN (hypertension)     Ptosis     Sciatica     Stroke     Type 2 diabetes mellitus without complications       Past Surgical History:  Past Surgical History:   Procedure Laterality Date    DIAGNOSTIC LAPAROSCOPY  N/A 9/24/2024    Procedure: LAPAROSCOPY, DIAGNOSTIC;  Surgeon: Adalberto Rock MD;  Location: [facility] OR;  Service: General;  Laterality: N/A;    HEMIARTHROPLASTY OF HIP Right 4/14/2025    Procedure: HEMIARTHROPLASTY, HIP;  Surgeon: Raghu Hunt MD;  Location: [facility] OR;  Service: Orthopedics;  Laterality: Right;  lateral peg board rodney    KIDNEY STONE SURGERY Left     REPAIR, HERNIA, INGUINAL Right 9/24/2024    Procedure: REPAIR, HERNIA, INGUINAL;  Surgeon: Adalberto Rock MD;  Location: [facility] OR;  Service: General;  Laterality: Right;  Incarcerated Hernia      Family History:  No family history on file.  Social History:  Social History     Tobacco Use    Smoking status: Never     Passive exposure: Never    Smokeless tobacco: Never   Substance Use Topics    Alcohol use: Never     Alcohol/week: 1.0 standard drink of alcohol     Types: 1 Cans of beer per week       Review of Systems:     Review of Systems   All other systems reviewed and are negative.      Objective:     VITAL SIGNS: 24 HR MIN & MAX LAST    Temp  Min: 97.9 °F (36.6 °C)  Max: 99.8 °F (37.7 °C)  98.3 °F (36.8 °C)        BP  Min: 93/52  Max: 103/63  100/63     Pulse  Min: 83  Max: 95  84     Resp  Min: 18  Max: 20  20    SpO2  Min: 95 %  Max: 97 %  97 %        Intake/Output Summary (Last 24 hours) at 4/21/2025 0846  Last data filed at 4/21/2025 0735  Gross per 24 hour   Intake 85 ml   Output 250 ml   Net -165 ml       Physical Exam  Constitutional:       General: He is not in acute distress.     Appearance: He is normal weight. He is not ill-appearing or toxic-appearing.   HENT:      Head: Normocephalic and atraumatic.      Mouth/Throat:      Mouth: Mucous membranes are dry.      Pharynx: Oropharyngeal exudate present.   Cardiovascular:      Rate and Rhythm: Normal rate and regular rhythm.      Pulses: Normal pulses.      Heart sounds: Normal heart sounds.   Pulmonary:      Effort: Pulmonary effort is normal.      Breath sounds: Normal  breath sounds.   Abdominal:      General: Bowel sounds are normal. There is no distension.      Palpations: Abdomen is soft. There is no mass.      Tenderness: There is no abdominal tenderness. There is no guarding.      Hernia: No hernia is present.      Comments: NGT with TF infusing   Musculoskeletal:      Right lower leg: No edema.      Left lower leg: No edema.      Comments: Right hip dressing c/d/i   Skin:     General: Skin is warm and dry.   Neurological:      Mental Status: He is alert and oriented to person, place, and time.   Psychiatric:         Mood and Affect: Mood normal.         Behavior: Behavior normal.         Thought Content: Thought content normal.         Judgment: Judgment normal.           Recent Results (from the past 48 hours)   POCT glucose    Collection Time: 04/19/25 12:13 PM   Result Value Ref Range    POCT Glucose 219 (H) 70 - 110 mg/dL   POCT glucose    Collection Time: 04/19/25  8:43 PM   Result Value Ref Range    POCT Glucose 150 (H) 70 - 110 mg/dL   POCT glucose    Collection Time: 04/20/25  4:54 AM   Result Value Ref Range    POCT Glucose 163 (H) 70 - 110 mg/dL   POCT Glucose, Hand-Held Device    Collection Time: 04/20/25 10:23 AM   Result Value Ref Range    POC Glucose 191 (A) 70 - 110 MG/DL   POCT glucose    Collection Time: 04/20/25  5:17 PM   Result Value Ref Range    POCT Glucose 181 (H) 70 - 110 mg/dL   POCT Glucose, Hand-Held Device    Collection Time: 04/20/25  5:34 PM   Result Value Ref Range    POC Glucose 148 (A) 70 - 110 MG/DL   POCT glucose    Collection Time: 04/20/25 10:36 PM   Result Value Ref Range    POCT Glucose 218 (H) 70 - 110 mg/dL   Basic Metabolic Panel    Collection Time: 04/21/25  4:33 AM   Result Value Ref Range    Sodium 137 136 - 145 mmol/L    Potassium 4.2 3.5 - 5.1 mmol/L    Chloride 104 98 - 107 mmol/L    CO2 26 23 - 31 mmol/L    Glucose 184 (H) 82 - 115 mg/dL    Blood Urea Nitrogen 22.4 8.4 - 25.7 mg/dL    Creatinine 0.60 (L) 0.72 - 1.25 mg/dL     BUN/Creatinine Ratio 37     Calcium 8.5 (L) 8.8 - 10.0 mg/dL    Anion Gap 7.0 mEq/L    eGFR >60 mL/min/1.73/m2   Magnesium    Collection Time: 04/21/25  4:33 AM   Result Value Ref Range    Magnesium Level 1.80 1.60 - 2.60 mg/dL   Phosphorus    Collection Time: 04/21/25  4:33 AM   Result Value Ref Range    Phosphorus Level 2.9 2.3 - 4.7 mg/dL   CBC with Differential    Collection Time: 04/21/25  4:33 AM   Result Value Ref Range    WBC 11.61 (H) 4.50 - 11.50 x10(3)/mcL    RBC 2.66 (L) 4.70 - 6.10 x10(6)/mcL    Hgb 5.6 (LL) 14.0 - 18.0 g/dL    Hct 20.7 (L) 42.0 - 52.0 %    MCV 77.8 (L) 80.0 - 94.0 fL    MCH 21.1 (L) 27.0 - 31.0 pg    MCHC 27.1 (L) 33.0 - 36.0 g/dL    RDW 22.1 (H) 11.5 - 17.0 %    Platelet 310 130 - 400 x10(3)/mcL    MPV 10.9 (H) 7.4 - 10.4 fL    Neut % 48.7 %    Lymph % 23.4 %    Mono % 9.8 %    Eos % 15.6 %    Basophil % 0.6 %    Imm Grans % 1.9 %    Neut # 5.65 2.1 - 9.2 x10(3)/mcL    Lymph # 2.72 0.6 - 4.6 x10(3)/mcL    Mono # 1.14 0.1 - 1.3 x10(3)/mcL    Eos # 1.81 (H) 0 - 0.9 x10(3)/mcL    Baso # 0.07 <=0.2 x10(3)/mcL    Imm Gran # 0.22 (H) 0.00 - 0.04 x10(3)/mcL    NRBC% 1.6 %   Blood Smear Microscopic Exam    Collection Time: 04/21/25  4:33 AM   Result Value Ref Range    RBC Morph Abnormal (A) Normal    Anisocytosis 1+ (A) (none)    Hypochromasia 1+ (A) (none)    Macrocytosis 1+ (A) (none)    Microcytosis 1+ (A) (none)    Platelets Normal Normal, Adequate   Prepare RBC 2 Units; to give    Collection Time: 04/21/25  5:52 AM   Result Value Ref Range    UNIT NUMBER [id number]     UNIT ABO/RH A POS     DISPENSE STATUS Selected     Unit Expiration [id number]     Product Code A0082R24     Unit Blood Type Code 6200     CROSSMATCH INTERPRETATION Compatible     UNIT NUMBER [id number]     UNIT ABO/RH A POS     DISPENSE STATUS Selected     Unit Expiration [id number]     Product Code R4717B88     Unit Blood Type Code 6200     CROSSMATCH INTERPRETATION Compatible    Type & Screen    Collection  Time: 04/21/25  5:52 AM   Result Value Ref Range    Group & Rh A POS     Indirect Dave GEL NEG     Specimen Outdate 04/24/2025 23:59        XR NG/OG tube placement check, non-radiologist performed  Result Date: 4/19/2025  EXAMINATION: XR NG/OG TUBE PLACEMENT CHECK, NON-RADIOLOGIST PERFORMED CLINICAL HISTORY: New NGT placement; COMPARISON: 17 April 2024     Frontal image of the upper abdomen.  Enteric tube extends well into the stomach. Electronically signed by: Bossman Lawrence Date:    04/19/2025 Time:    15:21    CV Ultrasound Bilateral Doppler Carotid  Result Date: 4/18/2025  The right internal carotid artery is patent with less than 50% stenosis. The left internal carotid artery is patent with less than 50% stenosis. Bilateral vertebral arteries are patent with antegrade flow.     XR Gastric tube check, non-radiologist performed  Result Date: 4/17/2025  EXAMINATION: XR GASTRIC TUBE CHECK, NON-RADIOLOGIST PERFORMED CLINICAL HISTORY: NG partially reinserted; COMPARISON: X-rays dated 04/16/2025 FINDINGS: Nasogastric tube has its tip over the stomach.     Nasogastric tube with tip over the stomach. Electronically signed by: Perlita Coffman Date:    04/17/2025 Time:    15:34    X-Ray Chest 1 View  Result Date: 4/17/2025  EXAMINATION: XR CHEST 1 VIEW CLINICAL HISTORY: new onset fever; TECHNIQUE: One view COMPARISON: April 13, 2025. FINDINGS: Cardiopericardial silhouette is within normal limits.  No acute dense focal or segmental consolidation, congestive process, pleural effusions or pneumothorax.  Right upper lung lobe small calcified granuloma.  There is a nasogastric tube with tip within the mid to distal gastric body.     No acute cardiopulmonary process identified. Electronically signed by: Fabio Godwin Date:    04/17/2025 Time:    09:29    XR Gastric tube check, non-radiologist performed  Result Date: 4/16/2025  EXAMINATION: XR GASTRIC TUBE CHECK, NON-RADIOLOGIST PERFORMED CLINICAL HISTORY: placement; TECHNIQUE:  One view COMPARISON: September 23, 2024 FINDINGS: Nasogastric tube traverses through the gastroesophageal junction and tip of tube is within the distal gastric body.  There is adequate length of the tube within the stomach.     Optimal placement of enteric tube. Electronically signed by: Fabio Godwin Date:    04/16/2025 Time:    17:01    Fl Modified Barium Swallow Speech  Result Date: 4/15/2025  See procedure notes from Speech Pathologist. This procedure was auto-finalized.    Echo Saline Bubble? Yes  Result Date: 4/14/2025    Left Ventricle: The left ventricle is normal in size. Normal wall thickness. There is normal systolic function with a visually estimated ejection fraction of 65 - 70%.   Right Ventricle: The right ventricle is normal in size. Systolic function is normal.   Left Atrium: Agitated saline study of the atrial septum is negative, suggesting absence of intracardiac shunt at the atrial level.   Aortic Valve: There is aortic valve sclerosis. There is no stenosis. There is trace aortic regurgitation.   Mitral Valve: There is no significant regurgitation.   Tricuspid Valve: There is trace regurgitation.   Pulmonary Artery: The estimated pulmonary artery systolic pressure is 30 mmHg.   IVC/SVC: Normal venous pressure at 3 mmHg.   Pericardium: There is no pericardial effusion.     X-Ray Hip 2 or 3 views Right with Pelvis when performed  Result Date: 4/14/2025  EXAMINATION: XR HIP WITH PELVIS WHEN PERFORMED 2 OR 3 VIEWS RIGHT CLINICAL HISTORY: post op;  fracture COMPARISON: Yesterday FINDINGS: Frontal view of the pelvis with two views of the right hip.  There are expected changes of interval placement of right hip arthroplasty.     As above. Electronically signed by: Bossman Lawrence Date:    04/14/2025 Time:    14:35    CTA Head and Neck (xpd)  Result Date: 4/13/2025  EXAMINATION: CTA HEAD AND NECK (XPD) CLINICAL HISTORY: Stroke, follow up; TECHNIQUE: Non contrast low dose axial images were obtained through  the head.  CT angiogram was performed from the level of the ranjana to the top of the head following the IV administration of 100mL of Omnipaque 350.   Sagittal and coronal reconstructions and maximum intensity projection reconstructions were performed. Arterial stenosis percentages are based on NASCET measurement criteria.  DLP 2906.  Automated exposure control used. COMPARISON: 11/15/2023 FINDINGS: Common carotid arteries, carotid bulbs, internal carotid arteries are patent without significant stenosis or occlusion.  Mild atherosclerotic plaque noted at the carotid bulbs bilaterally.  Mild moderate narrowing of the left dominant vertebral artery origin.  Vertebral arteries are otherwise patent.  No soft tissue mass, fluid collection, hematoma, lymphadenopathy. Head: Prominent atherosclerotic plaque noted involving the distal vertebral arteries bilaterally suspected moderate severe narrowing.  Basilar artery patent.  Prominent atherosclerotic plaque along the carotid siphons bilaterally we severe narrowing bilaterally particular involving the ophthalmic and clinoid segments likely slightly greater on the left.  Middle cerebral arteries and branches patent bilaterally.  Anterior cerebral arteries, posterior cerebral arteries patent bilaterally.  No aneurysm evident..     Mild moderate narrowing of the left vertebral artery origin. Severe narrowing of the distal vertebral arteries bilaterally. Severe narrowing of the clinoid, ophthalmic segments of the internal carotid artery siphons bilaterally.  Suspected near occlusion on the left. No significant interval change from the prior. Electronically signed by: Taras Dunn MD Date:    04/13/2025 Time:    12:31    MRI Brain Without Contrast  Result Date: 4/13/2025  EXAMINATION: MRI BRAIN WITHOUT CONTRAST CLINICAL HISTORY: Stroke, follow up; TECHNIQUE: Multiplanar multisequence MR imaging of the brain was performed without contrast. COMPARISON: CT same day FINDINGS:  Small linear area of susceptibility artifact posterior left thalamus with a punctate area of increased T1 signal consistent with acute/subacute thalamic hemorrhage. No visible diffusion restriction on diffusion sequence. Advanced involutional change.  Moderate severe chronic microvascular white matter ischemic change.  Scattered remote lacunar infarcts in the subinsular basal ganglia regions. Ventricles, sulci, cisterns otherwise normal with no mass effect or midline shift.  No intra or extra-axial mass. Posterior fossa brainstem otherwise grossly normal.  Sella and orbits grossly normal. Mild ethmoid mucosal thickening.  Cranium and extracranial structures otherwise unremarkable.     Small linear/punctate area of acute/subacute left thalamic hemorrhage, possible hemorrhagic infarct although with no visible diffusion restriction, region obscured by hemorrhagic artifact. Advanced involutional change, moderate severe chronic microvascular white matter ischemic change. Electronically signed by: Taras Dunn MD Date:    04/13/2025 Time:    12:11    CT Head Without Contrast  Result Date: 4/13/2025  EXAMINATION: CT HEAD WITHOUT CONTRAST CLINICAL HISTORY: fall; TECHNIQUE: Sequential axial images were performed of the brain without contrast. Dose product length of 1281 mGycm. Automated exposure control was utilized to minimize radiation dose. COMPARISON: CT brain March 20, 2024. FINDINGS: There is left basal ganglia blush like subtle hyperdense which could represent subacute minimal hemorrhage on image 32 series 2.  This may be related to hemorrhagic infarct.  There are bilateral thalami old lacunar infarcts.  There is also pontine old lacunar infarct.  No acute large vessel territory infarct identified.  There is chronic microvascular ischemia and atrophy.  No mass effect, midline shift or hydrocephalus.  No acute extra-axial fluid collections.  There is no acute depressed calvarial fracture. There is mild  mucoperiosteal thickening of the ethmoidal air cells and minimally the right maxillary sinus.  Otherwise, visualized paranasal sinuses are clear without mucosal thickening, polypoidal abnormality or air-fluid levels. Mastoid air cells aeration is optimal.     1.  Left thalamic minimal hyperdensity may represent subacute hemorrhage related to subacute infarct.  Please correlate clinically.  This may be further assessed with MRI brain. 2.  No convincing acute brain traumatic findings. Electronically signed by: Fabio Godwin Date:    04/13/2025 Time:    10:09    CT Cervical Spine Without Contrast  Result Date: 4/13/2025  EXAMINATION: CT CERVICAL SPINE WITHOUT CONTRAST CLINICAL HISTORY: fall; TECHNIQUE: Low dose axial images, sagittal and coronal reformations were performed though the cervical spine.  Contrast was not administered.  DLP 1281.  Automated exposure control used. COMPARISON: None FINDINGS: No fracture or dislocation evident.  Vertebral body height is maintained.  Odontoid intact. C2-C3 demonstrates minimal facet arthrosis. C3-C4 demonstrates mild left-sided facet arthrosis, right-sided uncovertebral spurring and moderate right-sided osseous foraminal narrowing. C4-C5 demonstrates mild disc space narrowing, osteophytic lipping and uncovertebral spurring, moderate severe osseous foraminal narrowing right greater than left. C5-C6 demonstrates mild disc space narrowing, moderate osteophytic change and uncovertebral spurring, moderate severe osseous foraminal narrowing. C6-C7 moderate disc space narrowing, osteophytic change, prominent uncovertebral spurring and severe osseous foraminal narrowing bilaterally. No soft tissue mass, fluid collection, hematoma, lymphadenopathy.     No acute findings.  Spondylosis as above. Electronically signed by: Taras Dunn MD Date:    04/13/2025 Time:    10:06    X-Ray Hip 2 or 3 views Right with Pelvis when performed  Result Date: 4/13/2025  EXAMINATION: XR HIP WITH PELVIS  WHEN PERFORMED 2 OR 3 VIEWS RIGHT CLINICAL HISTORY: Fall. COMPARISON: None available. FINDINGS: There is right femoral neck fracture.  Femoral neck however is not well seen and is obscured due to some proximal retraction of the femoral shaft.  Femoral head is situated within the acetabulum.  No other fracture or dislocation     Fracture. Electronically signed by: Fabio Godwin Date:    04/13/2025 Time:    09:46    X-Ray Chest AP Portable  Result Date: 4/13/2025  EXAMINATION: XR CHEST AP PORTABLE CLINICAL HISTORY: Unspecified fall, initial encounter TECHNIQUE: One view COMPARISON: March 27, 2023. FINDINGS: Cardiopericardial silhouette enlarged appearance is similar.  Lungs are without dense focal or segmental consolidation, congestive process, pleural effusions or pneumothorax.     No acute cardiopulmonary process identified. Electronically signed by: Fabio Godwin Date:    04/13/2025 Time:    09:43    US Abdomen Limited  Result Date: 4/9/2025  EXAMINATION: US ABDOMEN LIMITED CLINICAL HISTORY: elevated liver enzymes;  Elevation of levels of liver transaminase levels COMPARISON: None FINDINGS: Liver measures 13.2 cm.  No abnormalities are demonstrated.  There is hepatopetal flow in the portal vein.  Gallbladder is anechoic.  Common bile duct measures 5 mm. The pancreas is incompletely visualized due to patient's body habitus.  An abnormality is not demonstrated.  There is flow present the proximal IVC. The right kidney measures 9.8 by 5 x 5 cm.  There are no focal lesions noted there is no hydronephrosis     No acute abnormalities are demonstrated. Limited exam Electronically signed by: Vic Turner MD Date:    04/09/2025 Time:    08:36    CV Ultrasound doppler venous legs bilat  Result Date: 3/27/2025    There is no evidence of a right lower extremity DVT.   There is no evidence of a left lower extremity DVT. Negative for deep and superficial vein thrombosis in bilateral lower extremities.     CV Ultrasound Bilateral  Doppler Carotid  Result Date: 3/27/2025  The right internal carotid artery is patent with less than 50% stenosis. The left internal carotid artery is patent with less than 50% stenosis. Bilateral vertebral arteries are patent with antegrade flow.       Imaging personally reviewed by myself and SP.    Assessment / Plan:   74-year-old male known to Dr. Maguire w/pmhx of diabetes, hypertension, stroke. He presented to the ER 4/13 with complaints of fall with right hip pain.  H&H 8.8/32. Admitted with fall, femur fracture and CVA.  Underwent right hip hemiarthroplasty 4/14.  Following surgery, Hemoglobin has slowly trended down.      GI consulted for acute on chronic JN  Acute on chronic JN  Hgb lowest today 8-- 5.5 (2u prbc tbi)  Iron 13, TIBC 314, ferritin 27, iron sat 4.  On oral iron bid  Occult stool pending.  -Continue ppi bid  -Monitor H/H and transfuse as needed to Hgb 7  -Monitor stools for bleeding  -Hold TF at MN   -Plan for blood resuscitation today, NPO at MN for EGD Tuesday.    Speech to evaluate patient post stroke. May need PEG in the future if patient/family agree.  Will need  for consent for procedure. On Droplet precautions for + Human Rhinovirus infection.     Case and plan of care discussed with MD  Thank you for allowing us to participate in this patient's care.   Deb Wetzel NP with Dr. Hernández         [1]   Current Facility-Administered Medications   Medication Dose Route Frequency Provider Last Rate Last Admin    0.9%  NaCl infusion (for blood administration)   Intravenous Q24H PRN Lorin Wells, TOMMY        0.9%  NaCl infusion (for blood administration)   Intravenous Q24H PRN Marianela Garzon MD        acetaminophen oral solution 650 mg  650 mg Per NG tube Q4H PRN Marianela Garzon MD   650 mg at 04/19/25 0437    atorvastatin tablet 40 mg  40 mg Per NG tube QHS Marianela Garzon MD   40 mg at 04/20/25 2233    bisacodyL suppository 10 mg  10 mg Rectal Daily PRN Janee Valerio,  BECCA        dextrose 50% injection 12.5 g  12.5 g Intravenous PRN Marianela Garzon MD        docusate 50 mg/5 mL liquid 100 mg  100 mg Per NG tube BID PRN Marianela Garzon MD        ferrous sulfate 300 mg (60 mg iron)/5 mL syrup 300 mg  300 mg Per NG tube BID Marianela Garzon MD   300 mg at 04/20/25 2233    gabapentin capsule 600 mg  600 mg Per NG tube TID Marianela Garzon MD   600 mg at 04/20/25 2233    glucagon (human recombinant) injection 1 mg  1 mg Intramuscular PRN Marianela Garzon MD        hydrALAZINE injection 10 mg  10 mg Intravenous Q4H PRN Claritza Deng FNP   10 mg at 04/16/25 1112    insulin aspart U-100 injection 0-10 Units  0-10 Units Subcutaneous Q6H PRN Marianela Garzon MD   2 Units at 04/21/25 0557    insulin glargine U-100 (Lantus) injection 12 Units  12 Units Subcutaneous BID Marianela Garzon MD   12 Units at 04/20/25 2234    metFORMIN tablet 1,000 mg  1,000 mg Per NG tube BID WM Marianela Garzon MD   1,000 mg at 04/21/25 0557    pantoprazole injection 40 mg  40 mg Intravenous BID Marianela Garzon MD        pantoprazole injection 80 mg  80 mg Intravenous Once Marianela Garzon MD        sodium chloride 0.9% flush 10 mL  10 mL Intravenous Q8H Janee Valerio PA-C   10 mL at 04/21/25 0222   [2]   Facility-Administered Medications Prior to Admission   Medication Dose Route Frequency Provider Last Rate Last Admin    influenza 65up-adj (QUADRIVALENT ADJUVANTED PF) vaccine 0.5 mL  0.5 mL Intramuscular vaccine x 1 dose Shannon Alonso MD        sodium chloride 0.9% flush 10 mL  10 mL Intravenous PRN Krystina Galeana MD        sodium chloride 0.9% flush 10 mL  10 mL Intravenous PRN Be Laws MD         Medications Prior to Admission   Medication Sig Dispense Refill Last Dose/Taking    atorvastatin (LIPITOR) 40 MG tablet Take 1 tablet (40 mg total) by mouth once daily. 90 tablet 3 4/12/2025    empagliflozin (JARDIANCE) 25 mg tablet Take 1 tablet (25 mg total) by mouth once daily. 90  tablet 3 4/12/2025    losartan (COZAAR) 25 MG tablet Take 1 tablet (25 mg total) by mouth once daily. 90 tablet 1 4/12/2025    metFORMIN (GLUMETZA) 1000 MG (MOD) 24hr tablet Take 1 tablet (1,000 mg total) by mouth 2 (two) times daily with meals. 180 tablet 3 4/12/2025    aspirin (ECOTRIN) 81 MG EC tablet Take 1 tablet (81 mg total) by mouth once daily. 30 tablet 11     ferrous sulfate (FEOSOL) 325 mg (65 mg iron) Tab tablet Take 1 tablet (325 mg total) by mouth once daily. 90 tablet 0     gabapentin (NEURONTIN) 300 MG capsule Take 2 capsules (600 mg total) by mouth 3 (three) times daily. 180 capsule 11     ketoconazole (NIZORAL) 2 % shampoo Apply topically twice a week. 120 mL 11     polyethylene glycol (GLYCOLAX) 17 gram/dose powder Oral

## 2025-04-21 NOTE — PROGRESS NOTES
[facility] Hospital Medicine Progress Note        Chief Complaint: Inpatient Follow-up for stroke/ Fall/ Rt hip fracture.     HPI:   74 y.o. [place]ian speaking male with a PMHx of hypertension, hyperlipidemia, diabetes mellitus type 2, ptosis, prior CVA without deficits and sciatica  presented to [facility] on 4/13/2025 with a primary complaint of right hip pain following a fall which occurred yesterday (04/12/2025). Patient was ambulating with a walker when he reached for the door, lost his balance and fell onto his right hip.  Patient denies hitting his head. Son who is at bedside translating stated that  patient did complain of some left eye blur vision  He denies shortness a breath, chest pain, nausea, vomiting, diarrhea, headache and dizziness.  He received 50 mcg of fentanyl in route for Rt hip pain.      On arrival  to the ED, Pt was afebrile, HR 88, /76, RR 18 and SpO2 96% on room air.  Labs with H&H 8.8/30.2, CO2 20, AST 48, ALT 63.  EKG with sinus rhythm with first-degree AV block, non specific  ST and T wave change. right hip x-ray revealed a right femoral neck fracture.  Chest x-ray with no acute cardiopulmonary process.  CT of the head revealed a left thalamic minimal hyperdensity which may represent subacute hemorrhage related to subacute infarct but no convincing acute brain traumatic findings.  CT cervical spine without acute findings.  MRI of the brain with small linear/punctuate area of acute/subacute left thalamic hemorrhage, possible hemorrhagic infarct.  In ED patient received IV hydration, morphine, Zofran.  Orthopedics and neurology were consulted.      Pt underwent Rt hip hemiarthroplasty with Dr. Raghu Hunt on 4/14/25. Neurology initiated stroke workup and suggested possible left thalamic hypertensive hemorrhagic stoke. CTA head/neck with severe narrowing of jah distal vertebral arteries, severe narrowing of the clinoid, ophthalmic segments of the internal  carotid artery siphons bilaterally and near occlusion on the left.. Echo with LVEF 65-70%, neg bubble study, Carotid U/S with less than 50% ICA stenosis. Pt failed SLP eval and placed NPO. NG tube inserted for medication and nutrition. Neurology cleared Pt to start ASA 4 days from stroke onset which was 4/17/25. PT /OT suggested high intensity therapy.Spoke to Dr. Phillip with Neurology and cleared Pt for prophylactic Lovenox as of 4/18/25.      New onset fever of 101 on 4/17/25 associated with tachycardia. Infectious workup include CBC, CXR, UA, blood cultures x2 and Resp PCR ordered. Pt was started on empiric Zosyn IV. Workup was neg except Resp PCR was positive for Human Rhinovirus infection. Pt became afebrile. Zosyn discontinued. Pt pulled NG tube on 4/19/25. Replaced without difficulty. SLP continued to follow. As of 4/19/25 Pt remained with oropharyngeal dysphagia unsafe for PO intake. On 4/21/25, Pt passed MBS and was cleared for puree diet and mod thick liquid.     On 4/21/25 , Pt with melanotic stool and an acute drop in Hgb to 5.5 from 8.0 before. 2 units of P-RBC transfused. High dose PPI IV initiated. ASA/ Prophylactic Lovenox held. GI consulted            Interval Hx:   Acute drop in Hgb to 5.5 from 8 noted on am labs  Stool reportedly melanotic today    2 units of P-RBC ordered   ASA 81 mg/ Prophylactic Lovenox  held   GI consulted and EGD planned for am     Case was discussed with patient's nurse and  on the floor.    Objective/physical exam:  General: In no acute distress, afebrile, on RA  Chest: Clear to auscultation bilaterally  Heart: RRR, +S1, S2, no appreciable murmur  Abdomen: Soft, nontender, BS +  MSK: Warm, no lower extremity edema, no clubbing or cyanosis, Surgical dressing to Rt upper lat thigh C/D/I  Neurologic: Alert and oriented  to self and person. Moves all 4 ext.    VITAL SIGNS: 24 HRS MIN & MAX LAST   Temp  Min: 97.9 °F (36.6 °C)  Max: 99.8 °F (37.7 °C) 98 °F (36.7 °C)    BP  Min: 93/52  Max: 116/71 109/68   Pulse  Min: 79  Max: 95  80   Resp  Min: 18  Max: 20 18   SpO2  Min: 96 %  Max: 98 % 97 %     I have reviewed the following labs:  Recent Labs   Lab 04/15/25  0509 04/16/25 1225 04/17/25 0827 04/21/25  0433 04/21/25  0823   WBC 13.29*  --  9.84 11.61*  --    RBC 3.93*  --  3.92* 2.66*  --    HGB 8.2*   < > 8.0* 5.6* 5.5*   HCT 29.6*   < > 30.0* 20.7* 20.1*   MCV 75.3*  --  76.5* 77.8*  --    MCH 20.9*  --  20.4* 21.1*  --    MCHC 27.7*  --  26.7* 27.1*  --    RDW 20.2*  --  21.2* 22.1*  --      --  397 310  --    MPV 9.3  --  10.0 10.9*  --     < > = values in this interval not displayed.     Recent Labs   Lab 04/15/25  0509 04/16/25 1225 04/18/25  0501 04/19/25  0451 04/21/25 0433      < > 151* 145 137   K 5.1   < > 4.2 3.8 4.2      < > 115* 109* 104   CO2 15*   < > 28 28 26   BUN 29.1*   < > 31.0* 34.7* 22.4   CREATININE 0.81   < > 0.74 0.75 0.60*   CALCIUM 8.5*   < > 8.8 8.5* 8.5*   MG  --    < > 2.20 2.10 1.80   ALBUMIN 3.4  --   --   --   --    ALKPHOS 96  --   --   --   --    ALT 37  --   --   --   --    AST 41  --   --   --   --    BILITOT 0.7  --   --   --   --     < > = values in this interval not displayed.     Microbiology Results (last 7 days)       Procedure Component Value Units Date/Time    Blood Culture [[id number]]  (Normal) Collected: 04/17/25 0827    Order Status: Completed Specimen: Blood Updated: 04/21/25 1000     Blood Culture No Growth At 96 Hours    Blood Culture [[id number]]  (Normal) Collected: 04/17/25 0827    Order Status: Completed Specimen: Blood Updated: 04/21/25 1000     Blood Culture No Growth At 96 Hours             See below for Radiology    Assessment/Plan:  Acute on chronic anemia due to possible GI bleed- 4/21/25- P-RBC x2 transfused   New onset fever with tachycardia / sepsis due to unspecified infection - 4/17/25 - resolved   Mechanical fall and closed displaced Rt femoral neck fracture, POA - s/p  Rt hip  hemiarthroplasty on 4/14/25.  Acute to subacute left thalamic hemorrhage, POA  Oropharyngeal dysphagia due to above, POA- s/p NG tube    Intracranial Atherosclerotic disease, POA   Left eye blur vision with near occlusion of clinoid, ophthalmic segments of the internal carotid artery siphon on the left, POA  Leukocytosis, POA- resolved   Microcytic hypochromic/ iron deficiency  anemia, moderate -POA  Diabetes Mellitis , type 2- uncontrolled due to hyperglycemia , HbA1c 8.0  Hypernatremia due to free water deficit - 4/16/25  Elevated LFT- resolved      Hx- HTN, HLD, prior CVA     Plan-  Hold ASA/ Prophylactic Lovenox   2 units of P-RBC today   Start Pantoprazole 80 mg x 1 then 40 mg bid   GI consult   Possible EGD in am     Critical care note:  Critical care diagnosis: acute anemia requiring P-RBC transfusion   Critical care interventions: Hands-on evaluation, review of labs/radiographs/records and discussion with patient and family if present  Critical care time spent: 35 minutes      VTE prophylaxis:  Lovenox started 4/18/25, then Held on 4/21/25 due to suspected GI bleed      Patient condition:  Fair     Anticipated discharge and Disposition:     TBD      All diagnosis and differential diagnosis have been reviewed; assessment and plan has been documented; I have personally reviewed the labs and test results that are presently available; I have reviewed the patients medication list; I have reviewed the consulting providers response and recommendations. I have reviewed or attempted to review medical records based upon their availability    All of the patient's questions have been  addressed and answered. Patient's is agreeable to the above stated plan. I will continue to monitor closely and make adjustments to medical management as needed.    Portions of this note dictated using EMR integrated voice recognition software, and may be subject to voice recognition errors not corrected at proofreading. Please contact  writer for clarification if needed.   _____________________________________________________________________    Malnutrition Status:  Nutrition consulted. Most recent weight and BMI monitored-     Measurements:  Wt Readings from Last 1 Encounters:   04/16/25 56.7 kg (125 lb)   Body mass index is 24.41 kg/m².    Patient has been screened and assessed by RD.    Malnutrition Type:  Context: acute illness or injury  Level: other (see comments) (Does not meet criteria)    Malnutrition Characteristic Summary:  Weight Loss (Malnutrition): other (see comments) (Does not meet criteria)  Muscle Mass (Malnutrition): mild depletion    Interventions/Recommendations (treatment strategy):  Enteral nutrition     Scheduled Med:   atorvastatin  40 mg Per NG tube QHS    ferrous sulfate  300 mg Per NG tube BID    gabapentin  600 mg Per NG tube TID    insulin glargine U-100  12 Units Subcutaneous BID    metFORMIN  1,000 mg Per NG tube BID WM    pantoprazole  40 mg Intravenous BID    sodium chloride 0.9%  10 mL Intravenous Q8H      Continuous Infusions:     PRN Meds:    Current Facility-Administered Medications:     0.9%  NaCl infusion (for blood administration), , Intravenous, Q24H PRN    0.9%  NaCl infusion (for blood administration), , Intravenous, Q24H PRN    acetaminophen, 650 mg, Per NG tube, Q4H PRN    bisacodyL, 10 mg, Rectal, Daily PRN    dextrose 50%, 12.5 g, Intravenous, PRN    docusate, 100 mg, Per NG tube, BID PRN    glucagon (human recombinant), 1 mg, Intramuscular, PRN    hydrALAZINE, 10 mg, Intravenous, Q4H PRN    insulin aspart U-100, 0-10 Units, Subcutaneous, Q6H PRN           Marianela Garzon MD  Department of Hospital Medicine   [facility]   04/21/2025

## 2025-04-21 NOTE — PROGRESS NOTES
[facility] Hospital Medicine Progress Note        Chief Complaint: Inpatient Follow-up for  stroke/ Fall/ Rt hip fracture.     HPI:   74 y.o. Nepali speaking male with a PMHx of hypertension, hyperlipidemia, diabetes mellitus type 2, ptosis, prior CVA without deficits and sciatica  presented to [facility] on 4/13/2025 with a primary complaint of right hip pain following a fall which occurred yesterday (04/12/2025). Patient was ambulating with a walker when he reached for the door, lost his balance and fell onto his right hip.  Patient denies hitting his head. Son who is at bedside translating stated that  patient did complain of some left eye blur vision  He denies shortness a breath, chest pain, nausea, vomiting, diarrhea, headache and dizziness.  He received 50 mcg of fentanyl in route for Rt hip pain.      On arrival  to the ED, Pt was afebrile, HR 88, /76, RR 18 and SpO2 96% on room air.  Labs with H&H 8.8/30.2, CO2 20, AST 48, ALT 63.  EKG with sinus rhythm with first-degree AV block, non specific  ST and T wave change. right hip x-ray revealed a right femoral neck fracture.  Chest x-ray with no acute cardiopulmonary process.  CT of the head revealed a left thalamic minimal hyperdensity which may represent subacute hemorrhage related to subacute infarct but no convincing acute brain traumatic findings.  CT cervical spine without acute findings.  MRI of the brain with small linear/punctuate area of acute/subacute left thalamic hemorrhage, possible hemorrhagic infarct.  In ED patient received IV hydration, morphine, Zofran.  Orthopedics and neurology were consulted.      Pt underwent Rt hip hemiarthroplasty with Dr. Raghu Hunt on 4/14/25. Neurology initiated stroke workup and suggested possible left thalamic hypertensive hemorrhagic stoke. CTA head/neck with severe narrowing of jah distal vertebral arteries, severe narrowing of the clinoid, ophthalmic segments of the  internal carotid artery siphons bilaterally and near occlusion on the left.. Echo with LVEF 65-70%, neg bubble study, Carotid U/S with less than 50% ICA stenosis. Pt failed SLP eval and placed NPO. NG tube inserted for medication and nutrition. Neurology cleared Pt to start ASA 4 days from stroke onset which was 4/17/25. PT /OT suggested high intensity therapy.Spoke to Dr. Phillip with Neurology and cleared Pt for prophylactic Lovenox as of 4/18/25.      New onset fever of 101 on 4/17/25 associated with tachycardia. Infectious workup include CBC, CXR, UA, blood cultures x2 and Resp PCR ordered. Pt was started on empiric Zosyn IV. Workup was neg except Resp PCR was positive for Human Rhinovirus infection. Pt became afebrile. Zosyn discontinued. Pt pulled NG tube on 4/19/25. Replaced without difficulty. SLP continued to follow. As of 4/19/25 Pt remained with oropharyngeal dysphagia unsafe for PO intake.       Interval Hx:   Frequent soft stools reported per nursing.  Will changed scheduled Colace to PRN  No other issues reported     Afebrile x 48h with tmax 99.4. Hemodynamics are stable, on RA      Case was discussed with patient's nurse and  on the floor.    Objective/physical exam:  General: In no acute distress, afebrile, on RA  Chest: Clear to auscultation bilaterally  Heart: RRR, +S1, S2, no appreciable murmur  Abdomen: Soft, nontender, BS +  MSK: Warm, no lower extremity edema, no clubbing or cyanosis, Surgical dressing to Rt upper lat thigh C/D/I  Neurologic: Alert and oriented  to self and person. Moves all 4 ext.      VITAL SIGNS: 24 HRS MIN & MAX LAST   Temp  Min: 98.2 °F (36.8 °C)  Max: 99.6 °F (37.6 °C) 99.6 °F (37.6 °C)   BP  Min: 93/52  Max: 109/69 (!) 93/52   Pulse  Min: 83  Max: 101  93   Resp  Min: 18  Max: 20 18   SpO2  Min: 95 %  Max: 98 % 96 %     I have reviewed the following labs:  Recent Labs   Lab 04/14/25  0320 04/15/25  0509 04/16/25  1225 04/17/25  0827   WBC 12.06* 13.29*  --   9.84   RBC 4.50* 3.93*  --  3.92*   HGB 9.4* 8.2* 7.5* 8.0*   HCT 34.3* 29.6* 26.8* 30.0*   MCV 76.2* 75.3*  --  76.5*   MCH 20.9* 20.9*  --  20.4*   MCHC 27.4* 27.7*  --  26.7*   RDW 20.6* 20.2*  --  21.2*   * 366  --  397   MPV 10.0 9.3  --  10.0     Recent Labs   Lab 04/14/25  0320 04/15/25  0509 04/16/25  1225 04/17/25  0827 04/18/25  0501 04/19/25  0451    136   < > 154* 151* 145   K 4.8 5.1   < > 4.0 4.2 3.8    106   < > 117* 115* 109*   CO2 16* 15*   < > 26 28 28   BUN 12.5 29.1*   < > 35.2* 31.0* 34.7*   CREATININE 0.67* 0.81   < > 0.71* 0.74 0.75   CALCIUM 8.9 8.5*   < > 9.6 8.8 8.5*   MG 1.80  --   --  2.40 2.20 2.10   ALBUMIN 3.7 3.4  --   --   --   --    ALKPHOS 121 96  --   --   --   --    ALT 54 37  --   --   --   --    AST 39 41  --   --   --   --    BILITOT 1.0 0.7  --   --   --   --     < > = values in this interval not displayed.     Microbiology Results (last 7 days)       Procedure Component Value Units Date/Time    Blood Culture [[id number]]  (Normal) Collected: 04/17/25 0827    Order Status: Completed Specimen: Blood Updated: 04/20/25 1001     Blood Culture No Growth At 72 Hours    Blood Culture [[id number]]  (Normal) Collected: 04/17/25 0827    Order Status: Completed Specimen: Blood Updated: 04/20/25 1001     Blood Culture No Growth At 72 Hours             See below for Radiology    Assessment/Plan:  New onset fever with tachycardia / sepsis due to unspecified infection - 4/17/25   Mechanical fall and closed displaced Rt femoral neck fracture, POA - s/p  Rt hip hemiarthroplasty on 4/14/25.  Acute to subacute left thalamic hemorrhage, POA  Oropharyngeal dysphagia due to above, POA- s/p NG tube    Intracranial Atherosclerotic disease, POA   Left eye blur vision with near occlusion of clinoid, ophthalmic segments of the internal carotid artery siphon on the left, POA  Leukocytosis, POA- resolved   Microcytic hypochromic/ iron deficiency  anemia, moderate -POA  Diabetes  Mellitis , type 2- uncontrolled due to hyperglycemia , HbA1c 8.0  Hypernatremia due to free water deficit - 4/16/25  Elevated LFT- resolved      Hx- HTN, HLD, prior CVA     Plan-  No fever x 48h  Infectious workup neg except Resp PCR + for human rhino virus infection.   Empiric Zosyn discontinued     Hypernatremia improved with D5 infusion and increased free water per NG tube.  Continue ASA, high intensity statin  Continue home meds as appropriate via NG tube  Monitor course   Continue SLP/PT/OT service         VTE prophylaxis:  Lovenox started 4/18/25     Patient condition:  Fair     Anticipated discharge and Disposition:     TBD     All diagnosis and differential diagnosis have been reviewed; assessment and plan has been documented; I have personally reviewed the labs and test results that are presently available; I have reviewed the patients medication list; I have reviewed the consulting providers response and recommendations. I have reviewed or attempted to review medical records based upon their availability    All of the patient's questions have been  addressed and answered. Patient's is agreeable to the above stated plan. I will continue to monitor closely and make adjustments to medical management as needed.    Portions of this note dictated using EMR integrated voice recognition software, and may be subject to voice recognition errors not corrected at proofreading. Please contact writer for clarification if needed.   _____________________________________________________________________    Malnutrition Status:  Nutrition consulted. Most recent weight and BMI monitored-     Measurements:  Wt Readings from Last 1 Encounters:   04/16/25 56.7 kg (125 lb)   Body mass index is 24.41 kg/m².    Patient has been screened and assessed by RD.    Malnutrition Type:  Context: acute illness or injury  Level: other (see comments) (Does not meet criteria)    Malnutrition Characteristic Summary:  Weight Loss (Malnutrition):  other (see comments) (Does not meet criteria)  Muscle Mass (Malnutrition): mild depletion    Interventions/Recommendations (treatment strategy):  Enteral nutrition     Scheduled Med:   aspirin  81 mg Per NG tube Daily    atorvastatin  40 mg Per NG tube QHS    enoxparin  40 mg Subcutaneous Q24H (prophylaxis, 1700)    ferrous sulfate  300 mg Per NG tube BID    gabapentin  600 mg Per NG tube TID    insulin glargine U-100  12 Units Subcutaneous BID    metFORMIN  1,000 mg Per NG tube BID WM    sodium chloride 0.9%  10 mL Intravenous Q8H      Continuous Infusions:     PRN Meds:    Current Facility-Administered Medications:     acetaminophen, 650 mg, Per NG tube, Q4H PRN    bisacodyL, 10 mg, Rectal, Daily PRN    dextrose 50%, 12.5 g, Intravenous, PRN    docusate, 100 mg, Per NG tube, BID PRN    glucagon (human recombinant), 1 mg, Intramuscular, PRN    hydrALAZINE, 10 mg, Intravenous, Q4H PRN    insulin aspart U-100, 0-10 Units, Subcutaneous, Q6H PRN         Marianela Garzon MD  Department of Hospital Medicine   [facility]   04/20/2025

## 2025-04-22 ENCOUNTER — ANESTHESIA EVENT (OUTPATIENT)
Dept: ENDOSCOPY | Facility: HOSPITAL | Age: 75
End: 2025-04-22
Payer: MEDICAID

## 2025-04-22 ENCOUNTER — ANESTHESIA (OUTPATIENT)
Dept: ENDOSCOPY | Facility: HOSPITAL | Age: 75
End: 2025-04-22
Payer: MEDICAID

## 2025-04-22 LAB
ALBUMIN SERPL-MCNC: 2.6 G/DL (ref 3.4–4.8)
ALBUMIN/GLOB SERPL: 0.7 RATIO (ref 1.1–2)
ALP SERPL-CCNC: 65 UNIT/L (ref 40–150)
ALT SERPL-CCNC: 23 UNIT/L (ref 0–55)
ANION GAP SERPL CALC-SCNC: 6 MEQ/L
AST SERPL-CCNC: 27 UNIT/L (ref 11–45)
BACTERIA BLD CULT: NORMAL
BACTERIA BLD CULT: NORMAL
BASOPHILS # BLD AUTO: 0.06 X10(3)/MCL
BASOPHILS NFR BLD AUTO: 0.5 %
BILIRUB SERPL-MCNC: 1.2 MG/DL
BUN SERPL-MCNC: 18.5 MG/DL (ref 8.4–25.7)
CALCIUM SERPL-MCNC: 8.2 MG/DL (ref 8.8–10)
CHLORIDE SERPL-SCNC: 108 MMOL/L (ref 98–107)
CO2 SERPL-SCNC: 26 MMOL/L (ref 23–31)
CREAT SERPL-MCNC: 0.58 MG/DL (ref 0.72–1.25)
CREAT/UREA NIT SERPL: 32
EOSINOPHIL # BLD AUTO: 1.54 X10(3)/MCL (ref 0–0.9)
EOSINOPHIL NFR BLD AUTO: 13.5 %
ERYTHROCYTE [DISTWIDTH] IN BLOOD BY AUTOMATED COUNT: 19.5 % (ref 11.5–17)
GFR SERPLBLD CREATININE-BSD FMLA CKD-EPI: >60 ML/MIN/1.73/M2
GLOBULIN SER-MCNC: 3.7 GM/DL (ref 2.4–3.5)
GLUCOSE SERPL-MCNC: 126 MG/DL (ref 82–115)
HCT VFR BLD AUTO: 30.4 % (ref 42–52)
HGB BLD-MCNC: 9 G/DL (ref 14–18)
IMM GRANULOCYTES # BLD AUTO: 0.52 X10(3)/MCL (ref 0–0.04)
IMM GRANULOCYTES NFR BLD AUTO: 4.6 %
LYMPHOCYTES # BLD AUTO: 2.39 X10(3)/MCL (ref 0.6–4.6)
LYMPHOCYTES NFR BLD AUTO: 21 %
MAGNESIUM SERPL-MCNC: 1.8 MG/DL (ref 1.6–2.6)
MCH RBC QN AUTO: 22.9 PG (ref 27–31)
MCHC RBC AUTO-ENTMCNC: 29.6 G/DL (ref 33–36)
MCV RBC AUTO: 77.4 FL (ref 80–94)
MONOCYTES # BLD AUTO: 1.12 X10(3)/MCL (ref 0.1–1.3)
MONOCYTES NFR BLD AUTO: 9.8 %
NEUTROPHILS # BLD AUTO: 5.75 X10(3)/MCL (ref 2.1–9.2)
NEUTROPHILS NFR BLD AUTO: 50.6 %
NRBC BLD AUTO-RTO: 3.4 %
PHOSPHATE SERPL-MCNC: 2.7 MG/DL (ref 2.3–4.7)
PLATELET # BLD AUTO: 301 X10(3)/MCL (ref 130–400)
PMV BLD AUTO: 10.8 FL (ref 7.4–10.4)
POCT GLUCOSE: 176 MG/DL (ref 70–110)
POTASSIUM SERPL-SCNC: 4.2 MMOL/L (ref 3.5–5.1)
PROT SERPL-MCNC: 6.3 GM/DL (ref 5.8–7.6)
RBC # BLD AUTO: 3.93 X10(6)/MCL (ref 4.7–6.1)
SODIUM SERPL-SCNC: 140 MMOL/L (ref 136–145)
WBC # BLD AUTO: 11.38 X10(3)/MCL (ref 4.5–11.5)

## 2025-04-22 PROCEDURE — 25000003 PHARM REV CODE 250: Performed by: INTERNAL MEDICINE

## 2025-04-22 PROCEDURE — 43239 EGD BIOPSY SINGLE/MULTIPLE: CPT | Performed by: STUDENT IN AN ORGANIZED HEALTH CARE EDUCATION/TRAINING PROGRAM

## 2025-04-22 PROCEDURE — 84100 ASSAY OF PHOSPHORUS: CPT | Performed by: INTERNAL MEDICINE

## 2025-04-22 PROCEDURE — 97550 CAREGIVER TRAING 1ST 30 MIN: CPT

## 2025-04-22 PROCEDURE — 97535 SELF CARE MNGMENT TRAINING: CPT

## 2025-04-22 PROCEDURE — D9220A PRA ANESTHESIA: Mod: CRNA,,, | Performed by: NURSE ANESTHETIST, CERTIFIED REGISTERED

## 2025-04-22 PROCEDURE — 97530 THERAPEUTIC ACTIVITIES: CPT

## 2025-04-22 PROCEDURE — 63600175 PHARM REV CODE 636 W HCPCS: Performed by: INTERNAL MEDICINE

## 2025-04-22 PROCEDURE — 25000003 PHARM REV CODE 250: Performed by: NURSE ANESTHETIST, CERTIFIED REGISTERED

## 2025-04-22 PROCEDURE — D9220A PRA ANESTHESIA: Mod: ANES,,, | Performed by: ANESTHESIOLOGY

## 2025-04-22 PROCEDURE — 21400001 HC TELEMETRY ROOM

## 2025-04-22 PROCEDURE — A4216 STERILE WATER/SALINE, 10 ML: HCPCS | Performed by: PHYSICIAN ASSISTANT

## 2025-04-22 PROCEDURE — 25000003 PHARM REV CODE 250: Performed by: PHYSICIAN ASSISTANT

## 2025-04-22 PROCEDURE — 80053 COMPREHEN METABOLIC PANEL: CPT

## 2025-04-22 PROCEDURE — 36415 COLL VENOUS BLD VENIPUNCTURE: CPT

## 2025-04-22 PROCEDURE — 0DB68ZX EXCISION OF STOMACH, VIA NATURAL OR ARTIFICIAL OPENING ENDOSCOPIC, DIAGNOSTIC: ICD-10-PCS | Performed by: INTERNAL MEDICINE

## 2025-04-22 PROCEDURE — 88305 TISSUE EXAM BY PATHOLOGIST: CPT | Performed by: STUDENT IN AN ORGANIZED HEALTH CARE EDUCATION/TRAINING PROGRAM

## 2025-04-22 PROCEDURE — 27000207 HC ISOLATION

## 2025-04-22 PROCEDURE — 27201423 OPTIME MED/SURG SUP & DEVICES STERILE SUPPLY: Performed by: STUDENT IN AN ORGANIZED HEALTH CARE EDUCATION/TRAINING PROGRAM

## 2025-04-22 PROCEDURE — 85025 COMPLETE CBC W/AUTO DIFF WBC: CPT

## 2025-04-22 PROCEDURE — 37000008 HC ANESTHESIA 1ST 15 MINUTES: Performed by: STUDENT IN AN ORGANIZED HEALTH CARE EDUCATION/TRAINING PROGRAM

## 2025-04-22 PROCEDURE — 83735 ASSAY OF MAGNESIUM: CPT | Performed by: INTERNAL MEDICINE

## 2025-04-22 PROCEDURE — 63600175 PHARM REV CODE 636 W HCPCS: Performed by: NURSE ANESTHETIST, CERTIFIED REGISTERED

## 2025-04-22 PROCEDURE — 37000009 HC ANESTHESIA EA ADD 15 MINS: Performed by: STUDENT IN AN ORGANIZED HEALTH CARE EDUCATION/TRAINING PROGRAM

## 2025-04-22 PROCEDURE — 97116 GAIT TRAINING THERAPY: CPT | Mod: CQ

## 2025-04-22 RX ORDER — LIDOCAINE HYDROCHLORIDE 20 MG/ML
INJECTION, SOLUTION EPIDURAL; INFILTRATION; INTRACAUDAL; PERINEURAL
Status: DISPENSED
Start: 2025-04-22 | End: 2025-04-22

## 2025-04-22 RX ORDER — PROPOFOL 10 MG/ML
VIAL (ML) INTRAVENOUS
Status: DISCONTINUED | OUTPATIENT
Start: 2025-04-22 | End: 2025-04-22

## 2025-04-22 RX ORDER — PANTOPRAZOLE SODIUM 40 MG/10ML
40 INJECTION, POWDER, LYOPHILIZED, FOR SOLUTION INTRAVENOUS 2 TIMES DAILY
Status: COMPLETED | OUTPATIENT
Start: 2025-04-22 | End: 2025-04-22

## 2025-04-22 RX ORDER — LIDOCAINE HYDROCHLORIDE 20 MG/ML
INJECTION INTRAVENOUS
Status: DISCONTINUED | OUTPATIENT
Start: 2025-04-22 | End: 2025-04-22

## 2025-04-22 RX ORDER — PANTOPRAZOLE SODIUM 40 MG/1
40 TABLET, DELAYED RELEASE ORAL
Status: DISCONTINUED | OUTPATIENT
Start: 2025-04-23 | End: 2025-04-30 | Stop reason: HOSPADM

## 2025-04-22 RX ORDER — PROPOFOL 10 MG/ML
VIAL (ML) INTRAVENOUS
Status: COMPLETED
Start: 2025-04-22 | End: 2025-04-22

## 2025-04-22 RX ADMIN — GABAPENTIN 600 MG: 300 CAPSULE ORAL at 09:04

## 2025-04-22 RX ADMIN — SODIUM CHLORIDE, PRESERVATIVE FREE 10 ML: 5 INJECTION INTRAVENOUS at 10:04

## 2025-04-22 RX ADMIN — SODIUM CHLORIDE, PRESERVATIVE FREE 10 ML: 5 INJECTION INTRAVENOUS at 06:04

## 2025-04-22 RX ADMIN — PANTOPRAZOLE SODIUM 40 MG: 40 INJECTION, POWDER, LYOPHILIZED, FOR SOLUTION INTRAVENOUS at 09:04

## 2025-04-22 RX ADMIN — ATORVASTATIN CALCIUM 40 MG: 40 TABLET, FILM COATED ORAL at 08:04

## 2025-04-22 RX ADMIN — SODIUM CHLORIDE, PRESERVATIVE FREE 10 ML: 5 INJECTION INTRAVENOUS at 07:04

## 2025-04-22 RX ADMIN — LIDOCAINE HYDROCHLORIDE 100 MG: 20 INJECTION INTRAVENOUS at 07:04

## 2025-04-22 RX ADMIN — PROPOFOL 50 MG: 10 INJECTION, EMULSION INTRAVENOUS at 07:04

## 2025-04-22 RX ADMIN — MINERAL SUPPLEMENT IRON 300 MG / 5 ML STRENGTH LIQUID 100 PER BOX UNFLAVORED 300 MG: at 09:04

## 2025-04-22 RX ADMIN — INSULIN GLARGINE 12 UNITS: 100 INJECTION, SOLUTION SUBCUTANEOUS at 09:04

## 2025-04-22 RX ADMIN — GABAPENTIN 600 MG: 300 CAPSULE ORAL at 08:04

## 2025-04-22 RX ADMIN — PANTOPRAZOLE SODIUM 40 MG: 40 INJECTION, POWDER, LYOPHILIZED, FOR SOLUTION INTRAVENOUS at 08:04

## 2025-04-22 RX ADMIN — MINERAL SUPPLEMENT IRON 300 MG / 5 ML STRENGTH LIQUID 100 PER BOX UNFLAVORED 300 MG: at 08:04

## 2025-04-22 RX ADMIN — Medication: at 07:04

## 2025-04-22 RX ADMIN — SODIUM CHLORIDE: 9 INJECTION, SOLUTION INTRAVENOUS at 07:04

## 2025-04-22 RX ADMIN — PROPOFOL 20 MG: 10 INJECTION, EMULSION INTRAVENOUS at 07:04

## 2025-04-22 RX ADMIN — INSULIN GLARGINE 12 UNITS: 100 INJECTION, SOLUTION SUBCUTANEOUS at 08:04

## 2025-04-22 NOTE — PT/OT/SLP PROGRESS
Physical Therapy Treatment    Patient Name:  William Mccauley   MRN:  [id number]    Recommendations:     Discharge therapy intensity: High Intensity Therapy   Discharge Equipment Recommendations: to be determined by next level of care  Barriers to discharge: Decreased caregiver support, Impaired mobility, and Ongoing medical needs    Assessment:     William Mccauley is a 74 y.o. male.  He presents with the following impairments/functional limitations: weakness, impaired endurance, impaired self care skills, impaired functional mobility, impaired balance, pain.  Communicated with NSG prior to session.     Rehab Prognosis: Good; patient would benefit from acute skilled PT services to address these deficits and reach maximum level of function.    Recent Surgery: Procedure(s) (LRB):  EGD (N/A)  EGD, WITH CLOSED BIOPSY * Day of Surgery *    Plan:     During this hospitalization, patient would benefit from acute PT services 6 x/week to address the identified rehab impairments via gait training, therapeutic activities, therapeutic exercises and progress toward the following goals:    Plan of Care Expires:  05/16/25    Subjective     Chief Complaint: pain        Objective:     Communicated with nurse prior to session.  Patient found supine with peripheral IV, pulse ox (continuous), telemetry, PureWick upon PT entry to room.     General Precautions: Standard, NPO, aspiration  Orthopedic Precautions: RLE weight bearing as tolerated  Braces: N/A  Respiratory Status: Room air  Blood Pressure:   Skin Integrity: Visible skin intact      Functional Mobility:  Mod assist to get EOB and max x 2 STS  Gait 4 ft x 2 max x 2 WBAT.   AAROM RLE    Education Provided:  Role and goals of PT, transfer training, bed mobility, gait training, balance training, safety awareness, assistive device, strengthening exercises, and importance of participating in PT to return to PLOF.    Patient left up in chair with call button in reach, chair alarm on, and sandra pad in  place    GOALS:   Multidisciplinary Problems       Physical Therapy Goals          Problem: Physical Therapy    Goal Priority Disciplines Outcome Interventions   Physical Therapy Goal     PT, PT/OT Progressing    Description: Goals to be met by: 2025     Patient will increase functional independence with mobility by performin. Supine to sit with Contact Guard Assistance  2. Sit to stand transfer with Contact Guard Assistance  3. Gait  x 150 feet with Contact Guard Assistance using Rolling Walker.                          Time Tracking:         Billable Minutes: Gait Training 24    Treatment Type: Treatment  PT/PTA: PTA     Number of PTA visits since last PT visit: 3     2025

## 2025-04-22 NOTE — PROVATION PATIENT INSTRUCTIONS
Discharge Summary/Instructions after an Endoscopic Procedure  Patient Name: William Mccauley  Patient MRN: [id number]  Patient YOB: 1950  Tuesday, April 22, 2025  Christian Hernández MD  Dear patient,  As a result of recent federal legislation (The Federal Cures Act), you may   receive lab or pathology results from your procedure in your [facility]   account before your physician is able to contact you. Your physician or   their representative will relay the results to you with their   recommendations at their soonest availability.  Thank you,  RESTRICTIONS:  During your procedure today, you received medications for sedation.  These   medications may affect your judgment, balance and coordination.  Therefore,   for 24 hours, you have the following restrictions:   - DO NOT drive a car, operate machinery, make legal/financial decisions,   sign important papers or drink alcohol.    ACTIVITY:  Today: no heavy lifting, straining or running due to procedural   sedation/anesthesia.  The following day: return to full activity including work.  DIET:  Eat and drink normally unless instructed otherwise.     TREATMENT FOR COMMON SIDE EFFECTS:  - Mild abdominal pain, nausea, belching, bloating or excessive gas:  rest,   eat lightly and use a heating pad.  - Sore Throat: treat with throat lozenges and/or gargle with warm salt   water.  - Because air was used during the procedure, expelling large amounts of air   from your rectum or belching is normal.  - If a bowel prep was taken, you may not have a bowel movement for 1-3 days.    This is normal.  SYMPTOMS TO WATCH FOR AND REPORT TO YOUR PHYSICIAN:  1. Abdominal pain or bloating, other than gas cramps.  2. Chest pain.  3. Back pain.  4. Signs of infection such as: chills or fever occurring within 24 hours   after the procedure.  5. Rectal bleeding, which would show as bright red, maroon, or black stools.   (A tablespoon of blood from the rectum is not serious, especially if    hemorrhoids are present.)  6. Vomiting.  7. Weakness or dizziness.  GO DIRECTLY TO THE NEAREST EMERGENCY ROOM IF YOU HAVE ANY OF THE FOLLOWING:      Difficulty breathing              Chills and/or fever over 101 F   Persistent vomiting and/or vomiting blood   Severe abdominal pain   Severe chest pain   Black, tarry stools   Bleeding- more than one tablespoon   Any other symptom or condition that you feel may need urgent attention  Your doctor recommends these additional instructions:  If any biopsies were taken, your doctors clinic will contact you in 1 to 2   weeks with any results.  Recommendations:  - Needs BID PPI x8 weeks  - [facility]   - Resume previous diet.   - Continue present medications.   - Await pathology results.   - Will perform EGD in 2 months to assess healing  - Reports being up to date on surveillance/screenign colonoscopies  - Please call with questions or concerns  Impressions:  EGD for severe JN but no overt GI bleeding in setting of respiratory   illnesses.  - Small hiatal hernia.   - Gastritis.  Biopsied. Erosions from this and duodenum plausibly cause of   JN  - Duodenitis.  For questions, problems or results please call your physician - Christian Hernández MD at Work:  [phone].  [facility] ED at [phone]  IF A COMPLICATION OR EMERGENCY SITUATION ARISES AND YOU ARE UNABLE TO REACH   YOUR PHYSICIAN - GO DIRECTLY TO THE EMERGENCY ROOM.  MD Christian Ya MD  4/22/2025 7:38:50 AM  This report has been verified and signed electronically.  Dear patient,  As a result of recent federal legislation (The Federal Cures Act), you may   receive lab or pathology results from your procedure in your [facility]   account before your physician is able to contact you. Your physician or   their representative will relay the results to you with their   recommendations at their soonest availability.  Thank  you,  PROVATION

## 2025-04-22 NOTE — PROGRESS NOTES
[facility] - 4th Floor Medical Telemetry  Orthopedics  Progress Note    Patient Name: William Mccauley  MRN: [id number]  Admission Date: 4/13/2025  Hospital Length of Stay: 9 days  Attending Provider: Marianela Garzon MD  Primary Care Provider: Shannon Alonso MD    Subjective:     Principal Problem:Closed displaced fracture of right femoral neck    Principal Orthopedic Problem:   R hip hemiarthroplasty    Interval History: Seen this am. No family at bedside. Went to EGD this am. Now working with therapy in the room.  used.     Review of patient's allergies indicates:  No Known Allergies    Current Facility-Administered Medications   Medication    0.9%  NaCl infusion (for blood administration)    0.9%  NaCl infusion (for blood administration)    acetaminophen oral solution 650 mg    atorvastatin tablet 40 mg    bisacodyL suppository 10 mg    dextrose 50% injection 12.5 g    diphenhydrAMINE-zinc acetate 1-0.1% cream    docusate 50 mg/5 mL liquid 100 mg    ferrous sulfate 300 mg (60 mg iron)/5 mL syrup 300 mg    gabapentin capsule 600 mg    glucagon (human recombinant) injection 1 mg    hydrALAZINE injection 10 mg    insulin aspart U-100 injection 0-10 Units    insulin glargine U-100 (Lantus) injection 12 Units    LIDOcaine (PF) 20 mg/mL (2%) 20 mg/mL (2 %) injection    pantoprazole injection 40 mg    sodium chloride 0.9% flush 10 mL     Objective:     Vital Signs (Most Recent):  Temp: 98.3 °F (36.8 °C) (04/22/25 1108)  Pulse: 79 (04/22/25 1108)  Resp: 18 (04/22/25 0910)  BP: 129/75 (04/22/25 1108)  SpO2: 97 % (04/22/25 1108) Vital Signs (24h Range):  Temp:  [97.2 °F (36.2 °C)-99.8 °F (37.7 °C)] 98.3 °F (36.8 °C)  Pulse:  [73-85] 79  Resp:  [18-26] 18  SpO2:  [96 %-100 %] 97 %  BP: (100-134)/(63-80) 129/75     Weight: 59.8 kg (131 lb 13.4 oz)  Height: 5' (152.4 cm)  Body mass index is 25.75 kg/m².      Intake/Output Summary (Last 24 hours) at 4/22/2025 1246  Last data filed at 4/22/2025 1100  Gross  per 24 hour   Intake 50 ml   Output 1000 ml   Net -950 ml       Physical Exam:   General the patient is alert and in no acute distress;   Resp: No signs of labored breathing                     RLE: -Skin: Dressing CDI thigh swollen but soft            -MSK: tolerates gentle ROM; + active DF/PF           -Neuro:  Sensation intact to light touch throughout           -CV: Capillary refill is less than 2 seconds. +DP.     Diagnostic Findings:     Significant Labs: CBC:   Recent Labs   Lab 04/21/25  0433 04/21/25  0823 04/22/25  0453   WBC 11.61*  --  11.38   HGB 5.6* 5.5* 9.0*   HCT 20.7* 20.1* 30.4*     --  301     All pertinent labs within the past 24 hours have been reviewed.    Significant Imaging: I have reviewed all pertinent imaging results/findings.     Assessment/Plan:     Active Diagnoses:    Diagnosis Date Noted POA    PRINCIPAL PROBLEM:  Closed displaced fracture of right femoral neck [S72.001A] 04/13/2025 Yes    Intraparenchymal hemorrhage of brain [I61.9] 04/15/2025 Yes      Problems Resolved During this Admission:   75 YO M   4/14/25 R hip hemiarthroplasty    Diet: per primary when cleared by SLP  Pain: PRN  DVT: ok from ortho perspective when cleared by neurology   ABX: periop ancef  PT/OT: Eval and Treat  Activity: WBAT RLE  Dry dressing changes; No creams/ointments   OK open to air of dry  Follow up in 4 weeks with Dr Hunt    The above findings, diagnostics, and treatment plan were discussed with Dr Hunt who is in agreement with the plan of care except as stated in additional documentation.      TOMMY Kramer   Orthopedic Trauma Surgery  [facility]

## 2025-04-22 NOTE — PT/OT/SLP PROGRESS
[facility]  Speech Language Pathology Department  Diet Tolerance Follow-up & education    Patient Name:  William Mccauley   MRN:  [id number]    Recommendations     General recommendations:  dysphagia therapy  Solid texture recommendation:  Puree Diet - IDDSI Level 4  Liquid consistency recommendation: Moderately thick liquids - IDDSI Level 3   Medications: crushed in puree  Precautions: aspiration    Diet Tolerance     Nursing reports no difficulty regarding diet tolerance.    Outcome Measures     Functional Oral Intake Scale: 5 - Total oral diet with multiple consistencies, by requiring special preparation or compensations    Patient Education     Patient's family thoroughly educated on patient's diet recommendations. Reported understanding.    Nurse re-educated on patient's rec as his diet order was change by GI following pt's EGD. Diet order corrected.    Plan     SLP Follow-Up:  Yes    Patient to be seen:  5 x/week   Plan of Care expires:  05/05/25  Plan of Care reviewed with:  daughter, son     Time Tracking     SLP Treatment Date:   04/22/25  Speech Start Time:  0930  Speech Stop Time:  0940     Speech Total Time (min):  10 min    Billable minutes:   Caregiver training: 10 minutes    04/22/2025

## 2025-04-22 NOTE — ANESTHESIA PREPROCEDURE EVALUATION
04/22/2025  William Mccauley is a 74 y.o., male.[place]orran speaking male with a past medical history of hypertension, hyperlipidemia, diabetes mellitus type 2, ptosis, CVA without deficits and sciatica. The patient presented to [facility] on 4/13/2025 with a primary complaint of right hip pain following a fall which occurred yesterday (04/12/2025).  Patient was ambulating with a walker when he reached for the door he lost his balance falling onto his right hip.     MRI of the brain with small linear/punctuate area of acute/subacute left thalamic hemorrhage, possible hemorrhagic infarct although with no visible diffusion restriction, region obscured by hemorrhagic artifact, advanced involutional change, moderate severe chronic microvascular white ischemic changes.     Past Medical History:   Diagnosis Date    Diabetes mellitus, type 2      HTN (hypertension)      Ptosis      Sciatica      Stroke      Type 2 diabetes mellitus without complications      Recent Labs   Lab 04/09/25  0819 04/13/25  0929   WBC 7.29 10.12   RBC 4.30* 4.11*   HGB 9.0* 8.8*   HCT 32.3* 30.2*   MCV 75.1* 73.5*   MCH 20.9* 21.4*   MCHC 27.9* 29.1*   RDW 20.2* 20.6*   * 388   MPV 10.1 10.0              Recent Labs   Lab 04/09/25  0819 04/13/25  0929    135*   K 4.1 3.8    103   CO2 27 20*   BUN 17.7 9.2   CREATININE 0.77 0.58*   CALCIUM 9.1 8.7*   ALBUMIN 3.9 3.8   ALKPHOS 128 119   ALT 92* 63*   AST 48* 48*   BILITOT 0.6 0.8      Echo Saline Bubble? Yes  Result Date: 4/14/2025    Left Ventricle: The left ventricle is normal in size. Normal wall thickness. There is normal systolic function with a visually estimated ejection fraction of 65 - 70%.   Right Ventricle: The right ventricle is normal in size. Systolic function is normal.   Left Atrium: Agitated saline study of the atrial septum is negative, suggesting absence of  intracardiac shunt at the atrial level.   Aortic Valve: There is aortic valve sclerosis. There is no stenosis. There is trace aortic regurgitation.   Mitral Valve: There is no significant regurgitation.   Tricuspid Valve: There is trace regurgitation.   Pulmonary Artery: The estimated pulmonary artery systolic pressure is 30 mmHg.   IVC/SVC: Normal venous pressure at 3 mmHg.   Pericardium: There is no pericardial effusion.     Underwent right hip hemiarthroplasty 4/14.  Following surgery, Hemoglobin has slowly trended down.          Pre-op Assessment    I have reviewed the Patient Summary Reports.    I have reviewed the NPO Status.   I have reviewed the Medications.     Review of Systems  Anesthesia Hx:  No problems with previous Anesthesia                Social:  Non-Smoker       Hematology/Oncology:  Hematology Normal                                     Cardiovascular:     Hypertension, well controlled                                          Pulmonary:      Shortness of breath   Denies Sleep Apnea.                Renal/:  Chronic Renal Disease renal calculi               Neurological:   CVA, no residual symptoms    Denies Seizures.                                Endocrine:  Diabetes, well controlled, type 2               Physical Exam  General: Well nourished, Cooperative, Alert and Oriented    Airway:  Mallampati: II   Mouth Opening: Normal  TM Distance: Normal  Tongue: Normal  Neck ROM: Normal ROM    Dental:    Chest/Lungs:  Clear to auscultation    Heart:  Rate: Normal  Rhythm: Regular Rhythm        Anesthesia Plan  Type of Anesthesia, risks & benefits discussed:    Anesthesia Type: Gen Natural Airway  Intra-op Monitoring Plan: Standard ASA Monitors  Post Op Pain Control Plan: IV/PO Opioids PRN  Induction:  IV  Informed Consent: Informed consent signed with the Patient representative and all parties understand the risks and agree with anesthesia plan.  All questions answered. Consent completed with   assistance  ASA Score: 3  Day of Surgery Review of History & Physical: H&P Update referred to the surgeon/provider.    Ready For Surgery From Anesthesia Perspective.     .

## 2025-04-22 NOTE — TRANSFER OF CARE
Anesthesia Transfer of Care Note    Patient: William Mccauley    Procedure(s) Performed: Procedure(s) (LRB):  EGD (N/A)  EGD, WITH CLOSED BIOPSY    Patient location: PACU    Anesthesia Type: general    Transport from OR: Transported from OR on room air with adequate spontaneous ventilation    Post pain: adequate analgesia    Post assessment: no apparent anesthetic complications    Post vital signs: stable    Level of consciousness: sedated, awake and responds to stimulation    Nausea/Vomiting: no nausea/vomiting    Complications: none    Transfer of care protocol was followed      Last vitals: Visit Vitals  /80   Pulse 79   Temp 36.2 °C (97.2 °F)   Resp 19   Ht 5' (1.524 m)   Wt 59.8 kg (131 lb 13.4 oz)   SpO2 99%   BMI 25.75 kg/m²

## 2025-04-22 NOTE — PT/OT/SLP PROGRESS
Occupational Therapy   Treatment    Name: William Mccauley  MRN: [id number]  Admitting Diagnosis:  Closed displaced fracture of right femoral neck  * Day of Surgery *    Recommendations:     Recommended therapy intensity at discharge: High Intensity Therapy   Discharge Equipment Recommendations:  to be determined by next level of care  Barriers to discharge:   (medically complex, impaired ADLs/mobility)    Assessment:     William Mccauley is a 74 y.o. male with a medical diagnosis of fall, Right femoral neck fx s/p R LEANDRA, and MRI brain showed small linear/punctuate area of acute/subacute left thalamic hemorrhage.  He presents with stable BP and HR to participate in bedside activities. Performance deficits affecting function are weakness, impaired endurance, impaired self care skills, impaired functional mobility, impaired balance.     Rehab Prognosis:  Good; patient would benefit from acute skilled OT services to address these deficits and reach maximum level of function.       Plan:     Patient to be seen 5 x/week to address the above listed problems via self-care/home management, therapeutic activities, therapeutic exercises, cognitive retraining  Plan of Care Expires: 05/15/25  Plan of Care Reviewed with: patient    Subjective     Interpretation service utilized to assist communication t/o today's session:   Interpretor: Rj Rojas ID # [id number]    Pain/Comfort:  Pain Rating 1:  (no PRS given, verbalized R hip pain)  Pain Addressed 1: Reposition, Distraction    Objective:     Communicated with: RN prior to session.  Patient found HOB elevated with pulse ox (continuous), telemetry, pressure relief boots, PureWick upon OT entry to room.    General Precautions: Standard, droplet, fall, other (see comments) (modified diet)    Orthopedic Precautions:RLE weight bearing as tolerated  Braces: N/A  Respiratory Status: Room air  Blood Pressure: 118/68 supine, HOB elevated 122/71, sitting /78  Heart Rate: 82-87 bpm    Occupational  Performance:     Bed Mobility:    SUP<>SIT: MAXX2    Functional Mobility/Transfers:  STS with RW and min-modA to achieve partial stand, unable to achieve full erect posture 2/2 pt with limited pain tolerance WB on RLE and minimal use of upper body strength to offload weight depsite verbal and tactile cues. (3 total reps)  Transitioned to pull up on back of recliner chair -- pt able to assist with upper body strength slightly more, but still does not achieve full upright standing. Pt tolerates static stand for 3 minutes to promote BLE weightbearing/balance before returning to sit. Provided modx2 throughout.   Functional Mobility: unable to take side steps with RW.     Activities of Daily Living:  Lower Body Dressing: maximal assistance to don socks   Toileting: maximal assistance, pure wick in place.   Pt soiled with BM upon first attempt. CNAx2 arrived to assist pt cleanup from bed level with max A.    Skin assessment: visible skin intact  AMPAC 6 Click ADL: 12    Patient Education:  Patient provided with verbal education education regarding OT role/goals/POC, post op precautions, fall prevention, and safety awareness.  Understanding was verbalized, however additional teaching warranted.      Patient left HOB elevated with all lines intact, call button in reach, and nurse notified. SCD's and pressure relief boots on.     GOALS:   Multidisciplinary Problems       Occupational Therapy Goals          Problem: Occupational Therapy    Goal Priority Disciplines Outcome Interventions   Occupational Therapy Goal     OT, PT/OT Progressing    Description: Goals to be met by: 5/15/25     Patient will increase functional independence with ADLs by performing:    UE Dressing with Stand-by Assistance.  LE Dressing with Minimal Assistance and Assistive Devices as needed.  Grooming while standing at sink with Contact Guard Assistance and Assistive Devices as needed.  Toileting from toilet with [place] and Assistive Devices as needed  for hygiene and clothing management.   Toilet transfer to toilet with Contact Guard Assistance and using LRAD.                         Time Tracking:     OT Date of Treatment: 04/22/25  OT Start Time: 1122  OT Stop Time: 1200  OT Total Time (min): 38 min    Billable Minutes:Self Care/Home Management 1  Therapeutic Activity 2    OT/YANETH: OT     Number of YANETH visits since last OT visit: 3    4/22/2025

## 2025-04-22 NOTE — ANESTHESIA POSTPROCEDURE EVALUATION
Anesthesia Post Evaluation    Patient: William Mccauley    Procedure(s) Performed: Procedure(s) (LRB):  EGD (N/A)  EGD, WITH CLOSED BIOPSY    Final Anesthesia Type: general      Patient location during evaluation: PACU  Patient participation: Yes- Able to Participate  Level of consciousness: awake and alert  Post-procedure vital signs: reviewed and stable  Pain management: adequate  Airway patency: patent    PONV status at discharge: No PONV  Anesthetic complications: no      Cardiovascular status: hemodynamically stable  Respiratory status: spontaneous ventilation and room air  Hydration status: euvolemic  Follow-up not needed.              Vitals Value Taken Time   /66 04/22/25 07:37   Temp 36.3 °C (97.3 °F) 04/22/25 07:37   Pulse 75 04/22/25 07:37   Resp 19 04/22/25 07:37   SpO2 100 % 04/22/25 07:37         No case tracking events are documented in the log.      Pain/Kati Score: Kati Score: 9 (4/22/2025  7:37 AM)

## 2025-04-23 LAB
POCT GLUCOSE: 132 MG/DL (ref 70–110)
POCT GLUCOSE: 183 MG/DL (ref 70–110)
POCT GLUCOSE: 205 MG/DL (ref 70–110)
POCT GLUCOSE: 280 MG/DL (ref 70–110)
PSYCHE PATHOLOGY RESULT: NORMAL

## 2025-04-23 PROCEDURE — 97110 THERAPEUTIC EXERCISES: CPT | Mod: CQ

## 2025-04-23 PROCEDURE — 97530 THERAPEUTIC ACTIVITIES: CPT | Mod: CQ

## 2025-04-23 PROCEDURE — 25000003 PHARM REV CODE 250: Performed by: PHYSICIAN ASSISTANT

## 2025-04-23 PROCEDURE — 27000207 HC ISOLATION

## 2025-04-23 PROCEDURE — 92526 ORAL FUNCTION THERAPY: CPT

## 2025-04-23 PROCEDURE — 21400001 HC TELEMETRY ROOM

## 2025-04-23 PROCEDURE — 63600175 PHARM REV CODE 636 W HCPCS: Performed by: INTERNAL MEDICINE

## 2025-04-23 PROCEDURE — 25000003 PHARM REV CODE 250: Performed by: INTERNAL MEDICINE

## 2025-04-23 PROCEDURE — A4216 STERILE WATER/SALINE, 10 ML: HCPCS | Performed by: PHYSICIAN ASSISTANT

## 2025-04-23 PROCEDURE — 97535 SELF CARE MNGMENT TRAINING: CPT

## 2025-04-23 RX ORDER — NAPROXEN SODIUM 220 MG/1
81 TABLET, FILM COATED ORAL DAILY
Status: DISCONTINUED | OUTPATIENT
Start: 2025-04-24 | End: 2025-04-30 | Stop reason: HOSPADM

## 2025-04-23 RX ADMIN — INSULIN GLARGINE 12 UNITS: 100 INJECTION, SOLUTION SUBCUTANEOUS at 11:04

## 2025-04-23 RX ADMIN — SODIUM CHLORIDE, PRESERVATIVE FREE 10 ML: 5 INJECTION INTRAVENOUS at 11:04

## 2025-04-23 RX ADMIN — SODIUM CHLORIDE, PRESERVATIVE FREE 10 ML: 5 INJECTION INTRAVENOUS at 06:04

## 2025-04-23 RX ADMIN — PANTOPRAZOLE SODIUM 40 MG: 40 TABLET, DELAYED RELEASE ORAL at 05:04

## 2025-04-23 RX ADMIN — INSULIN GLARGINE 12 UNITS: 100 INJECTION, SOLUTION SUBCUTANEOUS at 08:04

## 2025-04-23 RX ADMIN — ATORVASTATIN CALCIUM 40 MG: 40 TABLET, FILM COATED ORAL at 11:04

## 2025-04-23 RX ADMIN — INSULIN ASPART 3 UNITS: 100 INJECTION, SOLUTION INTRAVENOUS; SUBCUTANEOUS at 11:04

## 2025-04-23 RX ADMIN — GABAPENTIN 600 MG: 300 CAPSULE ORAL at 11:04

## 2025-04-23 RX ADMIN — GABAPENTIN 600 MG: 300 CAPSULE ORAL at 08:04

## 2025-04-23 RX ADMIN — SODIUM CHLORIDE 125 MG: 9 INJECTION, SOLUTION INTRAVENOUS at 09:04

## 2025-04-23 RX ADMIN — PANTOPRAZOLE SODIUM 40 MG: 40 TABLET, DELAYED RELEASE ORAL at 06:04

## 2025-04-23 RX ADMIN — GABAPENTIN 600 MG: 300 CAPSULE ORAL at 02:04

## 2025-04-23 RX ADMIN — SODIUM CHLORIDE, PRESERVATIVE FREE 10 ML: 5 INJECTION INTRAVENOUS at 02:04

## 2025-04-23 RX ADMIN — INSULIN ASPART 2 UNITS: 100 INJECTION, SOLUTION INTRAVENOUS; SUBCUTANEOUS at 05:04

## 2025-04-23 NOTE — PROGRESS NOTES
[facility] Hospital Medicine Progress Note        Chief Complaint: Inpatient Follow-up for stroke/ Fall/ Rt hip fracture.        HPI:     74 y.o. Albanian speaking male with a PMHx of HTN, HLD, DM 2, ptosis, prior CVA without deficits and sciatica presented to [facility] on 4/13/2025 with a primary complaint of right hip pain following a fall which occurred yesterday (04/12/2025). Patient was ambulating with a walker when he reached for the door, lost his balance and fell onto his right hip.  Patient denies hitting his head. Son who is at bedside translating stated that  patient did complain of some left eye blur vision  He denies shortness a breath, chest pain, nausea, vomiting, diarrhea, headache and dizziness.  He received 50 mcg of fentanyl in route for Rt hip pain.      On arrival  to the ED, Pt was afebrile, HR 88, /76, RR 18 and SpO2 96% on room air.  Labs with H&H 8.8/30.2, CO2 20, AST 48, ALT 63.  EKG with sinus rhythm with first-degree AV block, non specific  ST and T wave change. right hip x-ray revealed a right femoral neck fracture.  Chest x-ray with no acute cardiopulmonary process.  CT of the head revealed a left thalamic minimal hyperdensity which may represent subacute hemorrhage related to subacute infarct but no convincing acute brain traumatic findings.  CT cervical spine without acute findings. MRI of the brain with small linear/punctuate area of acute/subacute left thalamic hemorrhage, possible hemorrhagic infarct.  In ED patient received IV hydration, morphine, Zofran.  Orthopedics and neurology were consulted.      Pt underwent Rt hip hemiarthroplasty with Dr. Raghu Hunt on 4/14/25. Neurology initiated stroke workup and suggested possible left thalamic hypertensive hemorrhagic stoke. CTA head/neck with severe narrowing of jah distal vertebral arteries, severe narrowing of the clinoid, ophthalmic segments of the internal carotid artery siphons bilaterally  and near occlusion on the left.. Echo with LVEF 65-70%, neg bubble study, Carotid U/S with less than 50% ICA stenosis. Pt failed SLP eval and placed NPO. NG tube inserted for medication and nutrition. Neurology cleared Pt to start ASA 4 days from stroke onset which was 4/17/25. PT /OT suggested high intensity therapy.Spoke to Dr. Phillip with Neurology and cleared Pt for prophylactic Lovenox as of 4/18/25.      New onset fever of 101 on 4/17/25 associated with tachycardia. Infectious workup include CBC, CXR, UA, blood cultures x2 and Resp PCR ordered. Pt was started on empiric Zosyn IV. Workup was neg except Resp PCR was positive for Human Rhinovirus infection. Pt became afebrile. Zosyn discontinued. Pt pulled NG tube on 4/19/25. Replaced without difficulty. SLP continued to follow. As of 4/19/25 Pt remained with oropharyngeal dysphagia unsafe for PO intake. On 4/21/25, Pt passed MBS and was cleared for puree diet and mod thick liquid later advanced to soft and bite sized diet.      On 4/21/25 , Pt with melanotic stool and an acute drop in Hgb to 5.5 from 8.0 before. 2 units of P-RBC transfused. High dose PPI IV initiated. ASA/ Prophylactic Lovenox held. GI consulted.Underwent EGD on 4/22/25 showed evidence of erosions and erythema entire stomach and duodenum. PPI continued. IV Ferric gluconate initiated for iron replacement. Spoke to GI on 4/23/25  and suggested Asa can be resumed. Prophylactic Lovenox placed on hold. SCDs utilized for VTE prophylaxis. PT/OT suggested high intensity therapy. CM consulted to assist with DC planning.       Interval Hx:   No acute events reported.  Tolerating diet.   Pt has no new c/o today   Afebrile. Hemodynamics are stable, on RA    Case was discussed with patient's nurse and  on the floor.    Objective/physical exam:  General: In no acute distress, afebrile, on RA  Chest: Clear to auscultation bilaterally  Heart: RRR, +S1, S2, no appreciable murmur  Abdomen: Soft,  nontender, BS +  MSK: Warm, no lower extremity edema, no clubbing or cyanosis, Surgical dressing to Rt upper lat thigh C/D/I  Neurologic: Alert and oriented  to self and person. Moves all 4 ext.      VITAL SIGNS: 24 HRS MIN & MAX LAST   Temp  Min: 98 °F (36.7 °C)  Max: 99.9 °F (37.7 °C) 99.9 °F (37.7 °C)   BP  Min: 109/66  Max: 130/79 112/70   Pulse  Min: 76  Max: 91  82   Resp  Min: 18  Max: 20 18   SpO2  Min: 96 %  Max: 99 % 97 %     I have reviewed the following labs:  Recent Labs   Lab 04/17/25 0827 04/21/25 0433 04/21/25 0823 04/22/25 0453   WBC 9.84 11.61*  --  11.38   RBC 3.92* 2.66*  --  3.93*   HGB 8.0* 5.6* 5.5* 9.0*   HCT 30.0* 20.7* 20.1* 30.4*   MCV 76.5* 77.8*  --  77.4*   MCH 20.4* 21.1*  --  22.9*   MCHC 26.7* 27.1*  --  29.6*   RDW 21.2* 22.1*  --  19.5*    310  --  301   MPV 10.0 10.9*  --  10.8*     Recent Labs   Lab 04/19/25 0451 04/21/25 0433 04/22/25 0452    137 140   K 3.8 4.2 4.2   * 104 108*   CO2 28 26 26   BUN 34.7* 22.4 18.5   CREATININE 0.75 0.60* 0.58*   CALCIUM 8.5* 8.5* 8.2*   MG 2.10 1.80 1.80   ALBUMIN  --   --  2.6*   ALKPHOS  --   --  65   ALT  --   --  23   AST  --   --  27   BILITOT  --   --  1.2     Microbiology Results (last 7 days)       Procedure Component Value Units Date/Time    Blood Culture [[id number]]  (Normal) Collected: 04/17/25 0827    Order Status: Completed Specimen: Blood Updated: 04/22/25 1000     Blood Culture No Growth at 5 days    Blood Culture [[id number]]  (Normal) Collected: 04/17/25 0827    Order Status: Completed Specimen: Blood Updated: 04/22/25 1000     Blood Culture No Growth at 5 days             See below for Radiology    Assessment/Plan:  Acute on chronic anemia due to possible GI bleed- 4/21/25- P-RBC x2 transfused   Gastritis /Duodenitis, POA  New onset fever with tachycardia / sepsis due to unspecified infection - 4/17/25 - resolved   Mechanical fall and closed displaced Rt femoral neck fracture, POA - s/p  Rt hip  hemiarthroplasty on 4/14/25.  Acute to subacute left thalamic hemorrhage, POA  Oropharyngeal dysphagia due to above, POA- s/p NG tube    Intracranial Atherosclerotic disease, POA   Left eye blur vision with near occlusion of clinoid, ophthalmic segments of the internal carotid artery siphon on the left, POA  Leukocytosis, POA- resolved   Microcytic hypochromic/ iron deficiency  anemia, moderate -POA  Diabetes Mellitis , type 2- uncontrolled due to hyperglycemia , HbA1c 8.0  Hypernatremia due to free water deficit - 4/16/25  Elevated LFT- resolved      Hx- HTN, HLD, prior CVA     Plan-  Continue PPI  No further melanotic stool reported   Will resume ASA from tomorrow   IV Ferric Gluconate as planned   Supportive treatment as indicated   Continue PT/OT service   CM working on placement   Pt is medically cleared for discharge to rehab once accepted.       VTE prophylaxis:  Lovenox started 4/18/25, then Held on 4/21/25 due to suspected GI bleed      Patient condition:  Fair     Anticipated discharge and Disposition:     Rehab placement       All diagnosis and differential diagnosis have been reviewed; assessment and plan has been documented; I have personally reviewed the labs and test results that are presently available; I have reviewed the patients medication list; I have reviewed the consulting providers response and recommendations. I have reviewed or attempted to review medical records based upon their availability    All of the patient's questions have been  addressed and answered. Patient's is agreeable to the above stated plan. I will continue to monitor closely and make adjustments to medical management as needed.    Portions of this note dictated using EMR integrated voice recognition software, and may be subject to voice recognition errors not corrected at proofreading. Please contact writer for clarification if needed.   _____________________________________________________________________    Malnutrition  Status:  Nutrition consulted. Most recent weight and BMI monitored-     Measurements:  Wt Readings from Last 1 Encounters:   04/22/25 59.8 kg (131 lb 13.4 oz)   Body mass index is 25.75 kg/m².    Patient has been screened and assessed by RD.    Malnutrition Type:  Context: acute illness or injury  Level: other (see comments) (Does not meet criteria)    Malnutrition Characteristic Summary:  Weight Loss (Malnutrition): other (see comments) (Does not meet criteria)  Muscle Mass (Malnutrition): mild depletion    Interventions/Recommendations (treatment strategy):  Enteral nutrition     Scheduled Med:   atorvastatin  40 mg Oral QHS    ferric gluconate (Ferrlecit) IVPB  125 mg Intravenous Daily    gabapentin  600 mg Oral TID    insulin glargine U-100  12 Units Subcutaneous BID    pantoprazole  40 mg Oral BID AC    sodium chloride 0.9%  10 mL Intravenous Q8H      Continuous Infusions:     PRN Meds:    Current Facility-Administered Medications:     0.9%  NaCl infusion (for blood administration), , Intravenous, Q24H PRN    0.9%  NaCl infusion (for blood administration), , Intravenous, Q24H PRN    acetaminophen, 650 mg, Per NG tube, Q4H PRN    bisacodyL, 10 mg, Rectal, Daily PRN    dextrose 50%, 12.5 g, Intravenous, PRN    diphenhydrAMINE-zinc acetate 1-0.1%, , Topical (Top), TID PRN    docusate, 100 mg, Per NG tube, BID PRN    glucagon (human recombinant), 1 mg, Intramuscular, PRN    hydrALAZINE, 10 mg, Intravenous, Q4H PRN    insulin aspart U-100, 0-10 Units, Subcutaneous, Q6H PRN         Marianela Garzon MD  Department of Hospital Medicine   [facility]   04/23/2025

## 2025-04-23 NOTE — PLAN OF CARE
Rocio with [facility] messaged their  said they do not have any Medicaid beds available. Gaye with [facility] message they are willing to submit for ltac then rehab. Dr Garzon informed and she stated ok. Myles said she will submit and she stated she called and spoke to  pt's dgt MehnazArturo

## 2025-04-23 NOTE — PLAN OF CARE
Pt choices of acute rehab provided to Mehnaz (pt's dgt). Pt Choices obtained. Referral sent to 1st choice [facility] and 2nd choice [facility] via "[employer].   04/23/25 0965   Discharge Assessment   Assessment Type Discharge Planning Reassessment   Discharge Plan A Rehab   Discharge Plan B Skilled Nursing Facility   Discharge Plan discussed with: Adult children

## 2025-04-23 NOTE — PT/OT/SLP PROGRESS
Occupational Therapy   Treatment    Name: William Mccauley  MRN: [id number]  Admitting Diagnosis:  Closed displaced fracture of right femoral neck  2 Days Post-Op    Recommendations:     Recommended therapy intensity at discharge: Moderate Intensity Therapy   Discharge Equipment Recommendations:  to be determined by next level of care  Barriers to discharge:   (medically complex, impaired ADLs/mobility)    Assessment:     William Mccauley is a 74 y.o. male with a medical diagnosis of fall, Right femoral neck fx s/p R LEANDRA, and MRI brain showed small linear/punctuate area of acute/subacute left thalamic hemorrhage. Performance deficits affecting function are weakness, impaired endurance, impaired self care skills, impaired functional mobility, impaired balance.     Pt with limited progress thus far and is only able to tolerate brief transfer bed>chair with total A. Changing recommendation to moderate intensity at this time. Will continue to progress as able.     Rehab Prognosis:  Good; patient would benefit from acute skilled OT services to address these deficits and reach maximum level of function.       Plan:     Patient to be seen 5 x/week to address the above listed problems via self-care/home management, therapeutic activities, therapeutic exercises, cognitive retraining  Plan of Care Expires: 05/15/25  Plan of Care Reviewed with: patient    Subjective     Utilized  [employer] [id number] however poor connection once seated at EOB, reconnected with  [employer] # [id number].     Pain/Comfort:   Pain Rating 1:  (no PRS given, verbalized R hip pain)  Pain Addressed 1: Reposition, Distraction    Objective:     Communicated with: RN prior to session.  Patient found HOB elevated with pulse ox (continuous), telemetry, PureWick upon OT entry to room.    General Precautions: Standard, droplet, fall, other (see comments) (modified diet)    Orthopedic Precautions:RLE weight bearing as tolerated  Braces: N/A  Respiratory Status: Room  air  Respiratory Status: Room air  Blood Pressure: 130/79    Occupational Performance:     Bed Mobility:    SUP<>SIT: MAXX2    Functional Mobility/Transfers:  STS with RW from bedside completed x2 reps with fluctuating assist  Mod assist x 2 on first trial  Max assist x 2 on second trial  Bed>chair transfer completed via step technique with RW and total A   Functional Mobility: Pt requires total A to advance steps/lateral wt-shifting/balance as he demos heavy posterior leaning and poor tolerance Wb'ing on RLE.      Activities of Daily Living:  Lower Body Dressing: maximal assistance to don socks   Toileting: maximal assistance for anterior pericare s/p pt noted to have female purewick on that was saturated.   Therapist educated pt need to let skin breathe to avoid irritation/skin breakdown. Brief unfastened and positioned under pt's bottom.   Mild redness noted on scrotum. RN informed and verbalized understanding.     Skin assessment: visible skin intact  [facility] 6 Click ADL:      Patient Education:  Patient provided with verbal education education regarding OT role/goals/POC, post op precautions, fall prevention, and safety awareness.  Understanding was verbalized, however additional teaching warranted.    Patient left [facility] with all lines intact, call button in reach, and nurse notified. Ta pad in place.     GOALS:   Multidisciplinary Problems       Occupational Therapy Goals          Problem: Occupational Therapy    Goal Priority Disciplines Outcome Interventions   Occupational Therapy Goal     OT, PT/OT Progressing    Description: Goals to be met by: 5/15/25     Patient will increase functional independence with ADLs by performing:    UE Dressing with Stand-by Assistance.  LE Dressing with Minimal Assistance and Assistive Devices as needed.  Grooming while standing at sink with Contact Guard Assistance and Assistive Devices as needed.  Toileting from toilet with [place] and Assistive Devices as needed for hygiene  and clothing management.   Toilet transfer to toilet with Contact Guard Assistance and using LRAD.                         Time Tracking:     OT Date of Treatment: 04/23/25  OT Start Time: 1050  OT Stop Time: 1130  OT Total Time (min): 40 min    Billable Minutes:Self Care/Home Management 3    OT/YANETH: OT     Number of YANETH visits since last OT visit: 4    4/24/2025      Detail Level: Zone Continue Regimen: Isotretinoin Samples Given: CeraVe healing ointment and CeraVe moisturizer Plan: Start on month #3, will increase dose to 60mg QD, she will have labs drawn before her next visit. Patient will call if there are any cost related issues with the increased dose of the medication and we can adjust. Follow-up in one month. Hopefully with the increase in dose she will see some significant improvement at her follow up visit

## 2025-04-23 NOTE — PROGRESS NOTES
[facility] Hospital Medicine Progress Note        Chief Complaint: Inpatient Follow-up for stroke/ Fall/ Rt hip fracture.     HPI:   74 y.o. Tristanian speaking male with a PMHx of HTN, HLD, DM 2, ptosis, prior CVA without deficits and sciatica presented to [facility] on 4/13/2025 with a primary complaint of right hip pain following a fall which occurred yesterday (04/12/2025). Patient was ambulating with a walker when he reached for the door, lost his balance and fell onto his right hip.  Patient denies hitting his head. Son who is at bedside translating stated that  patient did complain of some left eye blur vision  He denies shortness a breath, chest pain, nausea, vomiting, diarrhea, headache and dizziness.  He received 50 mcg of fentanyl in route for Rt hip pain.      On arrival  to the ED, Pt was afebrile, HR 88, /76, RR 18 and SpO2 96% on room air.  Labs with H&H 8.8/30.2, CO2 20, AST 48, ALT 63.  EKG with sinus rhythm with first-degree AV block, non specific  ST and T wave change. right hip x-ray revealed a right femoral neck fracture.  Chest x-ray with no acute cardiopulmonary process.  CT of the head revealed a left thalamic minimal hyperdensity which may represent subacute hemorrhage related to subacute infarct but no convincing acute brain traumatic findings.  CT cervical spine without acute findings. MRI of the brain with small linear/punctuate area of acute/subacute left thalamic hemorrhage, possible hemorrhagic infarct.  In ED patient received IV hydration, morphine, Zofran.  Orthopedics and neurology were consulted.      Pt underwent Rt hip hemiarthroplasty with Dr. Raghu Hunt on 4/14/25. Neurology initiated stroke workup and suggested possible left thalamic hypertensive hemorrhagic stoke. CTA head/neck with severe narrowing of jah distal vertebral arteries, severe narrowing of the clinoid, ophthalmic segments of the internal carotid artery siphons bilaterally and  near occlusion on the left.. Echo with LVEF 65-70%, neg bubble study, Carotid U/S with less than 50% ICA stenosis. Pt failed SLP eval and placed NPO. NG tube inserted for medication and nutrition. Neurology cleared Pt to start ASA 4 days from stroke onset which was 4/17/25. PT /OT suggested high intensity therapy.Spoke to Dr. Phillip with Neurology and cleared Pt for prophylactic Lovenox as of 4/18/25.      New onset fever of 101 on 4/17/25 associated with tachycardia. Infectious workup include CBC, CXR, UA, blood cultures x2 and Resp PCR ordered. Pt was started on empiric Zosyn IV. Workup was neg except Resp PCR was positive for Human Rhinovirus infection. Pt became afebrile. Zosyn discontinued. Pt pulled NG tube on 4/19/25. Replaced without difficulty. SLP continued to follow. As of 4/19/25 Pt remained with oropharyngeal dysphagia unsafe for PO intake. On 4/21/25, Pt passed MBS and was cleared for puree diet and mod thick liquid.      On 4/21/25 , Pt with melanotic stool and an acute drop in Hgb to 5.5 from 8.0 before. 2 units of P-RBC transfused. High dose PPI IV initiated. ASA/ Prophylactic Lovenox held. GI consulted.Underwent EGD on 4/22/25 showed evidence of erosions and erythema entire stomach and duodenum.        Interval Hx:   NPO for EGD today   Hgb 9.0 after 2 units of P-RBC  Hemodynamics are stable  ASA/prophylactic lovenox on hold     Case was discussed with patient's nurse and  on the floor.    Objective/physical exam:  General: In no acute distress, afebrile, on RA  Chest: Clear to auscultation bilaterally  Heart: RRR, +S1, S2, no appreciable murmur  Abdomen: Soft, nontender, BS +  MSK: Warm, no lower extremity edema, no clubbing or cyanosis, Surgical dressing to Rt upper lat thigh C/D/I  Neurologic: Alert and oriented  to self and person. Moves all 4 ext.       VITAL SIGNS: 24 HRS MIN & MAX LAST   Temp  Min: 97.2 °F (36.2 °C)  Max: 99.2 °F (37.3 °C) 98.1 °F (36.7 °C)   BP  Min: 100/63   Max: 134/78 116/70   Pulse  Min: 73  Max: 105  91   Resp  Min: 18  Max: 20 20   SpO2  Min: 96 %  Max: 100 % 96 %     I have reviewed the following labs:  Recent Labs   Lab 04/17/25 0827 04/21/25 0433 04/21/25 0823 04/22/25 0453   WBC 9.84 11.61*  --  11.38   RBC 3.92* 2.66*  --  3.93*   HGB 8.0* 5.6* 5.5* 9.0*   HCT 30.0* 20.7* 20.1* 30.4*   MCV 76.5* 77.8*  --  77.4*   MCH 20.4* 21.1*  --  22.9*   MCHC 26.7* 27.1*  --  29.6*   RDW 21.2* 22.1*  --  19.5*    310  --  301   MPV 10.0 10.9*  --  10.8*     Recent Labs   Lab 04/19/25 0451 04/21/25 0433 04/22/25 0452    137 140   K 3.8 4.2 4.2   * 104 108*   CO2 28 26 26   BUN 34.7* 22.4 18.5   CREATININE 0.75 0.60* 0.58*   CALCIUM 8.5* 8.5* 8.2*   MG 2.10 1.80 1.80   ALBUMIN  --   --  2.6*   ALKPHOS  --   --  65   ALT  --   --  23   AST  --   --  27   BILITOT  --   --  1.2     Microbiology Results (last 7 days)       Procedure Component Value Units Date/Time    Blood Culture [[id number]]  (Normal) Collected: 04/17/25 0827    Order Status: Completed Specimen: Blood Updated: 04/22/25 1000     Blood Culture No Growth at 5 days    Blood Culture [[id number]]  (Normal) Collected: 04/17/25 0827    Order Status: Completed Specimen: Blood Updated: 04/22/25 1000     Blood Culture No Growth at 5 days             See below for Radiology    Assessment/Plan:    Acute on chronic anemia due to possible GI bleed- 4/21/25- P-RBC x2 transfused   New onset fever with tachycardia / sepsis due to unspecified infection - 4/17/25 - resolved   Mechanical fall and closed displaced Rt femoral neck fracture, POA - s/p  Rt hip hemiarthroplasty on 4/14/25.  Acute to subacute left thalamic hemorrhage, POA  Oropharyngeal dysphagia due to above, POA- s/p NG tube    Intracranial Atherosclerotic disease, POA   Left eye blur vision with near occlusion of clinoid, ophthalmic segments of the internal carotid artery siphon on the left, POA  Leukocytosis, POA- resolved   Microcytic  hypochromic/ iron deficiency  anemia, moderate -POA  Diabetes Mellitis , type 2- uncontrolled due to hyperglycemia , HbA1c 8.0  Hypernatremia due to free water deficit - 4/16/25  Elevated LFT- resolved      Hx- HTN, HLD, prior CVA     Plan-  Undergoing EGD today   Hold ASA/ Prophylactic Lovenox   S/P 2 units of P-RBC yesterday   Continue pantoprazole 40 mg bid   IV Ferric Gluconate   Supportive treatment as indicated      VTE prophylaxis:  Lovenox started 4/18/25, then Held on 4/21/25 due to suspected GI bleed      Patient condition:  Fair     Anticipated discharge and Disposition:     Rehab placement        All diagnosis and differential diagnosis have been reviewed; assessment and plan has been documented; I have personally reviewed the labs and test results that are presently available; I have reviewed the patients medication list; I have reviewed the consulting providers response and recommendations. I have reviewed or attempted to review medical records based upon their availability    All of the patient's questions have been  addressed and answered. Patient's is agreeable to the above stated plan. I will continue to monitor closely and make adjustments to medical management as needed.    Portions of this note dictated using EMR integrated voice recognition software, and may be subject to voice recognition errors not corrected at proofreading. Please contact writer for clarification if needed.   _____________________________________________________________________    Malnutrition Status:  Nutrition consulted. Most recent weight and BMI monitored-     Measurements:  Wt Readings from Last 1 Encounters:   04/22/25 59.8 kg (131 lb 13.4 oz)   Body mass index is 25.75 kg/m².    Patient has been screened and assessed by RD.    Malnutrition Type:  Context: acute illness or injury  Level: other (see comments) (Does not meet criteria)    Malnutrition Characteristic Summary:  Weight Loss (Malnutrition): other (see  comments) (Does not meet criteria)  Muscle Mass (Malnutrition): mild depletion    Interventions/Recommendations (treatment strategy):  Enteral nutrition     Scheduled Med:   atorvastatin  40 mg Oral QHS    [START ON 4/23/2025] ferric gluconate (Ferrlecit) IVPB  125 mg Intravenous Daily    ferrous sulfate  300 mg Oral BID    gabapentin  600 mg Oral TID    insulin glargine U-100  12 Units Subcutaneous BID    [START ON 4/23/2025] pantoprazole  40 mg Oral BID AC    pantoprazole  40 mg Intravenous BID    sodium chloride 0.9%  10 mL Intravenous Q8H      Continuous Infusions:     PRN Meds:    Current Facility-Administered Medications:     0.9%  NaCl infusion (for blood administration), , Intravenous, Q24H PRN    0.9%  NaCl infusion (for blood administration), , Intravenous, Q24H PRN    acetaminophen, 650 mg, Per NG tube, Q4H PRN    bisacodyL, 10 mg, Rectal, Daily PRN    dextrose 50%, 12.5 g, Intravenous, PRN    diphenhydrAMINE-zinc acetate 1-0.1%, , Topical (Top), TID PRN    docusate, 100 mg, Per NG tube, BID PRN    glucagon (human recombinant), 1 mg, Intramuscular, PRN    hydrALAZINE, 10 mg, Intravenous, Q4H PRN    insulin aspart U-100, 0-10 Units, Subcutaneous, Q6H PRN           Marianela Garzon MD  Department of Hospital Medicine   [facility]   04/22/2025

## 2025-04-23 NOTE — PT/OT/SLP PROGRESS
Physical Therapy Treatment    Patient Name:  William Mccauley   MRN:  [id number]    Recommendations:     Discharge therapy intensity: Moderate Intensity Therapy   Discharge Equipment Recommendations: to be determined by next level of care  Barriers to discharge: Impaired mobility and Ongoing medical needs    Assessment:     William Mccauley is a 74 y.o. male admitted with a medical diagnosis of Right femoral neck fx s/p R LEANDRA, and MRI brain showed small linear/punctuate area of acute/subacute left thalamic hemorrhage.  He presents with the following impairments/functional limitations: weakness, impaired endurance, impaired self care skills, impaired functional mobility, impaired balance, pain.    Pt's daughter not present at this time. Utilized  WebVisible [id number] however poor connection once seated at EOB, reconnected with  BumpTop # [id number]. Pt hesitant to participate but agreed with encouragement. Remains max/total assist with a strong posterior lean.     Rehab Prognosis: Good; patient would benefit from acute skilled PT services to address these deficits and reach maximum level of function.    Recent Surgery: Procedure(s) (LRB):  EGD (N/A)  EGD, WITH CLOSED BIOPSY 1 Day Post-Op    Plan:     During this hospitalization, patient would benefit from acute PT services 6 x/week to address the identified rehab impairments via gait training, therapeutic activities, therapeutic exercises and progress toward the following goals:    Plan of Care Expires:  05/16/25    Subjective     Chief Complaint: L leg pain  Patient/Family Comments/goals: none stated  Pain/Comfort:  Pain Rating 1: other (see comments) (reporting R thigh/leg pain; did not rate)  Pain Addressed 1: Reposition, Distraction      Objective:     Communicated with RN prior to session.  Patient found HOB elevated with pulse ox (continuous), telemetry, PureWick upon PT entry to room.     General Precautions: Standard, droplet  Orthopedic Precautions: RLE weight bearing as  tolerated  Braces: N/A  Respiratory Status: Room air  Blood Pressure: 130/79  Skin Integrity: Visible skin intact      Functional Mobility:  Bed Mobility:    Supine to sit: Max assist x 2  Scooting: max assist  Transfers:    Sit<->stand:   Mod assist x 2 on first trial  Max assist x 2 on second trial  Bed to chair: total assist 2/2 posterior lean, cues and facilitation for upright posture, lateral weight shifting and cues for sequencing.   Balance: R lateral lean at EOB, CGA to min assist    Therapeutic Activities/Exercises:  RLE AAROM knee flex/ext and hip flexion x 10 reps    Education:  Patient provided with verbal education education regarding PT role/goals/POC and post-op precautions.  Understanding was verbalized.     Patient left up in chair with all lines intact, call button in reach, chair alarm on, sandra pad in place, and CNA notified    GOALS:   Multidisciplinary Problems       Physical Therapy Goals          Problem: Physical Therapy    Goal Priority Disciplines Outcome Interventions   Physical Therapy Goal     PT, PT/OT Progressing    Description: Goals to be met by: 2025     Patient will increase functional independence with mobility by performin. Supine to sit with Contact Guard Assistance  2. Sit to stand transfer with Contact Guard Assistance  3. Gait  x 150 feet with Contact Guard Assistance using Rolling Walker.                          Time Tracking:     PT Received On: 25  PT Start Time: 1048     PT Stop Time: 1128  PT Total Time (min): 40 min     Billable Minutes: Therapeutic Activity 2 units and Therapeutic Exercise 1 unit    Treatment Type: Treatment  PT/PTA: PTA     Number of PTA visits since last PT visit: 4     2025

## 2025-04-23 NOTE — PLAN OF CARE
Problem: Adult Inpatient Plan of Care  Goal: Plan of Care Review  Outcome: Progressing  Goal: Patient-Specific Goal (Individualized)  Outcome: Progressing  Goal: Absence of Hospital-Acquired Illness or Injury  Outcome: Progressing  Goal: Optimal Comfort and Wellbeing  Outcome: Progressing  Goal: Readiness for Transition of Care  Outcome: Progressing     Problem: Stroke, Ischemic (Includes Transient Ischemic Attack)  Goal: Optimal Coping  Outcome: Progressing  Goal: Effective Bowel Elimination  Outcome: Progressing  Goal: Optimal Cerebral Tissue Perfusion  Outcome: Progressing  Goal: Optimal Cognitive Function  Outcome: Progressing  Goal: Improved Communication Skills  Outcome: Progressing  Goal: Optimal Functional Ability  Outcome: Progressing  Goal: Optimal Nutrition Intake  Outcome: Progressing  Goal: Effective Oxygenation and Ventilation  Outcome: Progressing  Goal: Improved Sensorimotor Function  Outcome: Progressing  Goal: Safe and Effective Swallow  Outcome: Progressing  Goal: Effective Urinary Elimination  Outcome: Progressing     Problem: Fall Injury Risk  Goal: Absence of Fall and Fall-Related Injury  Outcome: Progressing     Problem: Skin Injury Risk Increased  Goal: Skin Health and Integrity  Outcome: Progressing     Problem: Diabetes Comorbidity  Goal: Blood Glucose Level Within Targeted Range  Outcome: Progressing     Problem: Infection  Goal: Absence of Infection Signs and Symptoms  Outcome: Progressing

## 2025-04-23 NOTE — PT/OT/SLP PROGRESS
[facility]  Speech Language Pathology Department  Dysphagia Therapy Progress Note    Patient Name:  William Mccauley   MRN:  [id number]    Recommendations     General recommendations:  dysphagia therapy  Solid texture recommendation:  Soft & Bite Sized Diet - IDDSI Level 6  Liquid consistency recommendation: Moderately thick liquids - IDDSI Level 3   Medications: crushed in puree  Aspiration precautions: small bites/sips, slow rate, upright for PO intake, and supervision with meals    Discharge therapy intensity: Moderate Intensity Therapy   Barriers to safe discharge:  severity of impairment    Subjective     Patient awake and alert.  Spiritual/Cultural/Holiness Beliefs/Practices that affect care: no    Pain/Comfort: Pain Rating 1: 0/10    Objective     Therapeutic PO Trials:  Consistency Amount Fed By Oral Symptoms Pharyngeal Symptoms   Thin liquid by straw 4oz Self None None   Puree 2oz Self None None   Soft & Bite-sized solid 5 bites Self with assistance None None     Assessment     Patient tolerated trials of soft and bite sized solids with no difficulties. However, attempting to take bite before swallowing his first bite. Patient requires assistance/supervision with meals to slow down, and eat one bite at a time. SLP to follow up regarding diet tolerance.    Outcome Measures     Functional Oral Intake Scale: 5 - Total oral diet with multiple consistencies, by requiring special preparation or compensations    Goals     Multidisciplinary Problems       SLP Goals          Problem: SLP    Goal Priority Disciplines Outcome   SLP Goal     SLP Progressing   Description: LTG: Patient will tolerate least restrictive diet with no clinical signs/sx of aspiration.    STGs:  1. The pt will complete tongue base/laryngeal elevation exercises.  2. Patient will tolerate moderately thick liquids.  3. Patient will tolerate pureed solids.  4. Patient will tolerate minced and moist solids.                        Plan     Will continue to follow and tx as appropriate.    SLP Follow-Up:  Yes   Patient to be seen:  5 x/week   Plan of Care expires:  05/05/25  Plan of Care reviewed with:  patient       Time Tracking     SLP Treatment Date:   04/23/25  Speech Start Time:  1335  Speech Stop Time:  1350     Speech Total Time (min):  15 min    Billable minutes:  Treatment of Swallow Dysfunction, 15 minutes       04/23/2025

## 2025-04-24 LAB
ERYTHROCYTE [DISTWIDTH] IN BLOOD BY AUTOMATED COUNT: 22.2 % (ref 11.5–17)
HCT VFR BLD AUTO: 33.4 % (ref 42–52)
HGB BLD-MCNC: 9.3 G/DL (ref 14–18)
MCH RBC QN AUTO: 23 PG (ref 27–31)
MCHC RBC AUTO-ENTMCNC: 27.8 G/DL (ref 33–36)
MCV RBC AUTO: 82.7 FL (ref 80–94)
NRBC BLD AUTO-RTO: 0.2 %
PLATELET # BLD AUTO: 309 X10(3)/MCL (ref 130–400)
PMV BLD AUTO: 11.6 FL (ref 7.4–10.4)
POCT GLUCOSE: 173 MG/DL (ref 70–110)
RBC # BLD AUTO: 4.04 X10(6)/MCL (ref 4.7–6.1)
WBC # BLD AUTO: 8.43 X10(3)/MCL (ref 4.5–11.5)

## 2025-04-24 PROCEDURE — 97530 THERAPEUTIC ACTIVITIES: CPT | Mod: CQ

## 2025-04-24 PROCEDURE — 97110 THERAPEUTIC EXERCISES: CPT | Mod: CQ

## 2025-04-24 PROCEDURE — 63600175 PHARM REV CODE 636 W HCPCS: Performed by: INTERNAL MEDICINE

## 2025-04-24 PROCEDURE — 25000003 PHARM REV CODE 250: Performed by: PHYSICIAN ASSISTANT

## 2025-04-24 PROCEDURE — 36415 COLL VENOUS BLD VENIPUNCTURE: CPT | Performed by: INTERNAL MEDICINE

## 2025-04-24 PROCEDURE — 85027 COMPLETE CBC AUTOMATED: CPT | Performed by: INTERNAL MEDICINE

## 2025-04-24 PROCEDURE — 97535 SELF CARE MNGMENT TRAINING: CPT

## 2025-04-24 PROCEDURE — 27000207 HC ISOLATION

## 2025-04-24 PROCEDURE — 25000003 PHARM REV CODE 250: Performed by: INTERNAL MEDICINE

## 2025-04-24 PROCEDURE — 21400001 HC TELEMETRY ROOM

## 2025-04-24 PROCEDURE — A4216 STERILE WATER/SALINE, 10 ML: HCPCS | Performed by: PHYSICIAN ASSISTANT

## 2025-04-24 RX ORDER — SUCRALFATE 1 G/1
1 TABLET ORAL
Status: DISCONTINUED | OUTPATIENT
Start: 2025-04-24 | End: 2025-04-30 | Stop reason: HOSPADM

## 2025-04-24 RX ORDER — TRAMADOL HYDROCHLORIDE 50 MG/1
50 TABLET ORAL EVERY 6 HOURS PRN
Status: DISCONTINUED | OUTPATIENT
Start: 2025-04-24 | End: 2025-04-30 | Stop reason: HOSPADM

## 2025-04-24 RX ORDER — HYDROCODONE BITARTRATE AND ACETAMINOPHEN 5; 325 MG/1; MG/1
1 TABLET ORAL ONCE
Refills: 0 | Status: COMPLETED | OUTPATIENT
Start: 2025-04-24 | End: 2025-04-24

## 2025-04-24 RX ADMIN — GABAPENTIN 600 MG: 300 CAPSULE ORAL at 08:04

## 2025-04-24 RX ADMIN — GABAPENTIN 600 MG: 300 CAPSULE ORAL at 10:04

## 2025-04-24 RX ADMIN — GABAPENTIN 600 MG: 300 CAPSULE ORAL at 06:04

## 2025-04-24 RX ADMIN — ASPIRIN 81 MG CHEWABLE TABLET 81 MG: 81 TABLET CHEWABLE at 08:04

## 2025-04-24 RX ADMIN — SODIUM CHLORIDE 125 MG: 9 INJECTION, SOLUTION INTRAVENOUS at 09:04

## 2025-04-24 RX ADMIN — ATORVASTATIN CALCIUM 40 MG: 40 TABLET, FILM COATED ORAL at 10:04

## 2025-04-24 RX ADMIN — PANTOPRAZOLE SODIUM 40 MG: 40 TABLET, DELAYED RELEASE ORAL at 06:04

## 2025-04-24 RX ADMIN — INSULIN GLARGINE 12 UNITS: 100 INJECTION, SOLUTION SUBCUTANEOUS at 10:04

## 2025-04-24 RX ADMIN — INSULIN GLARGINE 12 UNITS: 100 INJECTION, SOLUTION SUBCUTANEOUS at 08:04

## 2025-04-24 RX ADMIN — ACETAMINOPHEN 650 MG: 650 SOLUTION ORAL at 08:04

## 2025-04-24 RX ADMIN — HYDROCODONE BITARTRATE AND ACETAMINOPHEN 1 TABLET: 5; 325 TABLET ORAL at 11:04

## 2025-04-24 RX ADMIN — SODIUM CHLORIDE, PRESERVATIVE FREE 10 ML: 5 INJECTION INTRAVENOUS at 10:04

## 2025-04-24 RX ADMIN — SUCRALFATE 1 G: 1 TABLET ORAL at 10:04

## 2025-04-24 RX ADMIN — INSULIN ASPART 4 UNITS: 100 INJECTION, SOLUTION INTRAVENOUS; SUBCUTANEOUS at 12:04

## 2025-04-24 NOTE — PT/OT/SLP PROGRESS
Physical Therapy Treatment    Patient Name:  William Mccauley   MRN:  [id number]    Recommendations:     Discharge therapy intensity: Moderate Intensity Therapy   Discharge Equipment Recommendations: to be determined by next level of care  Barriers to discharge: Impaired mobility and Ongoing medical needs    Assessment:     William Mccauley is a 74 y.o. male admitted with a medical diagnosis of Right femoral neck fx s/p R LEANDRA, and MRI brain showed small linear/punctuate area of acute/subacute left thalamic hemorrhage.  He presents with the following impairments/functional limitations: weakness, impaired endurance, impaired self care skills, impaired functional mobility, impaired balance, pain.    Pt's daughter present translating for the pt. Discussed pain management with nursing to improve tolerance to therapy. Max assist x 2 to stand step transfer to recliner. Pt remains flexed at trunk and knees. Daughter reports his posture is close to baseline however did not require much assistance to ambulate.    Rehab Prognosis: Good; patient would benefit from acute skilled PT services to address these deficits and reach maximum level of function.    Recent Surgery: Procedure(s) (LRB):  EGD (N/A)  EGD, WITH CLOSED BIOPSY 2 Days Post-Op    Plan:     During this hospitalization, patient would benefit from acute PT services 6 x/week to address the identified rehab impairments via gait training, therapeutic activities, therapeutic exercises and progress toward the following goals:    Plan of Care Expires:  05/16/25    Subjective     Chief Complaint: R leg pain  Patient/Family Comments/goals: none stated  Pain/Comfort:  Pain Rating 1: other (see comments) (R hip pain; did not rate)  Pain Addressed 1: Nurse notified, Pre-medicate for activity, Reposition      Objective:     Communicated with RN prior to session.  Patient found HOB elevated with pulse ox (continuous), telemetry, PureWick upon PT entry to room.     General Precautions: Standard,  droplet  Orthopedic Precautions: RLE weight bearing as tolerated  Braces: N/A  Respiratory Status: Room air  Blood Pressure:   Skin Integrity: Visible skin intact      Functional Mobility:  Bed Mobility:    Rolling to L: Mod assist  Supine to sit: Max assist x 2  Scooting: max assist  Bridging to place brief: Min assist; able to clear the bed slightly  Transfers:    Sit<->stand:   Mod assist  with RW   Bed to chair: Max assist x 2; one therapist managing walker and balance while second person guiding pelvis, mild posterior lean, flexed trunk and knees. Able to take shuffling steps.   Balance:SBA static sitting at EOB. Improved WB through R hip, maintained midline. Initially demonstrated a posterior lean but corrected with cues and maintained    Therapeutic Activities/Exercises:  RLE AAROM hip and knee flex/ext, hip abd/add and LAQ's x 10 reps    Education:  Patient provided with verbal education education regarding PT role/goals/POC and post-op precautions.  Understanding was verbalized.     Patient left up in chair with all lines intact, call button in reach, chair alarm on, sandra pad in place, and nurse notified.    GOALS:   Multidisciplinary Problems       Physical Therapy Goals          Problem: Physical Therapy    Goal Priority Disciplines Outcome Interventions   Physical Therapy Goal     PT, PT/OT Progressing    Description: Goals to be met by: 2025     Patient will increase functional independence with mobility by performin. Supine to sit with Contact Guard Assistance  2. Sit to stand transfer with Contact Guard Assistance  3. Gait  x 150 feet with Contact Guard Assistance using Rolling Walker.                          Time Tracking:     PT Received On: 25  PT Start Time: 0951     PT Stop Time: 1017  PT Total Time (min): 26 min     Billable Minutes: Therapeutic Activity 1 unit and Therapeutic Exercise 1 unit    Treatment Type: Treatment  PT/PTA: PTA     Number of PTA visits since last PT  visit: 5     04/24/2025

## 2025-04-24 NOTE — CARE UPDATE
[id number] Ludy Merrill called reporting insurance denied auth for ltac. They will submit for auth for rehab. She will let me know

## 2025-04-24 NOTE — PROGRESS NOTES
Inpatient Nutrition Assessment    Admit Date: 4/13/2025   Total duration of encounter: 11 days   Patient Age: 74 y.o.    Nutrition Recommendation/Prescription     Continue regular diet with consistency per SLP.    Communication of Recommendations: reviewed with nurse    Nutrition Assessment     Malnutrition Assessment/Nutrition-Focused Physical Exam    Malnutrition Context: acute illness or injury (04/16/25 1238)  Malnutrition Level: other (see comments) (Does not meet criteria) (04/16/25 1238)     Weight Loss (Malnutrition): other (see comments) (Does not meet criteria) (04/16/25 1238)              Muscle Mass (Malnutrition): mild depletion (04/16/25 1238)     Clavicle Bone Region (Muscle Loss): mild depletion, possibly normal for patient given no reported weight/intake decrease                             A minimum of two characteristics is recommended for diagnosis of either severe or non-severe malnutrition.    Chart Review    Reason Seen: follow-up    Malnutrition Screening Tool Results   Have you recently lost weight without trying?: No  Have you been eating poorly because of a decreased appetite?: No   MST Score: 0   Diagnosis:  Right femoral neck fracture secondary to mechanical fall   Acute/subacute left thalamic hemorrhage  Microcytic anemia, stable   Metabolic acidosis   Transaminitis     Relevant Medical History: hypertension, hyperlipidemia, diabetes mellitus type 2, ptosis, CVA without deficits, sciatica     Scheduled Medications:  aspirin, 81 mg, Daily  atorvastatin, 40 mg, QHS  ferric gluconate (Ferrlecit) IVPB, 125 mg, Daily  gabapentin, 600 mg, TID  insulin glargine U-100, 12 Units, BID  pantoprazole, 40 mg, BID AC  sodium chloride 0.9%, 10 mL, Q8H    Continuous Infusions: none       PRN Medications:   0.9%  NaCl infusion (for blood administration), , Q24H PRN  0.9%  NaCl infusion (for blood administration), , Q24H PRN  acetaminophen, 650 mg, Q4H PRN  bisacodyL, 10 mg, Daily PRN  dextrose 50%, 12.5  g, PRN  diphenhydrAMINE-zinc acetate 1-0.1%, , TID PRN  docusate, 100 mg, BID PRN  glucagon (human recombinant), 1 mg, PRN  hydrALAZINE, 10 mg, Q4H PRN  insulin aspart U-100, 0-10 Units, Q6H PRN  traMADoL, 50 mg, Q6H PRN    Calorie Containing IV Medications: no significant kcals from medications at this time    Recent Labs   Lab 04/18/25  0501 04/19/25  0451 04/21/25  0433 04/21/25  0823 04/22/25  0452 04/22/25  0453 04/24/25  0844   * 145 137  --  140  --   --    K 4.2 3.8 4.2  --  4.2  --   --    CALCIUM 8.8 8.5* 8.5*  --  8.2*  --   --    PHOS 2.4 3.6 2.9  --  2.7  --   --    MG 2.20 2.10 1.80  --  1.80  --   --    * 109* 104  --  108*  --   --    CO2 28 28 26  --  26  --   --    BUN 31.0* 34.7* 22.4  --  18.5  --   --    CREATININE 0.74 0.75 0.60*  --  0.58*  --   --    EGFRNORACEVR >60 >60 >60  --  >60  --   --    GLUCOSE 242* 235* 184*  --  126*  --   --    BILITOT  --   --   --   --  1.2  --   --    ALKPHOS  --   --   --   --  65  --   --    ALT  --   --   --   --  23  --   --    AST  --   --   --   --  27  --   --    ALBUMIN  --   --   --   --  2.6*  --   --    WBC  --   --  11.61*  --   --  11.38 8.43   HGB  --   --  5.6* 5.5*  --  9.0* 9.3*   HCT  --   --  20.7* 20.1*  --  30.4* 33.4*     Nutrition Orders:  Diet Soft & Bite Sized (IDDSI Level 6) Supervision with Meals; Moderately Thick Liquids (IDDSI Level 3); Standard Tray    Appetite/Oral Intake: good/% of meals  Factors Affecting Nutritional Intake: difficulty/impaired swallowing  Social Needs Impacting Access to Food: none identified  Food/Hinduism/Cultural Preferences: none reported  Food Allergies: no known food allergies  Last Bowel Movement: 04/22/25  Wound(s): no pressure injuries documented at this time     Comments    4/16/25 Spoke with patient's daughter at bedside, denies translation services at this time. Daughter reports good appetite/intake prior to admission, denies weight loss. MBS done 4/15/25 with recommendations for  NPO and dysphagia therapy. He is currently receiving Clinimix E 4.25/5 at 75 ml/hr. Recommend consideration of nasogastric tube feeding to better meet estimated needs and to maintain GI tract integrity.    4/17/25 Patient receiving tube feeding per nasogastric tube, needs met. Hypernatremia noted, physician managing free water flushes at this time, also has D5W ordered.     4/21/25 Patient continues with tube feeding, plans for EGD tomorrow. Nurse reports loose stools and patient reporting some fecal incontinence, will add BanatrolTF to give as needed.    4/24/25 Patient transitioned to modified diet yesterday afternoon, nurse reports good intake, observed meal tray at bedside during round with 100% consumed.    Anthropometrics    Height: 5' (152.4 cm), Height Method: Stated  Last Weight: 59.8 kg (131 lb 13.4 oz) (04/22/25 0622), Weight Method: Standard Scale  BMI (Calculated): 25.7  BMI Classification: overweight (BMI 25-29.9)        Ideal Body Weight (IBW), Male: 106 lb     % Ideal Body Weight, Male (lb): 132.07 %                          Usual Weight Provided By: family/caregiver denies unintentional weight loss    Wt Readings from Last 5 Encounters:   04/22/25 59.8 kg (131 lb 13.4 oz)   04/02/25 59.4 kg (131 lb)   02/05/25 62.9 kg (138 lb 9.6 oz)   10/31/24 58.1 kg (128 lb)   10/18/24 59.4 kg (131 lb)     Weight Change(s) Since Admission:   4/24/25 3.1 kg increase from 4/16 to 4/22 noted, possibly inaccurate, continue to monitor weights for trends  4/16/25 admission weight (63.5 kg) stated, took bed weight (56.7 kg) during rounds  Wt Readings from Last 1 Encounters:   04/22/25 0622 59.8 kg (131 lb 13.4 oz)   04/16/25 1234 56.7 kg (125 lb)   04/13/25 1306 63.5 kg (139 lb 15.9 oz)   04/13/25 0911 63.5 kg (140 lb)   Admit Weight: 63.5 kg (140 lb) (04/13/25 0911), Weight Method: Stated    Estimated Needs    Weight Used For Calorie Calculations: 56.7 kg (125 lb)  Energy Calorie Requirements (kcal): 0541-7290, 1.2-1.4  stress factor  Energy Need Method: [place]  Weight Used For Protein Calculations: 56.7 kg (125 lb)  Protein Requirements: 57-80 g, 1-1.4 g/kg  Fluid Requirements (mL): 3709-5586, 1 ml/kcal  CHO Requirement: 138-181 g, 40-45% of kcal     Enteral Nutrition discontinued    Parenteral Nutrition discontinued    Evaluation of Received Nutrient Intake    Calories: meeting estimated needs  Protein: meeting estimated needs    Patient Education Not applicable.    Nutrition Diagnosis     PES: Inadequate energy intake related to inability to consume sufficient nutrients as evidenced by less than 80% needs met. (resolved)    PES: N/A            Nutrition Interventions     Intervention(s): general/healthful diet, modified composition of meals/snacks, and collaboration with other providers  Intervention(s): Enteral nutrition - discontinued     Goal: Meet greater than 80% of nutritional needs by follow-up. (goal met)  Goal: Tolerate enteral feeding at goal rate by follow-up. (goal discontinued)    Nutrition Goals & Monitoring     Dietitian will monitor: food and beverage intake, energy intake, weight, electrolyte/renal panel, and beliefs/attitudes  Discharge planning:  regular diet with texture modifications per SLP  Nutrition Risk/Follow-Up: high (follow-up in 1-4 days)   Please consult if re-assessment needed sooner.

## 2025-04-24 NOTE — PLAN OF CARE
Problem: Adult Inpatient Plan of Care  Goal: Plan of Care Review  Outcome: Progressing  Goal: Patient-Specific Goal (Individualized)  Outcome: Progressing  Goal: Absence of Hospital-Acquired Illness or Injury  Outcome: Progressing  Goal: Optimal Comfort and Wellbeing  Outcome: Progressing  Goal: Readiness for Transition of Care  Outcome: Progressing     Problem: Stroke, Ischemic (Includes Transient Ischemic Attack)  Goal: Optimal Coping  Outcome: Progressing

## 2025-04-24 NOTE — PLAN OF CARE
Problem: Adult Inpatient Plan of Care  Goal: Plan of Care Review  Outcome: Progressing  Goal: Patient-Specific Goal (Individualized)  Outcome: Progressing  Goal: Absence of Hospital-Acquired Illness or Injury  Outcome: Progressing  Goal: Optimal Comfort and Wellbeing  Outcome: Progressing  Goal: Readiness for Transition of Care  Outcome: Progressing     Problem: Stroke, Ischemic (Includes Transient Ischemic Attack)  Goal: Optimal Coping  Outcome: Progressing  Goal: Effective Bowel Elimination  Outcome: Progressing  Goal: Optimal Cerebral Tissue Perfusion  Outcome: Progressing  Goal: Optimal Cognitive Function  Outcome: Progressing  Goal: Improved Communication Skills  Outcome: Progressing  Goal: Optimal Functional Ability  Outcome: Progressing  Goal: Optimal Nutrition Intake  Outcome: Progressing  Goal: Effective Oxygenation and Ventilation  Outcome: Progressing  Goal: Improved Sensorimotor Function  Outcome: Progressing  Goal: Safe and Effective Swallow  Outcome: Progressing  Goal: Effective Urinary Elimination  Outcome: Progressing     Problem: Fall Injury Risk  Goal: Absence of Fall and Fall-Related Injury  Outcome: Progressing     Problem: Skin Injury Risk Increased  Goal: Skin Health and Integrity  Outcome: Progressing     Problem: Stroke, Intracerebral Hemorrhage  Goal: Optimal Coping  Outcome: Progressing  Goal: Effective Bowel Elimination  Outcome: Progressing  Goal: Optimal Cerebral Tissue Perfusion  Outcome: Progressing  Goal: Optimal Cognitive Function  Outcome: Progressing  Goal: Effective Communication Skills  Outcome: Progressing  Goal: Optimal Functional Ability  Outcome: Progressing  Goal: Optimal Nutrition Intake  Outcome: Progressing  Goal: Optimal Pain Control and Function  Outcome: Progressing  Goal: Effective Oxygenation and Ventilation  Outcome: Progressing  Goal: Improved Sensorimotor Function  Outcome: Progressing  Goal: Safe and Effective Swallow  Outcome: Progressing  Goal: Effective  Urinary Elimination  Outcome: Progressing     Problem: Diabetes Comorbidity  Goal: Blood Glucose Level Within Targeted Range  Outcome: Progressing

## 2025-04-24 NOTE — PT/OT/SLP PROGRESS
Occupational Therapy   Treatment    Name: William Mccauley  MRN: [id number]  Admitting Diagnosis:  Closed displaced fracture of right femoral neck  2 Days Post-Op    Recommendations:     Recommended therapy intensity at discharge: Moderate Intensity Therapy   Discharge Equipment Recommendations:  to be determined by next level of care  Barriers to discharge:   (medically complex, impaired ADLs/mobility)    Assessment:     William Mccauley is a 74 y.o. male with a medical diagnosis of fall, Right femoral neck fx s/p R LEANDRA, and MRI brain showed small linear/punctuate area of acute/subacute left thalamic hemorrhage. Performance deficits affecting function are weakness, impaired endurance, impaired self care skills, impaired functional mobility, impaired balance.     Dtr present to assist translating.     Rehab Prognosis:  Good; patient would benefit from acute skilled OT services to address these deficits and reach maximum level of function.       Plan:     Patient to be seen 5 x/week to address the above listed problems via self-care/home management, therapeutic activities, therapeutic exercises, cognitive retraining  Plan of Care Expires: 05/15/25  Plan of Care Reviewed with: patient    Subjective       Pain/Comfort:   Pain Rating 1:  (no PRS given, verbalized R hip pain)  Pain Addressed 1: Reposition, Distraction    Objective:     Communicated with: RN prior to session.  Patient found HOB elevated with pulse ox (continuous), telemetry, PureWick upon OT entry to room.    General Precautions: Standard, droplet, fall, other (see comments) (modified diet)    Orthopedic Precautions:RLE weight bearing as tolerated  Braces: N/A  Respiratory Status: Room air    Occupational Performance:     Bed Mobility:    SUP>SIT: MAXX2  Rolling R/L: modA  Scooting: max assist     Functional Mobility/Transfers:  STS with RW from bedside completed Mod assist   Bed to chair: Max assist x 2; one therapist managing walker and balance while second person guiding  pelvis, mild posterior lean, flexed trunk and knees. Able to take shuffling steps.     Activities of Daily Living:  Lower Body Dressing: maximal assistance to don socks and brief  Toileting: max A. Quivie in place.     Skin assessment: visible skin intact  [facility] 6 Click ADL: 14    Patient Education:  Patient provided with verbal education education regarding OT role/goals/POC, post op precautions, fall prevention, and safety awareness.  Understanding was verbalized, however additional teaching warranted.    Patient left [facility] with all lines intact, call button in reach, and nurse notified. Ta pad in place.     GOALS:   Multidisciplinary Problems       Occupational Therapy Goals          Problem: Occupational Therapy    Goal Priority Disciplines Outcome Interventions   Occupational Therapy Goal     OT, PT/OT Progressing    Description: Goals to be met by: 5/15/25     Patient will increase functional independence with ADLs by performing:    UE Dressing with Stand-by Assistance.  LE Dressing with Minimal Assistance and Assistive Devices as needed.  Grooming while standing at sink with Contact Guard Assistance and Assistive Devices as needed.  Toileting from toilet with [place] and Assistive Devices as needed for hygiene and clothing management.   Toilet transfer to toilet with Contact Guard Assistance and using LRAD.                         Time Tracking:     OT Date of Treatment: 04/24/25  OT Start Time: 0955  OT Stop Time: 1018  OT Total Time (min): 23 min    Billable Minutes:Self Care/Home Management 2    OT/YANETH: OT     Number of YANETH visits since last OT visit: 5    4/24/2025

## 2025-04-25 LAB
POCT GLUCOSE: 102 MG/DL (ref 70–110)
POCT GLUCOSE: 115 MG/DL (ref 70–110)
POCT GLUCOSE: 215 MG/DL (ref 70–110)
RBCS: NORMAL

## 2025-04-25 PROCEDURE — 97164 PT RE-EVAL EST PLAN CARE: CPT

## 2025-04-25 PROCEDURE — 25000003 PHARM REV CODE 250: Performed by: PHYSICIAN ASSISTANT

## 2025-04-25 PROCEDURE — 63600175 PHARM REV CODE 636 W HCPCS: Performed by: INTERNAL MEDICINE

## 2025-04-25 PROCEDURE — 25000003 PHARM REV CODE 250: Performed by: INTERNAL MEDICINE

## 2025-04-25 PROCEDURE — 97116 GAIT TRAINING THERAPY: CPT

## 2025-04-25 PROCEDURE — 21400001 HC TELEMETRY ROOM

## 2025-04-25 PROCEDURE — 97535 SELF CARE MNGMENT TRAINING: CPT

## 2025-04-25 PROCEDURE — A4216 STERILE WATER/SALINE, 10 ML: HCPCS | Performed by: PHYSICIAN ASSISTANT

## 2025-04-25 PROCEDURE — 27000207 HC ISOLATION

## 2025-04-25 PROCEDURE — 97168 OT RE-EVAL EST PLAN CARE: CPT

## 2025-04-25 RX ADMIN — SODIUM CHLORIDE, PRESERVATIVE FREE 10 ML: 5 INJECTION INTRAVENOUS at 10:04

## 2025-04-25 RX ADMIN — TRAMADOL HYDROCHLORIDE 50 MG: 50 TABLET, COATED ORAL at 10:04

## 2025-04-25 RX ADMIN — GABAPENTIN 600 MG: 300 CAPSULE ORAL at 04:04

## 2025-04-25 RX ADMIN — SODIUM CHLORIDE, PRESERVATIVE FREE 10 ML: 5 INJECTION INTRAVENOUS at 05:04

## 2025-04-25 RX ADMIN — INSULIN GLARGINE 12 UNITS: 100 INJECTION, SOLUTION SUBCUTANEOUS at 10:04

## 2025-04-25 RX ADMIN — ATORVASTATIN CALCIUM 40 MG: 40 TABLET, FILM COATED ORAL at 10:04

## 2025-04-25 RX ADMIN — SUCRALFATE 1 G: 1 TABLET ORAL at 10:04

## 2025-04-25 RX ADMIN — ASPIRIN 81 MG CHEWABLE TABLET 81 MG: 81 TABLET CHEWABLE at 10:04

## 2025-04-25 RX ADMIN — INSULIN ASPART 4 UNITS: 100 INJECTION, SOLUTION INTRAVENOUS; SUBCUTANEOUS at 04:04

## 2025-04-25 RX ADMIN — PANTOPRAZOLE SODIUM 40 MG: 40 TABLET, DELAYED RELEASE ORAL at 04:04

## 2025-04-25 RX ADMIN — SODIUM CHLORIDE 125 MG: 9 INJECTION, SOLUTION INTRAVENOUS at 10:04

## 2025-04-25 RX ADMIN — GABAPENTIN 600 MG: 300 CAPSULE ORAL at 10:04

## 2025-04-25 RX ADMIN — SUCRALFATE 1 G: 1 TABLET ORAL at 04:04

## 2025-04-25 RX ADMIN — PANTOPRAZOLE SODIUM 40 MG: 40 TABLET, DELAYED RELEASE ORAL at 05:04

## 2025-04-25 RX ADMIN — DOCUSATE SODIUM LIQUID 100 MG: 100 LIQUID ORAL at 04:04

## 2025-04-25 RX ADMIN — SUCRALFATE 1 G: 1 TABLET ORAL at 05:04

## 2025-04-25 NOTE — PT/OT/SLP RE-EVAL
Physical Therapy Re-Evaluation and Treatment    Patient Name:  William Mccauley   MRN:  [id number]    Recommendations:     Discharge therapy intensity: Moderate Intensity Therapy   Discharge Equipment Recommendations: to be determined by next level of care   Barriers to discharge: Impaired mobility    Assessment:     William Mccauley is a 74 y.o. male admitted with a medical diagnosis of Right femoral neck fx s/p R LEANDRA, and MRI brain showed small linear/punctuate area of acute/subacute left thalamic hemorrhage..  He presents with the following impairments/functional limitations: weakness, impaired endurance, impaired self care skills, impaired functional mobility, impaired balance, pain.    Used  services #[id number]. Pt motivated for mobility and agreeable. Pt demonstrates shuffling and festinating gait with difficulty weight bearing on RLE. Pt requires Mod A x2 for transfers and gait at this time. Recommending moderate intensity.     Rehab Prognosis: Good; patient would benefit from acute skilled PT services to address these deficits and reach maximum level of function.    Recent Surgery: Procedure(s) (LRB):  EGD (N/A)  EGD, WITH CLOSED BIOPSY 3 Days Post-Op    Plan:     During this hospitalization, patient would benefit from acute PT services 6 x/week to address the identified rehab impairments via gait training, therapeutic activities, therapeutic exercises, neuromuscular re-education and progress toward the following goals:    Plan of Care Expires:  05/16/25    PT/PTA conference to discuss PT POC and patient's progression towards goals held with Chanda Moore PTA.     Subjective     Chief Complaint: LE weakness  Patient/Family Comments/goals: to get better  Pain/Comfort:  Location - Side 1: Right  Location 1: hip  Pain Addressed 1: Pre-medicate for activity, Reposition    Patients cultural, spiritual, Mormon conflicts given the current situation: no    Objective:     Communicated with nurse prior to session.  Patient  found supine with pulse ox (continuous), telemetry  upon PT entry to room.    General Precautions: Standard, aspiration, droplet (rhinovirus)  Orthopedic Precautions:RLE weight bearing as tolerated (ROM as tolerated)   Braces: N/A  Respiratory Status: Room air        Exams:  RLE ROM: WNL  RLE Strength: Deficits: 2/5 hip flexion, 3/5 knee ext, 3/5 ankle DF  LLE ROM: WNL  LLE Strength: Deficits: 3+/5 grossly  Skin integrity: Visible skin intact      Functional Mobility:  Bed Mobility:     Scooting: maximal assistance and of 2 persons  Supine to Sit: maximal assistance and of 2 persons  Transfers:     Sit to Stand:  moderate assistance and of 2 persons with rolling walker  Gait: 2ft from bed to chair Max x2 assist w RW for steering and weight shifting. Then 10ft x2 with Modx2 and RW for weight shifting and periodic advancement of LLE. Pt with shuffling gait BLE with ER.       AM-PAC 6 CLICK MOBILITY  Total Score:11       Treatment & Education:  Performed sit<>stand transfer x3.     Patient provided with verbal education education regarding PT role/goals/POC, fall prevention, and safety awareness.  Understanding was verbalized.     Patient left up in chair with all lines intact, call button in reach, chair alarm on, geomat cushion, and CNA notified.    GOALS:   Multidisciplinary Problems       Physical Therapy Goals          Problem: Physical Therapy    Goal Priority Disciplines Outcome Interventions   Physical Therapy Goal     PT, PT/OT Progressing    Description: Goals to be met by: 2025     Patient will increase functional independence with mobility by performin. Supine to sit with Contact Guard Assistance  2. Sit to stand transfer with Contact Guard Assistance  3. Gait  x 150 feet with Contact Guard Assistance using Rolling Walker.                          History:     Past Medical History:   Diagnosis Date    Diabetes mellitus, type 2     HTN (hypertension)     Ptosis     Sciatica     Stroke     Type 2  diabetes mellitus without complications        Past Surgical History:   Procedure Laterality Date    DIAGNOSTIC LAPAROSCOPY N/A 9/24/2024    Procedure: LAPAROSCOPY, DIAGNOSTIC;  Surgeon: Adalberto Rock MD;  Location: [facility];  Service: General;  Laterality: N/A;    EGD, WITH CLOSED BIOPSY  4/22/2025    Procedure: EGD, WITH CLOSED BIOPSY;  Surgeon: Christian Hernández MD;  Location: [facility] ENDOSCOPY;  Service: Endoscopy;;    ESOPHAGOGASTRODUODENOSCOPY N/A 4/22/2025    Procedure: EGD;  Surgeon: Christian Hernández MD;  Location: [facility] ENDOSCOPY;  Service: Endoscopy;  Laterality: N/A;    HEMIARTHROPLASTY OF HIP Right 4/14/2025    Procedure: HEMIARTHROPLASTY, HIP;  Surgeon: Raghu Hunt MD;  Location: [facility];  Service: Orthopedics;  Laterality: Right;  lateral peg board rodney    KIDNEY STONE SURGERY Left     REPAIR, HERNIA, INGUINAL Right 9/24/2024    Procedure: REPAIR, HERNIA, INGUINAL;  Surgeon: Adalberto Rock MD;  Location: [facility];  Service: General;  Laterality: Right;  Incarcerated Hernia       Time Tracking:     PT Received On: 04/25/25  PT Start Time: 1405     PT Stop Time: 1445  PT Total Time (min): 40 min     Billable Minutes: Re-eval 30 and Gait Training 10      04/25/2025

## 2025-04-25 NOTE — PT/OT/SLP RE-EVAL
Occupational Therapy   Re-evaluation    Name: William Mccauley  MRN: [id number]  Recent Surgery: Procedure(s) (LRB):  EGD (N/A)  EGD, WITH CLOSED BIOPSY 3 Days Post-Op    Recommendations:     Discharge therapy intensity: Moderate Intensity Therapy   Discharge Equipment Recommendations:  to be determined by next level of care  Barriers to discharge:   (medically complex, impaired ADLs/mobility)    Assessment:     William Mccauley is a 74 y.o. male with a medical diagnosis of  fall, Right femoral neck fx s/p R LEANDRA, and MRI brain showed small linear/punctuate area of acute/subacute left thalamic hemorrhage.  He presents with the following performance deficits affecting function: weakness, impaired endurance, orthopedic precautions, impaired self care skills, impaired sensation, impaired functional mobility, gait instability, impaired balance, pain, decreased safety awareness, decreased lower extremity function, impaired skin.      Pt with progress in fxnl mobility and pain tolerance, though he is still requiring increased assist of 2 for very short distance mobility using RW. He remains appropriate for moderate intensity at this time. Will continue to progress as able.     Rehab Prognosis: Good; patient would benefit from acute skilled OT services to address these deficits and reach maximum level of function.       Plan:     Patient to be seen 5 x/week to address the above listed problems via self-care/home management, therapeutic activities, therapeutic exercises, cognitive retraining  Plan of Care Expires: 05/15/25  Plan of Care Reviewed with: patient    Subjective   Interpretation service utilized to assist communication t/o today's session:   Interpretor: Carl ID # [id number]    Chief Complaint: pain RLE  Patient/Family Comments/goals: get better    Pain/Comfort:    Pain Rating 1:  (no PRS given, verbalized R hip pain)  Pain Addressed 1: Reposition, Distraction    Patients cultural, spiritual, Hindu conflicts given the current  situation: no    Objective:     OT communicated with RN prior to session.      Patient was found HOB elevated with pulse ox (continuous), telemetry, PureWick upon OT entry to room.    General Precautions: Standard, droplet, fall, other (see comments) (modified diet)  Orthopedic Precautions: RLE weight bearing as tolerated  Braces: N/A    Vital Signs: Respiratory Status: on room air    Bed Mobility:    Patient completed Rolling/Turning to Left with  minimum assistance  Patient completed Rolling/Turning to Right with minimum assistance  Patient completed Scooting/Bridging with maximal assistance  Patient completed Supine to Sit with maximal assistance    Functional Mobility/Transfers:  Patient completed Sit <> Stand Transfer with moderate assistance and of 2 persons  with  rolling walker   Patient completed Bed <> Chair Transfer using Step Transfer technique with moderate assistance and of 2 persons with rolling walker  Functional Mobility: pt completes x2 trials of ambulating forward 10ft.   Requires maxx2 for RW mgt/wt-shifting/sequencing gait.   PT advancing LLE intermittently 2/2 pt with limited tolerance WB through RLE  NBOS, shuffling gait, fwd flexed posture t/o    Activities of Daily Living:  Lower Body Dressing: pt doffs L sock sitting UIC with SBA unable to doff R sock. requires max A to doff R/don B socks  Toileting: maximal assistance brief donned in bed, pure wick in place at end of session.    [facility] 6 Click ADL:  AMPAC Total Score:      Functional Cognition:  Affect: Appropriate to situation and Cooperative  Orientation: oriented to Person, Place, Time, and Situation  Command Following: Follows simple one-step commands with 90% accuracy, Requires verbal cues, Requires visual cues, and  services    Visual Perceptual Skills:  Intact    Upper Extremity Function:  Right Upper Extremity:   WFL    Left Upper Extremity:  WFL    Balance:   Static sitting balance: WFL (SBA)  Dynamic sitting  balance:Impaired. Fair +  Static standing balance:Impaired. Fair with AD  Dynamic standing balance:Impaired. Mod-maxA of 2 and AD.    Therapeutic Positioning  Risk for acquired pressure injuries is significantly increased due to relative decrease in mobility d/t hospitalization , impaired mobility, and incontinence. & language barrier increases difficulty communicating toileting needs.      OT interventions performed during the course of today's session:   Education was provided on benefits of and recommendations for therapeutic positioning  Therapeutic positioning was provided at the conclusion of session to offload all bony prominences for the prevention and/or reduction of pressure injuries  Positioning recommendations were communicated to care team      Skin assessment: all bony prominences were assessed               Findings:  visible skin intact, post-op bandages C/D/I     OT recommendations for therapeutic positioning throughout hospitalization:   Follow [facility] Pressure Injury Prevention Protocol  Geomat recommended for sacral protection while [facility]     Patient Education:  Patient provided with verbal education education regarding OT role/goals/POC, post op precautions, fall prevention, safety awareness, Discharge/DME recommendations, and pressure ulcer prevention.   Communicated via  services.        Patient left up in chair with all lines intact, call button in reach, chair alarm on, geomat cushion, and RN notified    GOALS:   Multidisciplinary Problems       Occupational Therapy Goals          Problem: Occupational Therapy    Goal Priority Disciplines Outcome Interventions   Occupational Therapy Goal     OT, PT/OT Progressing    Description: Goals to be met by: 5/15/25     Patient will increase functional independence with ADLs by performing:    UE Dressing with Stand-by Assistance.  LE Dressing with Minimal Assistance and Assistive Devices as needed.  Grooming while standing at sink with Contact  Guard Assistance and Assistive Devices as needed.  Toileting from toilet with [place] and Assistive Devices as needed for hygiene and clothing management.   Toilet transfer to toilet with Contact Guard Assistance and using LRAD.                         History:     Past Medical History:   Diagnosis Date    Diabetes mellitus, type 2     HTN (hypertension)     Ptosis     Sciatica     Stroke     Type 2 diabetes mellitus without complications          Past Surgical History:   Procedure Laterality Date    DIAGNOSTIC LAPAROSCOPY N/A 9/24/2024    Procedure: LAPAROSCOPY, DIAGNOSTIC;  Surgeon: Adalberto Rock MD;  Location: [facility];  Service: General;  Laterality: N/A;    EGD, WITH CLOSED BIOPSY  4/22/2025    Procedure: EGD, WITH CLOSED BIOPSY;  Surgeon: Christian Hernández MD;  Location: [facility] ENDOSCOPY;  Service: Endoscopy;;    ESOPHAGOGASTRODUODENOSCOPY N/A 4/22/2025    Procedure: EGD;  Surgeon: Christian Hernández MD;  Location: [facility] ENDOSCOPY;  Service: Endoscopy;  Laterality: N/A;    HEMIARTHROPLASTY OF HIP Right 4/14/2025    Procedure: HEMIARTHROPLASTY, HIP;  Surgeon: Raghu Hunt MD;  Location: [facility];  Service: Orthopedics;  Laterality: Right;  lateral peg board rodney    KIDNEY STONE SURGERY Left     REPAIR, HERNIA, INGUINAL Right 9/24/2024    Procedure: REPAIR, HERNIA, INGUINAL;  Surgeon: Adalberto Rock MD;  Location: [facility];  Service: General;  Laterality: Right;  Incarcerated Hernia       Time Tracking:     OT Date of Treatment: 04/24/25  OT Start Time: 0955  OT Stop Time: 1018  OT Total Time (min): 23 min    Billable Minutes:Re-eval 1  Self Care/Home Management 1    4/25/2025

## 2025-04-25 NOTE — CARE UPDATE
[id number] spoke with Ludy with [facility] to obtain an update on the status of insurance auth. She reported they have not heard from the insurance yet. She will let me know.

## 2025-04-25 NOTE — PROGRESS NOTES
"Hospital Medicine  Progress Note    Patient Name: William Mccauley  MRN: [id number]  Status: IP- Inpatient   Admission Date: 4/13/2025  Length of Stay: 12  Date of Service: 04/25/2025       CC: hospital follow-up for GI bleed       SUBJECTIVE   "74 y.o. Croatian speaking male with a PMHx of HTN, HLD, DM 2, ptosis, prior CVA without deficits and sciatica presented to [facility] on 4/13/2025 with a primary complaint of right hip pain following a fall which occurred yesterday (04/12/2025). Patient was ambulating with a walker when he reached for the door, lost his balance and fell onto his right hip.  Patient denies hitting his head. Son who is at bedside translating stated that  patient did complain of some left eye blur vision  He denies shortness a breath, chest pain, nausea, vomiting, diarrhea, headache and dizziness.  He received 50 mcg of fentanyl in route for Rt hip pain.      On arrival  to the ED, Pt was afebrile, HR 88, /76, RR 18 and SpO2 96% on room air.  Labs with H&H 8.8/30.2, CO2 20, AST 48, ALT 63.  EKG with sinus rhythm with first-degree AV block, non specific  ST and T wave change. right hip x-ray revealed a right femoral neck fracture.  Chest x-ray with no acute cardiopulmonary process.  CT of the head revealed a left thalamic minimal hyperdensity which may represent subacute hemorrhage related to subacute infarct but no convincing acute brain traumatic findings.  CT cervical spine without acute findings. MRI of the brain with small linear/punctuate area of acute/subacute left thalamic hemorrhage, possible hemorrhagic infarct.  In ED patient received IV hydration, morphine, Zofran.  Orthopedics and neurology were consulted.      Pt underwent Rt hip hemiarthroplasty with Dr. Raghu Hunt on 4/14/25. Neurology initiated stroke workup and suggested possible left thalamic hypertensive hemorrhagic stoke. CTA head/neck with severe narrowing of jah distal vertebral arteries, severe narrowing of the clinoid, " "ophthalmic segments of the internal carotid artery siphons bilaterally and near occlusion on the left.. Echo with LVEF 65-70%, neg bubble study, Carotid U/S with less than 50% ICA stenosis. Pt failed SLP eval and placed NPO. NG tube inserted for medication and nutrition. Neurology cleared Pt to start ASA 4 days from stroke onset which was 4/17/25. PT /OT suggested high intensity therapy.Spoke to Dr. Phillip with Neurology and cleared Pt for prophylactic Lovenox as of 4/18/25.      New onset fever of 101 on 4/17/25 associated with tachycardia. Infectious workup include CBC, CXR, UA, blood cultures x2 and Resp PCR ordered. Pt was started on empiric Zosyn IV. Workup was neg except Resp PCR was positive for Human Rhinovirus infection. Pt became afebrile. Zosyn discontinued. Pt pulled NG tube on 4/19/25. Replaced without difficulty. SLP continued to follow. As of 4/19/25 Pt remained with oropharyngeal dysphagia unsafe for PO intake. On 4/21/25, Pt passed MBS and was cleared for puree diet and mod thick liquid later advanced to soft and bite sized diet.      On 4/21/25 , Pt with melanotic stool and an acute drop in Hgb to 5.5 from 8.0 before. 2 units of P-RBC transfused. High dose PPI IV initiated. ASA/ Prophylactic Lovenox held. GI consulted.Underwent EGD on 4/22/25 showed evidence of erosions and erythema entire stomach and duodenum. PPI continued. IV Ferric gluconate initiated for iron replacement. Spoke to GI on 4/23/25  and suggested Asa can be resumed. Prophylactic Lovenox placed on hold. SCDs utilized for VTE prophylaxis. PT/OT suggested high intensity therapy. CM consulted to assist with DC planning."    Today: Patient seen and examined at bedside, and chart reviewed.  No new issues or events.  H&H remains stable.      MEDICATIONS   Scheduled   aspirin  81 mg Oral Daily    atorvastatin  40 mg Oral QHS    ferric gluconate (Ferrlecit) IVPB  125 mg Intravenous Daily    gabapentin  600 mg Oral TID    insulin glargine " "U-100  12 Units Subcutaneous BID    pantoprazole  40 mg Oral BID AC    sodium chloride 0.9%  10 mL Intravenous Q8H    sucralfate  1 g Oral QID (AC & HS)     Continuous Infusions  None      PHYSICAL EXAM   VITALS: T 98 °F (36.7 °C)   /72   P 76   RR 20   O2 97 %    GENERAL: awake and in no acute distress  LUNGS: Respirations non-labored  CVS: Normal rate  ABD: Soft, non-tender  EXTREMITIES: Radial pulse 2+  NEURO: Awake, alert  PSYCHIATRIC: Cooperative      LABS   CBC  Recent Labs     04/24/25  0844   WBC 8.43   RBC 4.04*   HGB 9.3*   HCT 33.4*   MCV 82.7   MCH 23.0*   MCHC 27.8*   RDW 22.2*        CHEM  No results for input(s): "NA", "K", "CHLORIDE", "CO2", "BUN", "CREATININE", "GLUCOSE", "CALCIUM", "MG", "PHOS", "ALBUMIN", "GLOBULIN", "ALKPHOS", "ALT", "AST", "BILITOT", "LIPASE", "CRP", "LDH", "HAPTOGLOBIN", "FERRITIN", "IRON", "TIBC", "TSH", "FREET4" in the last 72 hours.        ASSESSMENT   Acute blood loss anemia s/t GI bleed  GI bleed s/t erosive Gastritis /Duodenitis  SIRS, resolved   Right femoral neck fracture (closed/displaced), s/p  Rt hip hemiarthroplasty on 4/14  Acute to subacute left thalamic hemorrhage  Oropharyngeal dysphagia due to above  Left eye blur vision s/t near occlusion of clinoid, ophthalmic segments of the internal carotid artery siphon on the left  Microcytic hypochromic/iron deficiency  Diabetes Mellitis , type II  Hypernatremia due to free water deficit  Elevated LFT, resolved   Essential HTN  HLD  h/o prior CVA    PLAN   ASA resumed,  Continue statin  Continue PPI BID, and Carafate   Monitoring H&H and for any signs of rebleeding  Continue iron replacement with IV route x5 doses daily, can start oral iron replacement thereafter  Continue with therapy services   CM consulted for placement options      Prophylaxis:  SCDs        Dorian Ferreira MD  [facility] Medicine      "

## 2025-04-25 NOTE — PT/OT/SLP PROGRESS
[facility]  Speech Language Pathology Department  Diet Tolerance Follow-up    Patient Name:  William Mccauley   MRN:  [id number]    Recommendations     General recommendations:  dysphagia therapy  Solid texture recommendation:  Soft & Bite Sized Diet - IDDSI Level 6  Liquid consistency recommendation: Moderately thick liquids - IDDSI Level 3   Medications: crushed in puree  Swallow strategies/precautions: small bites/sips, slow rate, upright for PO intake, and supervision with meals  Precautions: droplet, aspiration    Diet Tolerance     Nursing reports no difficulty regarding diet tolerance.    Plan     SLP to follow up regarding dysphagia therapy.    04/25/2025

## 2025-04-26 LAB
ANION GAP SERPL CALC-SCNC: 6 MEQ/L
BUN SERPL-MCNC: 7.5 MG/DL (ref 8.4–25.7)
CALCIUM SERPL-MCNC: 8.3 MG/DL (ref 8.8–10)
CHLORIDE SERPL-SCNC: 109 MMOL/L (ref 98–107)
CO2 SERPL-SCNC: 23 MMOL/L (ref 23–31)
CREAT SERPL-MCNC: 0.54 MG/DL (ref 0.72–1.25)
CREAT/UREA NIT SERPL: 14
ERYTHROCYTE [DISTWIDTH] IN BLOOD BY AUTOMATED COUNT: 23.8 % (ref 11.5–17)
GFR SERPLBLD CREATININE-BSD FMLA CKD-EPI: >60 ML/MIN/1.73/M2
GLUCOSE SERPL-MCNC: 87 MG/DL (ref 82–115)
HCT VFR BLD AUTO: 32.6 % (ref 42–52)
HGB BLD-MCNC: 9.4 G/DL (ref 14–18)
MCH RBC QN AUTO: 23.5 PG (ref 27–31)
MCHC RBC AUTO-ENTMCNC: 28.8 G/DL (ref 33–36)
MCV RBC AUTO: 81.5 FL (ref 80–94)
NRBC BLD AUTO-RTO: 0 %
PLATELET # BLD AUTO: 256 X10(3)/MCL (ref 130–400)
PMV BLD AUTO: 11.3 FL (ref 7.4–10.4)
POCT GLUCOSE: 121 MG/DL (ref 70–110)
POCT GLUCOSE: 159 MG/DL (ref 70–110)
POCT GLUCOSE: 197 MG/DL (ref 70–110)
POCT GLUCOSE: 83 MG/DL (ref 70–110)
POTASSIUM SERPL-SCNC: 4.1 MMOL/L (ref 3.5–5.1)
RBC # BLD AUTO: 4 X10(6)/MCL (ref 4.7–6.1)
SODIUM SERPL-SCNC: 138 MMOL/L (ref 136–145)
WBC # BLD AUTO: 7.14 X10(3)/MCL (ref 4.5–11.5)

## 2025-04-26 PROCEDURE — 85027 COMPLETE CBC AUTOMATED: CPT | Performed by: INTERNAL MEDICINE

## 2025-04-26 PROCEDURE — 80048 BASIC METABOLIC PNL TOTAL CA: CPT | Performed by: INTERNAL MEDICINE

## 2025-04-26 PROCEDURE — 25000003 PHARM REV CODE 250: Performed by: INTERNAL MEDICINE

## 2025-04-26 PROCEDURE — A4216 STERILE WATER/SALINE, 10 ML: HCPCS | Performed by: PHYSICIAN ASSISTANT

## 2025-04-26 PROCEDURE — 21400001 HC TELEMETRY ROOM

## 2025-04-26 PROCEDURE — 25000003 PHARM REV CODE 250: Performed by: PHYSICIAN ASSISTANT

## 2025-04-26 PROCEDURE — 36415 COLL VENOUS BLD VENIPUNCTURE: CPT | Performed by: INTERNAL MEDICINE

## 2025-04-26 PROCEDURE — 63600175 PHARM REV CODE 636 W HCPCS: Performed by: INTERNAL MEDICINE

## 2025-04-26 PROCEDURE — 27000207 HC ISOLATION

## 2025-04-26 RX ADMIN — GABAPENTIN 600 MG: 300 CAPSULE ORAL at 05:04

## 2025-04-26 RX ADMIN — SODIUM CHLORIDE 125 MG: 9 INJECTION, SOLUTION INTRAVENOUS at 10:04

## 2025-04-26 RX ADMIN — SODIUM CHLORIDE, PRESERVATIVE FREE 10 ML: 5 INJECTION INTRAVENOUS at 06:04

## 2025-04-26 RX ADMIN — INSULIN GLARGINE 12 UNITS: 100 INJECTION, SOLUTION SUBCUTANEOUS at 08:04

## 2025-04-26 RX ADMIN — ATORVASTATIN CALCIUM 40 MG: 40 TABLET, FILM COATED ORAL at 08:04

## 2025-04-26 RX ADMIN — SUCRALFATE 1 G: 1 TABLET ORAL at 05:04

## 2025-04-26 RX ADMIN — SUCRALFATE 1 G: 1 TABLET ORAL at 12:04

## 2025-04-26 RX ADMIN — SODIUM CHLORIDE, PRESERVATIVE FREE 10 ML: 5 INJECTION INTRAVENOUS at 09:04

## 2025-04-26 RX ADMIN — PANTOPRAZOLE SODIUM 40 MG: 40 TABLET, DELAYED RELEASE ORAL at 05:04

## 2025-04-26 RX ADMIN — GABAPENTIN 600 MG: 300 CAPSULE ORAL at 08:04

## 2025-04-26 RX ADMIN — SUCRALFATE 1 G: 1 TABLET ORAL at 08:04

## 2025-04-26 RX ADMIN — SODIUM CHLORIDE, PRESERVATIVE FREE 10 ML: 5 INJECTION INTRAVENOUS at 05:04

## 2025-04-26 RX ADMIN — INSULIN GLARGINE 12 UNITS: 100 INJECTION, SOLUTION SUBCUTANEOUS at 10:04

## 2025-04-26 RX ADMIN — GABAPENTIN 600 MG: 300 CAPSULE ORAL at 10:04

## 2025-04-26 RX ADMIN — PANTOPRAZOLE SODIUM 40 MG: 40 TABLET, DELAYED RELEASE ORAL at 06:04

## 2025-04-26 RX ADMIN — ASPIRIN 81 MG CHEWABLE TABLET 81 MG: 81 TABLET CHEWABLE at 10:04

## 2025-04-26 RX ADMIN — SUCRALFATE 1 G: 1 TABLET ORAL at 06:04

## 2025-04-26 NOTE — PROGRESS NOTES
"Hospital Medicine  Progress Note    Patient Name: William Mccauley  MRN: [id number]  Status: IP- Inpatient   Admission Date: 4/13/2025  Length of Stay: 13  Date of Service: 04/26/2025       CC: hospital follow-up for GI bleed       SUBJECTIVE   "74 y.o. Mongolian speaking male with a PMHx of HTN, HLD, DM 2, ptosis, prior CVA without deficits and sciatica presented to [facility] on 4/13/2025 with a primary complaint of right hip pain following a fall which occurred yesterday (04/12/2025). Patient was ambulating with a walker when he reached for the door, lost his balance and fell onto his right hip.  Patient denies hitting his head. Son who is at bedside translating stated that  patient did complain of some left eye blur vision  He denies shortness a breath, chest pain, nausea, vomiting, diarrhea, headache and dizziness.  He received 50 mcg of fentanyl in route for Rt hip pain.      On arrival  to the ED, Pt was afebrile, HR 88, /76, RR 18 and SpO2 96% on room air.  Labs with H&H 8.8/30.2, CO2 20, AST 48, ALT 63.  EKG with sinus rhythm with first-degree AV block, non specific  ST and T wave change. right hip x-ray revealed a right femoral neck fracture.  Chest x-ray with no acute cardiopulmonary process.  CT of the head revealed a left thalamic minimal hyperdensity which may represent subacute hemorrhage related to subacute infarct but no convincing acute brain traumatic findings.  CT cervical spine without acute findings. MRI of the brain with small linear/punctuate area of acute/subacute left thalamic hemorrhage, possible hemorrhagic infarct.  In ED patient received IV hydration, morphine, Zofran.  Orthopedics and neurology were consulted.      Pt underwent Rt hip hemiarthroplasty with Dr. Raghu Hunt on 4/14/25. Neurology initiated stroke workup and suggested possible left thalamic hypertensive hemorrhagic stoke. CTA head/neck with severe narrowing of jah distal vertebral arteries, severe narrowing of the clinoid, " "ophthalmic segments of the internal carotid artery siphons bilaterally and near occlusion on the left.. Echo with LVEF 65-70%, neg bubble study, Carotid U/S with less than 50% ICA stenosis. Pt failed SLP eval and placed NPO. NG tube inserted for medication and nutrition. Neurology cleared Pt to start ASA 4 days from stroke onset which was 4/17/25. PT /OT suggested high intensity therapy.Spoke to Dr. Phillip with Neurology and cleared Pt for prophylactic Lovenox as of 4/18/25.      New onset fever of 101 on 4/17/25 associated with tachycardia. Infectious workup include CBC, CXR, UA, blood cultures x2 and Resp PCR ordered. Pt was started on empiric Zosyn IV. Workup was neg except Resp PCR was positive for Human Rhinovirus infection. Pt became afebrile. Zosyn discontinued. Pt pulled NG tube on 4/19/25. Replaced without difficulty. SLP continued to follow. As of 4/19/25 Pt remained with oropharyngeal dysphagia unsafe for PO intake. On 4/21/25, Pt passed MBS and was cleared for puree diet and mod thick liquid later advanced to soft and bite sized diet.      On 4/21/25 , Pt with melanotic stool and an acute drop in Hgb to 5.5 from 8.0 before. 2 units of P-RBC transfused. High dose PPI IV initiated. ASA/ Prophylactic Lovenox held. GI consulted.Underwent EGD on 4/22/25 showed evidence of erosions and erythema entire stomach and duodenum. PPI continued. IV Ferric gluconate initiated for iron replacement. Spoke to GI on 4/23/25  and suggested Asa can be resumed. Prophylactic Lovenox placed on hold. SCDs utilized for VTE prophylaxis. PT/OT suggested high intensity therapy. CM consulted to assist with DC planning."    Today: Patient seen and examined at bedside, and chart reviewed.  No new issues or events.  H&H is stable.      MEDICATIONS   Scheduled   aspirin  81 mg Oral Daily    atorvastatin  40 mg Oral QHS    ferric gluconate (Ferrlecit) IVPB  125 mg Intravenous Daily    gabapentin  600 mg Oral TID    insulin glargine U-100  " 12 Units Subcutaneous BID    pantoprazole  40 mg Oral BID AC    sodium chloride 0.9%  10 mL Intravenous Q8H    sucralfate  1 g Oral QID (AC & HS)     Continuous Infusions  None      PHYSICAL EXAM   VITALS: T 98.7 °F (37.1 °C)   /76   P 76   RR 17   O2 98 %    GENERAL: awake and in no acute distress  LUNGS: Respirations non-labored  CVS: Normal rate  ABD: Soft, non-tender  EXTREMITIES: Radial pulse 2+  NEURO: Awake, alert  PSYCHIATRIC: Cooperative      LABS   CBC  Recent Labs     04/24/25  0844 04/26/25  0527   WBC 8.43 7.14   RBC 4.04* 4.00*   HGB 9.3* 9.4*   HCT 33.4* 32.6*   MCV 82.7 81.5   MCH 23.0* 23.5*   MCHC 27.8* 28.8*   RDW 22.2* 23.8*    256     CHEM  Recent Labs     04/26/25  0527      K 4.1   CO2 23   BUN 7.5*   CREATININE 0.54*   GLUCOSE 87   CALCIUM 8.3*           ASSESSMENT   Acute blood loss anemia s/t GI bleed  GI bleed s/t erosive Gastritis /Duodenitis  SIRS, resolved   Right femoral neck fracture (closed/displaced), s/p  Rt hip hemiarthroplasty on 4/14  Acute to subacute left thalamic hemorrhage  Oropharyngeal dysphagia due to above  Left eye blur vision s/t near occlusion of clinoid, ophthalmic segments of the internal carotid artery siphon on the left  Microcytic hypochromic/iron deficiency  Diabetes Mellitis , type II  Hypernatremia due to free water deficit  Elevated LFT, resolved   Essential HTN  HLD  h/o prior CVA    PLAN   Continue ASA/statin  Continue PPI BID, and Carafate   H&H now seems sable  Continue IV iron replacement x5 daily, can start oral iron replacement thereafter  Continue with therapy services   CM consulted for placement options      Prophylaxis:  SCDs        Dorian Ferreira MD  [facility] Medicine

## 2025-04-27 LAB
POCT GLUCOSE: 156 MG/DL (ref 70–110)
POCT GLUCOSE: 204 MG/DL (ref 70–110)
POCT GLUCOSE: 213 MG/DL (ref 70–110)

## 2025-04-27 PROCEDURE — 97116 GAIT TRAINING THERAPY: CPT | Mod: CQ

## 2025-04-27 PROCEDURE — 97530 THERAPEUTIC ACTIVITIES: CPT | Mod: CQ

## 2025-04-27 PROCEDURE — 63600175 PHARM REV CODE 636 W HCPCS: Performed by: INTERNAL MEDICINE

## 2025-04-27 PROCEDURE — 25000003 PHARM REV CODE 250: Performed by: INTERNAL MEDICINE

## 2025-04-27 PROCEDURE — A4216 STERILE WATER/SALINE, 10 ML: HCPCS | Performed by: PHYSICIAN ASSISTANT

## 2025-04-27 PROCEDURE — 21400001 HC TELEMETRY ROOM

## 2025-04-27 PROCEDURE — 25000003 PHARM REV CODE 250: Performed by: PHYSICIAN ASSISTANT

## 2025-04-27 RX ORDER — LANOLIN ALCOHOL/MO/W.PET/CERES
1 CREAM (GRAM) TOPICAL DAILY
Status: DISCONTINUED | OUTPATIENT
Start: 2025-04-27 | End: 2025-04-30 | Stop reason: HOSPADM

## 2025-04-27 RX ADMIN — SODIUM CHLORIDE, PRESERVATIVE FREE 10 ML: 5 INJECTION INTRAVENOUS at 05:04

## 2025-04-27 RX ADMIN — GABAPENTIN 600 MG: 300 CAPSULE ORAL at 08:04

## 2025-04-27 RX ADMIN — PANTOPRAZOLE SODIUM 40 MG: 40 TABLET, DELAYED RELEASE ORAL at 05:04

## 2025-04-27 RX ADMIN — GABAPENTIN 600 MG: 300 CAPSULE ORAL at 05:04

## 2025-04-27 RX ADMIN — ATORVASTATIN CALCIUM 40 MG: 40 TABLET, FILM COATED ORAL at 09:04

## 2025-04-27 RX ADMIN — FERROUS SULFATE TAB 325 MG (65 MG ELEMENTAL FE) 1 EACH: 325 (65 FE) TAB at 01:04

## 2025-04-27 RX ADMIN — INSULIN GLARGINE 12 UNITS: 100 INJECTION, SOLUTION SUBCUTANEOUS at 09:04

## 2025-04-27 RX ADMIN — GABAPENTIN 600 MG: 300 CAPSULE ORAL at 09:04

## 2025-04-27 RX ADMIN — SODIUM CHLORIDE 125 MG: 9 INJECTION, SOLUTION INTRAVENOUS at 08:04

## 2025-04-27 RX ADMIN — INSULIN GLARGINE 12 UNITS: 100 INJECTION, SOLUTION SUBCUTANEOUS at 08:04

## 2025-04-27 RX ADMIN — TRAMADOL HYDROCHLORIDE 50 MG: 50 TABLET, COATED ORAL at 01:04

## 2025-04-27 RX ADMIN — SUCRALFATE 1 G: 1 TABLET ORAL at 05:04

## 2025-04-27 RX ADMIN — SUCRALFATE 1 G: 1 TABLET ORAL at 01:04

## 2025-04-27 RX ADMIN — Medication: at 01:04

## 2025-04-27 RX ADMIN — TRAMADOL HYDROCHLORIDE 50 MG: 50 TABLET, COATED ORAL at 08:04

## 2025-04-27 RX ADMIN — SUCRALFATE 1 G: 1 TABLET ORAL at 09:04

## 2025-04-27 RX ADMIN — SODIUM CHLORIDE, PRESERVATIVE FREE 10 ML: 5 INJECTION INTRAVENOUS at 10:04

## 2025-04-27 RX ADMIN — SODIUM CHLORIDE, PRESERVATIVE FREE 10 ML: 5 INJECTION INTRAVENOUS at 01:04

## 2025-04-27 RX ADMIN — ASPIRIN 81 MG CHEWABLE TABLET 81 MG: 81 TABLET CHEWABLE at 08:04

## 2025-04-27 NOTE — PROGRESS NOTES
"Hospital Medicine  Progress Note    Patient Name: William Mccauley  MRN: [id number]  Status: IP- Inpatient   Admission Date: 4/13/2025  Length of Stay: 14  Date of Service: 04/27/2025       CC: hospital follow-up for GI bleed       SUBJECTIVE   "74 y.o. Welsh speaking male with a PMHx of HTN, HLD, DM 2, ptosis, prior CVA without deficits and sciatica presented to [facility] on 4/13/2025 with a primary complaint of right hip pain following a fall which occurred yesterday (04/12/2025). Patient was ambulating with a walker when he reached for the door, lost his balance and fell onto his right hip.  Patient denies hitting his head. Son who is at bedside translating stated that  patient did complain of some left eye blur vision  He denies shortness a breath, chest pain, nausea, vomiting, diarrhea, headache and dizziness.  He received 50 mcg of fentanyl in route for Rt hip pain.      On arrival  to the ED, Pt was afebrile, HR 88, /76, RR 18 and SpO2 96% on room air.  Labs with H&H 8.8/30.2, CO2 20, AST 48, ALT 63.  EKG with sinus rhythm with first-degree AV block, non specific  ST and T wave change. right hip x-ray revealed a right femoral neck fracture.  Chest x-ray with no acute cardiopulmonary process.  CT of the head revealed a left thalamic minimal hyperdensity which may represent subacute hemorrhage related to subacute infarct but no convincing acute brain traumatic findings.  CT cervical spine without acute findings. MRI of the brain with small linear/punctuate area of acute/subacute left thalamic hemorrhage, possible hemorrhagic infarct.  In ED patient received IV hydration, morphine, Zofran.  Orthopedics and neurology were consulted.      Pt underwent Rt hip hemiarthroplasty with Dr. Raghu Hunt on 4/14/25. Neurology initiated stroke workup and suggested possible left thalamic hypertensive hemorrhagic stoke. CTA head/neck with severe narrowing of jah distal vertebral arteries, severe narrowing of the clinoid, " "ophthalmic segments of the internal carotid artery siphons bilaterally and near occlusion on the left.. Echo with LVEF 65-70%, neg bubble study, Carotid U/S with less than 50% ICA stenosis. Pt failed SLP eval and placed NPO. NG tube inserted for medication and nutrition. Neurology cleared Pt to start ASA 4 days from stroke onset which was 4/17/25. PT /OT suggested high intensity therapy.Spoke to Dr. Phillip with Neurology and cleared Pt for prophylactic Lovenox as of 4/18/25.      New onset fever of 101 on 4/17/25 associated with tachycardia. Infectious workup include CBC, CXR, UA, blood cultures x2 and Resp PCR ordered. Pt was started on empiric Zosyn IV. Workup was neg except Resp PCR was positive for Human Rhinovirus infection. Pt became afebrile. Zosyn discontinued. Pt pulled NG tube on 4/19/25. Replaced without difficulty. SLP continued to follow. As of 4/19/25 Pt remained with oropharyngeal dysphagia unsafe for PO intake. On 4/21/25, Pt passed MBS and was cleared for puree diet and mod thick liquid later advanced to soft and bite sized diet.      On 4/21/25 , Pt with melanotic stool and an acute drop in Hgb to 5.5 from 8.0 before. 2 units of P-RBC transfused. High dose PPI IV initiated. ASA/ Prophylactic Lovenox held. GI consulted.Underwent EGD on 4/22/25 showed evidence of erosions and erythema entire stomach and duodenum. PPI continued. IV Ferric gluconate initiated for iron replacement. Spoke to GI on 4/23/25  and suggested Asa can be resumed. Prophylactic Lovenox placed on hold. SCDs utilized for VTE prophylaxis. PT/OT suggested high intensity therapy. CM consulted to assist with DC planning."    Today: Patient seen and examined at bedside, and chart reviewed.  No new issues or events.      MEDICATIONS   Scheduled   aspirin  81 mg Oral Daily    atorvastatin  40 mg Oral QHS    ferrous sulfate  1 tablet Oral Daily    gabapentin  600 mg Oral TID    insulin glargine U-100  12 Units Subcutaneous BID    " pantoprazole  40 mg Oral BID AC    sodium chloride 0.9%  10 mL Intravenous Q8H    sucralfate  1 g Oral QID (AC & HS)     Continuous Infusions  None      PHYSICAL EXAM   VITALS: T 98.3 °F (36.8 °C)   /66   P 94   RR 18   O2 98 %    GENERAL: awake and in no acute distress  LUNGS: Respirations non-labored  CVS: Normal rate  ABD: Soft, non-tender  EXTREMITIES: Radial pulse 2+  NEURO: Awake, alert  PSYCHIATRIC: Cooperative      LABS   CBC  Recent Labs     04/26/25  0527   WBC 7.14   RBC 4.00*   HGB 9.4*   HCT 32.6*   MCV 81.5   MCH 23.5*   MCHC 28.8*   RDW 23.8*        CHEM  Recent Labs     04/26/25  0527      K 4.1   CO2 23   BUN 7.5*   CREATININE 0.54*   GLUCOSE 87   CALCIUM 8.3*           ASSESSMENT   Acute blood loss anemia s/t GI bleed  GI bleed s/t erosive Gastritis /Duodenitis  SIRS, resolved   Right femoral neck fracture (closed/displaced), s/p  Rt hip hemiarthroplasty on 4/14  Acute to subacute left thalamic hemorrhage  Oropharyngeal dysphagia due to above  Left eye blur vision s/t near occlusion of clinoid, ophthalmic segments of the internal carotid artery siphon on the left  Microcytic hypochromic/iron deficiency  Diabetes Mellitis , type II  Hypernatremia due to free water deficit  Elevated LFT, resolved   Essential HTN  HLD  h/o prior CVA    PLAN   Continue ASA/statin  Continue PPI BID, and Carafate   Will repeat H&H again tomorrow, to ensure stability  Completed IV iron replacement x5 days, will start oral iron replacement  Will monitor for constipation with oral iron  Continue with therapy services   CM consulted for placement options      Prophylaxis:  SCDs        Dorian Ferreira MD  [facility]

## 2025-04-27 NOTE — PLAN OF CARE
Problem: Adult Inpatient Plan of Care  Goal: Plan of Care Review  Outcome: Progressing  Goal: Patient-Specific Goal (Individualized)  Outcome: Progressing  Goal: Absence of Hospital-Acquired Illness or Injury  Outcome: Progressing  Goal: Optimal Comfort and Wellbeing  Outcome: Progressing  Goal: Readiness for Transition of Care  Outcome: Progressing     Problem: Stroke, Ischemic (Includes Transient Ischemic Attack)  Goal: Optimal Coping  Outcome: Progressing  Goal: Effective Bowel Elimination  Outcome: Progressing  Goal: Optimal Cerebral Tissue Perfusion  Outcome: Progressing  Goal: Optimal Cognitive Function  Outcome: Progressing  Goal: Improved Communication Skills  Outcome: Progressing  Goal: Optimal Functional Ability  Outcome: Progressing  Goal: Optimal Nutrition Intake  Outcome: Progressing  Goal: Effective Oxygenation and Ventilation  Outcome: Progressing  Goal: Improved Sensorimotor Function  Outcome: Progressing  Goal: Safe and Effective Swallow  Outcome: Progressing  Goal: Effective Urinary Elimination  Outcome: Progressing     Problem: Fall Injury Risk  Goal: Absence of Fall and Fall-Related Injury  Outcome: Progressing     Problem: Skin Injury Risk Increased  Goal: Skin Health and Integrity  Outcome: Progressing     Problem: Wound  Goal: Optimal Coping  Outcome: Progressing  Goal: Optimal Functional Ability  Outcome: Progressing  Goal: Absence of Infection Signs and Symptoms  Outcome: Progressing  Goal: Improved Oral Intake  Outcome: Progressing  Goal: Optimal Pain Control and Function  Outcome: Progressing  Goal: Skin Health and Integrity  Outcome: Progressing  Goal: Optimal Wound Healing  Outcome: Progressing     Problem: Diabetes Comorbidity  Goal: Blood Glucose Level Within Targeted Range  Outcome: Progressing     Problem: Stroke, Intracerebral Hemorrhage  Goal: Optimal Coping  Outcome: Progressing  Goal: Effective Bowel Elimination  Outcome: Progressing  Goal: Optimal Cerebral Tissue  Perfusion  Outcome: Progressing  Goal: Optimal Cognitive Function  Outcome: Progressing  Goal: Effective Communication Skills  Outcome: Progressing  Goal: Optimal Functional Ability  Outcome: Progressing  Goal: Optimal Nutrition Intake  Outcome: Progressing  Goal: Optimal Pain Control and Function  Outcome: Progressing  Goal: Effective Oxygenation and Ventilation  Outcome: Progressing  Goal: Improved Sensorimotor Function  Outcome: Progressing  Goal: Safe and Effective Swallow  Outcome: Progressing  Goal: Effective Urinary Elimination  Outcome: Progressing     Problem: Infection  Goal: Absence of Infection Signs and Symptoms  Outcome: Progressing      Secondary Intention Text (Leave Blank If You Do Not Want): The defect will heal with secondary intention.

## 2025-04-27 NOTE — PT/OT/SLP PROGRESS
Physical Therapy Treatment    Patient Name:  William Mccauley   MRN:  [id number]    Recommendations:     Discharge therapy intensity: Moderate Intensity Therapy   Discharge Equipment Recommendations: to be determined by next level of care  Barriers to discharge: Decreased caregiver support, Impaired mobility, and Ongoing medical needs    Assessment:     William Mccauley is a 74 y.o. male admitted with a medical diagnosis of Right femoral neck fx s/p R LEANDRA, and MRI brain showed small linear/punctuate area of acute/subacute left thalamic hemorrhage..  He presents with the following impairments/functional limitations: weakness, impaired endurance, impaired self care skills, impaired functional mobility, impaired balance, pain.     Rehab Prognosis: Good; patient would benefit from acute skilled PT services to address these deficits and reach maximum level of function.    Recent Surgery: Procedure(s) (LRB):  EGD (N/A)  EGD, WITH CLOSED BIOPSY 5 Days Post-Op    Plan:     During this hospitalization, patient would benefit from acute PT services 6 x/week to address the identified rehab impairments via gait training, therapeutic activities, therapeutic exercises, neuromuscular re-education and progress toward the following goals:    Plan of Care Expires:  05/16/25    Subjective     Chief Complaint: pain and he was pre medicated.     Objective:     Communicated with nurse prior to session.  Patient found supine with pulse ox (continuous), telemetry upon PT entry to room.     General Precautions: Standard, aspiration, droplet (rhinovirus)  Orthopedic Precautions: RLE weight bearing as tolerated (ROM as tolerated)  Braces: N/A  Respiratory Status: Room air  Blood Pressure:   Skin Integrity: Visible skin intact      Functional Mobility:  Mod assist to get EOB and max STS  Gait 7 ft x 2 mod assist x 2 WBAT with posterior lean and small step bilaterally. Constant cues with  to correct RAMEZ.     Education Provided:  Role and goals of PT,  transfer training, bed mobility, gait training, balance training, safety awareness, assistive device, strengthening exercises, and importance of participating in PT to return to PLOF.    Patient left up in chair with all lines intact, call button in reach, and sandra pad in place    GOALS:   Multidisciplinary Problems       Physical Therapy Goals          Problem: Physical Therapy    Goal Priority Disciplines Outcome Interventions   Physical Therapy Goal     PT, PT/OT Progressing    Description: Goals to be met by: 2025     Patient will increase functional independence with mobility by performin. Supine to sit with Contact Guard Assistance  2. Sit to stand transfer with Contact Guard Assistance  3. Gait  x 150 feet with Contact Guard Assistance using Rolling Walker.                          Time Tracking:     Billable Minutes: Gait Training 15 and Therapeutic Activity 10    Treatment Type: Treatment  PT/PTA: PTA     Number of PTA visits since last PT visit: 2025

## 2025-04-28 LAB
ERYTHROCYTE [DISTWIDTH] IN BLOOD BY AUTOMATED COUNT: 24.8 % (ref 11.5–17)
HCT VFR BLD AUTO: 28.8 % (ref 42–52)
HGB BLD-MCNC: 8.4 G/DL (ref 14–18)
MCH RBC QN AUTO: 24.5 PG (ref 27–31)
MCHC RBC AUTO-ENTMCNC: 29.2 G/DL (ref 33–36)
MCV RBC AUTO: 84 FL (ref 80–94)
NRBC BLD AUTO-RTO: 0 %
PLATELET # BLD AUTO: 370 X10(3)/MCL (ref 130–400)
PMV BLD AUTO: 10.2 FL (ref 7.4–10.4)
POCT GLUCOSE: 105 MG/DL (ref 70–110)
POCT GLUCOSE: 224 MG/DL (ref 70–110)
POCT GLUCOSE: 284 MG/DL (ref 70–110)
RBC # BLD AUTO: 3.43 X10(6)/MCL (ref 4.7–6.1)
WBC # BLD AUTO: 5.8 X10(3)/MCL (ref 4.5–11.5)

## 2025-04-28 PROCEDURE — 85027 COMPLETE CBC AUTOMATED: CPT | Performed by: INTERNAL MEDICINE

## 2025-04-28 PROCEDURE — 25000003 PHARM REV CODE 250: Performed by: INTERNAL MEDICINE

## 2025-04-28 PROCEDURE — 25000003 PHARM REV CODE 250: Performed by: PHYSICIAN ASSISTANT

## 2025-04-28 PROCEDURE — 97116 GAIT TRAINING THERAPY: CPT | Mod: CQ

## 2025-04-28 PROCEDURE — 63600175 PHARM REV CODE 636 W HCPCS: Performed by: INTERNAL MEDICINE

## 2025-04-28 PROCEDURE — 97530 THERAPEUTIC ACTIVITIES: CPT | Mod: CQ

## 2025-04-28 PROCEDURE — 36415 COLL VENOUS BLD VENIPUNCTURE: CPT | Performed by: INTERNAL MEDICINE

## 2025-04-28 PROCEDURE — A4216 STERILE WATER/SALINE, 10 ML: HCPCS | Performed by: PHYSICIAN ASSISTANT

## 2025-04-28 PROCEDURE — 21400001 HC TELEMETRY ROOM

## 2025-04-28 RX ORDER — DOCUSATE SODIUM 50 MG/5ML
100 LIQUID ORAL 2 TIMES DAILY PRN
Status: DISCONTINUED | OUTPATIENT
Start: 2025-04-28 | End: 2025-04-30 | Stop reason: HOSPADM

## 2025-04-28 RX ORDER — ACETAMINOPHEN 650 MG/20.3ML
650 LIQUID ORAL EVERY 4 HOURS PRN
Status: DISCONTINUED | OUTPATIENT
Start: 2025-04-28 | End: 2025-04-30 | Stop reason: HOSPADM

## 2025-04-28 RX ADMIN — SUCRALFATE 1 G: 1 TABLET ORAL at 11:04

## 2025-04-28 RX ADMIN — INSULIN ASPART 2 UNITS: 100 INJECTION, SOLUTION INTRAVENOUS; SUBCUTANEOUS at 11:04

## 2025-04-28 RX ADMIN — FERROUS SULFATE TAB 325 MG (65 MG ELEMENTAL FE) 1 EACH: 325 (65 FE) TAB at 08:04

## 2025-04-28 RX ADMIN — INSULIN GLARGINE 12 UNITS: 100 INJECTION, SOLUTION SUBCUTANEOUS at 09:04

## 2025-04-28 RX ADMIN — SODIUM CHLORIDE, PRESERVATIVE FREE 10 ML: 5 INJECTION INTRAVENOUS at 09:04

## 2025-04-28 RX ADMIN — INSULIN ASPART 3 UNITS: 100 INJECTION, SOLUTION INTRAVENOUS; SUBCUTANEOUS at 09:04

## 2025-04-28 RX ADMIN — ATORVASTATIN CALCIUM 40 MG: 40 TABLET, FILM COATED ORAL at 09:04

## 2025-04-28 RX ADMIN — SUCRALFATE 1 G: 1 TABLET ORAL at 09:04

## 2025-04-28 RX ADMIN — SUCRALFATE 1 G: 1 TABLET ORAL at 06:04

## 2025-04-28 RX ADMIN — SODIUM CHLORIDE, PRESERVATIVE FREE 10 ML: 5 INJECTION INTRAVENOUS at 05:04

## 2025-04-28 RX ADMIN — PANTOPRAZOLE SODIUM 40 MG: 40 TABLET, DELAYED RELEASE ORAL at 03:04

## 2025-04-28 RX ADMIN — GABAPENTIN 600 MG: 300 CAPSULE ORAL at 08:04

## 2025-04-28 RX ADMIN — PANTOPRAZOLE SODIUM 40 MG: 40 TABLET, DELAYED RELEASE ORAL at 06:04

## 2025-04-28 RX ADMIN — ASPIRIN 81 MG CHEWABLE TABLET 81 MG: 81 TABLET CHEWABLE at 08:04

## 2025-04-28 RX ADMIN — SUCRALFATE 1 G: 1 TABLET ORAL at 03:04

## 2025-04-28 RX ADMIN — SODIUM CHLORIDE, PRESERVATIVE FREE 10 ML: 5 INJECTION INTRAVENOUS at 01:04

## 2025-04-28 RX ADMIN — GABAPENTIN 600 MG: 300 CAPSULE ORAL at 09:04

## 2025-04-28 RX ADMIN — TRAMADOL HYDROCHLORIDE 50 MG: 50 TABLET, COATED ORAL at 08:04

## 2025-04-28 RX ADMIN — INSULIN GLARGINE 12 UNITS: 100 INJECTION, SOLUTION SUBCUTANEOUS at 08:04

## 2025-04-28 RX ADMIN — GABAPENTIN 600 MG: 300 CAPSULE ORAL at 03:04

## 2025-04-28 NOTE — PROGRESS NOTES
[facility] - 4th Floor Medical Telemetry  Orthopedics  Progress Note    Patient Name: William Mccauley  MRN: [id number]  Admission Date: 4/13/2025  Hospital Length of Stay: 15 days  Attending Provider: Marianela Garzon MD  Primary Care Provider: Shannon Alonso MD    Subjective:     Principal Problem:Closed displaced fracture of right femoral neck    Principal Orthopedic Problem:   R hip hemiarthroplasty    Interval History: Seen this am. Family at bedside. Awaiting placement. About to work with therapy this am.     Review of patient's allergies indicates:  No Known Allergies    Current Facility-Administered Medications   Medication    0.9%  NaCl infusion (for blood administration)    0.9%  NaCl infusion (for blood administration)    acetaminophen oral solution 650 mg    aspirin chewable tablet 81 mg    atorvastatin tablet 40 mg    bisacodyL suppository 10 mg    dextrose 50% injection 12.5 g    diphenhydrAMINE-zinc acetate 1-0.1% cream    docusate 50 mg/5 mL liquid 100 mg    ferrous sulfate tablet 1 each    gabapentin capsule 600 mg    glucagon (human recombinant) injection 1 mg    hydrALAZINE injection 10 mg    insulin aspart U-100 injection 0-10 Units    insulin glargine U-100 (Lantus) injection 12 Units    pantoprazole EC tablet 40 mg    sodium chloride 0.9% flush 10 mL    sucralfate tablet 1 g    traMADoL tablet 50 mg     Objective:     Vital Signs (Most Recent):  Temp: 98.2 °F (36.8 °C) (04/28/25 0712)  Pulse: 66 (04/28/25 0712)  Resp: 18 (04/28/25 0819)  BP: 112/62 (04/28/25 0712)  SpO2: 99 % (04/28/25 0712) Vital Signs (24h Range):  Temp:  [97.7 °F (36.5 °C)-98.6 °F (37 °C)] 98.2 °F (36.8 °C)  Pulse:  [66-95] 66  Resp:  [18] 18  SpO2:  [97 %-100 %] 99 %  BP: (104-132)/(55-79) 112/62     Weight: 59.8 kg (131 lb 13.4 oz)  Height: 5' (152.4 cm)  Body mass index is 25.75 kg/m².      Intake/Output Summary (Last 24 hours) at 4/28/2025 0948  Last data filed at 4/28/2025 0546  Gross per 24 hour   Intake --    Output 1450 ml   Net -1450 ml       Physical Exam:   General the patient is alert and in no acute distress;   Resp: No signs of labored breathing                     RLE: -Skin: Incision well approximated.  thigh swollen but soft            -MSK: tolerates gentle ROM; + active DF/PF           -Neuro:  Sensation intact to light touch throughout           -CV: Capillary refill is less than 2 seconds. +DP.     Diagnostic Findings:     Significant Labs: CBC:   Recent Labs   Lab 04/28/25  0424   WBC 5.80   HGB 8.4*   HCT 28.8*        All pertinent labs within the past 24 hours have been reviewed.    Significant Imaging: I have reviewed all pertinent imaging results/findings.     Assessment/Plan:     Active Diagnoses:    Diagnosis Date Noted POA    PRINCIPAL PROBLEM:  Closed displaced fracture of right femoral neck [S72.001A] 04/13/2025 Yes    Intraparenchymal hemorrhage of brain [I61.9] 04/15/2025 Yes      Problems Resolved During this Admission:   73 YO M   4/14/25 R hip hemiarthroplasty  2 weeks post op  Diet: per primary when cleared by SLP  Pain: PRN  DVT: ok from ortho perspective when cleared by neurology   PT/OT: Eval and Treat  Activity: WBAT RLE  OK incision open to air  Follow up in 4 weeks with Dr Hunt    The above findings, diagnostics, and treatment plan were discussed with Dr Hunt who is in agreement with the plan of care except as stated in additional documentation.      Yoselin Mae, DOROTHYP   Orthopedic Trauma Surgery  [facility]

## 2025-04-28 NOTE — PROGRESS NOTES
Inpatient Nutrition Assessment    Admit Date: 4/13/2025   Total duration of encounter: 15 days   Patient Age: 74 y.o.    Nutrition Recommendation/Prescription     Continue regular diet with consistency per SLP.    Communication of Recommendations: reviewed with nurse    Nutrition Assessment     Malnutrition Assessment/Nutrition-Focused Physical Exam    Malnutrition Context: acute illness or injury (04/16/25 1238)  Malnutrition Level: other (see comments) (Does not meet criteria) (04/16/25 1238)     Weight Loss (Malnutrition): other (see comments) (Does not meet criteria) (04/16/25 1238)              Muscle Mass (Malnutrition): mild depletion (04/16/25 1238)     Clavicle Bone Region (Muscle Loss): mild depletion, possibly normal for patient given no reported weight/intake decrease                             A minimum of two characteristics is recommended for diagnosis of either severe or non-severe malnutrition.    Chart Review    Reason Seen: follow-up    Malnutrition Screening Tool Results   Have you recently lost weight without trying?: No  Have you been eating poorly because of a decreased appetite?: No   MST Score: 0   Diagnosis:  Right femoral neck fracture secondary to mechanical fall   Acute/subacute left thalamic hemorrhage  Microcytic anemia, stable   Metabolic acidosis   Transaminitis     Relevant Medical History: hypertension, hyperlipidemia, diabetes mellitus type 2, ptosis, CVA without deficits, sciatica     Scheduled Medications:  aspirin, 81 mg, Daily  atorvastatin, 40 mg, QHS  ferrous sulfate, 1 tablet, Daily  gabapentin, 600 mg, TID  insulin glargine U-100, 12 Units, BID  pantoprazole, 40 mg, BID AC  sodium chloride 0.9%, 10 mL, Q8H  sucralfate, 1 g, QID (AC & HS)    Continuous Infusions: none       PRN Medications:   0.9%  NaCl infusion (for blood administration), , Q24H PRN  0.9%  NaCl infusion (for blood administration), , Q24H PRN  acetaminophen, 650 mg, Q4H PRN  bisacodyL, 10 mg, Daily  PRN  dextrose 50%, 12.5 g, PRN  diphenhydrAMINE-zinc acetate 1-0.1%, , TID PRN  docusate, 100 mg, BID PRN  glucagon (human recombinant), 1 mg, PRN  hydrALAZINE, 10 mg, Q4H PRN  insulin aspart U-100, 0-10 Units, Q6H PRN  traMADoL, 50 mg, Q6H PRN    Calorie Containing IV Medications: no significant kcals from medications at this time    Recent Labs   Lab 04/22/25  0452 04/22/25  0453 04/24/25  0844 04/26/25  0527 04/28/25  0424     --   --  138  --    K 4.2  --   --  4.1  --    CALCIUM 8.2*  --   --  8.3*  --    PHOS 2.7  --   --   --   --    MG 1.80  --   --   --   --    *  --   --  109*  --    CO2 26  --   --  23  --    BUN 18.5  --   --  7.5*  --    CREATININE 0.58*  --   --  0.54*  --    EGFRNORACEVR >60  --   --  >60  --    GLUCOSE 126*  --   --  87  --    BILITOT 1.2  --   --   --   --    ALKPHOS 65  --   --   --   --    ALT 23  --   --   --   --    AST 27  --   --   --   --    ALBUMIN 2.6*  --   --   --   --    WBC  --  11.38 8.43 7.14 5.80   HGB  --  9.0* 9.3* 9.4* 8.4*   HCT  --  30.4* 33.4* 32.6* 28.8*     Nutrition Orders:  Diet Soft & Bite Sized (IDDSI Level 6) Supervision with Meals; Moderately Thick Liquids (IDDSI Level 3); Standard Tray    Appetite/Oral Intake: good/% of meals  Factors Affecting Nutritional Intake: difficulty/impaired swallowing  Social Needs Impacting Access to Food: none identified  Food/Episcopalian/Cultural Preferences: none reported  Food Allergies: no known food allergies  Last Bowel Movement: 04/27/25  Wound(s): no pressure injuries documented at this time     Comments    4/16/25 Spoke with patient's daughter at bedside, denies translation services at this time. Daughter reports good appetite/intake prior to admission, denies weight loss. MBS done 4/15/25 with recommendations for NPO and dysphagia therapy. He is currently receiving Clinimix E 4.25/5 at 75 ml/hr. Recommend consideration of nasogastric tube feeding to better meet estimated needs and to maintain GI  tract integrity.    4/17/25 Patient receiving tube feeding per nasogastric tube, needs met. Hypernatremia noted, physician managing free water flushes at this time, also has D5W ordered.     4/21/25 Patient continues with tube feeding, plans for EGD tomorrow. Nurse reports loose stools and patient reporting some fecal incontinence, will add BanatrolTF to give as needed.    4/24/25 Patient transitioned to modified diet yesterday afternoon, nurse reports good intake, observed meal tray at bedside during round with 100% consumed.    4/28/25 Good intake continues.    Anthropometrics    Height: 5' (152.4 cm), Height Method: Stated  Last Weight: 59.8 kg (131 lb 13.4 oz) (04/22/25 0622), Weight Method: Standard Scale  BMI (Calculated): 25.7  BMI Classification: overweight (BMI 25-29.9)        Ideal Body Weight (IBW), Male: 106 lb     % Ideal Body Weight, Male (lb): 132.07 %                          Usual Weight Provided By: family/caregiver denies unintentional weight loss    Wt Readings from Last 5 Encounters:   04/22/25 59.8 kg (131 lb 13.4 oz)   04/02/25 59.4 kg (131 lb)   02/05/25 62.9 kg (138 lb 9.6 oz)   10/31/24 58.1 kg (128 lb)   10/18/24 59.4 kg (131 lb)     Weight Change(s) Since Admission:   4/24/25 3.1 kg increase from 4/16 to 4/22 noted, possibly inaccurate, continue to monitor weights for trends  4/16/25 admission weight (63.5 kg) stated, took bed weight (56.7 kg) during rounds  Wt Readings from Last 1 Encounters:   04/22/25 0622 59.8 kg (131 lb 13.4 oz)   04/16/25 1234 56.7 kg (125 lb)   04/13/25 1306 63.5 kg (139 lb 15.9 oz)   04/13/25 0911 63.5 kg (140 lb)   Admit Weight: 63.5 kg (140 lb) (04/13/25 0911), Weight Method: Stated    Estimated Needs    Weight Used For Calorie Calculations: 56.7 kg (125 lb)  Energy Calorie Requirements (kcal): 8644-3451, 1.2-1.4 stress factor  Energy Need Method: [facility]  Weight Used For Protein Calculations: 56.7 kg (125 lb)  Protein Requirements: 57-80 g, 1-1.4  g/kg  Fluid Requirements (mL): 2948-3837, 1 ml/kcal  CHO Requirement: 138-181 g, 40-45% of kcal     Enteral Nutrition discontinued    Parenteral Nutrition discontinued    Evaluation of Received Nutrient Intake    Calories: meeting estimated needs  Protein: meeting estimated needs    Patient Education Not applicable.    Nutrition Diagnosis     PES: Inadequate energy intake related to inability to consume sufficient nutrients as evidenced by less than 80% needs met. (resolved)    PES: N/A            Nutrition Interventions     Intervention(s): general/healthful diet, modified composition of meals/snacks, and collaboration with other providers  Intervention(s): Enteral nutrition - discontinued     Goal: Meet greater than 80% of nutritional needs by follow-up. (goal met)    Nutrition Goals & Monitoring     Dietitian will monitor: food and beverage intake, energy intake, weight, electrolyte/renal panel, and beliefs/attitudes  Discharge planning:  regular diet with texture modifications per SLP  Nutrition Risk/Follow-Up: high (follow-up in 1-4 days)   Please consult if re-assessment needed sooner.

## 2025-04-28 NOTE — CARE UPDATE
[id number] Rec call from Marina with [facility] who reports pt's insurance is requesting a peer to peer. Called [phone] ext [phone] and spoke with Anuel with [employer]. She informs me I need to fax the consent to treat form in which pt signs when coming to hospital. Once they receive the form then they will call CM to schedule a peer to peer. She requested I fax the form. Consent to treat form faxed to [employer] at [phone] Rec call from Anuel with [employer] reporting she received the consent to treat and can schedule a peer to peer any time from 10am-1:30pm. Dr Ferreira requested 1-1:30pm. Called Anuel with [employer] and scheduled the peer to peer tomorrow at 1pm. Provided Anuel with Dr Ferreira's ph #. Dr Thomas with [employer] will contact Dr Ferreira tomorrow.

## 2025-04-28 NOTE — PT/OT/SLP PROGRESS
Physical Therapy Treatment    Patient Name:  William Mccauley   MRN:  [id number]    Recommendations:     Discharge therapy intensity: Moderate Intensity Therapy   Discharge Equipment Recommendations: to be determined by next level of care  Barriers to discharge: Impaired mobility and Ongoing medical needs    Assessment:     William Mccauley is a 74 y.o. male admitted with a medical diagnosis of Right femoral neck fx s/p R LEANDRA, and MRI brain showed small linear/punctuate area of acute/subacute left thalamic hemorrhage.  He presents with the following impairments/functional limitations: weakness, impaired endurance, impaired self care skills, impaired functional mobility, impaired balance, pain.    Pt's daughter present and wished to translate for the pt. Pt tolerated session well however continues to require mod assist to ambulate and poor ability to correct with cueing this date.     Rehab Prognosis: Good; patient would benefit from acute skilled PT services to address these deficits and reach maximum level of function.    Recent Surgery: Procedure(s) (LRB):  EGD (N/A)  EGD, WITH CLOSED BIOPSY 6 Days Post-Op    Plan:     During this hospitalization, patient would benefit from acute PT services 6 x/week to address the identified rehab impairments via gait training, therapeutic activities, therapeutic exercises, neuromuscular re-education and progress toward the following goals:    Plan of Care Expires:  05/16/25    Subjective     Chief Complaint: R leg pain  Patient/Family Comments/goals: to get better  Pain/Comfort:  Pain Rating 1: 0/10  Pain Addressed 1: Reposition      Objective:     Communicated with RN prior to session.  Patient found HOB elevated with pulse ox (continuous), telemetry upon PT entry to room.     General Precautions: Standard, aspiration, droplet (rhinovirus)  Orthopedic Precautions: RLE weight bearing as tolerated (ROM as tolerated)  Braces: N/A  Respiratory Status: Room air  Blood Pressure: 126/76, HR 74  Skin  Integrity: Visible skin intact      Functional Mobility:  Bed Mobility:    Rolling B/L directions with min assist  Supine to sit: Max assist to complete trunk flexion  Transfers:    Sit<->stand:   Mod assist with RW   Bed to chair: Max assist to squat pivot to chair  Balance:SBA static sitting at EOB with UE support  Gait: Mod assist with RW, R lateral lean, flexed trunk, difficulty advancing LLE 2/2 poor acceptance of weight on RLE, chair following,     Therapeutic Activities/Exercises:      Education:  Patient provided with verbal education education regarding PT role/goals/POC and post-op precautions.  Understanding was verbalized.     Patient left up in chair with all lines intact, call button in reach, chair alarm on, geomat cushion, sandra pad in place, and nurse notified.    GOALS:   Multidisciplinary Problems       Physical Therapy Goals          Problem: Physical Therapy    Goal Priority Disciplines Outcome Interventions   Physical Therapy Goal     PT, PT/OT Progressing    Description: Goals to be met by: 2025     Patient will increase functional independence with mobility by performin. Supine to sit with Contact Guard Assistance  2. Sit to stand transfer with Contact Guard Assistance  3. Gait  x 150 feet with Contact Guard Assistance using Rolling Walker.                          Time Tracking:     PT Received On: 25  PT Start Time: 854     PT Stop Time: 927  PT Total Time (min): 33 min     Billable Minutes: Gait Training 1 unit and Therapeutic Activity 1 unit    Treatment Type: Treatment  PT/PTA: PTA     Number of PTA visits since last PT visit: 2     2025

## 2025-04-29 LAB
POCT GLUCOSE: 107 MG/DL (ref 70–110)
POCT GLUCOSE: 160 MG/DL (ref 70–110)
POCT GLUCOSE: 182 MG/DL (ref 70–110)

## 2025-04-29 PROCEDURE — 94799 UNLISTED PULMONARY SVC/PX: CPT

## 2025-04-29 PROCEDURE — 97116 GAIT TRAINING THERAPY: CPT | Mod: CQ

## 2025-04-29 PROCEDURE — 25000003 PHARM REV CODE 250: Performed by: INTERNAL MEDICINE

## 2025-04-29 PROCEDURE — 63600175 PHARM REV CODE 636 W HCPCS: Performed by: INTERNAL MEDICINE

## 2025-04-29 PROCEDURE — A4216 STERILE WATER/SALINE, 10 ML: HCPCS | Performed by: PHYSICIAN ASSISTANT

## 2025-04-29 PROCEDURE — 97535 SELF CARE MNGMENT TRAINING: CPT

## 2025-04-29 PROCEDURE — 97530 THERAPEUTIC ACTIVITIES: CPT | Mod: CQ

## 2025-04-29 PROCEDURE — 21400001 HC TELEMETRY ROOM

## 2025-04-29 PROCEDURE — 25000003 PHARM REV CODE 250: Performed by: PHYSICIAN ASSISTANT

## 2025-04-29 RX ADMIN — TRAMADOL HYDROCHLORIDE 50 MG: 50 TABLET, COATED ORAL at 03:04

## 2025-04-29 RX ADMIN — SUCRALFATE 1 G: 1 TABLET ORAL at 10:04

## 2025-04-29 RX ADMIN — INSULIN ASPART 2 UNITS: 100 INJECTION, SOLUTION INTRAVENOUS; SUBCUTANEOUS at 11:04

## 2025-04-29 RX ADMIN — SUCRALFATE 1 G: 1 TABLET ORAL at 05:04

## 2025-04-29 RX ADMIN — SUCRALFATE 1 G: 1 TABLET ORAL at 03:04

## 2025-04-29 RX ADMIN — PANTOPRAZOLE SODIUM 40 MG: 40 TABLET, DELAYED RELEASE ORAL at 05:04

## 2025-04-29 RX ADMIN — ASPIRIN 81 MG CHEWABLE TABLET 81 MG: 81 TABLET CHEWABLE at 09:04

## 2025-04-29 RX ADMIN — TRAMADOL HYDROCHLORIDE 50 MG: 50 TABLET, COATED ORAL at 08:04

## 2025-04-29 RX ADMIN — INSULIN GLARGINE 12 UNITS: 100 INJECTION, SOLUTION SUBCUTANEOUS at 09:04

## 2025-04-29 RX ADMIN — GABAPENTIN 600 MG: 300 CAPSULE ORAL at 03:04

## 2025-04-29 RX ADMIN — SODIUM CHLORIDE, PRESERVATIVE FREE 10 ML: 5 INJECTION INTRAVENOUS at 09:04

## 2025-04-29 RX ADMIN — ATORVASTATIN CALCIUM 40 MG: 40 TABLET, FILM COATED ORAL at 09:04

## 2025-04-29 RX ADMIN — GABAPENTIN 600 MG: 300 CAPSULE ORAL at 09:04

## 2025-04-29 RX ADMIN — PANTOPRAZOLE SODIUM 40 MG: 40 TABLET, DELAYED RELEASE ORAL at 03:04

## 2025-04-29 RX ADMIN — FERROUS SULFATE TAB 325 MG (65 MG ELEMENTAL FE) 1 EACH: 325 (65 FE) TAB at 09:04

## 2025-04-29 RX ADMIN — SUCRALFATE 1 G: 1 TABLET ORAL at 09:04

## 2025-04-29 RX ADMIN — SODIUM CHLORIDE, PRESERVATIVE FREE 10 ML: 5 INJECTION INTRAVENOUS at 05:04

## 2025-04-29 NOTE — PT/OT/SLP PROGRESS
Physical Therapy      Patient Name:  William Mccauley   MRN:  [id number]    Tx attempted at 1007, pt receiving a bath. Will follow up in p.m. if schedule allows.

## 2025-04-29 NOTE — CARE UPDATE
[id number] Dr Ferreira messaged me and reported that he was able to over turn the appeal. Insurance will approve pt to go to inpt rehab. Informed Ludy with [facility] so she can start looking for the auth from insurance.

## 2025-04-29 NOTE — PLAN OF CARE
Problem: Stroke, Ischemic (Includes Transient Ischemic Attack)  Goal: Optimal Coping  Outcome: Met  Goal: Effective Bowel Elimination  Outcome: Met  Goal: Optimal Cerebral Tissue Perfusion  Outcome: Met  Goal: Optimal Cognitive Function  Outcome: Met  Goal: Improved Communication Skills  Outcome: Met  Goal: Effective Oxygenation and Ventilation  Outcome: Met  Goal: Improved Sensorimotor Function  Outcome: Met  Goal: Safe and Effective Swallow  Outcome: Met  Goal: Effective Urinary Elimination  Outcome: Met     Problem: Wound  Goal: Optimal Coping  Outcome: Met  Goal: Optimal Functional Ability  Outcome: Met  Goal: Absence of Infection Signs and Symptoms  Outcome: Met  Goal: Improved Oral Intake  Outcome: Met  Goal: Optimal Pain Control and Function  Outcome: Met  Goal: Skin Health and Integrity  Outcome: Met     Problem: Stroke, Intracerebral Hemorrhage  Goal: Optimal Coping  Outcome: Met  Goal: Effective Bowel Elimination  Outcome: Met  Goal: Optimal Cerebral Tissue Perfusion  Outcome: Met  Goal: Optimal Cognitive Function  Outcome: Met  Goal: Effective Communication Skills  Outcome: Met  Goal: Optimal Nutrition Intake  Outcome: Met  Goal: Optimal Pain Control and Function  Outcome: Met  Goal: Effective Oxygenation and Ventilation  Outcome: Met  Goal: Improved Sensorimotor Function  Outcome: Met  Goal: Safe and Effective Swallow  Outcome: Met  Goal: Effective Urinary Elimination  Outcome: Met     Problem: Infection  Goal: Absence of Infection Signs and Symptoms  Outcome: Met

## 2025-04-29 NOTE — PLAN OF CARE
Problem: Adult Inpatient Plan of Care  Goal: Plan of Care Review  Outcome: Progressing  Goal: Patient-Specific Goal (Individualized)  Outcome: Progressing  Goal: Absence of Hospital-Acquired Illness or Injury  Outcome: Progressing  Goal: Optimal Comfort and Wellbeing  Outcome: Progressing  Goal: Readiness for Transition of Care  Outcome: Progressing     Problem: Stroke, Ischemic (Includes Transient Ischemic Attack)  Goal: Optimal Functional Ability  Outcome: Progressing  Goal: Optimal Nutrition Intake  Outcome: Progressing     Problem: Fall Injury Risk  Goal: Absence of Fall and Fall-Related Injury  Outcome: Progressing     Problem: Skin Injury Risk Increased  Goal: Skin Health and Integrity  Outcome: Progressing     Problem: Wound  Goal: Optimal Wound Healing  Outcome: Progressing     Problem: Diabetes Comorbidity  Goal: Blood Glucose Level Within Targeted Range  Outcome: Progressing     Problem: Stroke, Intracerebral Hemorrhage  Goal: Optimal Functional Ability  Outcome: Progressing

## 2025-04-29 NOTE — PROGRESS NOTES
"Hospital Medicine  Progress Note    Patient Name: William Mccauley  MRN: [id number]  Status: IP- Inpatient   Admission Date: 4/13/2025  Length of Stay: 16  Date of Service: 04/29/2025       CC: hospital follow-up for GI bleed       SUBJECTIVE   "74 y.o. Urdu speaking male with a PMHx of HTN, HLD, DM 2, ptosis, prior CVA without deficits and sciatica presented to [facility] on 4/13/2025 with a primary complaint of right hip pain following a fall which occurred yesterday (04/12/2025). Patient was ambulating with a walker when he reached for the door, lost his balance and fell onto his right hip.  Patient denies hitting his head. Son who is at bedside translating stated that  patient did complain of some left eye blur vision  He denies shortness a breath, chest pain, nausea, vomiting, diarrhea, headache and dizziness.  He received 50 mcg of fentanyl in route for Rt hip pain.      On arrival  to the ED, Pt was afebrile, HR 88, /76, RR 18 and SpO2 96% on room air.  Labs with H&H 8.8/30.2, CO2 20, AST 48, ALT 63.  EKG with sinus rhythm with first-degree AV block, non specific  ST and T wave change. right hip x-ray revealed a right femoral neck fracture.  Chest x-ray with no acute cardiopulmonary process.  CT of the head revealed a left thalamic minimal hyperdensity which may represent subacute hemorrhage related to subacute infarct but no convincing acute brain traumatic findings.  CT cervical spine without acute findings. MRI of the brain with small linear/punctuate area of acute/subacute left thalamic hemorrhage, possible hemorrhagic infarct.  In ED patient received IV hydration, morphine, Zofran.  Orthopedics and neurology were consulted.      Pt underwent Rt hip hemiarthroplasty with Dr. Raghu Hunt on 4/14/25. Neurology initiated stroke workup and suggested possible left thalamic hypertensive hemorrhagic stoke. CTA head/neck with severe narrowing of jah distal vertebral arteries, severe narrowing of the clinoid, " "ophthalmic segments of the internal carotid artery siphons bilaterally and near occlusion on the left.. Echo with LVEF 65-70%, neg bubble study, Carotid U/S with less than 50% ICA stenosis. Pt failed SLP eval and placed NPO. NG tube inserted for medication and nutrition. Neurology cleared Pt to start ASA 4 days from stroke onset which was 4/17/25. PT /OT suggested high intensity therapy.Spoke to Dr. Phillip with Neurology and cleared Pt for prophylactic Lovenox as of 4/18/25.      New onset fever of 101 on 4/17/25 associated with tachycardia. Infectious workup include CBC, CXR, UA, blood cultures x2 and Resp PCR ordered. Pt was started on empiric Zosyn IV. Workup was neg except Resp PCR was positive for Human Rhinovirus infection. Pt became afebrile. Zosyn discontinued. Pt pulled NG tube on 4/19/25. Replaced without difficulty. SLP continued to follow. As of 4/19/25 Pt remained with oropharyngeal dysphagia unsafe for PO intake. On 4/21/25, Pt passed MBS and was cleared for puree diet and mod thick liquid later advanced to soft and bite sized diet.      On 4/21/25 , Pt with melanotic stool and an acute drop in Hgb to 5.5 from 8.0 before. 2 units of P-RBC transfused. High dose PPI IV initiated. ASA/ Prophylactic Lovenox held. GI consulted.Underwent EGD on 4/22/25 showed evidence of erosions and erythema entire stomach and duodenum. PPI continued. IV Ferric gluconate initiated for iron replacement. Spoke to GI on 4/23/25  and suggested Asa can be resumed. Prophylactic Lovenox placed on hold. SCDs utilized for VTE prophylaxis. PT/OT suggested high intensity therapy. CM consulted to assist with DC planning."    Today: Patient seen and examined at bedside, and chart reviewed.  Stable.      MEDICATIONS   Scheduled   aspirin  81 mg Oral Daily    atorvastatin  40 mg Oral QHS    ferrous sulfate  1 tablet Oral Daily    gabapentin  600 mg Oral TID    insulin glargine U-100  12 Units Subcutaneous BID    pantoprazole  40 mg Oral " "BID AC    sodium chloride 0.9%  10 mL Intravenous Q8H    sucralfate  1 g Oral QID (AC & HS)     Continuous Infusions  None      PHYSICAL EXAM   VITALS: T 98.5 °F (36.9 °C)   /71   P 91   RR 18   O2 95 %    GENERAL: awake and in no acute distress  LUNGS: Respirations non-labored  CVS: Normal rate  ABD: Soft, non-tender  EXTREMITIES: Radial pulse 2+  NEURO: Awake, alert  PSYCHIATRIC: Cooperative      LABS   CBC  Recent Labs     04/28/25  0424   WBC 5.80   RBC 3.43*   HGB 8.4*   HCT 28.8*   MCV 84.0   MCH 24.5*   MCHC 29.2*   RDW 24.8*        CHEM  No results for input(s): "NA", "K", "CHLORIDE", "CO2", "BUN", "CREATININE", "GLUCOSE", "CALCIUM", "MG", "PHOS", "ALBUMIN", "GLOBULIN", "ALKPHOS", "ALT", "AST", "BILITOT", "LIPASE", "CRP", "LDH", "HAPTOGLOBIN", "FERRITIN", "IRON", "TIBC", "TSH", "FREET4" in the last 72 hours.          ASSESSMENT   Acute blood loss anemia s/t GI bleed  GI bleed s/t erosive Gastritis /Duodenitis  SIRS, resolved   Right femoral neck fracture (closed/displaced), s/p  Rt hip hemiarthroplasty on 4/14  Acute to subacute left thalamic hemorrhage  Oropharyngeal dysphagia due to above  Left eye blur vision s/t near occlusion of clinoid, ophthalmic segments of the internal carotid artery siphon on the left  Microcytic hypochromic/iron deficiency  Diabetes Mellitis , type II  Hypernatremia due to free water deficit  Elevated LFT, resolved   Essential HTN  HLD  h/o prior CVA    PLAN   Continue ASA/statin  Continue PPI BID, and Carafate   Continue oral iron replacement  Continue with therapy services   CM consulted for placement options      Prophylaxis:  SCDs        Dorian Ferreira MD  [facility]      "

## 2025-04-29 NOTE — PT/OT/SLP PROGRESS
Physical Therapy Treatment    Patient Name:  William Mccauley   MRN:  [id number]    Recommendations:     Discharge therapy intensity: Moderate Intensity Therapy   Discharge Equipment Recommendations: to be determined by next level of care  Barriers to discharge: Impaired mobility and Ongoing medical needs    Assessment:     William Mccauley is a 74 y.o. male admitted with a medical diagnosis of Right femoral neck fx s/p R LEANDRA, and MRI brain showed small linear/punctuate area of acute/subacute left thalamic hemorrhage.  He presents with the following impairments/functional limitations: weakness, impaired endurance, impaired self care skills, impaired functional mobility, impaired balance, pain.    Utilized  number [id number]. Pt motivated to participate and tolerated well. Improved ability to advance LLE but remains Mod assist to ambulate 2/2 R lateral and posterior lean with frequent cues to correct posture.     Rehab Prognosis: Good; patient would benefit from acute skilled PT services to address these deficits and reach maximum level of function.    Recent Surgery: Procedure(s) (LRB):  EGD (N/A)  EGD, WITH CLOSED BIOPSY 7 Days Post-Op    Plan:     During this hospitalization, patient would benefit from acute PT services 6 x/week to address the identified rehab impairments via gait training, therapeutic activities, therapeutic exercises, neuromuscular re-education and progress toward the following goals:    Plan of Care Expires:  05/16/25    Subjective     Chief Complaint: R leg pain  Patient/Family Comments/goals: to get better  Pain/Comfort:  Pain Rating 1: other (see comments) (no hip pain at rest but hurts when he moves it; did not rate)  Pain Addressed 1: Reposition, Pre-medicate for activity      Objective:     Communicated with RN prior to session.  Patient found HOB elevated with pulse ox (continuous), telemetry upon PT entry to room.     General Precautions: Standard, aspiration  Orthopedic Precautions: RLE weight  bearing as tolerated (ROM as tolerated)  Braces: N/A  Respiratory Status: Room air  Blood Pressure: 123/70  Skin Integrity: Visible skin intact      Functional Mobility:  Bed Mobility:    Supine to sit: Mod assist  Scooting anteriorly to EOB: Mod assist  Transfers:    Sit<->stand:   Mod assist with RW   Bed to chair: Mod assist x 2 with RW, assist to weight shift and advance LLE  Balance: SBA at EOB  Gait: 10', 15'  Mod assist with RW, chair following, pt with kyphotic posture, R lateral and posterior lean, frequent cues to improve upright posture with minimal improvement, improved ability to advance LLE this date.     Therapeutic Activities/Exercises:      Education:  Patient provided with verbal education education regarding PT role/goals/POC and post-op precautions.  Understanding was verbalized.     Patient left up in chair with all lines intact, call button in reach, chair alarm on, geomat cushion, sandra pad in place, and nurse notified.    GOALS:   Multidisciplinary Problems       Physical Therapy Goals          Problem: Physical Therapy    Goal Priority Disciplines Outcome Interventions   Physical Therapy Goal     PT, PT/OT Progressing    Description: Goals to be met by: 2025     Patient will increase functional independence with mobility by performin. Supine to sit with Contact Guard Assistance  2. Sit to stand transfer with Contact Guard Assistance  3. Gait  x 150 feet with Contact Guard Assistance using Rolling Walker.                          Time Tracking:     PT Received On: 25  PT Start Time: 1425     PT Stop Time: 1510  PT Total Time (min): 45 min     Billable Minutes: Gait Training 2 units and Therapeutic Activity 1 unit    Treatment Type: Treatment  PT/PTA: PTA     Number of PTA visits since last PT visit: 3     2025

## 2025-04-29 NOTE — PROGRESS NOTES
"Hospital Medicine  Progress Note    Patient Name: William Mccauley  MRN: [id number]  Status: IP- Inpatient   Admission Date: 4/13/2025  Length of Stay: 15  Date of Service: 04/28/2025       CC: hospital follow-up for GI bleed       SUBJECTIVE   "74 y.o. Mongolian speaking male with a PMHx of HTN, HLD, DM 2, ptosis, prior CVA without deficits and sciatica presented to [facility] on 4/13/2025 with a primary complaint of right hip pain following a fall which occurred yesterday (04/12/2025). Patient was ambulating with a walker when he reached for the door, lost his balance and fell onto his right hip.  Patient denies hitting his head. Son who is at bedside translating stated that  patient did complain of some left eye blur vision  He denies shortness a breath, chest pain, nausea, vomiting, diarrhea, headache and dizziness.  He received 50 mcg of fentanyl in route for Rt hip pain.      On arrival  to the ED, Pt was afebrile, HR 88, /76, RR 18 and SpO2 96% on room air.  Labs with H&H 8.8/30.2, CO2 20, AST 48, ALT 63.  EKG with sinus rhythm with first-degree AV block, non specific  ST and T wave change. right hip x-ray revealed a right femoral neck fracture.  Chest x-ray with no acute cardiopulmonary process.  CT of the head revealed a left thalamic minimal hyperdensity which may represent subacute hemorrhage related to subacute infarct but no convincing acute brain traumatic findings.  CT cervical spine without acute findings. MRI of the brain with small linear/punctuate area of acute/subacute left thalamic hemorrhage, possible hemorrhagic infarct.  In ED patient received IV hydration, morphine, Zofran.  Orthopedics and neurology were consulted.      Pt underwent Rt hip hemiarthroplasty with Dr. Raghu Hunt on 4/14/25. Neurology initiated stroke workup and suggested possible left thalamic hypertensive hemorrhagic stoke. CTA head/neck with severe narrowing of jah distal vertebral arteries, severe narrowing of the clinoid, " "ophthalmic segments of the internal carotid artery siphons bilaterally and near occlusion on the left.. Echo with LVEF 65-70%, neg bubble study, Carotid U/S with less than 50% ICA stenosis. Pt failed SLP eval and placed NPO. NG tube inserted for medication and nutrition. Neurology cleared Pt to start ASA 4 days from stroke onset which was 4/17/25. PT /OT suggested high intensity therapy.Spoke to Dr. Phillip with Neurology and cleared Pt for prophylactic Lovenox as of 4/18/25.      New onset fever of 101 on 4/17/25 associated with tachycardia. Infectious workup include CBC, CXR, UA, blood cultures x2 and Resp PCR ordered. Pt was started on empiric Zosyn IV. Workup was neg except Resp PCR was positive for Human Rhinovirus infection. Pt became afebrile. Zosyn discontinued. Pt pulled NG tube on 4/19/25. Replaced without difficulty. SLP continued to follow. As of 4/19/25 Pt remained with oropharyngeal dysphagia unsafe for PO intake. On 4/21/25, Pt passed MBS and was cleared for puree diet and mod thick liquid later advanced to soft and bite sized diet.      On 4/21/25 , Pt with melanotic stool and an acute drop in Hgb to 5.5 from 8.0 before. 2 units of P-RBC transfused. High dose PPI IV initiated. ASA/ Prophylactic Lovenox held. GI consulted.Underwent EGD on 4/22/25 showed evidence of erosions and erythema entire stomach and duodenum. PPI continued. IV Ferric gluconate initiated for iron replacement. Spoke to GI on 4/23/25  and suggested Asa can be resumed. Prophylactic Lovenox placed on hold. SCDs utilized for VTE prophylaxis. PT/OT suggested high intensity therapy. CM consulted to assist with DC planning."    Today: Patient seen and examined at bedside, and chart reviewed.  Stable.      MEDICATIONS   Scheduled   aspirin  81 mg Oral Daily    atorvastatin  40 mg Oral QHS    ferrous sulfate  1 tablet Oral Daily    gabapentin  600 mg Oral TID    insulin glargine U-100  12 Units Subcutaneous BID    pantoprazole  40 mg Oral " BID AC    sodium chloride 0.9%  10 mL Intravenous Q8H    sucralfate  1 g Oral QID (AC & HS)     Continuous Infusions  None      PHYSICAL EXAM   VITALS: T 99.5 °F (37.5 °C)   BP (!) 158/90   P (!) 116   RR 18   O2 (!) 93 %    GENERAL: awake and in no acute distress  LUNGS: Respirations non-labored  CVS: Normal rate  ABD: Soft, non-tender  EXTREMITIES: Radial pulse 2+  NEURO: Awake, alert  PSYCHIATRIC: Cooperative      LABS   CBC  Recent Labs     04/26/25  0527 04/28/25  0424   WBC 7.14 5.80   RBC 4.00* 3.43*   HGB 9.4* 8.4*   HCT 32.6* 28.8*   MCV 81.5 84.0   MCH 23.5* 24.5*   MCHC 28.8* 29.2*   RDW 23.8* 24.8*    370     CHEM  Recent Labs     04/26/25  0527      K 4.1   CO2 23   BUN 7.5*   CREATININE 0.54*   GLUCOSE 87   CALCIUM 8.3*           ASSESSMENT   Acute blood loss anemia s/t GI bleed  GI bleed s/t erosive Gastritis /Duodenitis  SIRS, resolved   Right femoral neck fracture (closed/displaced), s/p  Rt hip hemiarthroplasty on 4/14  Acute to subacute left thalamic hemorrhage  Oropharyngeal dysphagia due to above  Left eye blur vision s/t near occlusion of clinoid, ophthalmic segments of the internal carotid artery siphon on the left  Microcytic hypochromic/iron deficiency  Diabetes Mellitis , type II  Hypernatremia due to free water deficit  Elevated LFT, resolved   Essential HTN  HLD  h/o prior CVA    PLAN   Continue ASA/statin  Continue PPI BID, and Carafate   Monitoring H&H, continue oral iron replacement  Continue with therapy services   CM consulted for placement options      Prophylaxis:  SCDs        Dorian Ferreira MD  [facility]

## 2025-04-29 NOTE — PLAN OF CARE
Problem: Adult Inpatient Plan of Care  Goal: Plan of Care Review  4/29/2025 0111 by Daphne Chamberlain RN  Outcome: Progressing  4/29/2025 0111 by Daphne Chamberlain RN  Outcome: Progressing  Goal: Patient-Specific Goal (Individualized)  4/29/2025 0111 by Daphne Chamberlain RN  Outcome: Progressing  4/29/2025 0111 by Daphne Chamberlain RN  Outcome: Progressing  Goal: Absence of Hospital-Acquired Illness or Injury  4/29/2025 0111 by Daphne Chamberlain RN  Outcome: Progressing  4/29/2025 0111 by Daphne Chamberlain RN  Outcome: Progressing  Goal: Optimal Comfort and Wellbeing  4/29/2025 0111 by Daphne Chamberlain RN  Outcome: Progressing  4/29/2025 0111 by Daphne Chamberlain RN  Outcome: Progressing  Goal: Readiness for Transition of Care  4/29/2025 0111 by Daphne Chamberlain RN  Outcome: Progressing  4/29/2025 0111 by Daphne Chamberlain RN  Outcome: Progressing

## 2025-04-30 VITALS
WEIGHT: 131.81 LBS | RESPIRATION RATE: 18 BRPM | OXYGEN SATURATION: 96 % | TEMPERATURE: 98 F | DIASTOLIC BLOOD PRESSURE: 77 MMHG | HEIGHT: 60 IN | BODY MASS INDEX: 25.88 KG/M2 | SYSTOLIC BLOOD PRESSURE: 135 MMHG | HEART RATE: 93 BPM

## 2025-04-30 LAB
POCT GLUCOSE: 103 MG/DL (ref 70–110)
POCT GLUCOSE: 134 MG/DL (ref 70–110)

## 2025-04-30 PROCEDURE — 25000003 PHARM REV CODE 250: Performed by: PHYSICIAN ASSISTANT

## 2025-04-30 PROCEDURE — 25000003 PHARM REV CODE 250: Performed by: INTERNAL MEDICINE

## 2025-04-30 PROCEDURE — A4216 STERILE WATER/SALINE, 10 ML: HCPCS | Performed by: PHYSICIAN ASSISTANT

## 2025-04-30 PROCEDURE — 63600175 PHARM REV CODE 636 W HCPCS: Performed by: INTERNAL MEDICINE

## 2025-04-30 RX ORDER — SUCRALFATE 1 G/1
1 TABLET ORAL
Start: 2025-04-30

## 2025-04-30 RX ORDER — PANTOPRAZOLE SODIUM 40 MG/1
40 TABLET, DELAYED RELEASE ORAL
Start: 2025-04-30 | End: 2026-04-30

## 2025-04-30 RX ADMIN — PANTOPRAZOLE SODIUM 40 MG: 40 TABLET, DELAYED RELEASE ORAL at 05:04

## 2025-04-30 RX ADMIN — FERROUS SULFATE TAB 325 MG (65 MG ELEMENTAL FE) 1 EACH: 325 (65 FE) TAB at 08:04

## 2025-04-30 RX ADMIN — SODIUM CHLORIDE, PRESERVATIVE FREE 10 ML: 5 INJECTION INTRAVENOUS at 05:04

## 2025-04-30 RX ADMIN — GABAPENTIN 600 MG: 300 CAPSULE ORAL at 08:04

## 2025-04-30 RX ADMIN — ASPIRIN 81 MG CHEWABLE TABLET 81 MG: 81 TABLET CHEWABLE at 08:04

## 2025-04-30 RX ADMIN — DOCUSATE SODIUM LIQUID 100 MG: 100 LIQUID ORAL at 05:04

## 2025-04-30 RX ADMIN — SUCRALFATE 1 G: 1 TABLET ORAL at 11:04

## 2025-04-30 RX ADMIN — INSULIN GLARGINE 12 UNITS: 100 INJECTION, SOLUTION SUBCUTANEOUS at 08:04

## 2025-04-30 RX ADMIN — SUCRALFATE 1 G: 1 TABLET ORAL at 05:04

## 2025-04-30 NOTE — PLAN OF CARE
Per report from previous  P2P done and denial overturned possible discharge today to [facility]

## 2025-04-30 NOTE — NURSING
Patient is refusing his q2 turns every time I try to turn him. I also explained the importance of Q2 turns but he still refused.

## 2025-04-30 NOTE — NURSING
Nori contacted Dr. Christian Hernández (GI) to get okay to resume Aspirin (which was already restarted on 4/17/25 by neuro) and had to leave message.  Awaiting a call back/response.

## 2025-04-30 NOTE — NURSING
Called [facility] and gave report to Casie. Transport will be provided from [facility]. Nurse asked to leave IV in.

## 2025-04-30 NOTE — PLAN OF CARE
04/30/25 0943   Final Note   Assessment Type Final Discharge Note   Anticipated Discharge Disposition [facility]  ([facility])   Post-Acute Status   Post-Acute Authorization Placement   Post-Acute Placement Status Set-up Complete/Auth obtained   Coverage medicaid Healthy blue   Discharge Delays (!) Ambulance Transport/Facility Transport

## 2025-04-30 NOTE — PLAN OF CARE
Problem: Adult Inpatient Plan of Care  Goal: Plan of Care Review  Outcome: Met  Goal: Patient-Specific Goal (Individualized)  Outcome: Met  Goal: Absence of Hospital-Acquired Illness or Injury  Outcome: Met  Goal: Optimal Comfort and Wellbeing  Outcome: Met  Goal: Readiness for Transition of Care  Outcome: Met     Problem: Stroke, Ischemic (Includes Transient Ischemic Attack)  Goal: Optimal Functional Ability  Outcome: Met  Goal: Optimal Nutrition Intake  Outcome: Met     Problem: Fall Injury Risk  Goal: Absence of Fall and Fall-Related Injury  Outcome: Met     Problem: Skin Injury Risk Increased  Goal: Skin Health and Integrity  Outcome: Met     Problem: Wound  Goal: Optimal Wound Healing  Outcome: Met     Problem: Diabetes Comorbidity  Goal: Blood Glucose Level Within Targeted Range  Outcome: Met     Problem: Stroke, Intracerebral Hemorrhage  Goal: Optimal Functional Ability  Outcome: Met

## 2025-05-07 NOTE — DISCHARGE SUMMARY
"Hospital Medicine  Discharge Summary    Patient Name: William Mccauley  MRN: [id number]  Admit Date: 4/13/2025  Discharge Date: 4/30/2025   Status: IP- Inpatient   Length of Stay: 17      PHYSICIANS   Admitting Physician: Warren Covarrubias MD  Discharging Physician: Dorian Ferreira MD.  Primary Care Physician: Shannon Alonso MD (Inactive)  Consults: Gastroenterology, Neurology, and Orthopedic Surgery      DISCHARGE DIAGNOSES   Acute blood loss anemia s/t GI bleed  GI bleed s/t erosive Gastritis /Duodenitis  SIRS, resolved   Right femoral neck fracture (closed/displaced), s/p  Rt hip hemiarthroplasty on 4/14  Acute to subacute left thalamic hemorrhage  Oropharyngeal dysphagia due to above  Left eye blur vision s/t near occlusion of clinoid, ophthalmic segments of the internal carotid artery siphon on the left  Microcytic hypochromic/iron deficiency  Diabetes Mellitis , type II  Hypernatremia due to free water deficit  Elevated LFT, resolved   Essential HTN  HLD  h/o prior CVA      PROCEDURES   Right hip Hemiarthroplasty - 4/14/25   EGD - 4/22/25 - GI       HOSPITAL COURSE    "74 y.o. Kinyarwanda speaking male with a PMHx of HTN, HLD, DM 2, ptosis, prior CVA without deficits and sciatica presented to [facility] on 4/13/2025 with a primary complaint of right hip pain following a fall which occurred yesterday (04/12/2025). Patient was ambulating with a walker when he reached for the door, lost his balance and fell onto his right hip.  Patient denies hitting his head. Son who is at bedside translating stated that  patient did complain of some left eye blur vision  He denies shortness a breath, chest pain, nausea, vomiting, diarrhea, headache and dizziness.  He received 50 mcg of fentanyl in route for Rt hip pain.      On arrival  to the ED, Pt was afebrile, HR 88, /76, RR 18 and SpO2 96% on room air.  Labs with H&H 8.8/30.2, CO2 20, AST 48, ALT 63.  EKG with sinus rhythm with first-degree AV block, non specific  ST and T wave " change. right hip x-ray revealed a right femoral neck fracture.  Chest x-ray with no acute cardiopulmonary process.  CT of the head revealed a left thalamic minimal hyperdensity which may represent subacute hemorrhage related to subacute infarct but no convincing acute brain traumatic findings.  CT cervical spine without acute findings. MRI of the brain with small linear/punctuate area of acute/subacute left thalamic hemorrhage, possible hemorrhagic infarct.  In ED patient received IV hydration, morphine, Zofran.  Orthopedics and neurology were consulted.      Pt underwent Rt hip hemiarthroplasty with Dr. Raghu Hunt on 4/14/25. Neurology initiated stroke workup and suggested possible left thalamic hypertensive hemorrhagic stoke. CTA head/neck with severe narrowing of jah distal vertebral arteries, severe narrowing of the clinoid, ophthalmic segments of the internal carotid artery siphons bilaterally and near occlusion on the left.. Echo with LVEF 65-70%, neg bubble study, Carotid U/S with less than 50% ICA stenosis. Pt failed SLP eval and placed NPO. NG tube inserted for medication and nutrition. Neurology cleared Pt to start ASA 4 days from stroke onset which was 4/17/25. PT /OT suggested high intensity therapy.Spoke to Dr. Phillip with Neurology and cleared Pt for prophylactic Lovenox as of 4/18/25.      New onset fever of 101 on 4/17/25 associated with tachycardia. Infectious workup include CBC, CXR, UA, blood cultures x2 and Resp PCR ordered. Pt was started on empiric Zosyn IV. Workup was neg except Resp PCR was positive for Human Rhinovirus infection. Pt became afebrile. Zosyn discontinued. Pt pulled NG tube on 4/19/25. Replaced without difficulty. SLP continued to follow. As of 4/19/25 Pt remained with oropharyngeal dysphagia unsafe for PO intake. On 4/21/25, Pt passed MBS and was cleared for puree diet and mod thick liquid later advanced to soft and bite sized diet.      On 4/21/25 , Pt with melanotic stool  "and an acute drop in Hgb to 5.5 from 8.0 before. 2 units of P-RBC transfused. High dose PPI IV initiated. ASA/ Prophylactic Lovenox held. GI consulted.Underwent EGD on 4/22/25 showed evidence of erosions and erythema entire stomach and duodenum. PPI continued. IV Ferric gluconate initiated for iron replacement. Spoke to GI on 4/23/25  and suggested Asa can be resumed. Prophylactic Lovenox placed on hold. SCDs utilized for VTE prophylaxis. PT/OT suggested high intensity therapy. CM consulted to assist with DC planning."    Patient working with PT/OT, tolerating diet.  He was arranged for inpatient rehab and stable for transfer.         STATUS  Improved    DISPOSITION  Discharge to inpatient rehab    DIET  Cardiac    ACTIVITY  As tolerated      FOLLOW-UP      Homa Avila FNP. Schedule an appointment as soon as possible for a visit.    Specialty: Neurology  Why: Follow up in stroke clinic within 3 months of d/c, with Homa Avila NP  Contact information:  [address] [place] [address] Dr William NEVES [address] [phone]               Raghu Hunt MD. Schedule an appointment as soon as possible for a visit in 4 week(s).    Specialty: Orthopedic Surgery  Why: For follow up X-rays  Contact information:  [address] [facility] [address] [phone]               Shannon Alonso MD. Call.    Specialty: Family Medicine  Why: Following Rehab discharge  Contact information:  [address] [facility] [address] [phone]                 DISCHARGE MEDICATION RECONCILIATION     PRESCRIBED     pantoprazole 40 MG tablet  Commonly known as: PROTONIX  Take 1 tablet (40 mg total) by mouth 2 (two) times daily before meals.     sucralfate 1 gram tablet  Commonly known as: CARAFATE  Take 1 tablet (1 g total) by mouth 4 (four) times daily before meals and nightly.       CONTINUE     aspirin 81 MG EC tablet  Commonly known as: ECOTRIN  Take 1 tablet (81 mg total) by mouth once daily.     atorvastatin 40 MG " tablet  Commonly known as: LIPITOR  Take 1 tablet (40 mg total) by mouth once daily.     empagliflozin 25 mg tablet  Commonly known as: JARDIANCE  Take 1 tablet (25 mg total) by mouth once daily.     ferrous sulfate 325 mg (65 mg iron) Tab tablet  Commonly known as: FEOSOL  Take 1 tablet (325 mg total) by mouth once daily.     gabapentin 300 MG capsule  Commonly known as: NEURONTIN  Take 2 capsules (600 mg total) by mouth 3 (three) times daily.     metFORMIN 1000 MG (MOD) 24hr tablet  Commonly known as: GLUMETZA  Take 1 tablet (1,000 mg total) by mouth 2 (two) times daily with meals.     polyethylene glycol 17 gram/dose powder  Commonly known as: GLYCOLAX       DISCONTINUE     ketoconazole 2 % shampoo  Commonly known as: NIZORAL     losartan 25 MG tablet  Commonly known as: COZAAR            PHYSICAL EXAM   VITALS: T 97.9 °F (36.6 °C)   /77   P 93   RR 18   O2 96 %    GENERAL: awake and in no acute distress  LUNGS: Respirations non-labored  CVS: Normal rate  ABD: Soft, non-tender  EXTREMITIES: Radial pulse 2+  NEURO: Awake, alert  PSYCHIATRIC: Cooperative      Discharge time: 33 minutes     Dorian Ferreira MD  [facility] Medicine       DIAGNOSITCS     Lab Results   Component Value Date    LDL 54.00 04/13/2025      Lab Results   Component Value Date    HGBA1C 8.0 (H) 04/13/2025        Fl Modified Barium Swallow Speech  Result Date: 4/21/2025  See procedure notes from Speech Pathologist. This procedure was auto-finalized.    XR NG/OG tube placement check, non-radiologist performed  Result Date: 4/19/2025  EXAMINATION: XR NG/OG TUBE PLACEMENT CHECK, NON-RADIOLOGIST PERFORMED CLINICAL HISTORY: New NGT placement; COMPARISON: 17 April 2024     Frontal image of the upper abdomen.  Enteric tube extends well into the stomach. Electronically signed by: Bossman Lawrence Date:    04/19/2025 Time:    15:21    CV Ultrasound Bilateral Doppler Carotid  Result Date: 4/18/2025  The right internal carotid artery is patent with less  than 50% stenosis. The left internal carotid artery is patent with less than 50% stenosis. Bilateral vertebral arteries are patent with antegrade flow.     XR Gastric tube check, non-radiologist performed  Result Date: 4/17/2025  EXAMINATION: XR GASTRIC TUBE CHECK, NON-RADIOLOGIST PERFORMED CLINICAL HISTORY: NG partially reinserted; COMPARISON: X-rays dated 04/16/2025 FINDINGS: Nasogastric tube has its tip over the stomach.     Nasogastric tube with tip over the stomach. Electronically signed by: Perlita Coffman Date:    04/17/2025 Time:    15:34    X-Ray Chest 1 View  Result Date: 4/17/2025  EXAMINATION: XR CHEST 1 VIEW CLINICAL HISTORY: new onset fever; TECHNIQUE: One view COMPARISON: April 13, 2025. FINDINGS: Cardiopericardial silhouette is within normal limits.  No acute dense focal or segmental consolidation, congestive process, pleural effusions or pneumothorax.  Right upper lung lobe small calcified granuloma.  There is a nasogastric tube with tip within the mid to distal gastric body.     No acute cardiopulmonary process identified. Electronically signed by: Fabio Godwin Date:    04/17/2025 Time:    09:29    XR Gastric tube check, non-radiologist performed  Result Date: 4/16/2025  EXAMINATION: XR GASTRIC TUBE CHECK, NON-RADIOLOGIST PERFORMED CLINICAL HISTORY: placement; TECHNIQUE: One view COMPARISON: September 23, 2024 FINDINGS: Nasogastric tube traverses through the gastroesophageal junction and tip of tube is within the distal gastric body.  There is adequate length of the tube within the stomach.     Optimal placement of enteric tube. Electronically signed by: Fabio Godwin Date:    04/16/2025 Time:    17:01    Fl Modified Barium Swallow Speech  Result Date: 4/15/2025  See procedure notes from Speech Pathologist. This procedure was auto-finalized.    Echo Saline Bubble? Yes  Result Date: 4/14/2025    Left Ventricle: The left ventricle is normal in size. Normal wall thickness. There is normal systolic  function with a visually estimated ejection fraction of 65 - 70%.   Right Ventricle: The right ventricle is normal in size. Systolic function is normal.   Left Atrium: Agitated saline study of the atrial septum is negative, suggesting absence of intracardiac shunt at the atrial level.   Aortic Valve: There is aortic valve sclerosis. There is no stenosis. There is trace aortic regurgitation.   Mitral Valve: There is no significant regurgitation.   Tricuspid Valve: There is trace regurgitation.   Pulmonary Artery: The estimated pulmonary artery systolic pressure is 30 mmHg.   IVC/SVC: Normal venous pressure at 3 mmHg.   Pericardium: There is no pericardial effusion.     X-Ray Hip 2 or 3 views Right with Pelvis when performed  Result Date: 4/14/2025  EXAMINATION: XR HIP WITH PELVIS WHEN PERFORMED 2 OR 3 VIEWS RIGHT CLINICAL HISTORY: post op;  fracture COMPARISON: Yesterday FINDINGS: Frontal view of the pelvis with two views of the right hip.  There are expected changes of interval placement of right hip arthroplasty.     As above. Electronically signed by: Bossman Lawrence Date:    04/14/2025 Time:    14:35    CTA Head and Neck (xpd)  Result Date: 4/13/2025  EXAMINATION: CTA HEAD AND NECK (XPD) CLINICAL HISTORY: Stroke, follow up; TECHNIQUE: Non contrast low dose axial images were obtained through the head.  CT angiogram was performed from the level of the ranjana to the top of the head following the IV administration of 100mL of Omnipaque 350.   Sagittal and coronal reconstructions and maximum intensity projection reconstructions were performed. Arterial stenosis percentages are based on NASCET measurement criteria.  DLP 2906.  Automated exposure control used. COMPARISON: 11/15/2023 FINDINGS: Common carotid arteries, carotid bulbs, internal carotid arteries are patent without significant stenosis or occlusion.  Mild atherosclerotic plaque noted at the carotid bulbs bilaterally.  Mild moderate narrowing of the left dominant  vertebral artery origin.  Vertebral arteries are otherwise patent.  No soft tissue mass, fluid collection, hematoma, lymphadenopathy. Head: Prominent atherosclerotic plaque noted involving the distal vertebral arteries bilaterally suspected moderate severe narrowing.  Basilar artery patent.  Prominent atherosclerotic plaque along the carotid siphons bilaterally we severe narrowing bilaterally particular involving the ophthalmic and clinoid segments likely slightly greater on the left.  Middle cerebral arteries and branches patent bilaterally.  Anterior cerebral arteries, posterior cerebral arteries patent bilaterally.  No aneurysm evident..     Mild moderate narrowing of the left vertebral artery origin. Severe narrowing of the distal vertebral arteries bilaterally. Severe narrowing of the clinoid, ophthalmic segments of the internal carotid artery siphons bilaterally.  Suspected near occlusion on the left. No significant interval change from the prior. Electronically signed by: Taras Dunn MD Date:    04/13/2025 Time:    12:31    MRI Brain Without Contrast  Result Date: 4/13/2025  EXAMINATION: MRI BRAIN WITHOUT CONTRAST CLINICAL HISTORY: Stroke, follow up; TECHNIQUE: Multiplanar multisequence MR imaging of the brain was performed without contrast. COMPARISON: CT same day FINDINGS: Small linear area of susceptibility artifact posterior left thalamus with a punctate area of increased T1 signal consistent with acute/subacute thalamic hemorrhage. No visible diffusion restriction on diffusion sequence. Advanced involutional change.  Moderate severe chronic microvascular white matter ischemic change.  Scattered remote lacunar infarcts in the subinsular basal ganglia regions. Ventricles, sulci, cisterns otherwise normal with no mass effect or midline shift.  No intra or extra-axial mass. Posterior fossa brainstem otherwise grossly normal.  Sella and orbits grossly normal. Mild ethmoid mucosal thickening.  Cranium and  extracranial structures otherwise unremarkable.     Small linear/punctate area of acute/subacute left thalamic hemorrhage, possible hemorrhagic infarct although with no visible diffusion restriction, region obscured by hemorrhagic artifact. Advanced involutional change, moderate severe chronic microvascular white matter ischemic change. Electronically signed by: Taras Dunn MD Date:    04/13/2025 Time:    12:11    CT Head Without Contrast  Result Date: 4/13/2025  EXAMINATION: CT HEAD WITHOUT CONTRAST CLINICAL HISTORY: fall; TECHNIQUE: Sequential axial images were performed of the brain without contrast. Dose product length of 1281 mGycm. Automated exposure control was utilized to minimize radiation dose. COMPARISON: CT brain March 20, 2024. FINDINGS: There is left basal ganglia blush like subtle hyperdense which could represent subacute minimal hemorrhage on image 32 series 2.  This may be related to hemorrhagic infarct.  There are bilateral thalami old lacunar infarcts.  There is also pontine old lacunar infarct.  No acute large vessel territory infarct identified.  There is chronic microvascular ischemia and atrophy.  No mass effect, midline shift or hydrocephalus.  No acute extra-axial fluid collections.  There is no acute depressed calvarial fracture. There is mild mucoperiosteal thickening of the ethmoidal air cells and minimally the right maxillary sinus.  Otherwise, visualized paranasal sinuses are clear without mucosal thickening, polypoidal abnormality or air-fluid levels. Mastoid air cells aeration is optimal.     1.  Left thalamic minimal hyperdensity may represent subacute hemorrhage related to subacute infarct.  Please correlate clinically.  This may be further assessed with MRI brain. 2.  No convincing acute brain traumatic findings. Electronically signed by: Fabio Gowdin Date:    04/13/2025 Time:    10:09    CT Cervical Spine Without Contrast  Result Date: 4/13/2025  EXAMINATION: CT CERVICAL  SPINE WITHOUT CONTRAST CLINICAL HISTORY: fall; TECHNIQUE: Low dose axial images, sagittal and coronal reformations were performed though the cervical spine.  Contrast was not administered.  DLP 1281.  Automated exposure control used. COMPARISON: None FINDINGS: No fracture or dislocation evident.  Vertebral body height is maintained.  Odontoid intact. C2-C3 demonstrates minimal facet arthrosis. C3-C4 demonstrates mild left-sided facet arthrosis, right-sided uncovertebral spurring and moderate right-sided osseous foraminal narrowing. C4-C5 demonstrates mild disc space narrowing, osteophytic lipping and uncovertebral spurring, moderate severe osseous foraminal narrowing right greater than left. C5-C6 demonstrates mild disc space narrowing, moderate osteophytic change and uncovertebral spurring, moderate severe osseous foraminal narrowing. C6-C7 moderate disc space narrowing, osteophytic change, prominent uncovertebral spurring and severe osseous foraminal narrowing bilaterally. No soft tissue mass, fluid collection, hematoma, lymphadenopathy.     No acute findings.  Spondylosis as above. Electronically signed by: Taras Dunn MD Date:    04/13/2025 Time:    10:06    X-Ray Hip 2 or 3 views Right with Pelvis when performed  Result Date: 4/13/2025  EXAMINATION: XR HIP WITH PELVIS WHEN PERFORMED 2 OR 3 VIEWS RIGHT CLINICAL HISTORY: Fall. COMPARISON: None available. FINDINGS: There is right femoral neck fracture.  Femoral neck however is not well seen and is obscured due to some proximal retraction of the femoral shaft.  Femoral head is situated within the acetabulum.  No other fracture or dislocation     Fracture. Electronically signed by: Fabio Godwin Date:    04/13/2025 Time:    09:46    X-Ray Chest AP Portable  Result Date: 4/13/2025  EXAMINATION: XR CHEST AP PORTABLE CLINICAL HISTORY: Unspecified fall, initial encounter TECHNIQUE: One view COMPARISON: March 27, 2023. FINDINGS: Cardiopericardial silhouette enlarged  appearance is similar.  Lungs are without dense focal or segmental consolidation, congestive process, pleural effusions or pneumothorax.     No acute cardiopulmonary process identified. Electronically signed by: Fabio Godwin Date:    04/13/2025 Time:    09:43    US Abdomen Limited  Result Date: 4/9/2025  EXAMINATION: US ABDOMEN LIMITED CLINICAL HISTORY: elevated liver enzymes;  Elevation of levels of liver transaminase levels COMPARISON: None FINDINGS: Liver measures 13.2 cm.  No abnormalities are demonstrated.  There is hepatopetal flow in the portal vein.  Gallbladder is anechoic.  Common bile duct measures 5 mm. The pancreas is incompletely visualized due to patient's body habitus.  An abnormality is not demonstrated.  There is flow present the proximal IVC. The right kidney measures 9.8 by 5 x 5 cm.  There are no focal lesions noted there is no hydronephrosis     No acute abnormalities are demonstrated. Limited exam Electronically signed by: Vic Turner MD Date:    04/09/2025 Time:    08:36

## 2025-06-02 ENCOUNTER — HOSPITAL ENCOUNTER (OUTPATIENT)
Dept: RADIOLOGY | Facility: CLINIC | Age: 75
Discharge: HOME | End: 2025-06-02
Attending: ORTHOPAEDIC SURGERY
Payer: MEDICAID

## 2025-06-02 ENCOUNTER — OFFICE VISIT (OUTPATIENT)
Dept: ORTHOPEDICS | Facility: CLINIC | Age: 75
End: 2025-06-02
Payer: MEDICAID

## 2025-06-02 VITALS
WEIGHT: 131.81 LBS | DIASTOLIC BLOOD PRESSURE: 79 MMHG | SYSTOLIC BLOOD PRESSURE: 127 MMHG | BODY MASS INDEX: 25.88 KG/M2 | HEIGHT: 60 IN | HEART RATE: 88 BPM

## 2025-06-02 DIAGNOSIS — S72.001D CLOSED FRACTURE OF NECK OF RIGHT FEMUR WITH ROUTINE HEALING: Primary | ICD-10-CM

## 2025-06-02 DIAGNOSIS — S72.001D CLOSED FRACTURE OF NECK OF RIGHT FEMUR WITH ROUTINE HEALING: ICD-10-CM

## 2025-06-02 PROCEDURE — 1160F RVW MEDS BY RX/DR IN RCRD: CPT | Mod: CPTII,,,

## 2025-06-02 PROCEDURE — 3288F FALL RISK ASSESSMENT DOCD: CPT | Mod: CPTII,,,

## 2025-06-02 PROCEDURE — 3074F SYST BP LT 130 MM HG: CPT | Mod: CPTII,,,

## 2025-06-02 PROCEDURE — 1159F MED LIST DOCD IN RCRD: CPT | Mod: CPTII,,,

## 2025-06-02 PROCEDURE — 1101F PT FALLS ASSESS-DOCD LE1/YR: CPT | Mod: CPTII,,,

## 2025-06-02 PROCEDURE — 73502 X-RAY EXAM HIP UNI 2-3 VIEWS: CPT | Mod: RT,,, | Performed by: ORTHOPAEDIC SURGERY

## 2025-06-02 PROCEDURE — 3052F HG A1C>EQUAL 8.0%<EQUAL 9.0%: CPT | Mod: CPTII,,,

## 2025-06-02 PROCEDURE — 99024 POSTOP FOLLOW-UP VISIT: CPT | Mod: ,,,

## 2025-06-02 PROCEDURE — 3078F DIAST BP <80 MM HG: CPT | Mod: CPTII,,,

## 2025-06-03 DIAGNOSIS — G57.01 SCIATIC NERVE DISEASE, RIGHT: ICD-10-CM

## 2025-06-05 RX ORDER — GABAPENTIN 300 MG/1
600 CAPSULE ORAL 3 TIMES DAILY
Qty: 180 CAPSULE | Refills: 2 | Status: SHIPPED | OUTPATIENT
Start: 2025-06-05 | End: 2025-09-03

## 2025-07-02 DIAGNOSIS — Z86.73 HISTORY OF STROKE: ICD-10-CM

## 2025-07-02 RX ORDER — ASPIRIN 81 MG/1
81 TABLET ORAL
Qty: 30 TABLET | Refills: 5 | Status: SHIPPED | OUTPATIENT
Start: 2025-07-02 | End: 2025-07-03 | Stop reason: SDUPTHER

## 2025-07-03 DIAGNOSIS — Z86.73 HISTORY OF STROKE: ICD-10-CM

## 2025-07-03 DIAGNOSIS — E11.65 UNCONTROLLED TYPE 2 DIABETES MELLITUS WITH HYPERGLYCEMIA: ICD-10-CM

## 2025-07-03 RX ORDER — ASPIRIN 81 MG/1
81 TABLET ORAL DAILY
Qty: 30 TABLET | Refills: 0 | Status: SHIPPED | OUTPATIENT
Start: 2025-07-03

## 2025-07-03 RX ORDER — METFORMIN HYDROCHLORIDE 1000 MG/1
1000 TABLET, EXTENDED RELEASE ORAL 2 TIMES DAILY WITH MEALS
Qty: 180 TABLET | Refills: 0 | Status: SHIPPED | OUTPATIENT
Start: 2025-07-03

## 2025-07-03 NOTE — TELEPHONE ENCOUNTER
Copied from CRM #[id number]. Topic: Medications - Medication Refill  >> Jul 2, 2025  4:11 PM Mavis wrote:  .Who Called: Chata with [facility]    Refill or New Rx:Refill  RX Name and Strength:metFORMIN (GLUMETZA) 1000 MG (MOD) 24hr tablet  How is the patient currently taking it? (ex. 1XDay):1 tab 2x per day   Is this a 30 day or 90 day RX:30   Local or Mail Order:local   List of preferred pharmacies on file (remove unneeded): [facility] [address] [place] [address]  If different Pharmacy is requested, enter Pharmacy information here including location and phone number:    Ordering Provider:      Preferred Method of Contact: Phone Call  Patient's Preferred Phone Number on File: [phone]   Best Call Back Number, if different: [phone]   Additional Information:     .Who Called: Thomas Mccauley    Refill or New Rx:Refill  RX Name and Strength:aspirin (ECOTRIN) 81 MG EC tablet  How is the patient currently taking it? (ex. 1XDay):1x   Is this a 30 day or 90 day RX:30   Local or Mail Order:local   List of preferred pharmacies on file (remove unneeded): [id number] [place], [place]  If different Pharmacy is requested, enter Pharmacy information here including location and phone number:    Ordering Provider:      Preferred Method of Contact: Phone Call  Patient's Preferred Phone Number on File: [phone]   Best Call Back Number, if different:[phone]  Additional Information:

## 2025-07-14 ENCOUNTER — HOSPITAL ENCOUNTER (OUTPATIENT)
Dept: RADIOLOGY | Facility: CLINIC | Age: 75
Discharge: HOME | End: 2025-07-14
Attending: ORTHOPAEDIC SURGERY
Payer: MEDICAID

## 2025-07-14 ENCOUNTER — OFFICE VISIT (OUTPATIENT)
Dept: ORTHOPEDICS | Facility: CLINIC | Age: 75
End: 2025-07-14
Payer: MEDICAID

## 2025-07-14 VITALS
BODY MASS INDEX: 25.88 KG/M2 | RESPIRATION RATE: 18 BRPM | SYSTOLIC BLOOD PRESSURE: 121 MMHG | WEIGHT: 131.81 LBS | DIASTOLIC BLOOD PRESSURE: 70 MMHG | HEART RATE: 68 BPM | HEIGHT: 60 IN

## 2025-07-14 DIAGNOSIS — S72.001D CLOSED FRACTURE OF NECK OF RIGHT FEMUR WITH ROUTINE HEALING: ICD-10-CM

## 2025-07-14 DIAGNOSIS — S72.001D CLOSED FRACTURE OF NECK OF RIGHT FEMUR WITH ROUTINE HEALING: Primary | ICD-10-CM

## 2025-07-14 PROCEDURE — 3288F FALL RISK ASSESSMENT DOCD: CPT | Mod: CPTII,,,

## 2025-07-14 PROCEDURE — 99024 POSTOP FOLLOW-UP VISIT: CPT | Mod: ,,,

## 2025-07-14 PROCEDURE — 3074F SYST BP LT 130 MM HG: CPT | Mod: CPTII,,,

## 2025-07-14 PROCEDURE — 3052F HG A1C>EQUAL 8.0%<EQUAL 9.0%: CPT | Mod: CPTII,,,

## 2025-07-14 PROCEDURE — 1100F PTFALLS ASSESS-DOCD GE2>/YR: CPT | Mod: CPTII,,,

## 2025-07-14 PROCEDURE — 3078F DIAST BP <80 MM HG: CPT | Mod: CPTII,,,

## 2025-07-14 PROCEDURE — 73502 X-RAY EXAM HIP UNI 2-3 VIEWS: CPT | Mod: RT,,, | Performed by: ORTHOPAEDIC SURGERY

## 2025-07-14 PROCEDURE — 1160F RVW MEDS BY RX/DR IN RCRD: CPT | Mod: CPTII,,,

## 2025-07-14 PROCEDURE — 1159F MED LIST DOCD IN RCRD: CPT | Mod: CPTII,,,

## 2025-07-14 NOTE — PROGRESS NOTES
[facility]      Name: William Mccauley  : 1950  MRN: [id number]  Date: 2025    Chief Complaint   Patient presents with    Right Hip - Follow-up     3 month f/u right hip aleta, presents in wheelchair, with daughter.  She reports pain, stiffness to help.  Especially in the mornings.        Subjective:      Chief Complaint   Patient presents with    Right Hip - Follow-up     3 month f/u right hip aleta, presents in wheelchair, with daughter.  She reports pain, stiffness to help.  Especially in the mornings.         Follow-up      William Mccauley is a 74 y.o. male presents to clinic 3 month status post right hip hemiarthroplasty.  Presents in wheelchair today.  His daughter is present with him today.  Reports HH PT is now complete but he does still have difficulty walking and pain to the anterior right hip.  Open to beginning outpt PT.       ROS:  Constitutional: Denies fever chills  MSK: Pain evident at site of injury located in HPI,   Integ: No signs of abrasions or lacerations  Neuro: No numbness or tingling  Lymphatic: No swelling outside the area of injury     Current Outpatient Medications   Medication Instructions    aspirin (ECOTRIN) 81 mg, Oral, Daily    atorvastatin (LIPITOR) 40 mg, Oral, Daily    empagliflozin (JARDIANCE) 25 mg, Oral, Daily    ferrous sulfate (FEOSOL) 325 mg, Oral, Daily    gabapentin (NEURONTIN) 600 mg, Oral, 3 times daily    metFORMIN (GLUMETZA) 1,000 mg, Oral, 2 times daily with meals    pantoprazole (PROTONIX) 40 mg, Oral, 2 times daily before meals    polyethylene glycol (GLYCOLAX) 17 gram/dose powder Oral    sucralfate (CARAFATE) 1 g, Oral, Before meals & nightly        Objective:     Visit Vitals  /70   Pulse 68   Resp 18   Ht 5' (1.524 m)   Wt 59.8 kg (131 lb 13.4 oz)   BMI 25.75 kg/m²     Physical Exam    General the patient is alert and oriented x3 no acute distress nontoxic-appearing appropriate affect.    Constitutional: Vital signs are examined and  stable.  Resp: No signs of labored breathing    Right lower extremity:   Compartments soft and compressible.  Incision well healed without dehiscence or infection.  Palpable posterior tibialis pulse.  Tolerates passive range of motion of hip and knee without pain.  Tolerates internal rotation, external rotation, flexion of hip passively.  Gross motor intact.  Light sensation intact.  Skin warm and dry without lacerations or abrasions.        Body mass index is 25.75 kg/m².  Ideal body weight: 50 kg (110 lb 3.7 oz)  Adjusted ideal body weight: 53.9 kg (118 lb 14 oz)  Hgb   Date Value Ref Range Status   04/28/2025 8.4 (L) 14.0 - 18.0 g/dL Final   04/26/2025 9.4 (L) 14.0 - 18.0 g/dL Final     Hct   Date Value Ref Range Status   04/28/2025 28.8 (L) 42.0 - 52.0 % Final   04/26/2025 32.6 (L) 42.0 - 52.0 % Final     Vitamin D   Date Value Ref Range Status   05/07/2024 32.1 30.0 - 80.0 ng/mL Final   03/27/2023 28.1 (L) 30.0 - 80.0 ng/mL Final     WBC   Date Value Ref Range Status   04/28/2025 5.80 4.50 - 11.50 x10(3)/mcL Final   04/26/2025 7.14 4.50 - 11.50 x10(3)/mcL Final       Radiology:   X-ray right hip with AP pelvis: Hardware intact.  No evidence of failure.  No acute osseous abnormalities noted.    Assessment:       ICD-10-CM ICD-9-CM   1. Closed fracture of neck of right femur with routine healing  S72.001D V54.13       Plan:     1. Closed fracture of neck of right femur with routine healing  -     X-Ray Hip 2 or 3 views Right with Pelvis when performed; Future; Expected date: 07/14/2025  -     Ambulatory Referral/Consult to Physical Therapy; Future; Expected date: 07/21/2025    Patient may continue weight-bearing as tolerated.  Pt to begin outpt PT.  He will f/u in 2 months.  Patient and his daughter both verbalized understanding and agree with treatment plan.  Pt to call clinic with any questions/concerns.      The above findings, diagnostics, and treatment plan were discussed with Dr. Hunt who is in agreement  with the plan of care except as stated in additional documentation.     Ashley Gunther, PA-C [facility]    Future Appointments   Date Time Provider Department Center   9/18/2025  9:00 AM Raghu Hunt MD [facility] [place]   11/5/2025  9:30 AM Stacie Newman, BESSY [facility] [place]   4/29/2026  9:00 AM [facility]        This note was created with the assistance of voice recognition software or phone dictation. There may be transcription errors as a result of using this technology however minimal. Effort has been made to assure accuracy of transcription but any obvious errors or omissions should be clarified with the author of the document.

## 2025-07-15 DIAGNOSIS — G57.01 SCIATIC NERVE DISEASE, RIGHT: ICD-10-CM

## 2025-07-15 RX ORDER — GABAPENTIN 300 MG/1
600 CAPSULE ORAL 3 TIMES DAILY
Qty: 180 CAPSULE | Refills: 2 | Status: SHIPPED | OUTPATIENT
Start: 2025-07-15 | End: 2025-10-13

## 2025-07-15 NOTE — TELEPHONE ENCOUNTER
Copied from CRM #[id number]. Topic: Medications - Medication Refill  >> Jul 9, 2025  8:43 AM Fadi wrote:  .Who Called: Jose Antonioe from [facility]     Refill or New Rx:Refill  RX Name and Strength:  gabapentin (NEURONTIN) 300 MG capsule    How is the patient currently taking it? (ex. 1XDay):3x  Is this a 30 day or 90 day RX:180 tablets   Local or Mail Order:local   List of preferred pharmacies on file (remove unneeded): [unfilled]  If different Pharmacy is requested, enter Pharmacy information here including location and phone number: [facility]    Ordering Provider:      Preferred Method of Contact: Phone Call  Patient's Preferred Phone Number on File: [phone]  Best Call Back Number, if different:  Additional Information:

## 2025-07-15 NOTE — TELEPHONE ENCOUNTER
Please schedule patient with next available  provider to establish care. Former patient of Dr. Alonso.    Thank you,    SAMEER Ferro

## 2025-08-19 RX ORDER — FERROUS SULFATE 325(65) MG
325 TABLET ORAL DAILY
Qty: 90 TABLET | Refills: 0 | Status: SHIPPED | OUTPATIENT
Start: 2025-08-19

## (undated) DEVICE — GLOVE SIGNATURE MICRO LTX 7

## (undated) DEVICE — SYR 10CC LUER LOCK

## (undated) DEVICE — TIP SUCTION YANKAUER

## (undated) DEVICE — TROCAR ENDOPATH XCEL 5X100MM

## (undated) DEVICE — TROCAR ENDOPATH XCEL 12MM 10CM

## (undated) DEVICE — RETRIEVER SUTURE HEWSON DISP

## (undated) DEVICE — GLOVE PROTEXIS LTX MICRO  7

## (undated) DEVICE — TAPE ADH MEDIPORE 4 X 10YDS

## (undated) DEVICE — SOL NACL IRR 3000ML

## (undated) DEVICE — SPONGE KITTNER 1/4X 5/8 L STRL

## (undated) DEVICE — KIT SURGICAL TURNOVER

## (undated) DEVICE — TRAY CATH 1-LYR URIMTR 16FR

## (undated) DEVICE — SUT VICRYL CTD 2-0 GI 27 SH

## (undated) DEVICE — BLADE SAG DUAL CUT 18X90X1.35

## (undated) DEVICE — COVER FULLGUARD SHOE HIGH-TOP

## (undated) DEVICE — BLOCK BLOX BITE DENT RIM 54FR

## (undated) DEVICE — MANIFOLD 4 PORT

## (undated) DEVICE — GLOVE SIGNATURE MICRO LTX 7.5

## (undated) DEVICE — SUT FIBERWIRE #5 1/2 X 38

## (undated) DEVICE — LINER MEDI-VAC PPV FLTR 1500CC

## (undated) DEVICE — GLOVE SIGNATURE MICRO LTX 6

## (undated) DEVICE — GLOVE SENSICARE PI GRN 6.5

## (undated) DEVICE — GLOVE SENSICARE PI GRN 7.5

## (undated) DEVICE — KIT ANTIFOG W/SPONG & FLUID

## (undated) DEVICE — GLOVE SIGNATURE MICRO LTX 6.5

## (undated) DEVICE — CLIPPER BLADE MOD 4406 (CAREF)

## (undated) DEVICE — GLOVE SURG PLYSPHRN ORTH SZ 8

## (undated) DEVICE — Device

## (undated) DEVICE — SPONGE COTTON TRAY 4X4IN

## (undated) DEVICE — GOWN POLY REINF BRTH SLV XL

## (undated) DEVICE — SOL NACL IRR 1000ML BTL

## (undated) DEVICE — PILLOW ABDUCTION FOAM MED

## (undated) DEVICE — ADHESIVE DERMABOND ADVANCED

## (undated) DEVICE — BLADE PEAK PLASMA

## (undated) DEVICE — FORCEP ALLIGATOR 2.8MM W/NDL

## (undated) DEVICE — ELECTRODE PATIENT RETURN DISP

## (undated) DEVICE — APPLICATOR CHLORAPREP ORN 26ML

## (undated) DEVICE — SUT PDS PLUS 1 TP1 96IN

## (undated) DEVICE — DRAPE SURG W/TWL 17 5/8X23

## (undated) DEVICE — NDL HYPO REG 25G X 1 1/2

## (undated) DEVICE — SUT SA85H SILK 2-0

## (undated) DEVICE — SUT MCRYL PLUS 4-0 PS2 27IN

## (undated) DEVICE — DRAPE STERI U-SHAPED 47X51IN

## (undated) DEVICE — SOL IRRI STRL WATER 1000ML

## (undated) DEVICE — TAPE SILK 3IN

## (undated) DEVICE — SUT 2/0 30IN SILK BLK BRAI

## (undated) DEVICE — GLOVE 8.0 PROTEXIS PI MICRO

## (undated) DEVICE — SYRINGE 0.9% NACL 10MIL PREFIL

## (undated) DEVICE — KIT TOTAL HIP HLGC

## (undated) DEVICE — ELECTRODE REM POLYHESIVE II

## (undated) DEVICE — GOWN SMARTSLEEVE AAMI LVL4 XL

## (undated) DEVICE — SUT STRATAFIX 3-0 30CM

## (undated) DEVICE — CONTAINER FORMALIN PREFILLED

## (undated) DEVICE — SUT VICRYL 3-0 27 SH

## (undated) DEVICE — CABLE EKG DL RBBN 3 LEAD DISP

## (undated) DEVICE — KIT SURGICAL COLON .25 1.1OZ

## (undated) DEVICE — SPONGE LAP 18X18 PREWASHED

## (undated) DEVICE — GLOVE SENSICARE PI GRN 6

## (undated) DEVICE — SUT CTD VICRYL CT-1 27

## (undated) DEVICE — DRAIN PENRS STERILE LTX 18X1/2

## (undated) DEVICE — DRAPE FULL SHEET 70X100IN